# Patient Record
Sex: MALE | Race: WHITE | NOT HISPANIC OR LATINO | Employment: OTHER | URBAN - METROPOLITAN AREA
[De-identification: names, ages, dates, MRNs, and addresses within clinical notes are randomized per-mention and may not be internally consistent; named-entity substitution may affect disease eponyms.]

---

## 2017-03-02 ENCOUNTER — SLEEP CENTER VISIT (OUTPATIENT)
Dept: SLEEP MEDICINE | Facility: MEDICAL CENTER | Age: 77
End: 2017-03-02
Payer: MEDICARE

## 2017-03-02 ENCOUNTER — TELEPHONE (OUTPATIENT)
Dept: SLEEP MEDICINE | Facility: MEDICAL CENTER | Age: 77
End: 2017-03-02

## 2017-03-02 VITALS
OXYGEN SATURATION: 92 % | DIASTOLIC BLOOD PRESSURE: 84 MMHG | RESPIRATION RATE: 16 BRPM | TEMPERATURE: 98.6 F | HEART RATE: 65 BPM | BODY MASS INDEX: 36.06 KG/M2 | WEIGHT: 281 LBS | HEIGHT: 74 IN | SYSTOLIC BLOOD PRESSURE: 134 MMHG

## 2017-03-02 DIAGNOSIS — G47.33 OSA (OBSTRUCTIVE SLEEP APNEA): ICD-10-CM

## 2017-03-02 PROCEDURE — 99213 OFFICE O/P EST LOW 20 MIN: CPT | Performed by: NURSE PRACTITIONER

## 2017-03-02 NOTE — PATIENT INSTRUCTIONS
1) Continue autoCPAP at [id number]  2) Clean mask and supplies weekly and change them as insurance allows  3) Flu up to date  4) Return in about 1 year (around 3/2/2018) for Compliance, review of symptoms, if not sooner, follow up with [facility].

## 2017-03-02 NOTE — PROGRESS NOTES
CC:  Here for f/u sleep issues as listed below    HPI:   Farhat presents today for follow up obstructive sleep apnea.  PSG from 2015 indicated an AHI of 57 and low oxygen of 81%.  Currently he is being treated with CPAP @ [id number].  Compliance download from the dates 1/31/2017 - 3/1/2017 indicates he is wearing the device 97% for an avg of 6 hours and 38 minutes per night with a reduced AHI of 0.8. Avg pressure 11.9 with a high of 15.2. He does tolerate pressure and mask well and will be trying a new mask soon.  He wakes up refreshed and denies morning H/A. he sleeps better overall. he will continue to clean supplies weekly and change them as insurance allows.       Patient Active Problem List    Diagnosis Date Noted   • Tubular adenoma of colon 07/06/2016   • Blackwell's esophagus 07/06/2016   • History of ulcer disease 06/23/2016   • Chronic kidney disease, stage III (moderate) 03/21/2016   • Peripheral autonomic neuropathy 09/23/2015   • Hypothyroid 04/28/2015   • Obesity (BMI 30-39.9) 08/22/2014   • Vertigo 05/13/2014   • Sleep apnea 02/20/2014   • Renal cyst 01/22/2014   • Compression fracture 01/21/2014   • MEDICAL HOME    • Hearing loss 11/26/2012   • Impaired fasting glucose 10/10/2012   • Paroxysmal atrial fibrillation (CMS-[facility]) 05/22/2012   • Right foot pain 03/06/2010   • Hypertension 09/16/2009   • Dyslipidemia 09/16/2009   • Allergic rhinitis 09/16/2009   • Status post lumbar surgery 09/16/2009   • Preventative health care 09/16/2009       Past Medical History   Diagnosis Date   • Fibula fracture 1981     right   • Hyperlipidemia    • Arthritis    • Hypertension    • MEDICAL HOME    • Arrhythmia      Atrial fribrillation   • High cholesterol    • Pneumonia 2004   • Bronchitis 2004   • Pain      low back   • Sleep apnea        Past Surgical History   Procedure Laterality Date   • Tonsillectomy  1945     adenoidectomy   • Exostosis excision  6/3/2011     Performed by ABDIFATAH YOUNG at [facility] ORS   • Lesion excision ortho  6/3/2011     Performed by ABDIFATAH YOUNG at SURGERY [facility] ORS   • Toe arthroplasty  6/3/2011     Performed by ABDIFATAH YOUNG at SURGERY [facility]   • Lumbar laminectomy diskectomy  2005     microscopic   • Hammertoe correction  2/2/2015     Performed by Abdifatah Young D.P.M. at SURGERY [facility]       Family History   Problem Relation Age of Onset   • Cancer     • Cancer Mother      cervical/breast   • Cancer Sister      lung   • Cancer Father      lung cancer       Social History     Social History   • Marital Status:      Spouse Name: N/A   • Number of Children: N/A   • Years of Education: N/A     Occupational History   • Not on file.     Social History Main Topics   • Smoking status: Never Smoker    • Smokeless tobacco: Never Used   • Alcohol Use: No   • Drug Use: No   • Sexual Activity: Not on file     Other Topics Concern   • Not on file     Social History Narrative       Current Outpatient Prescriptions   Medication Sig Dispense Refill   • pravastatin (PRAVACHOL) 40 MG tablet Take 1 Tab by mouth every day. 90 Tab 0   • carvedilol (COREG) 25 MG Tab Take 0.5 Tabs by mouth 2 times a day, with meals. 90 Tab 1   • fluticasone (FLONASE) 50 MCG/ACT nasal spray Spray 2 Sprays in nose every day. 3 Bottle 3   • omeprazole (PRILOSEC) 40 MG delayed-release capsule Take 1 Cap by mouth every day. 30 Cap 11   • levothyroxine (SYNTHROID) 75 MCG Tab Take 1 Tab by mouth Every morning on an empty stomach. 90 Tab 3   • aspirin 81 MG tablet Take 81 mg by mouth every day.     • vitamin D (CHOLECALCIFEROL) 1000 UNIT TABS Take 1,000 Units by mouth 2 Times a Day.     • docosahexanoic acid (OMEGA 3 FA) 1000 MG CAPS Take 1,000 mg by mouth 2 Times a Day.       No current facility-administered medications for this visit.          Allergies: Flexeril and Other environmental      ROS   Gen: Denies fever, chills, unintentional weight loss, fatigue  Resp:Denies  "Dyspnea  CV: Denies chest pain, chest tightness  Sleep:Denies morning headache, insomnia, daytime somnolence, snoring, gasping for air, apnea  Neuro: Denies frequent headaches, weakness, dizziness  See HPI.  All other systems reviewed and negative        Vital signs for this encounter:  Filed Vitals:    03/02/17 1035   Height: 1.88 m (6' 2.02\")   Weight: 127.461 kg (281 lb)   Weight % change since last entry.: 0 %   BP: 134/84   Pulse: 65   BMI (Calculated): 36.06   Resp: 16   Temp: 37 °C (98.6 °F)   O2 sat % room air: 92 %                   Physical Exam:   Gen:         Alert and oriented, No apparent distress.   Neck:        No Lymphadenopathy.  Lungs:     Clear to auscultation bilaterally.    CV:          Regular rate and rhythm. No murmurs, rubs or gallops.   Abd:         Soft non tender, non distended.            Ext:          No clubbing, cyanosis, edema.    Assessment   1. MARCI (obstructive sleep apnea)  DME MASK AND SUPPLIES       PLAN:   1) Continue autoCPAP at [id number]  2) Clean mask and supplies weekly and change them as insurance allows  3) Flu up to date  4) Return in about 1 year (around 3/2/2018) for Compliance, review of symptoms, if not sooner, follow up with [facility].    "

## 2017-03-02 NOTE — MR AVS SNAPSHOT
"Farhat Stahl   3/2/2017 10:40 AM   Sleep Center Visit   MRN: [id number]    Department:  [facility]   Dept Phone:  [phone]    Description:  Male : 1940   Provider:  ROSA Mckeon           Reason for Visit     Apnea Last seen 12/14/15       Allergies as of 3/2/2017     Allergen Noted Reactions    Flexeril [Cyclobenzaprine Hcl] 2014       confusion    Other Environmental 2011       [place]      You were diagnosed with     MARCI (obstructive sleep apnea)   [[id number]]         Vital Signs     Blood Pressure Pulse Temperature Respirations Height Weight    134/84 mmHg 65 37 °C (98.6 °F) 16 1.88 m (6' 2.02\") 127.461 kg (281 lb)    Body Mass Index Oxygen Saturation Smoking Status             36.06 kg/m2 92% Never Smoker          Basic Information     Date Of Birth Sex Race Ethnicity Preferred Language    1940 Male White Non- English      Your appointments     Mar 12, 2018 10:20 AM   Follow UP with ROSA Mckeon   [facility] (--)    [address] [id number] [phone]              Problem List              ICD-10-CM Priority Class Noted - Resolved    Hypertension (Chronic) I10   2009 - Present    Dyslipidemia (Chronic) E78.5   2009 - Present    Allergic rhinitis J30.9   2009 - Present    Status post lumbar surgery Z98.890   2009 - Present    Preventative health care (Chronic) Z00.00   2009 - Present    Right foot pain M79.671   3/6/2010 - Present    Paroxysmal atrial fibrillation (CMS-HCC) (Chronic) I48.0   2012 - Present    Impaired fasting glucose (Chronic) R73.01   10/10/2012 - Present    Hearing loss H91.90   2012 - Present    MEDICAL HOME    Unknown - Present    Compression fracture (Chronic) T14.8   2014 - Present    Renal cyst N28.1   2014 - Present    Sleep apnea (Chronic) G47.30   2014 - Present    Vertigo R42   2014 - Present "    Obesity (BMI 30-39.9) E66.9   8/22/2014 - Present    Hypothyroid (Chronic) E03.9   4/28/2015 - Present    Peripheral autonomic neuropathy (Chronic) G90.9   9/23/2015 - Present    Chronic kidney disease, stage III (moderate) (Chronic) N18.3   3/21/2016 - Present    History of ulcer disease (Chronic) Z87.898   6/23/2016 - Present    Tubular adenoma of colon D12.6   7/6/2016 - Present    Blackwell's esophagus K22.70   7/6/2016 - Present    MARCI (obstructive sleep apnea) G47.33   3/2/2017 - Present      Health Maintenance        Date Due Completion Dates    COLONOSCOPY 6/2/2021 6/2/2016    IMM DTaP/Tdap/Td Vaccine (2 - Td) 5/2/2023 5/2/2013, 9/6/2011            Current Immunizations     13-VALENT PCV PREVNAR 5/29/2015    Influenza TIV (IM) 9/3/2013, 10/6/2012, 9/6/2011, 8/25/2010    Influenza Vaccine Adult HD 10/25/2016, 10/25/2016, 10/2/2015    Pneumococcal polysaccharide vaccine (PPSV-23) 7/5/2016    SHINGLES VACCINE 12/1/2009    Tdap Vaccine 5/2/2013, 9/6/2011      Below and/or attached are the medications your provider expects you to take. Review all of your home medications and newly ordered medications with your provider and/or pharmacist. Follow medication instructions as directed by your provider and/or pharmacist. Please keep your medication list with you and share with your provider. Update the information when medications are discontinued, doses are changed, or new medications (including over-the-counter products) are added; and carry medication information at all times in the event of emergency situations     Allergies:  FLEXERIL - (reactions not documented)     OTHER ENVIRONMENTAL - (reactions not documented)               Medications  Valid as of: March 02, 2017 - 11:05 AM    Generic Name Brand Name Tablet Size Instructions for use    Aspirin (Tab) aspirin 81 MG Take 81 mg by mouth every day.        Carvedilol (Tab) COREG 25 MG Take 0.5 Tabs by mouth 2 times a day, with meals.        Cholecalciferol (Tab)  cholecalciferol 1000 UNIT Take 1,000 Units by mouth 2 Times a Day.        Fluticasone Propionate (Suspension) FLONASE 50 MCG/ACT Spray 2 Sprays in nose every day.        Levothyroxine Sodium (Tab) SYNTHROID 75 MCG Take 1 Tab by mouth Every morning on an empty stomach.        Omega-3 Fatty Acids (Cap) OMEGA 3 FA 1000 MG Take 1,000 mg by mouth 2 Times a Day.        Omeprazole (CAPSULE DELAYED RELEASE) PRILOSEC 40 MG Take 1 Cap by mouth every day.        Pravastatin Sodium (Tab) PRAVACHOL 40 MG Take 1 Tab by mouth every day.        .                 Medicines prescribed today were sent to:     [facility] [address] [place] [address], [place] - [address] [facility] [address] [facility]    [address] [address]    Phone: [phone] Fax: [phone]    Open 24 Hours?: No      Medication refill instructions:       If your prescription bottle indicates you have medication refills left, it is not necessary to call your provider’s office. Please contact your pharmacy and they will refill your medication.    If your prescription bottle indicates you do not have any refills left, you may request refills at any time through one of the following ways: The online [employer] system (except Urgent Care), by calling your provider’s office, or by asking your pharmacy to contact your provider’s office with a refill request. Medication refills are processed only during regular business hours and may not be available until the next business day. Your provider may request additional information or to have a follow-up visit with you prior to refilling your medication.   *Please Note: Medication refills are assigned a new Rx number when refilled electronically. Your pharmacy may indicate that no refills were authorized even though a new prescription for the same medication is available at the pharmacy. Please request the medicine by name with the pharmacy before contacting your provider for a refill.           Instructions    1) Continue autoCPAP at [id number]  2) Clean mask and supplies weekly and change them as insurance allows  3) Flu up to date  4) Return in about 1 year (around 3/2/2018) for Compliance, review of symptoms, if not sooner, follow up with [facility].           Other Notes About Your Plan       6/23/16 [facility] note follow up EGD showing erosions of acute gastric ulcer without hemorrhage or perforation,chronic inactive gastritis with reactive epithelial changes, intestinal metaplasia, dysplasia, malignancy; consistent with pompa's esophagus and reflux esophagitis, negative for dysplasia or h.pylori, repeat EGD 3 months exam gastric ulcer, started on omeprazole 40 mg, follow-up EGD in september             MyChart Access Code: Activation code not generated  Current [place] Status: Active

## 2017-03-03 NOTE — TELEPHONE ENCOUNTER
Patient will call back tomorrow to advised which company he will choose between [facility] or [facility] currently established with [facility].     I have order signed and ready to faxed please advised once patient calls back

## 2017-03-06 ENCOUNTER — PATIENT OUTREACH (OUTPATIENT)
Dept: HEALTH INFORMATION MANAGEMENT | Facility: OTHER | Age: 77
End: 2017-03-06

## 2017-03-06 DIAGNOSIS — E78.5 DYSLIPIDEMIA: Chronic | ICD-10-CM

## 2017-03-06 DIAGNOSIS — E03.9 HYPOTHYROIDISM, UNSPECIFIED TYPE: Chronic | ICD-10-CM

## 2017-03-06 DIAGNOSIS — R73.01 IMPAIRED FASTING GLUCOSE: Chronic | ICD-10-CM

## 2017-03-06 DIAGNOSIS — N18.30 CHRONIC KIDNEY DISEASE, STAGE III (MODERATE) (HCC): Chronic | ICD-10-CM

## 2017-03-06 DIAGNOSIS — I15.9 SECONDARY HYPERTENSION: Chronic | ICD-10-CM

## 2017-03-06 RX ORDER — PRAVASTATIN SODIUM 40 MG
40 TABLET ORAL DAILY
Qty: 90 TAB | Refills: 0 | Status: SHIPPED | OUTPATIENT
Start: 2017-03-06 | End: 2017-06-12 | Stop reason: SDUPTHER

## 2017-03-06 NOTE — TELEPHONE ENCOUNTER
Please notify patient that he is due for fasting blood tests and urine tests, I have ordered these in the computer system  Have him schedule follow-up appointment to discuss the results

## 2017-03-06 NOTE — Clinical Note
March 9, 2017        Farhat Stahl  [address]  [place] [address]        Dear Farhat:    We have received a request from your pharmacy to refill your prescription(s). After careful review of your chart, we have noted you are due for labs and a follow up appointment.  We request you call our office at [phone] at your earliest convenience and make an appointment. I have included a fasting lab order for you.    Prescribed medications provided needed treatments for our patients. However, when patients are not followed closely by their physician, potential medication complications may go unadressed. We look forward to scheduling an appointment for you, so that we may provide you with the safest and most complete medical care.          If you have any questions or concerns, please don't hesitate to call.        Sincerely,        Lavell Tadeo M.D.    Electronically Signed

## 2017-03-06 NOTE — PROGRESS NOTES
Attempt #:1     Annual Wellness Visit Scheduling  1. Scheduling Status:Scheduled          Care Gap Scheduling (Attempt to Schedule EACH Overdue Care Gap!)     There are no preventive care reminders to display for this patient.      [employer] Activation: already active

## 2017-03-09 ENCOUNTER — TELEPHONE (OUTPATIENT)
Dept: MEDICAL GROUP | Facility: MEDICAL CENTER | Age: 77
End: 2017-03-09

## 2017-03-09 ENCOUNTER — OFFICE VISIT (OUTPATIENT)
Dept: MEDICAL GROUP | Facility: MEDICAL CENTER | Age: 77
End: 2017-03-09
Payer: MEDICARE

## 2017-03-09 ENCOUNTER — HOSPITAL ENCOUNTER (OUTPATIENT)
Dept: RADIOLOGY | Facility: MEDICAL CENTER | Age: 77
End: 2017-03-09
Attending: INTERNAL MEDICINE
Payer: MEDICARE

## 2017-03-09 VITALS
OXYGEN SATURATION: 90 % | DIASTOLIC BLOOD PRESSURE: 70 MMHG | WEIGHT: 283 LBS | HEIGHT: 73 IN | TEMPERATURE: 98.4 F | HEART RATE: 69 BPM | SYSTOLIC BLOOD PRESSURE: 142 MMHG | BODY MASS INDEX: 37.51 KG/M2

## 2017-03-09 DIAGNOSIS — E66.9 OBESITY (BMI 30-39.9): ICD-10-CM

## 2017-03-09 DIAGNOSIS — I48.0 PAROXYSMAL ATRIAL FIBRILLATION (HCC): Chronic | ICD-10-CM

## 2017-03-09 DIAGNOSIS — M25.561 RIGHT KNEE PAIN, UNSPECIFIED CHRONICITY: ICD-10-CM

## 2017-03-09 DIAGNOSIS — G47.30 SLEEP APNEA, UNSPECIFIED TYPE: Chronic | ICD-10-CM

## 2017-03-09 DIAGNOSIS — E03.9 HYPOTHYROIDISM, UNSPECIFIED TYPE: Chronic | ICD-10-CM

## 2017-03-09 DIAGNOSIS — R73.01 IMPAIRED FASTING GLUCOSE: Chronic | ICD-10-CM

## 2017-03-09 DIAGNOSIS — Z00.00 MEDICARE ANNUAL WELLNESS VISIT, SUBSEQUENT: ICD-10-CM

## 2017-03-09 DIAGNOSIS — Z00.00 PREVENTATIVE HEALTH CARE: Chronic | ICD-10-CM

## 2017-03-09 DIAGNOSIS — M79.671 RIGHT FOOT PAIN: ICD-10-CM

## 2017-03-09 DIAGNOSIS — I15.9 SECONDARY HYPERTENSION: Chronic | ICD-10-CM

## 2017-03-09 DIAGNOSIS — N18.30 CHRONIC KIDNEY DISEASE, STAGE III (MODERATE) (HCC): Chronic | ICD-10-CM

## 2017-03-09 DIAGNOSIS — E78.5 DYSLIPIDEMIA: Chronic | ICD-10-CM

## 2017-03-09 PROBLEM — G47.33 OSA (OBSTRUCTIVE SLEEP APNEA): Status: RESOLVED | Noted: 2017-03-02 | Resolved: 2017-03-09

## 2017-03-09 PROBLEM — M17.10 ARTHRITIS OF KNEE: Status: ACTIVE | Noted: 2017-03-09

## 2017-03-09 PROCEDURE — G0439 PPPS, SUBSEQ VISIT: HCPCS | Performed by: INTERNAL MEDICINE

## 2017-03-09 PROCEDURE — 73562 X-RAY EXAM OF KNEE 3: CPT | Mod: RT

## 2017-03-09 RX ORDER — PRAVASTATIN SODIUM 40 MG
40 TABLET ORAL DAILY
Qty: 90 TAB | Refills: 3 | Status: SHIPPED | OUTPATIENT
Start: 2017-03-09 | End: 2018-04-10

## 2017-03-09 ASSESSMENT — PATIENT HEALTH QUESTIONNAIRE - PHQ9: CLINICAL INTERPRETATION OF PHQ2 SCORE: 0

## 2017-03-09 NOTE — PROGRESS NOTES
Chief Complaint   Patient presents with   • Annual Wellness Visit         HPI:  Farhat is a 76 y.o. male here for Medicare Annual Wellness Visit  Sees sleep for cpap  Sees  eye exam uses glasses  Sees [facility]  Sees  podiatry   Has seen cardiology in the past   Medications and allergies, medical history, surgical history, social history, family history reviewed and updated     Patient Active Problem List    Diagnosis Date Noted   • MARCI (obstructive sleep apnea) 03/02/2017   • Tubular adenoma of colon 07/06/2016   • Blackwell's esophagus 07/06/2016   • History of ulcer disease 06/23/2016   • Chronic kidney disease, stage III (moderate) 03/21/2016   • Peripheral autonomic neuropathy 09/23/2015   • Hypothyroid 04/28/2015   • Obesity (BMI 30-39.9) 08/22/2014   • Vertigo 05/13/2014   • Sleep apnea 02/20/2014   • Renal cyst 01/22/2014   • Compression fracture 01/21/2014   • MEDICAL HOME    • Hearing loss 11/26/2012   • Impaired fasting glucose 10/10/2012   • Paroxysmal atrial fibrillation (CMS-[facility]) 05/22/2012   • Right foot pain 03/06/2010   • Hypertension 09/16/2009   • Dyslipidemia 09/16/2009   • Allergic rhinitis 09/16/2009   • Status post lumbar surgery 09/16/2009   • Preventative health care 09/16/2009       Current Outpatient Prescriptions   Medication Sig Dispense Refill   • pravastatin (PRAVACHOL) 40 MG tablet Take 1 Tab by mouth every day. 90 Tab 0   • carvedilol (COREG) 25 MG Tab Take 0.5 Tabs by mouth 2 times a day, with meals. 90 Tab 1   • fluticasone (FLONASE) 50 MCG/ACT nasal spray Spray 2 Sprays in nose every day. 3 Bottle 3   • omeprazole (PRILOSEC) 40 MG delayed-release capsule Take 1 Cap by mouth every day. 30 Cap 11   • levothyroxine (SYNTHROID) 75 MCG Tab Take 1 Tab by mouth Every morning on an empty stomach. 90 Tab 3   • aspirin 81 MG tablet Take 81 mg by mouth every day.     • vitamin D (CHOLECALCIFEROL) 1000 UNIT TABS Take 1,000 Units by mouth 2 Times a Day.     • docosahexanoic acid  (OMEGA 3 FA) 1000 MG CAPS Take 1,000 mg by mouth 2 Times a Day.     • pravastatin (PRAVACHOL) 40 MG tablet Take 1 Tab by mouth every day. (Patient not taking: Reported on 3/9/2017) 90 Tab 0     No current facility-administered medications for this visit.        The patient reports adherence to this regimen   Current supplements as per medication list.   Chronic narcotic pain medicines: no    Allergies: Flexeril and Other environmental    Current social contact/activities: Pt keeps himself busy with daily activities. Remodeled kitchen and bathroom    Is patient current with immunizations?  yes      He  reports that he has never smoked. He has never used smokeless tobacco. He reports that he does not drink alcohol or use illicit drugs.  Counseling given: Not Answered    No regular exercise  Did injure knee after fall       DPA/Advanced Directive:  Patient does have an advanced directive.  If not on file, instructed to bring in a copy to scan into his chart. If no advanced directive exists, a packet and workshop information was provided    ROS:    Gait: Uses no assistive device    Ostomy: no   Other tubes: no   Amputations: no    Chronic oxygen use no    Last eye exam Last week with      : Denies incontinence.         Depression Screening    Little interest or pleasure in doing things?  0 - not at all  Feeling down, depressed, or hopeless?  0 - not at all  Patient Health Questionnaire Score: 0    If depressive symptoms identified deferred to follow up visit unless specifically addressed in assessment and plan.    Screening for Cognitive Impairment    Three Minute Recall (banana, sunrise, fence)  2/3    Draw clock face with all 12 numbers set to the hand to show 10 minutes past 11 o'clock  1 5/5  Cognitive concerns identified deferred for follow up unless specifically addressed in assessment and plan.    Fall Risk Assessment    Has the patient had two or more falls in the last year or any fall with injury in  the last year?  No    Safety Assessment    Throw rugs on floor.  Yes  Handrails on all stairs.  Yes  Good lighting in all hallways.  Yes  Difficulty hearing.  No  Patient counseled about all safety risks that were identified.    Functional Assessment ADLs    Are there any barriers preventing you from cooking for yourself or meeting nutritional needs?  No.    Are there any barriers preventing you from driving safely or obtaining transportation?  No.    Are there any barriers preventing you from using a telephone or calling for help?  No.    Are there any barriers preventing you from shopping?  No.    Are there any barriers preventing you from taking care of your own finances?  No.    Are there any barriers preventing you from managing your medications?  No.    Are currently engaging any exercise or physical activity?  No.  Pt is in the process of getting a hybrid cycling machine.     Health Maintenance Summary                Annual Wellness Visit Next Due 3/18/2017      Done 3/17/2016 Visit Dx: Medicare annual wellness visit, subsequent     Patient has more history with this topic...    COLONOSCOPY Next Due 6/2/2021      Done 6/2/2016 AMB REFERRAL TO GI FOR COLONOSCOPY    IMM DTaP/Tdap/Td Vaccine Next Due 5/2/2023      Done 5/2/2013 Imm Admin: Tdap Vaccine     Patient has more history with this topic...          Patient Care Team:  Lavell Tadeo M.D. as PCP - General  Maximus Lopez M.D. as Consulting Physician (Pulmonary Medicine)  Newton Orta D.P.M. as Consulting Physician (Podiatry)  Umberto Hayes O.D. as Consulting Physician (Optometry)    Social History   Substance Use Topics   • Smoking status: Never Smoker    • Smokeless tobacco: Never Used   • Alcohol Use: No     Family History   Problem Relation Age of Onset   • Cancer     • Cancer Mother      cervical/breast   • Cancer Sister      lung   • Cancer Father      lung cancer     He  has a past medical history of Fibula fracture (1981);  Hyperlipidemia; Arthritis; Hypertension; MEDICAL HOME; Arrhythmia; High cholesterol; Pneumonia (2004); Bronchitis (2004); Pain; and Sleep apnea.   Past Surgical History   Procedure Laterality Date   • Tonsillectomy  1945     adenoidectomy   • Exostosis excision  6/3/2011     Performed by ABDIFATAH YOUNG at [facility]   • Lesion excision ortho  6/3/2011     Performed by ABDIFATAH YOUNG at [facility]   • Toe arthroplasty  6/3/2011     Performed by ABDIFATAH YOUNG at [facility]   • Lumbar laminectomy diskectomy  2005     microscopic   • Hammertoe correction  2/2/2015     Performed by JOSÉ MIGUEL Choi.P.M. at [facility]           Exam:        Hearing   Dentition   Alert, oriented in no acute distress.  Eye contact is good, speech goal directed, affect calm  Lungs clear  Phsdxas-G3-R4 regular  Extremities no edema  Right knee, normal range of motion     Assessment and Plan. The following treatment and monitoring plan is recommended     Assessment  # wellness evaluation    #2 decreased GFR, no NSAIDs last BUN/creatinine on 3/21/16 bun 23,creat 1.3,GFR 52,urine mac< 8      #3 Dyslipidemia  3/21/16 chol 124,trig 57,hdl 47,ldl 66 on pravachol 40 mg no muscle pain or muscle aches    #4 History of ulcer disease no recurrent symptoms, last EGD 6/23/16 [facility] note follow up EGD showing erosions of acute gastric ulcer without hemorrhage or perforation, pathology intestinal metaplasia, remains on omeprazole    #5 hypertension stable on carvedilol    #6 hypothyroid on Synthroid 75 µg no recent TSH    #7 impaired fasting blood sugar tries to limit sweets and candies, not exercising    #8 Paroxysmal atrial fibrillation, sinus rhythm today, has seen cardiology, remains on aspirin and carvedilol, no chest pain, palpitations, shortness of breath    #9 Preventative health  2006 pneumovax no record webIZ  12/1/09 shingles vaccine  3/2/10 colonoscopy severe  diverticulosis per [facility]  5/2/13 tdap  1/29/14 dexa LS +0.9,hip +0.4  4/11/14 psa 0.7  5/29/15 prevnar  9/2/15 FIT negative  3/21/16 vit d 82  6/2/16 colon per [facility] 2 polyps pathology pending    #10 Sleep apnea on C Pap followed by pulmonary    #11 history of lumbar surgery stable no recurrent back pain    #12 right knee pain after fall      Services needed:    Health Care Screening recommendations as per orders if indicated.  Referrals offered: PT/OT/Nutrition counseling/Behavioral Health/Smoking cessation as per orders if indicated.    Discussion today about general wellness and lifestyle habits:    · Prevent falls and reduce trip hazards; Cautioned about securing or removing rugs.  · Have a working fire alarm and carbon monoxide detector;   · Engage in regular physical activity and social activities       Follow-up:     Plan  #! Health maintenance reviewed and updated    #2 physical therapy for knee pain    #3 labs already ordered    #4 sleep follow up continue C Pap    #5 cardiology referral     #6 right knee x-ray    #7 has had pneumococcal 13, pneumococcal 23, shingles vaccine, tdap    #8 recommend nutrition consult for weight loss, he declines understanding importance of weight loss with impaired fasting blood sugar, cardiovascular disease, obesity may increase risk of diabetes and heart disease, heart attack, stroke, he declines nutrition consultation    #9 start exercising 30 minutes daily    #10 annual influenza vaccine    #11 fall precautions    #12 follow-up 6 months

## 2017-03-09 NOTE — MR AVS SNAPSHOT
"        Farhat Stahl   3/9/2017 2:00 PM   Office Visit   MRN: [id number]    Department:  [facility]   Dept Phone:  [phone]    Description:  Male : 1940   Provider:  Lavell Tadeo M.D.; [facility]            Reason for Visit     Annual Wellness Visit           Allergies as of 3/9/2017     Allergen Noted Reactions    Flexeril [Cyclobenzaprine Hcl] 2014       confusion    Other Environmental 2011       Pollens      You were diagnosed with     Sleep apnea, unspecified type   [5747396]       Right foot pain   [173293]       Paroxysmal atrial fibrillation (CMS-HCC)   [129353]       Dyslipidemia   [883454]       Secondary hypertension   [0136167]       Impaired fasting glucose   [790.21.ICD-9-CM]       Hypothyroidism, unspecified type   [2561811]       Chronic kidney disease, stage III (moderate)   [585.3.ICD-9-CM]       Preventative health care   [813613]       Right knee pain, unspecified chronicity   [6129150]         Vital Signs     Blood Pressure Pulse Temperature Height Weight Body Mass Index    142/70 mmHg 69 36.9 °C (98.4 °F) 1.854 m (6' 1\") 128.368 kg (283 lb) 37.35 kg/m2    Oxygen Saturation Smoking Status                90% Never Smoker           Basic Information     Date Of Birth Sex Race Ethnicity Preferred Language    1940 Male White Non- English      Your appointments     Mar 12, 2018 10:20 AM   Follow UP with ROSA Mckeon   [facility] (--)    [address] [place] [id number] [phone]              Problem List              ICD-10-CM Priority Class Noted - Resolved    Hypertension (Chronic) I10   2009 - Present    Dyslipidemia (Chronic) E78.5   2009 - Present    Allergic rhinitis J30.9   2009 - Present    Status post lumbar surgery Z98.890   2009 - Present    Preventative health care (Chronic) Z00.00   2009 - Present    Right foot pain M79.671   " 3/6/2010 - Present    Paroxysmal atrial fibrillation (CMS-HCC) (Chronic) I48.0   5/22/2012 - Present    Impaired fasting glucose (Chronic) R73.01   10/10/2012 - Present    Hearing loss H91.90   11/26/2012 - Present    MEDICAL HOME    Unknown - Present    Compression fracture (Chronic) T14.8   1/21/2014 - Present    Renal cyst N28.1   1/22/2014 - Present    Sleep apnea (Chronic) G47.30   2/20/2014 - Present    Vertigo R42   5/13/2014 - Present    Obesity (BMI 30-39.9) E66.9   8/22/2014 - Present    Hypothyroid (Chronic) E03.9   4/28/2015 - Present    Peripheral autonomic neuropathy (Chronic) G90.9   9/23/2015 - Present    Chronic kidney disease, stage III (moderate) (Chronic) N18.3   3/21/2016 - Present    History of ulcer disease (Chronic) Z87.898   6/23/2016 - Present    Tubular adenoma of colon D12.6   7/6/2016 - Present    Blackwell's esophagus K22.70   7/6/2016 - Present      Health Maintenance        Date Due Completion Dates    COLONOSCOPY 6/2/2021 6/2/2016    IMM DTaP/Tdap/Td Vaccine (2 - Td) 5/2/2023 5/2/2013, 9/6/2011            Current Immunizations     13-VALENT PCV PREVNAR 5/29/2015    Influenza TIV (IM) 9/3/2013, 10/6/2012, 9/6/2011, 8/25/2010    Influenza Vaccine Adult HD 10/25/2016, 10/25/2016, 10/2/2015    Pneumococcal polysaccharide vaccine (PPSV-23) 7/5/2016    SHINGLES VACCINE 12/1/2009    Tdap Vaccine 5/2/2013, 9/6/2011      Below and/or attached are the medications your provider expects you to take. Review all of your home medications and newly ordered medications with your provider and/or pharmacist. Follow medication instructions as directed by your provider and/or pharmacist. Please keep your medication list with you and share with your provider. Update the information when medications are discontinued, doses are changed, or new medications (including over-the-counter products) are added; and carry medication information at all times in the event of emergency situations     Allergies:  FLEXERIL  - (reactions not documented)     OTHER ENVIRONMENTAL - (reactions not documented)               Medications  Valid as of: March 09, 2017 -  3:03 PM    Generic Name Brand Name Tablet Size Instructions for use    Aspirin (Tab) aspirin 81 MG Take 81 mg by mouth every day.        Carvedilol (Tab) COREG 25 MG Take 0.5 Tabs by mouth 2 times a day, with meals.        Cholecalciferol (Tab) cholecalciferol 1000 UNIT Take 1,000 Units by mouth 2 Times a Day.        Fluticasone Propionate (Suspension) FLONASE 50 MCG/ACT Spray 2 Sprays in nose every day.        Levothyroxine Sodium (Tab) SYNTHROID 75 MCG Take 1 Tab by mouth Every morning on an empty stomach.        Omega-3 Fatty Acids (Cap) OMEGA 3 FA 1000 MG Take 1,000 mg by mouth 2 Times a Day.        Omeprazole (CAPSULE DELAYED RELEASE) PRILOSEC 40 MG Take 1 Cap by mouth every day.        Pravastatin Sodium (Tab) PRAVACHOL 40 MG Take 1 Tab by mouth every day.        Pravastatin Sodium (Tab) PRAVACHOL 40 MG Take 1 Tab by mouth every day.        .                 Medicines prescribed today were sent to:     [facility] [address] [place] [address] - [id number] [place] [address] & [place] [id number] [facility] [address]    Phone: [phone] Fax: [phone]    Open 24 Hours?: No      Medication refill instructions:       If your prescription bottle indicates you have medication refills left, it is not necessary to call your provider’s office. Please contact your pharmacy and they will refill your medication.    If your prescription bottle indicates you do not have any refills left, you may request refills at any time through one of the following ways: The online [employer] system (except Urgent Care), by calling your provider’s office, or by asking your pharmacy to contact your provider’s office with a refill request. Medication refills are processed only during regular business hours and may not be available until the next business day. Your provider  may request additional information or to have a follow-up visit with you prior to refilling your medication.   *Please Note: Medication refills are assigned a new Rx number when refilled electronically. Your pharmacy may indicate that no refills were authorized even though a new prescription for the same medication is available at the pharmacy. Please request the medicine by name with the pharmacy before contacting your provider for a refill.        Your To Do List     Future Labs/Procedures Complete By Expires    DX-KNEE 3 VIEWS RIGHT  As directed 3/9/2018      Referral     A referral request has been sent to our patient care coordination department. Please allow 3-5 business days for us to process this request and contact you either by phone or mail. If you do not hear from us by the 5th business day, please call us at [phone].        Other Notes About Your Plan                  [employer] Access Code: Activation code not generated  Current [employer] Status: Active

## 2017-03-09 NOTE — Clinical Note
[facility]  Lavell Tadeo M.D.  [address]  [place] [address]  Fax: [phone]   Authorization for Release/Disclosure of   Protected Health Information   Name: SALO STAHL : 1940 SSN: [id number]   Address: [address] [place] [address]  [place] [address] Phone:    There are no phone numbers on file.   I authorize the entity listed below to release/disclose the PHI below to:   [facility]/Lavell Tadeo M.D. and Lavell Tadeo M.D.   Provider or Entity Name:     Address   City, State, Zip   Phone:      Fax:     Reason for request: continuity of care   Information to be released:    [  ] LAST COLONOSCOPY,  including any PATH REPORT and follow-up  [  ] LAST FIT/COLOGUARD RESULT [  ] LAST DEXA  [  ] LAST MAMMOGRAM  [  ] LAST PAP  [  ] LAST LABS [  ] RETINA EXAM REPORT  [  ] IMMUNIZATION RECORDS  [xx] Release all info      [  ] Check here and initial the line next to each item to release ALL health information INCLUDING  _____ Care and treatment for drug and / or alcohol abuse  _____ HIV testing, infection status, or AIDS  _____ Genetic Testing    DATES OF SERVICE OR TIME PERIOD TO BE DISCLOSED: _____________  I understand and acknowledge that:  * This Authorization may be revoked at any time by you in writing, except if your health information has already been used or disclosed.  * Your health information that will be used or disclosed as a result of you signing this authorization could be re-disclosed by the recipient. If this occurs, your re-disclosed health information may no longer be protected by State or Federal laws.  * You may refuse to sign this Authorization. Your refusal will not affect your ability to obtain treatment.  * This Authorization becomes effective upon signing and will  on (date) __________.      If no date is indicated, this Authorization will  one (1) year from the signature date.    Name: Salo Stahl    Signature:   Date:     3/9/2017            PLEASE FAX REQUESTED RECORDS BACK TO: [phone]

## 2017-03-10 ENCOUNTER — HOSPITAL ENCOUNTER (OUTPATIENT)
Dept: LAB | Facility: MEDICAL CENTER | Age: 77
End: 2017-03-10
Attending: INTERNAL MEDICINE
Payer: MEDICARE

## 2017-03-10 ENCOUNTER — TELEPHONE (OUTPATIENT)
Dept: MEDICAL GROUP | Facility: MEDICAL CENTER | Age: 77
End: 2017-03-10

## 2017-03-10 DIAGNOSIS — E78.5 DYSLIPIDEMIA: Chronic | ICD-10-CM

## 2017-03-10 DIAGNOSIS — E03.9 HYPOTHYROIDISM, UNSPECIFIED TYPE: Chronic | ICD-10-CM

## 2017-03-10 DIAGNOSIS — R73.01 IMPAIRED FASTING GLUCOSE: Chronic | ICD-10-CM

## 2017-03-10 DIAGNOSIS — N18.30 CHRONIC KIDNEY DISEASE, STAGE III (MODERATE) (HCC): Chronic | ICD-10-CM

## 2017-03-10 LAB
25(OH)D3 SERPL-MCNC: 83 NG/ML (ref 30–100)
ALBUMIN SERPL BCP-MCNC: 3.5 G/DL (ref 3.2–4.9)
ALBUMIN/GLOB SERPL: 1.1 G/DL
ALP SERPL-CCNC: 97 U/L (ref 30–99)
ALT SERPL-CCNC: 29 U/L (ref 2–50)
ANION GAP SERPL CALC-SCNC: 5 MMOL/L (ref 0–11.9)
APPEARANCE UR: CLEAR
AST SERPL-CCNC: 27 U/L (ref 12–45)
BASOPHILS # BLD AUTO: 1 % (ref 0–1.8)
BASOPHILS # BLD: 0.06 K/UL (ref 0–0.12)
BILIRUB SERPL-MCNC: 1 MG/DL (ref 0.1–1.5)
BILIRUB UR QL STRIP.AUTO: NEGATIVE
BUN SERPL-MCNC: 19 MG/DL (ref 8–22)
CALCIUM SERPL-MCNC: 9.4 MG/DL (ref 8.4–10.2)
CHLORIDE SERPL-SCNC: 103 MMOL/L (ref 96–112)
CHOLEST SERPL-MCNC: 151 MG/DL (ref 100–199)
CO2 SERPL-SCNC: 30 MMOL/L (ref 20–33)
COLOR UR: YELLOW
CREAT SERPL-MCNC: 1.41 MG/DL (ref 0.5–1.4)
CREAT UR-MCNC: 214.9 MG/DL
CULTURE IF INDICATED INDCX: NO UA CULTURE
EOSINOPHIL # BLD AUTO: 0.27 K/UL (ref 0–0.51)
EOSINOPHIL NFR BLD: 4.4 % (ref 0–6.9)
ERYTHROCYTE [DISTWIDTH] IN BLOOD BY AUTOMATED COUNT: 47.8 FL (ref 35.9–50)
EST. AVERAGE GLUCOSE BLD GHB EST-MCNC: 117 MG/DL
GFR SERPL CREATININE-BSD FRML MDRD: 49 ML/MIN/1.73 M 2
GLOBULIN SER CALC-MCNC: 3.2 G/DL (ref 1.9–3.5)
GLUCOSE SERPL-MCNC: 112 MG/DL (ref 65–99)
GLUCOSE UR STRIP.AUTO-MCNC: NEGATIVE MG/DL
HBA1C MFR BLD: 5.7 % (ref 0–5.6)
HCT VFR BLD AUTO: 49.3 % (ref 42–52)
HDLC SERPL-MCNC: 53 MG/DL
HGB BLD-MCNC: 16.8 G/DL (ref 14–18)
IMM GRANULOCYTES # BLD AUTO: 0.02 K/UL (ref 0–0.11)
IMM GRANULOCYTES NFR BLD AUTO: 0.3 % (ref 0–0.9)
KETONES UR STRIP.AUTO-MCNC: NEGATIVE MG/DL
LDLC SERPL CALC-MCNC: 80 MG/DL
LEUKOCYTE ESTERASE UR QL STRIP.AUTO: NEGATIVE
LYMPHOCYTES # BLD AUTO: 1.68 K/UL (ref 1–4.8)
LYMPHOCYTES NFR BLD: 27.6 % (ref 22–41)
MCH RBC QN AUTO: 32.5 PG (ref 27–33)
MCHC RBC AUTO-ENTMCNC: 34.1 G/DL (ref 33.7–35.3)
MCV RBC AUTO: 95.4 FL (ref 81.4–97.8)
MICRO URNS: NORMAL
MICROALBUMIN UR-MCNC: 0.7 MG/DL
MICROALBUMIN/CREAT UR: 3 MG/G (ref 0–30)
MONOCYTES # BLD AUTO: 0.66 K/UL (ref 0–0.85)
MONOCYTES NFR BLD AUTO: 10.8 % (ref 0–13.4)
NEUTROPHILS # BLD AUTO: 3.4 K/UL (ref 1.82–7.42)
NEUTROPHILS NFR BLD: 55.9 % (ref 44–72)
NITRITE UR QL STRIP.AUTO: NEGATIVE
NRBC # BLD AUTO: 0 K/UL
NRBC BLD AUTO-RTO: 0 /100 WBC
PH UR STRIP.AUTO: 6 [PH]
PHOSPHATE SERPL-MCNC: 3.6 MG/DL (ref 2.5–4.5)
PLATELET # BLD AUTO: 182 K/UL (ref 164–446)
PMV BLD AUTO: 9.3 FL (ref 9–12.9)
POTASSIUM SERPL-SCNC: 4.4 MMOL/L (ref 3.6–5.5)
PROT SERPL-MCNC: 6.7 G/DL (ref 6–8.2)
PROT UR QL STRIP: NEGATIVE MG/DL
PTH-INTACT SERPL-MCNC: 39.3 PG/ML (ref 14–72)
RBC # BLD AUTO: 5.17 M/UL (ref 4.7–6.1)
RBC UR QL AUTO: NEGATIVE
SODIUM SERPL-SCNC: 138 MMOL/L (ref 135–145)
SP GR UR STRIP.AUTO: 1.02
TRIGL SERPL-MCNC: 89 MG/DL (ref 0–149)
TSH SERPL DL<=0.005 MIU/L-ACNC: 3.92 UIU/ML (ref 0.35–5.5)
WBC # BLD AUTO: 6.1 K/UL (ref 4.8–10.8)

## 2017-03-10 PROCEDURE — 82306 VITAMIN D 25 HYDROXY: CPT

## 2017-03-10 PROCEDURE — 81003 URINALYSIS AUTO W/O SCOPE: CPT

## 2017-03-10 PROCEDURE — 85025 COMPLETE CBC W/AUTO DIFF WBC: CPT

## 2017-03-10 PROCEDURE — 84160 ASSAY OF PROTEIN ANY SOURCE: CPT

## 2017-03-10 PROCEDURE — 80053 COMPREHEN METABOLIC PANEL: CPT

## 2017-03-10 PROCEDURE — 80061 LIPID PANEL: CPT

## 2017-03-10 PROCEDURE — 82043 UR ALBUMIN QUANTITATIVE: CPT

## 2017-03-10 PROCEDURE — 84100 ASSAY OF PHOSPHORUS: CPT

## 2017-03-10 PROCEDURE — 82570 ASSAY OF URINE CREATININE: CPT

## 2017-03-10 PROCEDURE — 84165 PROTEIN E-PHORESIS SERUM: CPT

## 2017-03-10 PROCEDURE — 84443 ASSAY THYROID STIM HORMONE: CPT

## 2017-03-10 PROCEDURE — 36415 COLL VENOUS BLD VENIPUNCTURE: CPT

## 2017-03-10 PROCEDURE — 83970 ASSAY OF PARATHORMONE: CPT

## 2017-03-10 PROCEDURE — 83036 HEMOGLOBIN GLYCOSYLATED A1C: CPT

## 2017-03-10 NOTE — TELEPHONE ENCOUNTER
----- Message from Lavell Tadeo M.D. sent at 3/9/2017  7:06 PM PST -----  Called patient and left message  Please notify patient that his x-ray does show knee arthritis, have him follow-up with physical therapy as discussed

## 2017-03-10 NOTE — TELEPHONE ENCOUNTER
Called patient and left message  Please notify patient that his x-ray does show knee arthritis, have him follow-up with physical therapy as discussed

## 2017-03-11 ENCOUNTER — TELEPHONE (OUTPATIENT)
Dept: MEDICAL GROUP | Facility: MEDICAL CENTER | Age: 77
End: 2017-03-11

## 2017-03-12 LAB
ALBUMIN SERPL-MCNC: 4.03 G/DL (ref 3.75–5.01)
ALPHA1 GLOB SERPL ELPH-MCNC: 0.25 G/DL (ref 0.19–0.46)
ALPHA2 GLOB SERPL ELPH-MCNC: 0.78 G/DL (ref 0.48–1.05)
B-GLOBULIN SERPL ELPH-MCNC: 0.96 G/DL (ref 0.48–1.1)
EER PROT ELECT SER Q1092: NORMAL
GAMMA GLOB SERPL ELPH-MCNC: 0.99 G/DL (ref 0.62–1.51)
INTERPRETATION SERPL IFE-IMP: NORMAL
PROT SERPL-MCNC: 7 G/DL (ref 6–8.3)

## 2017-03-12 NOTE — TELEPHONE ENCOUNTER
Notified with labs  Creatinine 1.4, will monitor, A1c slightly elevated 5.7% Will monitor, physical therapy referral made for knee pain, hopefully that will assist with increasing mobilization, continue to work on his diet, will repeat labs 6 months, he will call us at that time

## 2017-05-02 ENCOUNTER — OFFICE VISIT (OUTPATIENT)
Dept: CARDIOLOGY | Facility: MEDICAL CENTER | Age: 77
End: 2017-05-02
Payer: MEDICARE

## 2017-05-02 VITALS
HEART RATE: 60 BPM | SYSTOLIC BLOOD PRESSURE: 140 MMHG | BODY MASS INDEX: 36.45 KG/M2 | OXYGEN SATURATION: 91 % | HEIGHT: 74 IN | WEIGHT: 284 LBS | DIASTOLIC BLOOD PRESSURE: 68 MMHG

## 2017-05-02 DIAGNOSIS — I48.0 PAROXYSMAL ATRIAL FIBRILLATION (HCC): Chronic | ICD-10-CM

## 2017-05-02 DIAGNOSIS — I10 ESSENTIAL HYPERTENSION: ICD-10-CM

## 2017-05-02 PROCEDURE — G8419 CALC BMI OUT NRM PARAM NOF/U: HCPCS | Performed by: INTERNAL MEDICINE

## 2017-05-02 PROCEDURE — 4040F PNEUMOC VAC/ADMIN/RCVD: CPT | Performed by: INTERNAL MEDICINE

## 2017-05-02 PROCEDURE — 99214 OFFICE O/P EST MOD 30 MIN: CPT | Performed by: INTERNAL MEDICINE

## 2017-05-02 PROCEDURE — 1101F PT FALLS ASSESS-DOCD LE1/YR: CPT | Performed by: INTERNAL MEDICINE

## 2017-05-02 PROCEDURE — 1036F TOBACCO NON-USER: CPT | Performed by: INTERNAL MEDICINE

## 2017-05-02 PROCEDURE — G8432 DEP SCR NOT DOC, RNG: HCPCS | Performed by: INTERNAL MEDICINE

## 2017-05-02 ASSESSMENT — ENCOUNTER SYMPTOMS
MYALGIAS: 0
CHILLS: 0
PND: 0
DEPRESSION: 0
FEVER: 0
HEARTBURN: 0
EYE DISCHARGE: 0
COUGH: 0
HEADACHES: 0
SHORTNESS OF BREATH: 0
DIZZINESS: 0
BLURRED VISION: 0
NAUSEA: 0
BRUISES/BLEEDS EASILY: 0
BACK PAIN: 1
NERVOUS/ANXIOUS: 0
SENSORY CHANGE: 1
PALPITATIONS: 0

## 2017-05-02 NOTE — Clinical Note
[facility]   [address]   [address]  [place] [address]  Phone: [phone]  Fax: [phone]              Farhat Stahl  1940    Encounter Date: 5/2/2017    Liam Eagle M.D.          PROGRESS NOTE:  Subjective:   Farhat Stahl is a 77 y.o. male who presents today in consultation at the request of Dr. Tadeo for atrial fibrillation and hypertension.  This patient was last seen in office greater than 3 years ago.  In May 2012. Lightheadedness and palpitations. Atrial fibrillation and heart rate of 130 was documented. Spontaneous conversion to sinus rhythm.  To the best of his knowledge, he has had no further atrial fibrillation.  He is very compliant with CPAP for sleep apnea.  Increased weight and truncal obesity is an issue.  He is unable to exercise due to arthritic condition currently in the right knee  He has treated hyperlipidemia. His last LDL was 80 2 months ago  Daily home blood pressures are no higher than 135 systolic    Past Medical History   Diagnosis Date   • Fibula fracture 1981     right   • Hyperlipidemia    • Arthritis    • Hypertension    • MEDICAL HOME    • Arrhythmia      Atrial fribrillation   • High cholesterol    • Pneumonia 2004   • Bronchitis 2004   • Pain      low back   • Sleep apnea      Past Surgical History   Procedure Laterality Date   • Tonsillectomy  1945     adenoidectomy   • Exostosis excision  6/3/2011     Performed by ABDIFATAH YOUNG at [facility]   • Lesion excision ortho  6/3/2011     Performed by ABDIFATAH YOUNG at [facility]   • Toe arthroplasty  6/3/2011     Performed by ABDIFATAH YOUNG at [facility]   • Lumbar laminectomy diskectomy  2005     microscopic   • Hammertoe correction  2/2/2015     Performed by RENAY ChoiP.MEmmanuelle at [facility]     Family History   Problem Relation Age of Onset   •  Cancer     • Cancer Mother      cervical/breast   • Cancer Sister      lung   • Cancer Father      lung cancer     History   Smoking status   • Never Smoker    Smokeless tobacco   • Never Used     Allergies   Allergen Reactions   • Flexeril [Cyclobenzaprine Hcl]      confusion   • Other Environmental      Pollens     Outpatient Encounter Prescriptions as of 5/2/2017   Medication Sig Dispense Refill   • pravastatin (PRAVACHOL) 40 MG tablet Take 1 Tab by mouth every day. 90 Tab 3   • carvedilol (COREG) 25 MG Tab Take 0.5 Tabs by mouth 2 times a day, with meals. 90 Tab 1   • fluticasone (FLONASE) 50 MCG/ACT nasal spray Spray 2 Sprays in nose every day. 3 Bottle 3   • omeprazole (PRILOSEC) 40 MG delayed-release capsule Take 1 Cap by mouth every day. 30 Cap 11   • levothyroxine (SYNTHROID) 75 MCG Tab Take 1 Tab by mouth Every morning on an empty stomach. 90 Tab 3   • aspirin 81 MG tablet Take 81 mg by mouth every day.     • vitamin D (CHOLECALCIFEROL) 1000 UNIT TABS Take 1,000 Units by mouth 2 Times a Day.     • docosahexanoic acid (OMEGA 3 FA) 1000 MG CAPS Take 1,000 mg by mouth 2 Times a Day.       No facility-administered encounter medications on file as of 5/2/2017.     Review of Systems   Constitutional: Negative for fever, chills and malaise/fatigue.   Eyes: Negative for blurred vision and discharge.   Respiratory: Negative for cough and shortness of breath.    Cardiovascular: Negative for chest pain, palpitations, leg swelling and PND.   Gastrointestinal: Negative for heartburn and nausea.   Genitourinary: Negative for dysuria and urgency.   Musculoskeletal: Positive for back pain and joint pain. Negative for myalgias.   Skin: Negative for itching and rash.   Neurological: Positive for sensory change. Negative for dizziness and headaches.   Endo/Heme/Allergies: Negative for environmental allergies. Does not bruise/bleed easily.   Psychiatric/Behavioral: Negative for depression. The patient is not nervous/anxious.   "       Objective:   /68 mmHg  Pulse 60  Ht 1.88 m (6' 2.02\")  Wt 128.822 kg (284 lb)  BMI 36.45 kg/m2  SpO2 91%    Physical Exam   Constitutional: He is oriented to person, place, and time. He appears well-developed and well-nourished.   Pleasant man with truncal obesity   HENT:   Head: Normocephalic and atraumatic.   Eyes: Conjunctivae and EOM are normal. No scleral icterus.   Neck: Neck supple. No JVD present. No thyromegaly present.   Cardiovascular: Normal rate, regular rhythm and normal heart sounds.  Exam reveals no gallop and no friction rub.    No murmur heard.  Repeat blood pressure left arm sitting, 132 systolic by me   Pulmonary/Chest: Effort normal and breath sounds normal. No respiratory distress. He has no wheezes. He has no rales. He exhibits no tenderness.   Abdominal: Soft. Bowel sounds are normal. He exhibits no distension and no mass. There is no tenderness.   Neurological: He is alert and oriented to person, place, and time. Coordination normal.   Skin: Skin is warm and dry. No rash noted. No pallor.   Psychiatric: He has a normal mood and affect. His behavior is normal. Judgment and thought content normal.       Assessment:     1. Paroxysmal atrial fibrillation (CMS-HCC)     2. Essential hypertension         Medical Decision Making:  Today's Assessment / Status / Plan:   There is a single documented episode of atrial fibrillation in May 2012. The patient was symptomatic with this one episode. To his knowledge, he has had no further episodes of atrial fibrillation.  Since that episode, stroke prevention has been with aspirin only.  He checks his blood pressure daily and it is no higher than 135 systolic  We discussed guidelines regarding oral anticoagulants which would be the regimen that the guidelines recommend for him.  However, it is reasonable at this moment to stay on aspirin 81 mg per day with the caveat that palpitations or lightheadedness should be investigated quickly in the " future.  We also discussed calorie restriction and weight loss programs.  In absence of palpitations return in one year.  Thank you for this consultation          Lavell NIKOLAS Tadeo M.D.  [address] [facility] [address]  [place] [address]  VIA In Basket

## 2017-05-02 NOTE — PROGRESS NOTES
Subjective:   Farhat Stahl is a 77 y.o. male who presents today in consultation at the request of Dr. Tadeo for atrial fibrillation and hypertension.  This patient was last seen in office greater than 3 years ago.  In May 2012. Lightheadedness and palpitations. Atrial fibrillation and heart rate of 130 was documented. Spontaneous conversion to sinus rhythm.  To the best of his knowledge, he has had no further atrial fibrillation.  He is very compliant with CPAP for sleep apnea.  Increased weight and truncal obesity is an issue.  He is unable to exercise due to arthritic condition currently in the right knee  He has treated hyperlipidemia. His last LDL was 80 2 months ago  Daily home blood pressures are no higher than 135 systolic    Past Medical History   Diagnosis Date   • Fibula fracture 1981     right   • Hyperlipidemia    • Arthritis    • Hypertension    • MEDICAL HOME    • Arrhythmia      Atrial fribrillation   • High cholesterol    • Pneumonia 2004   • Bronchitis 2004   • Pain      low back   • Sleep apnea      Past Surgical History   Procedure Laterality Date   • Tonsillectomy  1945     adenoidectomy   • Exostosis excision  6/3/2011     Performed by ABDIFATAH YOUNG at [facility]   • Lesion excision ortho  6/3/2011     Performed by ABDIFATAH YOUNG at [facility]   • Toe arthroplasty  6/3/2011     Performed by ABDIFATAH YOUNG at [facility]   • Lumbar laminectomy diskectomy  2005     microscopic   • Hammertoe correction  2/2/2015     Performed by Abdifatah Young, D.P.M. at [facility]     Family History   Problem Relation Age of Onset   • Cancer     • Cancer Mother      cervical/breast   • Cancer Sister      lung   • Cancer Father      lung cancer     History   Smoking status   • Never Smoker    Smokeless tobacco   • Never Used     Allergies   Allergen Reactions   • Flexeril [Cyclobenzaprine Hcl]      confusion   • Other  "Environmental      Pollens     Outpatient Encounter Prescriptions as of 5/2/2017   Medication Sig Dispense Refill   • pravastatin (PRAVACHOL) 40 MG tablet Take 1 Tab by mouth every day. 90 Tab 3   • carvedilol (COREG) 25 MG Tab Take 0.5 Tabs by mouth 2 times a day, with meals. 90 Tab 1   • fluticasone (FLONASE) 50 MCG/ACT nasal spray Spray 2 Sprays in nose every day. 3 Bottle 3   • omeprazole (PRILOSEC) 40 MG delayed-release capsule Take 1 Cap by mouth every day. 30 Cap 11   • levothyroxine (SYNTHROID) 75 MCG Tab Take 1 Tab by mouth Every morning on an empty stomach. 90 Tab 3   • aspirin 81 MG tablet Take 81 mg by mouth every day.     • vitamin D (CHOLECALCIFEROL) 1000 UNIT TABS Take 1,000 Units by mouth 2 Times a Day.     • docosahexanoic acid (OMEGA 3 FA) 1000 MG CAPS Take 1,000 mg by mouth 2 Times a Day.       No facility-administered encounter medications on file as of 5/2/2017.     Review of Systems   Constitutional: Negative for fever, chills and malaise/fatigue.   Eyes: Negative for blurred vision and discharge.   Respiratory: Negative for cough and shortness of breath.    Cardiovascular: Negative for chest pain, palpitations, leg swelling and PND.   Gastrointestinal: Negative for heartburn and nausea.   Genitourinary: Negative for dysuria and urgency.   Musculoskeletal: Positive for back pain and joint pain. Negative for myalgias.   Skin: Negative for itching and rash.   Neurological: Positive for sensory change. Negative for dizziness and headaches.   Endo/Heme/Allergies: Negative for environmental allergies. Does not bruise/bleed easily.   Psychiatric/Behavioral: Negative for depression. The patient is not nervous/anxious.         Objective:   /68 mmHg  Pulse 60  Ht 1.88 m (6' 2.02\")  Wt 128.822 kg (284 lb)  BMI 36.45 kg/m2  SpO2 91%    Physical Exam   Constitutional: He is oriented to person, place, and time. He appears well-developed and well-nourished.   Pleasant man with truncal obesity "   HENT:   Head: Normocephalic and atraumatic.   Eyes: Conjunctivae and EOM are normal. No scleral icterus.   Neck: Neck supple. No JVD present. No thyromegaly present.   Cardiovascular: Normal rate, regular rhythm and normal heart sounds.  Exam reveals no gallop and no friction rub.    No murmur heard.  Repeat blood pressure left arm sitting, 132 systolic by me   Pulmonary/Chest: Effort normal and breath sounds normal. No respiratory distress. He has no wheezes. He has no rales. He exhibits no tenderness.   Abdominal: Soft. Bowel sounds are normal. He exhibits no distension and no mass. There is no tenderness.   Neurological: He is alert and oriented to person, place, and time. Coordination normal.   Skin: Skin is warm and dry. No rash noted. No pallor.   Psychiatric: He has a normal mood and affect. His behavior is normal. Judgment and thought content normal.       Assessment:     1. Paroxysmal atrial fibrillation (CMS-HCC)     2. Essential hypertension         Medical Decision Making:  Today's Assessment / Status / Plan:   There is a single documented episode of atrial fibrillation in May 2012. The patient was symptomatic with this one episode. To his knowledge, he has had no further episodes of atrial fibrillation.  Since that episode, stroke prevention has been with aspirin only.  He checks his blood pressure daily and it is no higher than 135 systolic  We discussed guidelines regarding oral anticoagulants which would be the regimen that the guidelines recommend for him.  However, it is reasonable at this moment to stay on aspirin 81 mg per day with the caveat that palpitations or lightheadedness should be investigated quickly in the future.  We also discussed calorie restriction and weight loss programs.  In absence of palpitations return in one year.  Thank you for this consultation

## 2017-05-02 NOTE — MR AVS SNAPSHOT
"        Farhat Wheeler Favio   2017 9:00 AM   Office Visit   MRN: [id number]    Department:  [facility]   Dept Phone:  [phone]    Description:  Male : 1940   Provider:  Liam Eagle M.D.           Allergies as of 2017     Allergen Noted Reactions    Flexeril [Cyclobenzaprine Hcl] 2014       confusion    Other Environmental 2011       Pollens      You were diagnosed with     Paroxysmal atrial fibrillation (CMS-HCC)   [560877]       Essential hypertension   [7497438]         Vital Signs     Blood Pressure Pulse Height Weight Body Mass Index Oxygen Saturation    140/68 mmHg 60 1.88 m (6' 2.02\") 128.822 kg (284 lb) 36.45 kg/m2 91%    Smoking Status                   Never Smoker            Basic Information     Date Of Birth Sex Race Ethnicity Preferred Language    1940 Male White Non- English      Your appointments     Mar 12, 2018 10:20 AM   Follow UP with ROSA Mckeon   [facility] (--)    [address] [address]   [phone]              Problem List              ICD-10-CM Priority Class Noted - Resolved    Hypertension (Chronic) I10   2009 - Present    Dyslipidemia (Chronic) E78.5   2009 - Present    Allergic rhinitis J30.9   2009 - Present    Status post lumbar surgery Z98.890   2009 - Present    Preventative health care (Chronic) Z00.00   2009 - Present    Right foot pain M79.671   3/6/2010 - Present    Paroxysmal atrial fibrillation (CMS-HCC) (Chronic) I48.0   2012 - Present    Impaired fasting glucose (Chronic) R73.01   10/10/2012 - Present    Hearing loss H91.90   2012 - Present    MEDICAL HOME    Unknown - Present    Compression fracture (Chronic) NAH2740   2014 - Present    Renal cyst N28.1   2014 - Present    Sleep apnea (Chronic) G47.30   2014 - Present    Vertigo R42   2014 - Present    Obesity (BMI 30-39.9) E66.9   2014 " - Present    Hypothyroid (Chronic) E03.9   4/28/2015 - Present    Peripheral autonomic neuropathy (Chronic) G90.9   9/23/2015 - Present    Chronic kidney disease, stage III (moderate) (Chronic) N18.3   3/21/2016 - Present    History of ulcer disease (Chronic) Z87.898   6/23/2016 - Present    Tubular adenoma of colon D12.6   7/6/2016 - Present    Blackwell's esophagus K22.70   7/6/2016 - Present    Arthritis of knee M17.10   3/9/2017 - Present      Health Maintenance        Date Due Completion Dates    COLONOSCOPY 6/2/2021 6/2/2016    IMM DTaP/Tdap/Td Vaccine (2 - Td) 5/2/2023 5/2/2013, 9/6/2011            Current Immunizations     13-VALENT PCV PREVNAR 5/29/2015    Influenza TIV (IM) 9/3/2013, 10/6/2012, 9/6/2011, 8/25/2010    Influenza Vaccine Adult HD 10/25/2016, 10/25/2016, 10/2/2015    Pneumococcal polysaccharide vaccine (PPSV-23) 7/5/2016    SHINGLES VACCINE 12/1/2009    Tdap Vaccine 5/2/2013, 9/6/2011      Below and/or attached are the medications your provider expects you to take. Review all of your home medications and newly ordered medications with your provider and/or pharmacist. Follow medication instructions as directed by your provider and/or pharmacist. Please keep your medication list with you and share with your provider. Update the information when medications are discontinued, doses are changed, or new medications (including over-the-counter products) are added; and carry medication information at all times in the event of emergency situations     Allergies:  FLEXERIL - (reactions not documented)     OTHER ENVIRONMENTAL - (reactions not documented)               Medications  Valid as of: May 02, 2017 - 10:43 AM    Generic Name Brand Name Tablet Size Instructions for use    Aspirin (Tab) aspirin 81 MG Take 81 mg by mouth every day.        Carvedilol (Tab) COREG 25 MG Take 0.5 Tabs by mouth 2 times a day, with meals.        Cholecalciferol (Tab) cholecalciferol 1000 UNIT Take 1,000 Units by mouth 2  Times a Day.        Fluticasone Propionate (Suspension) FLONASE 50 MCG/ACT Spray 2 Sprays in nose every day.        Levothyroxine Sodium (Tab) SYNTHROID 75 MCG Take 1 Tab by mouth Every morning on an empty stomach.        Omega-3 Fatty Acids (Cap) OMEGA 3 FA 1000 MG Take 1,000 mg by mouth 2 Times a Day.        Omeprazole (CAPSULE DELAYED RELEASE) PRILOSEC 40 MG Take 1 Cap by mouth every day.        Pravastatin Sodium (Tab) PRAVACHOL 40 MG Take 1 Tab by mouth every day.        .                 Medicines prescribed today were sent to:     [facility] [address] [place] [address], [place] - [address] AT [facility]    [address] [facility] [place] [address]    Phone: [phone] Fax: [phone]    Open 24 Hours?: No      Medication refill instructions:       If your prescription bottle indicates you have medication refills left, it is not necessary to call your provider’s office. Please contact your pharmacy and they will refill your medication.    If your prescription bottle indicates you do not have any refills left, you may request refills at any time through one of the following ways: The online [employer] system (except Urgent Care), by calling your provider’s office, or by asking your pharmacy to contact your provider’s office with a refill request. Medication refills are processed only during regular business hours and may not be available until the next business day. Your provider may request additional information or to have a follow-up visit with you prior to refilling your medication.   *Please Note: Medication refills are assigned a new Rx number when refilled electronically. Your pharmacy may indicate that no refills were authorized even though a new prescription for the same medication is available at the pharmacy. Please request the medicine by name with the pharmacy before contacting your provider for a refill.        Other Notes About Your Plan     3/10/17 A1c 5.7%,bun 19,creat  1.4  9/17 repeat labs consider ACE           MyChart Access Code: Activation code not generated  Current MyChart Status: Active

## 2017-06-13 ENCOUNTER — TELEPHONE (OUTPATIENT)
Dept: SLEEP MEDICINE | Facility: MEDICAL CENTER | Age: 77
End: 2017-06-13

## 2017-06-13 NOTE — TELEPHONE ENCOUNTER
Patient called and stated he's now ready to switchout from [facility].  Advised his insurance will only allow [facility] now; he is amenable to this.  Order and records faxed to DME:  [facility] / ph [phone] / fax [phone].

## 2017-09-11 DIAGNOSIS — J30.9 ALLERGIC RHINITIS: ICD-10-CM

## 2017-09-11 RX ORDER — FLUTICASONE PROPIONATE 50 MCG
2 SPRAY, SUSPENSION (ML) NASAL DAILY
Qty: 1 BOTTLE | Refills: 11 | Status: SHIPPED | OUTPATIENT
Start: 2017-09-11 | End: 2018-11-04 | Stop reason: SDUPTHER

## 2017-09-28 ENCOUNTER — APPOINTMENT (OUTPATIENT)
Dept: SOCIAL WORK | Facility: CLINIC | Age: 77
End: 2017-09-28
Payer: MEDICARE

## 2017-09-28 PROCEDURE — G0008 ADMIN INFLUENZA VIRUS VAC: HCPCS | Performed by: REGISTERED NURSE

## 2017-09-28 PROCEDURE — 90662 IIV NO PRSV INCREASED AG IM: CPT | Performed by: REGISTERED NURSE

## 2018-02-26 ENCOUNTER — PATIENT OUTREACH (OUTPATIENT)
Dept: HEALTH INFORMATION MANAGEMENT | Facility: OTHER | Age: 78
End: 2018-02-26

## 2018-02-26 NOTE — PROGRESS NOTES
1. Attempt #: 1    2. HealthConnect Verified: yes    3. Verify PCP: yes    4. Care Team Updated:       •   DME Company (gait device, O2, CPAP, etc.): YES       •   Other Specialists (eye doctor, derm, GYN, cardiology, endo, etc): YES    5.  Reviewed/Updated the following with patient:       •   Communication Preference Obtained? NO       •   Preferred Pharmacy? YES       •   Preferred Lab? YES       •   Family History (document living status of immediate family members and if + hx of cancer, diabetes, hypertension, hyperlipidemia, heart attack, stroke) YES. Was Abstract Encounter opened and chart updated? YES    6. Richard Pauer - 3P Activation: already active    7. Richard Pauer - 3P Haresh: no    8. Annual Wellness Visit Scheduling  Scheduling Status:Scheduled      9. Care Gap Scheduling (Attempt to Schedule EACH Overdue Care Gap!)     There are no preventive care reminders to display for this patient.     Scheduled patient for Annual Wellness Visit    10. Patient was advised: “This is a free wellness visit. The provider will screen for medical conditions to help you stay healthy. If you have other concerns to address you may be asked to discuss these at a separate visit or there may be an additional fee.”     11. Patient was informed to arrive 15 min prior to their scheduled appointment and bring in their medication bottles.

## 2018-03-28 ENCOUNTER — OFFICE VISIT (OUTPATIENT)
Dept: MEDICAL GROUP | Facility: MEDICAL CENTER | Age: 78
End: 2018-03-28
Payer: MEDICARE

## 2018-03-28 VITALS
OXYGEN SATURATION: 97 % | WEIGHT: 289 LBS | HEART RATE: 57 BPM | HEIGHT: 74 IN | SYSTOLIC BLOOD PRESSURE: 132 MMHG | TEMPERATURE: 98 F | RESPIRATION RATE: 16 BRPM | BODY MASS INDEX: 37.09 KG/M2 | DIASTOLIC BLOOD PRESSURE: 84 MMHG

## 2018-03-28 DIAGNOSIS — M54.41 ACUTE RIGHT-SIDED LOW BACK PAIN WITH RIGHT-SIDED SCIATICA: ICD-10-CM

## 2018-03-28 DIAGNOSIS — M17.11 PRIMARY OSTEOARTHRITIS OF RIGHT KNEE: ICD-10-CM

## 2018-03-28 DIAGNOSIS — I48.0 PAROXYSMAL ATRIAL FIBRILLATION (HCC): Chronic | ICD-10-CM

## 2018-03-28 DIAGNOSIS — Z98.890 STATUS POST LUMBAR SURGERY: ICD-10-CM

## 2018-03-28 PROCEDURE — 99214 OFFICE O/P EST MOD 30 MIN: CPT | Performed by: FAMILY MEDICINE

## 2018-03-28 RX ORDER — METHYLPREDNISOLONE 4 MG/1
TABLET ORAL
Qty: 21 TAB | Refills: 0 | Status: SHIPPED | OUTPATIENT
Start: 2018-03-28 | End: 2018-04-10

## 2018-03-28 ASSESSMENT — PATIENT HEALTH QUESTIONNAIRE - PHQ9: CLINICAL INTERPRETATION OF PHQ2 SCORE: 0

## 2018-03-28 NOTE — PATIENT INSTRUCTIONS
Wear and Tear Disorders of the Knee (Arthritis, Osteoarthritis)  Everyone will experience wear and tear injuries (arthritis, osteoarthritis) of the knee. These are the changes we all get as we age. They come from the joint stress of daily living. The amount of cartilage damage in your knee and your symptoms determine if you need surgery. Mild problems require approximately two months recovery time. More severe problems take several months to recover. With mild problems, your surgeon may find worn and rough cartilage surfaces. With severe changes, your surgeon may find cartilage that has completely worn away and exposed the bone. Loose bodies of bone and cartilage, bone spurs (excess bone growth), and injuries to the menisci (cushions between the large bones of your leg) are also common. All of these problems can cause pain.  For a mild wear and tear problem, rough cartilage may simply need to be shaved and smoothed. For more severe problems with areas of exposed bone, your surgeon may use an instrument for roughing up the bone surfaces to stimulate new cartilage growth. Loose bodies are usually removed. Torn menisci may be trimmed or repaired.  ABOUT THE ARTHROSCOPIC PROCEDURE  Arthroscopy is a surgical technique. It allows your orthopedic surgeon to diagnose and treat your knee injury with accuracy. The surgeon looks into your knee through a small scope. The scope is like a small (pencil-sized) telescope. Arthroscopy is less invasive than open knee surgery. You can expect a more rapid recovery. After the procedure, you will be moved to a recovery area until most of the effects of the medication have worn off. Your caregiver will discuss the test results with you.  RECOVERY  The severity of the arthritis and the type of procedure performed will determine recovery time. Other important factors include age, physical condition, medical conditions, and the type of rehabilitation program. Strengthening your muscles after  arthroscopy helps guarantee a better recovery. Follow your caregiver's instructions. Use crutches, rest, elevate, ice, and do knee exercises as instructed. Your caregivers will help you and instruct you with exercises and other physical therapy required to regain your mobility, muscle strength, and functioning following surgery. Only take over-the-counter or prescription medicines for pain, discomfort, or fever as directed by your caregiver.   SEEK MEDICAL CARE IF:   · There is increased bleeding (more than a small spot) from the wound.  · You notice redness, swelling, or increasing pain in the wound.  · Pus is coming from wound.  · You develop an unexplained oral temperature above 102° F (38.9° C) , or as your caregiver suggests.  · You notice a foul smell coming from the wound or dressing.  · You have severe pain with motion of the knee.  SEEK IMMEDIATE MEDICAL CARE IF:   · You develop a rash.  · You have difficulty breathing.  · You have any allergic problems.  MAKE SURE YOU:   · Understand these instructions.  · Will watch your condition.  · Will get help right away if you are not doing well or get worse.  Document Released: 12/15/2001 Document Revised: 03/11/2013 Document Reviewed: 05/13/2009  [employer]® Patient Information ©2014 [employer].  Back Pain, Adult  Back pain is very common in adults. The cause of back pain is rarely dangerous and the pain often gets better over time. The cause of your back pain may not be known. Some common causes of back pain include:  · Strain of the muscles or ligaments supporting the spine.  · Wear and tear (degeneration) of the spinal disks.  · Arthritis.  · Direct injury to the back.  For many people, back pain may return. Since back pain is rarely dangerous, most people can learn to manage this condition on their own.  Follow these instructions at home:  Watch your back pain for any changes. The following actions may help to lessen any discomfort you are feeling:  · Remain  active. It is stressful on your back to sit or  one place for long periods of time. Do not sit, drive, or  one place for more than 30 minutes at a time. Take short walks on even surfaces as soon as you are able. Try to increase the length of time you walk each day.  · Exercise regularly as directed by your health care provider. Exercise helps your back heal faster. It also helps avoid future injury by keeping your muscles strong and flexible.  · Do not stay in bed. Resting more than 1-2 days can delay your recovery.  · Pay attention to your body when you bend and lift. The most comfortable positions are those that put less stress on your recovering back. Always use proper lifting techniques, including:  ¨ Bending your knees.  ¨ Keeping the load close to your body.  ¨ Avoiding twisting.  · Find a comfortable position to sleep. Use a firm mattress and lie on your side with your knees slightly bent. If you lie on your back, put a pillow under your knees.  · Avoid feeling anxious or stressed. Stress increases muscle tension and can worsen back pain. It is important to recognize when you are anxious or stressed and learn ways to manage it, such as with exercise.  · Take medicines only as directed by your health care provider. Over-the-counter medicines to reduce pain and inflammation are often the most helpful. Your health care provider may prescribe muscle relaxant drugs. These medicines help dull your pain so you can more quickly return to your normal activities and healthy exercise.  · Apply ice to the injured area:  ¨ Put ice in a plastic bag.  ¨ Place a towel between your skin and the bag.  ¨ Leave the ice on for 20 minutes, 2-3 times a day for the first 2-3 days. After that, ice and heat may be alternated to reduce pain and spasms.  · Maintain a healthy weight. Excess weight puts extra stress on your back and makes it difficult to maintain good posture.  Contact a health care provider if:  · You  have pain that is not relieved with rest or medicine.  · You have increasing pain going down into the legs or buttocks.  · You have pain that does not improve in one week.  · You have night pain.  · You lose weight.  · You have a fever or chills.  Get help right away if:  · You develop new bowel or bladder control problems.  · You have unusual weakness or numbness in your arms or legs.  · You develop nausea or vomiting.  · You develop abdominal pain.  · You feel faint.  This information is not intended to replace advice given to you by your health care provider. Make sure you discuss any questions you have with your health care provider.  Document Released: 12/18/2006 Document Revised: 04/27/2017 Document Reviewed: 04/21/2015  [employer] Interactive Patient Education © 2017 [employer]  Sciatica  Sciatica is pain, numbness, weakness, or tingling along the path of the sciatic nerve. The sciatic nerve starts in the lower back and runs down the back of each leg. The nerve controls the muscles in the lower leg and in the back of the knee. It also provides feeling (sensation) to the back of the thigh, the lower leg, and the sole of the foot. Sciatica is a symptom of another medical condition that pinches or puts pressure on the sciatic nerve.  Generally, sciatica only affects one side of the body. Sciatica usually goes away on its own or with treatment. In some cases, sciatica may keep coming back (recur).  What are the causes?  This condition is caused by pressure on the sciatic nerve, or pinching of the sciatic nerve. This may be the result of:  · A disk in between the bones of the spine (vertebrae) bulging out too far (herniated disk).  · Age-related changes in the spinal disks (degenerative disk disease).  · A pain disorder that affects a muscle in the buttock (piriformis syndrome).  · Extra bone growth (bone spur) near the sciatic nerve.  · An injury or break (fracture) of the pelvis.  · Pregnancy.  · Tumor  (rare).  What increases the risk?  The following factors may make you more likely to develop this condition:  · Playing sports that place pressure or stress on the spine, such as football or weight lifting.  · Having poor strength and flexibility.  · A history of back injury.  · A history of back surgery.  · Sitting for long periods of time.  · Doing activities that involve repetitive bending or lifting.  · Obesity.  What are the signs or symptoms?  Symptoms can vary from mild to very severe, and they may include:  · Any of these problems in the lower back, leg, hip, or buttock:  ¨ Mild tingling or dull aches.  ¨ Burning sensations.  ¨ Sharp pains.  · Numbness in the back of the calf or the sole of the foot.  · Leg weakness.  · Severe back pain that makes movement difficult.  These symptoms may get worse when you cough, sneeze, or laugh, or when you sit or stand for long periods of time. Being overweight may also make symptoms worse. In some cases, symptoms may recur over time.  How is this diagnosed?  This condition may be diagnosed based on:  · Your symptoms.  · A physical exam. Your health care provider may ask you to do certain movements to check whether those movements trigger your symptoms.  · You may have tests, including:  ¨ Blood tests.  ¨ X-rays.  ¨ MRI.  ¨ CT scan.  How is this treated?  In many cases, this condition improves on its own, without any treatment. However, treatment may include:  · Reducing or modifying physical activity during periods of pain.  · Exercising and stretching to strengthen your abdomen and improve the flexibility of your spine.  · Icing and applying heat to the affected area.  · Medicines that help:  ¨ To relieve pain and swelling.  ¨ To relax your muscles.  · Injections of medicines that help to relieve pain, irritation, and inflammation around the sciatic nerve (steroids).  · Surgery.  Follow these instructions at home:  Medicines  · Take over-the-counter and prescription  medicines only as told by your health care provider.  · Do not drive or operate heavy machinery while taking prescription pain medicine.  Managing pain  · If directed, apply ice to the affected area.  ¨ Put ice in a plastic bag.  ¨ Place a towel between your skin and the bag.  ¨ Leave the ice on for 20 minutes, 2-3 times a day.  · After icing, apply heat to the affected area before you exercise or as often as told by your health care provider. Use the heat source that your health care provider recommends, such as a moist heat pack or a heating pad.  ¨ Place a towel between your skin and the heat source.  ¨ Leave the heat on for 20-30 minutes.  ¨ Remove the heat if your skin turns bright red. This is especially important if you are unable to feel pain, heat, or cold. You may have a greater risk of getting burned.  Activity  · Return to your normal activities as told by your health care provider. Ask your health care provider what activities are safe for you.  ¨ Avoid activities that make your symptoms worse.  · Take brief periods of rest throughout the day. Resting in a lying or standing position is usually better than sitting to rest.  ¨ When you rest for longer periods, mix in some mild activity or stretching between periods of rest. This will help to prevent stiffness and pain.  ¨ Avoid sitting for long periods of time without moving. Get up and move around at least one time each hour.  · Exercise and stretch regularly, as told by your health care provider.  · Do not lift anything that is heavier than 10 lb (4.5 kg) while you have symptoms of sciatica. When you do not have symptoms, you should still avoid heavy lifting, especially repetitive heavy lifting.  · When you lift objects, always use proper lifting technique, which includes:  ¨ Bending your knees.  ¨ Keeping the load close to your body.  ¨ Avoiding twisting.  General instructions  · Use good posture.  ¨ Avoid leaning forward while sitting.  ¨ Avoid  hunching over while standing.  · Maintain a healthy weight. Excess weight puts extra stress on your back and makes it difficult to maintain good posture.  · Wear supportive, comfortable shoes. Avoid wearing high heels.  · Avoid sleeping on a mattress that is too soft or too hard. A mattress that is firm enough to support your back when you sleep may help to reduce your pain.  · Keep all follow-up visits as told by your health care provider. This is important.  Contact a health care provider if:  · You have pain that wakes you up when you are sleeping.  · You have pain that gets worse when you lie down.  · Your pain is worse than you have experienced in the past.  · Your pain lasts longer than 4 weeks.  · You experience unexplained weight loss.  Get help right away if:  · You lose control of your bowel or bladder (incontinence).  · You have:  ¨ Weakness in your lower back, pelvis, buttocks, or legs that gets worse.  ¨ Redness or swelling of your back.  ¨ A burning sensation when you urinate.  This information is not intended to replace advice given to you by your health care provider. Make sure you discuss any questions you have with your health care provider.  Document Released: 12/12/2002 Document Revised: 05/23/2017 Document Reviewed: 08/26/2016  [employer] Interactive Patient Education © 2017 [employer]

## 2018-03-28 NOTE — ASSESSMENT & PLAN NOTE
"Back Pain: Location/quality of pain: right lumbar area  Radiation? yes -   Course of symptoms: Started 3 weeks ago - he was unloading groceries from the car - did several jeanette to the car.       Time of day when symptoms are worst: afternoon. Alleviating factors identifiable by patient: recumbency. Exacerbating factors identifiable by patient: standing, sitting. Treatments so far initiated by patient: none    Previous back problems: yes - had lumbar surgery 10 years ago. Previous workup: MRI 2014.         No \"RED FLAGS’ FOR FRACTURE: (Recent major trauma, Minor trauma or strenuous lifting in older or potentially osteoporotic patient, History of chronic corticosteroid therapy)  No \"RED FLAGS\" FOR NEOPLASM OR INFECTION: (Age over 50 or under 20, History of cancer, calin. breast, lung, prostate, Recent fever/chills, Recent unexplained weight loss,  Recent bacterial infection, current IV drug use, Immunosuppression (from meds, HIV, etc.), Pain worse when supine or at night)  No \"RED FLAGS’ FOR CAUDA EQUINA SYNDROME: (Saddle anesthesia, urine retention, fecal incontinence, Severe or progressive LE neurologic deficit)        "

## 2018-03-30 ENCOUNTER — HOSPITAL ENCOUNTER (OUTPATIENT)
Dept: RADIOLOGY | Facility: MEDICAL CENTER | Age: 78
End: 2018-03-30
Attending: FAMILY MEDICINE
Payer: MEDICARE

## 2018-03-30 DIAGNOSIS — Z98.890 STATUS POST LUMBAR SURGERY: ICD-10-CM

## 2018-03-30 DIAGNOSIS — M54.41 ACUTE RIGHT-SIDED LOW BACK PAIN WITH RIGHT-SIDED SCIATICA: ICD-10-CM

## 2018-03-30 PROCEDURE — 72148 MRI LUMBAR SPINE W/O DYE: CPT

## 2018-03-31 NOTE — PROGRESS NOTES
"Subjective:     Chief Complaint   Patient presents with   • Knee Pain   • Back Pain     pinching pain running to right leg       Farhat Stahl is a 78 y.o. male here today for evaluation and management of:    Acute right-sided low back pain with right-sided sciatica  Back Pain: Location/quality of pain: right lumbar area  Radiation? yes -   Course of symptoms: Started 3 weeks ago - he was unloading groceries from the car - did several jeanette to the car.       Time of day when symptoms are worst: afternoon. Alleviating factors identifiable by patient: recumbency. Exacerbating factors identifiable by patient: standing, sitting. Treatments so far initiated by patient: none    Previous back problems: yes - had lumbar surgery 10 years ago. Previous workup: MRI 2014.         No \"RED FLAGS’ FOR FRACTURE: (Recent major trauma, Minor trauma or strenuous lifting in older or potentially osteoporotic patient, History of chronic corticosteroid therapy)  No \"RED FLAGS\" FOR NEOPLASM OR INFECTION: (Age over 50 or under 20, History of cancer, calin. breast, lung, prostate, Recent fever/chills, Recent unexplained weight loss,  Recent bacterial infection, current IV drug use, Immunosuppression (from meds, HIV, etc.), Pain worse when supine or at night)  No \"RED FLAGS’ FOR CAUDA EQUINA SYNDROME: (Saddle anesthesia, urine retention, fecal incontinence, Severe or progressive LE neurologic deficit)          Osteoarthritis of right knee  Patient also complains of bilateral knee pain.  He has not had any imaging in many years but had advanced osteoarthritis at that time.  He is having more knee pain and it is affecting his gait.    Paroxysmal atrial fibrillation (CMS-HCC)  Stable.  He is taking aspirin daily.  He denies any chest pain or palpitations.       Allergies   Allergen Reactions   • Flexeril [Cyclobenzaprine Hcl]      confusion   • Other Environmental      Pollens       Current medicines (including changes today)  Current " Outpatient Prescriptions   Medication Sig Dispense Refill   • MethylPREDNISolone (MEDROL DOSEPAK) 4 MG Tablet Therapy Pack As directed on the packaging label. 21 Tab 0   • carvedilol (COREG) 25 MG Tab Take 0.5 Tabs by mouth 2 times a day, with meals. 90 Tab 0   • fluticasone (FLONASE) 50 MCG/ACT nasal spray Spray 2 Sprays in nose every day. 1 Bottle 11   • levothyroxine (SYNTHROID) 75 MCG Tab Take 1 Tab by mouth Every morning on an empty stomach. 90 Tab 3   • pravastatin (PRAVACHOL) 40 MG tablet Take 1 Tab by mouth every day. 90 Tab 3   • omeprazole (PRILOSEC) 40 MG delayed-release capsule Take 1 Cap by mouth every day. 30 Cap 11   • aspirin 81 MG tablet Take 81 mg by mouth every day.     • vitamin D (CHOLECALCIFEROL) 1000 UNIT TABS Take 1,000 Units by mouth 2 Times a Day.     • docosahexanoic acid (OMEGA 3 FA) 1000 MG CAPS Take 1,000 mg by mouth 2 Times a Day.       No current facility-administered medications for this visit.        He  has a past medical history of Arrhythmia; Arthritis; Bronchitis (2004); Fibula fracture (1981); High cholesterol; Hyperlipidemia; Hypertension; MEDICAL HOME; Pain; Pneumonia (2004); and Sleep apnea.    Patient Active Problem List    Diagnosis Date Noted   • Acute right-sided low back pain with right-sided sciatica 03/28/2018   • Osteoarthritis of right knee 03/28/2018   • Arthritis of knee 03/09/2017   • Tubular adenoma of colon 07/06/2016   • Blackwell's esophagus 07/06/2016   • History of ulcer disease 06/23/2016   • Chronic kidney disease, stage III (moderate) 03/21/2016   • Peripheral autonomic neuropathy 09/23/2015   • Hypothyroid 04/28/2015   • Obesity (BMI 30-39.9) 08/22/2014   • Vertigo 05/13/2014   • Sleep apnea 02/20/2014   • Renal cyst 01/22/2014   • Compression fracture 01/21/2014   • MEDICAL HOME    • Hearing loss 11/26/2012   • Impaired fasting glucose 10/10/2012   • Paroxysmal atrial fibrillation (CMS-HCC) 05/22/2012   • Right foot pain 03/06/2010   • Hypertension  "09/16/2009   • Dyslipidemia 09/16/2009   • Allergic rhinitis 09/16/2009   • Status post lumbar surgery 09/16/2009   • Preventative health care 09/16/2009       ROS   No fever or chills.  No nausea or vomiting.  No chest pain or palpitations.  No cough or SOB.  No pain with urination or hematuria.  No black or bloody stools.       Objective:     Blood pressure 132/84, pulse (!) 57, temperature 36.7 °C (98 °F), resp. rate 16, height 1.88 m (6' 2.02\"), weight (!) 131.1 kg (289 lb), SpO2 97 %. Body mass index is 37.09 kg/m².   Physical Exam:  Well developed, well nourished.  Alert, oriented in no acute distress.  Eye contact is good, speech goal directed, affect calm  Eyes: conjunctiva non-injected, sclera non-icteric.  Neck Supple.  No adenopathy or masses in the neck or supraclavicular regions. No thyromegaly  Lungs: clear to auscultation bilaterally with good excursion. No wheezes or rhonchi  CV: regular rate and rhythm. No murmur  SPINE: No significant spinal curvature on forward bend. Mild tenderness in paraspinous muscles lumbar spine without current spasm. SLT negative. DTR 2+ patella, 1+ achilles bilaterally. Strength 5/5 proximal and distal LE.  No pain with stress of SI. Full hip ROM. Poor hamstring flexibility. No symptoms with axial loading.   Knees: No erythema/edema/ecchymosis/effusion. Tenderness to palpation on medial patella. Joint line tenderness bilaterally. Patellar grind test positive. Lachman's negative. Bertha's negative. ROM intact. 5/5 strength bilaterally. 2+ deep tendon reflexes bilaterally.        Assessment and Plan:   The following treatment plan was discussed    1. Acute right-sided low back pain with right-sided sciatica  Start Medrol dose pack - Continue omeprazole   - MethylPREDNISolone (MEDROL DOSEPAK) 4 MG Tablet Therapy Pack; As directed on the packaging label.  Dispense: 21 Tab; Refill: 0  - MR-LUMBAR SPINE-W/O; Future    2. Primary osteoarthritis of right knee    - REFERRAL TO " ORTHOPEDICS    3. Status post lumbar surgery    - MR-LUMBAR SPINE-W/O; Future    4. Paroxysmal atrial fibrillation (CMS-HCC)  Stable.  Continue ASA    Any change or worsening of signs or symptoms, patient encouraged to follow-up or report to the emergency room for further evaluation. Patient understands and agrees.    Followup: Return if symptoms worsen or fail to improve.

## 2018-03-31 NOTE — ASSESSMENT & PLAN NOTE
Patient also complains of bilateral knee pain.  He has not had any imaging in many years but had advanced osteoarthritis at that time.  He is having more knee pain and it is affecting his gait.

## 2018-04-10 ENCOUNTER — HOSPITAL ENCOUNTER (OUTPATIENT)
Dept: LAB | Facility: MEDICAL CENTER | Age: 78
End: 2018-04-10
Attending: INTERNAL MEDICINE
Payer: MEDICARE

## 2018-04-10 ENCOUNTER — TELEPHONE (OUTPATIENT)
Dept: MEDICAL GROUP | Facility: MEDICAL CENTER | Age: 78
End: 2018-04-10

## 2018-04-10 ENCOUNTER — OFFICE VISIT (OUTPATIENT)
Dept: MEDICAL GROUP | Facility: MEDICAL CENTER | Age: 78
End: 2018-04-10
Payer: MEDICARE

## 2018-04-10 VITALS
HEART RATE: 68 BPM | BODY MASS INDEX: 36.96 KG/M2 | TEMPERATURE: 98.4 F | WEIGHT: 288 LBS | HEIGHT: 74 IN | DIASTOLIC BLOOD PRESSURE: 70 MMHG | SYSTOLIC BLOOD PRESSURE: 138 MMHG | OXYGEN SATURATION: 92 %

## 2018-04-10 DIAGNOSIS — Z12.5 PROSTATE CANCER SCREENING: ICD-10-CM

## 2018-04-10 DIAGNOSIS — E78.5 DYSLIPIDEMIA: Chronic | ICD-10-CM

## 2018-04-10 DIAGNOSIS — R73.01 IMPAIRED FASTING GLUCOSE: Chronic | ICD-10-CM

## 2018-04-10 DIAGNOSIS — M54.9 BACK PAIN, UNSPECIFIED BACK LOCATION, UNSPECIFIED BACK PAIN LATERALITY, UNSPECIFIED CHRONICITY: ICD-10-CM

## 2018-04-10 DIAGNOSIS — Z91.81 AT RISK FOR FALLING: ICD-10-CM

## 2018-04-10 DIAGNOSIS — N28.1 RENAL CYST: ICD-10-CM

## 2018-04-10 DIAGNOSIS — Z98.890 STATUS POST LUMBAR SURGERY: ICD-10-CM

## 2018-04-10 DIAGNOSIS — H90.3 SENSORINEURAL HEARING LOSS (SNHL) OF BOTH EARS: ICD-10-CM

## 2018-04-10 DIAGNOSIS — Z00.00 MEDICARE ANNUAL WELLNESS VISIT, SUBSEQUENT: Primary | ICD-10-CM

## 2018-04-10 DIAGNOSIS — I15.9 SECONDARY HYPERTENSION: Chronic | ICD-10-CM

## 2018-04-10 DIAGNOSIS — I48.0 PAROXYSMAL ATRIAL FIBRILLATION (HCC): Chronic | ICD-10-CM

## 2018-04-10 DIAGNOSIS — G47.30 SLEEP APNEA, UNSPECIFIED TYPE: Chronic | ICD-10-CM

## 2018-04-10 PROBLEM — M54.41 ACUTE RIGHT-SIDED LOW BACK PAIN WITH RIGHT-SIDED SCIATICA: Status: RESOLVED | Noted: 2018-03-28 | Resolved: 2018-04-10

## 2018-04-10 LAB
ALBUMIN SERPL BCP-MCNC: 4 G/DL (ref 3.2–4.9)
ALBUMIN/GLOB SERPL: 2.1 G/DL
ALP SERPL-CCNC: 92 U/L (ref 30–99)
ALT SERPL-CCNC: 35 U/L (ref 2–50)
ANION GAP SERPL CALC-SCNC: 3 MMOL/L (ref 0–11.9)
AST SERPL-CCNC: 24 U/L (ref 12–45)
BASOPHILS # BLD AUTO: 0.6 % (ref 0–1.8)
BASOPHILS # BLD: 0.05 K/UL (ref 0–0.12)
BILIRUB SERPL-MCNC: 0.6 MG/DL (ref 0.1–1.5)
BUN SERPL-MCNC: 27 MG/DL (ref 8–22)
CALCIUM SERPL-MCNC: 9 MG/DL (ref 8.5–10.5)
CHLORIDE SERPL-SCNC: 105 MMOL/L (ref 96–112)
CHOLEST SERPL-MCNC: 158 MG/DL (ref 100–199)
CO2 SERPL-SCNC: 30 MMOL/L (ref 20–33)
CREAT SERPL-MCNC: 1.36 MG/DL (ref 0.5–1.4)
EOSINOPHIL # BLD AUTO: 0.27 K/UL (ref 0–0.51)
EOSINOPHIL NFR BLD: 3 % (ref 0–6.9)
ERYTHROCYTE [DISTWIDTH] IN BLOOD BY AUTOMATED COUNT: 52 FL (ref 35.9–50)
EST. AVERAGE GLUCOSE BLD GHB EST-MCNC: 120 MG/DL
GLOBULIN SER CALC-MCNC: 1.9 G/DL (ref 1.9–3.5)
GLUCOSE SERPL-MCNC: 102 MG/DL (ref 65–99)
HBA1C MFR BLD: 5.8 % (ref 0–5.6)
HCT VFR BLD AUTO: 47.4 % (ref 42–52)
HDLC SERPL-MCNC: 56 MG/DL
HGB BLD-MCNC: 15.7 G/DL (ref 14–18)
IMM GRANULOCYTES # BLD AUTO: 0.02 K/UL (ref 0–0.11)
IMM GRANULOCYTES NFR BLD AUTO: 0.2 % (ref 0–0.9)
LDLC SERPL CALC-MCNC: 75 MG/DL
LYMPHOCYTES # BLD AUTO: 1.82 K/UL (ref 1–4.8)
LYMPHOCYTES NFR BLD: 20.2 % (ref 22–41)
MCH RBC QN AUTO: 32.2 PG (ref 27–33)
MCHC RBC AUTO-ENTMCNC: 33.1 G/DL (ref 33.7–35.3)
MCV RBC AUTO: 97.3 FL (ref 81.4–97.8)
MONOCYTES # BLD AUTO: 0.85 K/UL (ref 0–0.85)
MONOCYTES NFR BLD AUTO: 9.5 % (ref 0–13.4)
NEUTROPHILS # BLD AUTO: 5.98 K/UL (ref 1.82–7.42)
NEUTROPHILS NFR BLD: 66.5 % (ref 44–72)
NRBC # BLD AUTO: 0 K/UL
NRBC BLD-RTO: 0 /100 WBC
PLATELET # BLD AUTO: 140 K/UL (ref 164–446)
PMV BLD AUTO: 10.3 FL (ref 9–12.9)
POTASSIUM SERPL-SCNC: 4.3 MMOL/L (ref 3.6–5.5)
PROT SERPL-MCNC: 5.9 G/DL (ref 6–8.2)
PSA SERPL-MCNC: 1.14 NG/ML (ref 0–4)
RBC # BLD AUTO: 4.87 M/UL (ref 4.7–6.1)
SODIUM SERPL-SCNC: 138 MMOL/L (ref 135–145)
TRIGL SERPL-MCNC: 134 MG/DL (ref 0–149)
TSH SERPL DL<=0.005 MIU/L-ACNC: 3.67 UIU/ML (ref 0.38–5.33)
WBC # BLD AUTO: 9 K/UL (ref 4.8–10.8)

## 2018-04-10 PROCEDURE — G0439 PPPS, SUBSEQ VISIT: HCPCS | Performed by: INTERNAL MEDICINE

## 2018-04-10 PROCEDURE — 36415 COLL VENOUS BLD VENIPUNCTURE: CPT

## 2018-04-10 PROCEDURE — 85025 COMPLETE CBC W/AUTO DIFF WBC: CPT

## 2018-04-10 PROCEDURE — 80061 LIPID PANEL: CPT

## 2018-04-10 PROCEDURE — 84443 ASSAY THYROID STIM HORMONE: CPT

## 2018-04-10 PROCEDURE — 83036 HEMOGLOBIN GLYCOSYLATED A1C: CPT

## 2018-04-10 PROCEDURE — 84153 ASSAY OF PSA TOTAL: CPT

## 2018-04-10 PROCEDURE — 80053 COMPREHEN METABOLIC PANEL: CPT

## 2018-04-10 ASSESSMENT — ACTIVITIES OF DAILY LIVING (ADL): BATHING_REQUIRES_ASSISTANCE: 0

## 2018-04-10 ASSESSMENT — PATIENT HEALTH QUESTIONNAIRE - PHQ9: CLINICAL INTERPRETATION OF PHQ2 SCORE: 0

## 2018-04-10 NOTE — PROGRESS NOTES
Chief Complaint   Patient presents with   • Annual Wellness Visit         HPI:  Farhat is a 78 y.o. here for Medicare Annual Wellness Visit  Medications, allergies, medical history, surgical history, social history, family history reviewed and updated  Sees  eye exam taking ocuvite daily sees every six months  Sees dentist every six months,   Sees pulmonary  Hearing aid right ear  Not exercising due to right knee pain and low back pain  SH no tobacco, etoh one drink,   Uses cpap nightly       Patient Active Problem List    Diagnosis Date Noted   • Acute right-sided low back pain with right-sided sciatica 03/28/2018   • Osteoarthritis of right knee 03/28/2018   • Arthritis of knee 03/09/2017   • Tubular adenoma of colon 07/06/2016   • Blackwell's esophagus 07/06/2016   • History of ulcer disease 06/23/2016   • Chronic kidney disease, stage III (moderate) 03/21/2016   • Peripheral autonomic neuropathy 09/23/2015   • Hypothyroid 04/28/2015   • Obesity (BMI 30-39.9) 08/22/2014   • Vertigo 05/13/2014   • Sleep apnea 02/20/2014   • Renal cyst 01/22/2014   • Compression fracture 01/21/2014   • Hearing loss 11/26/2012   • Impaired fasting glucose 10/10/2012   • Paroxysmal atrial fibrillation (CMS-[facility]) 05/22/2012   • Right foot pain 03/06/2010   • Hypertension 09/16/2009   • Dyslipidemia 09/16/2009   • Allergic rhinitis 09/16/2009   • Status post lumbar surgery 09/16/2009   • Preventative health care 09/16/2009     Allergic rhinitis of Flonase    arthritis knee  3/9/17 x-ray right knee degenerative changes lateral femoral-tibial articulation, osteophytic spurring and likely subchondral cystic change  3/9/17 referral physical therapy  3/17/17 custom physical therapy evaluation    barretts   6/23/16 [facility] note follow up EGD showing erosions of acute gastric ulcer without hemorrhage or perforation,chronic inactive gastritis with reactive epithelial changes, intestinal metaplasia, dysplasia, malignancy;  consistent with pompa's esophagus and reflux esophagitis, negative for dysplasia or h.pylori, repeat EGD 3 months exam gastric ulcer, started on omeprazole 40 mg, follow-up EGD in september 11/2/17 EGD per [facility] erosions and antral biopsies performed, hiatal hernia, irregularly gastroesophageal junction, biopsies performed     ckd   5/9/14 bun 17,creat 1.1,GFR >60  9/2/14 bun 19,creat 1.3,GFR 52  1/28/15 bun 22,creat 1.29,GFR 54  4/28/15 bun 26,creat 1.1,GFR>60  3/21/16 bun 23,creat 1.3,GFR 52,urine mac< 8  3/10/17 bun 19,creat 1.4,GFR 49,PTH 39,calcium 9.4,phos 3.6,vit d 83,SPEP negative     Compression fracture  1/20/14 MRI lumbar spine acute anterior compression L1 chronic fracture T12, L5-S1 diffuse disc bulge, moderate right neural foraminal stenosis, L4-L5 left paracentral disc extrusion, moderate bilateral foraminal stenosis  1/29/14 dexa LS +0.9,hip +0.4     Dyslipidemia  4/09 chol 189,trig 87,hdl 45,ldl 127  9/09 chol 206,trig 90,hdl 52,ldl 136 off lipitor/zocor  12/09 chol 183,trig 84,hdl 42,ldl 124  2/10 [facility] note chol 153,ldl 124 started by cards on pravastatin 40 since ?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  9/10 chol 135,trig 52,hdl 46,ldl 79 on pravachol  9/11 chol 145,trig 64,hdl 52,ldl 80 on pravachol  5/12 chol 146,trig 98,hdl 46,ldl 80 on pravachol 40 mg  10/12 chol 133,trig 65,hdl 46,ldl 74 on pravachol 40 mg  7/19/13 chol 121,trig 65,hdl 48,ldl 60 on pravachol 40 mg  9/3/13 chol 131,trig 75,hdl 49,ldl 67 on pravachol 40 mg  4/11/14 chol 150,trig 42,hdl 62,ldl 80 on pravachol 40 mg  8/22/14 chol 134,trig 73,hdl 54,ldl 65 on pravachol 40 mg  4/28/15 chol 114,trig 59,hdl 51,ldl 51 on pravachol 40 mg  3/21/16 chol 124,trig 57,hdl 47,ldl 66 on pravachol 40 mg  3/10/17 chol 151,trig 89,hdl 53,ldl 80 on pravachol 40 mg     History of ulcer disease  3/17/16 GI evaluation dysphagia  6/2/16 EGD per [facility] localized irregularity mucosa GE junction, biopsies performed, 42 Slovenian dilation to 54 Slovenian, localized  erosions and ulceration mucosa stomach, biopsies performed, take omeprazole 40 mg daily  6/23/16 [facility] note follow up EGD showing erosions of acute gastric ulcer without hemorrhage or perforation,chronic inactive gastritis with reactive epithelial changes, intestinal metaplasia, dysplasia, malignancy; consistent with pompa's esophagus and reflux esophagitis, negative for dysplasia or h.pylori, repeat EGD 3 months exam gastric ulcer, started on omeprazole 40 mg, follow-up EGD in september 11/2/17 EGD per [facility] erosions and antral biopsies performed, hiatal hernia, irregularly gastroesophageal junction, biopsies performed      Hypertension  6/07 echocardiogram borderline LVH  1/09 VEENA left 1.3, right 1.3  9/10 urine mac 2  3/11 u/s vascular lower ext negative  5/11 [facility] note   5/12 [facility] note on coreg 25 bid for paroxysmal atrial fibrillation and vasotec 10 mg qday  6/12 on coreg 25 bid  id, off vasotec  7/2/12 [facility] note  7/13 cardiology note  1/21/14 ultrasound carotid negative; on coreg 25 mg bid, asa  5/9/14 echo normal ejection fraction 65%, mild LVH  5/16/14 decrease coreg to 12.5 mg bid, cont asa  8/21/14 cardiology note follow up one year  3/21/16 urine mac <8 on coreg 12.5 mg  3/10/17 urine mac 3 on coreg 12.5 mg   5/2/17 cardiology note one episode atrial fibrillation 2012 will continue on aspirin, encourage weight loss, follow-up one year     Hypothyroid  5/19/12 tsh 7.45  4/28/15 tsh 7.49 start synthroid 50 mcg repeat tsh 6 weeks  8/28/15 tsh 3.2 on synthroid 50 mcg  3/21/16 hypothyroid 6.1 on synthroid 50 mcg, increase to synthroid 75 mcg repeat tsh 6 weeks  5/6/16 tsh 2.4 on synthroid 75 mcg  3/10/17 tsh 3.9 on synthroid 75 mcg     Impaired fasting blood sugar  10/12 bs 110  7/8/13 bs 106  9/3/13 A1c 6.1%  1/19/14 bs 95  4/11/14 A1c 5.5%, bs 103  8/22/14 bs 107  4/28/15 A1c 5.6%, bs 95  3/21/16 A1c 5.5%  3/10/17 A1c 5.7%,bs 112     Paroxysmal atrial fibrillation  5/12 hospital admission atrial fibrillation,  converted with intravenous cardizem  5/19/12 echo normal LV function  5/19/12 Persantine thallium normal EF 62%, subtle decrease uptake lateral wall  5/12 CHADS2 score 1   5/22/12 [facility] discontinue coumadin, change to aspirin; coreg 25 bid  7/1/13 cardiology note no changes, NSR cont coreg 25 bid and asa  1/19/14 EKG normal sinus rhythm on admission for compression fracture  1/21/14 ultrasound carotids negative  1/20/14 MRI brain without; no acute infarct or hemorrhage, moderate atrophy, mild chronic microvascular ischemic changes; on coreg 25 mg bid and asa  3/4/14 NSR on exam  5/9/14 echo normal ejection fraction 65%, mild LVH  5/16/14 decrease coreg to 12.5 mg bid secondary to heart rate and continue aspirin  8/21/14 cardiology note follow up one year  5/2/17 cardiology note one episode atrial fibrillation 2012 will continue on aspirin, encourage weight loss, follow-up one year     Preventative health  12/1/09 shingles vaccine  5/2/13 tdap  1/29/14 dexa LS +0.9,hip +0.4  4/11/14 psa 0.7  5/29/15 prevnar  6/2/16 colon per [facility] tubular adenoma times 2 repeat colonoscopy 5 years  7/5/16 pneumovax  10/25/16 flu hd at [facility]  3/10/17 vit d 83     Renal cyst  1/20/14 MRI lumbar spine incidental finding 4 x 3.2 cm cyst right kidney  4/29/14 ultrasound renal 4 cm right upper pole simple renal cyst     Right foot pain  3/10 x-ray right foot and severe DJD and bone spur first MTP, small bone spur calcaneus and fifth metatarsal  3/11 right foot x-ray prominent DJD first MTP, posterior osseous spurring or soft tissue calcification distal to the first metatarsal  3/11 uric 5.3,esr 2,crp 1.2  3/11 u/s arterial RLE negative  3/11 MRI lumbar spine mild anterolisthesis L4-L5, mild canal stenosis L4-L5  6/11  right first toe surgery and replacement  10/12 ultrasound right lower extremity venous negative  3/9/17 sees  podiatry hammer toe     Sleep apnea  2/18/14 overnight pulse oximetry room air time less than 88%  saturation 182 minutes, low saturation 67%, basal saturation 87%, apnea index 42 events per hour  4/11/14 pt would like to repeat tests, initial test done while he had bronchitis  6/18/14 nocturnal oximetry <89% for 231 minutes, low 63%, AHI per hour 55;will refer to [facility]  2/11/15 [facility] note, recommend cpap titration  3/21/15 [facility] sleep study 191 minutes,AHI 57,hypopnea index 42,minimum saturation 81%,improved on cpap 11 titration study  4/30/15 [facility] sleep note mask fit auto CPAP 10-15, followup one month  12/14/15 [facility] sleep note AHI 57 on autopap 10-16, 100% compliance AHI decreased to 0.6  3/2/17 sleep note      Lumbar surgery  1/04 right L4-L5 laminotomy and discectomy Dr. Spears  1/20/14 MRI lumbar spine acute anterior compression fracture L1 fracture cleft noted along superior endplate, chronic fracture T12 vertebra, L5-S1 moderate right foraminal stenosis, L4-L5 left paracentral disc protrusion and facet joint arthropathy with mild to moderate lower foraminal stenosis  3/17/17 custom PT evaluation     vertigo  5/8/14 CT head cerebral atrophy, periventricular small vessel ischemic change  5/9/14 MRI brain with and without; moderate microangiopathic ischemic change vs. demyelination or gliosis, moderate cervical and cerebellar substance loss  5/2/14 vestibular clinic evaluation, follow up as needed                   Current Outpatient Prescriptions   Medication Sig Dispense Refill   • pravastatin (PRAVACHOL) 40 MG tablet Take 1 Tab by mouth every day. 90 Tab 0   • MethylPREDNISolone (MEDROL DOSEPAK) 4 MG Tablet Therapy Pack As directed on the packaging label. 21 Tab 0   • carvedilol (COREG) 25 MG Tab Take 0.5 Tabs by mouth 2 times a day, with meals. 90 Tab 0   • fluticasone (FLONASE) 50 MCG/ACT nasal spray Spray 2 Sprays in nose every day. 1 Bottle 11   • levothyroxine (SYNTHROID) 75 MCG Tab Take 1 Tab by mouth Every morning on an empty stomach. 90 Tab 3   • pravastatin (PRAVACHOL) 40 MG tablet Take 1 Tab by mouth  every day. 90 Tab 3   • omeprazole (PRILOSEC) 40 MG delayed-release capsule Take 1 Cap by mouth every day. 30 Cap 11   • aspirin 81 MG tablet Take 81 mg by mouth every day.     • vitamin D (CHOLECALCIFEROL) 1000 UNIT TABS Take 1,000 Units by mouth 2 Times a Day.     • docosahexanoic acid (OMEGA 3 FA) 1000 MG CAPS Take 1,000 mg by mouth 2 Times a Day.       No current facility-administered medications for this visit.         Patient is taking medications as noted in medication list.  Current supplements as per medication list.     Allergies: Flexeril [cyclobenzaprine hcl] and Other environmental    Current social contact/activities: pt watches television.    Patient's perception of their health: good    Is patient current with immunizations? Yes.    He  reports that he has never smoked. He has never used smokeless tobacco. He reports that he does not drink alcohol or use drugs.  Counseling given: Not Answered        DPA/Advanced directive: Patient has Advanced Directive, but it is not on file. Instructed to bring in a copy to scan into their chart.    ROS:    Gait: Uses no assistive device   Ostomy: no    Other tubes: no    Amputations: no   Chronic oxygen use no   Last eye exam within the last six months     Wears hearing aids: yes, right ear    : Denies any urinary leakage during the last 6 months       Screening:       Depression Screening    Little interest or pleasure in doing things?  0 - not at all  Feeling down, depressed, or hopeless? 0 - not at all  Patient Health Questionnaire Score: 0    If depressive symptoms identified deferred to follow up visit unless specifically addressed in assessment and plan.    Interpretation of PHQ-9 Total Score   Score Severity   1-4 No Depression   5-9 Mild Depression   10-14 Moderate Depression   15-19 Moderately Severe Depression   20-27 Severe Depression    Screening for Cognitive Impairment    Three Minute Recall (apple, watch, yani)  2/3    Draw clock face with all 12  numbers set to the hand to show 10 minutes past 11 o'clock  1 5/5  If cognitive concerns identified, deferred for follow up unless specifically addressed in assessment and plan.    Fall Risk Assessment    Has the patient had two or more falls in the last year or any fall with injury in the last year?  Yes  If fall risk identified, deferred for follow up unless specifically addressed in assessment and plan.    Safety Assessment    Throw rugs on floor.  Yes  Handrails on all stairs.  Yes  Good lighting in all hallways.  Yes  Difficulty hearing.  No  Patient counseled about all safety risks that were identified.    Functional Assessment ADLs    Are there any barriers preventing you from cooking for yourself or meeting nutritional needs?  No.    Are there any barriers preventing you from driving safely or obtaining transportation?  No.    Are there any barriers preventing you from using a telephone or calling for help?  No.    Are there any barriers preventing you from shopping?  No.    Are there any barriers preventing you from taking care of your own finances?  No.    Are there any barriers preventing you from managing your medications?  No.    Are there any barriers preventing you from showering, bathing or dressing yourself?  No.    Are you currently engaging any exercise or physical activity?  No.       Health Maintenance Summary                Annual Wellness Visit Overdue 3/10/2018      Done 3/9/2017 Visit Dx: Medicare annual wellness visit, subsequent     Patient has more history with this topic...    COLONOSCOPY Next Due 6/2/2021      Done 6/2/2016 AMB REFERRAL TO GI FOR COLONOSCOPY    IMM DTaP/Tdap/Td Vaccine Next Due 5/2/2023      Done 5/2/2013 Imm Admin: Tdap Vaccine     Patient has more history with this topic...          Patient Care Team:  Lavell Tadeo M.D. as PCP - General  Maximus Lopez M.D. as Consulting Physician (Pulmonary Medicine)  Newton Orta D.P.M. as Consulting Physician  (Podiatry)  Umberto Hayes O.D. as Consulting Physician (Optometry)  Sven Bolanos M.D. as Consulting Physician (Gastroenterology)  [facility] as Home Health Provider (DME Supplier)    Social History   Substance Use Topics   • Smoking status: Never Smoker   • Smokeless tobacco: Never Used   • Alcohol use No     Family History   Problem Relation Age of Onset   • Cancer Mother      cervical/breast   • Cancer Sister      lung   • Cancer Father      lung cancer   • Cancer       He  has a past medical history of Arrhythmia; Arthritis; Bronchitis (2004); Fibula fracture (1981); High cholesterol; Hyperlipidemia; Hypertension; MEDICAL HOME; Pain; Pneumonia (2004); and Sleep apnea.   Past Surgical History:   Procedure Laterality Date   • HAMMERTOE CORRECTION  2/2/2015    Performed by JOSÉ MIGUEL Choi.P.MEmmanuelle at [facility]   • EXOSTOSIS EXCISION  6/3/2011    Performed by ABDIFATAH YOUNG at [facility]   • LESION EXCISION ORTHO  6/3/2011    Performed by ABDIFATAH YOUNG at [facility]   • TOE ARTHROPLASTY  6/3/2011    Performed by ABDIFATAH YOUNG at [facility]   • LUMBAR LAMINECTOMY DISKECTOMY  2005    microscopic   • TONSILLECTOMY  1945    adenoidectomy           Exam:     Hearing and dentition fair  Alert, oriented in no acute distress.  Eye contact is good, speech goal directed, affect calm  Lungs clear  Cardio vascular S1-S2 regular    Assessment and Plan. The following treatment and monitoring plan is recommended:    Assessment  #1 wellness assessment    #2 arthritis knee has seen physical therapy 3/9/17 x-ray right knee degenerative changes lateral femoral-tibial articulation, osteophytic spurring and likely subchondral cystic change    #3 Blackwell's esophagus on Prilosec per GI 11/2/17 EGD per [facility] erosions and antral biopsies performed, hiatal hernia, irregularly gastroesophageal junction, biopsies performed     #4 ckd stable, no NSAIDs 3/10/17 bun  19,creat 1.4,GFR 49,PTH 39,calcium 9.4,phos 3.6,vit d 83,SPEP negative     #5 Dyslipidemia 3/10/17 chol 151,trig 89,hdl 53,ldl 80 on pravachol 40 mg     #6 Hypertension 5/2/17 cardiology note one episode atrial fibrillation 2012 will continue on aspirin, encourage weight loss, follow-up one year     #7 Hypothyroid 3/10/17 tsh 3.9 on synthroid 75 mcg     #8 Impaired fasting blood sugar 3/10/17 A1c 5.7%,bs 112     #9 Paroxysmal atrial fibrillation sinus rhythm on exam, on aspirin daily, seen by cardiology previously 5/2/17 cardiology note one episode atrial fibrillation 2012 will continue on aspirin, encourage weight loss, follow-up one year     #10 Preventative health  12/1/09 shingles vaccine  5/2/13 tdap  1/29/14 dexa LS +0.9,hip +0.4  4/11/14 psa 0.7  5/29/15 prevnar  6/2/16 colon per [facility] tubular adenoma times 2 repeat colonoscopy 5 years  7/5/16 pneumovax  3/10/17 vit d 83      #11 Sleep apnea 12/14/15 [facility] sleep note AHI 57 on autopap 10-16, 100% compliance AHI decreased to 0.6 on Z-Ezra 3/2/17 sleep note      #12 history of Lumbar surgery recent low-back pain flareup, has seen physical therapy previously 1/04 right L4-L5 laminotomy and discectomy Dr. Spears 3/13/18 MRI lumbar spine L5-S1 moderate to severe right L5 foraminal stenosis, impingement L5 nerve root, L4-L5 mild to moderate bilateral and central canal stenosis, chronic compression deformity L1     #13 fall risk    #14 BMI 36.9              Services suggested:    Health Care Screening: Age-appropriate preventive services recommended by USPTF and ACIP covered by Medicare were discussed today. Services ordered if indicated and agreed upon by the patient.  Referrals offered: PT/OT/Nutrition counseling/Behavioral Health/Smoking cessation as per orders if indicated.    Discussion today about general wellness and lifestyle habits:    · Prevent falls and reduce trip hazards; Cautioned about securing or removing rugs.  · Have a working fire alarm and carbon  monoxide detector;   · Engage in regular physical activity and social activities       Follow-up:   Plan  #! Health maintenance reviewed and updated    #2 labs    #3 continue cpap follow up pulmonary    #4 nutrition consultation offered patient declines, understands obesity increases risk for diabetes and heart disease    #5 follow up [employer] hearing test    #6 has had flu vaccine, pneumonia 13 and 23 vaccines, tdap, zoster vaccine, check with zoster vaccine     #7 follow up orthopedics knee pain    #8 referral spine specialist    #9 fall precautions declines physical therapy    #10 nutrition, diet, exercise discussed    #11 continue medications no changes    #12 continue avoid NSAIDs    #13 check blood pressure periodically and record    #14 follow-up 6 months

## 2018-04-11 ENCOUNTER — TELEPHONE (OUTPATIENT)
Dept: MEDICAL GROUP | Facility: MEDICAL CENTER | Age: 78
End: 2018-04-11

## 2018-04-11 NOTE — TELEPHONE ENCOUNTER
Please notify patient that his three-month blood sugar average is 5.8%, no significant change from last year.  No medications are necessary, have him continue to work on his nutrition and activity level as much as possible.

## 2018-04-11 NOTE — TELEPHONE ENCOUNTER
----- Message from Lavell Tadeo M.D. sent at 4/11/2018 12:28 AM PDT -----  Please notify patient that his three-month blood sugar average is 5.8%, no significant change from last year.  No medications are necessary, have him continue to work on his nutrition and activity level as much as possible.

## 2018-06-14 ENCOUNTER — SLEEP CENTER VISIT (OUTPATIENT)
Dept: SLEEP MEDICINE | Facility: MEDICAL CENTER | Age: 78
End: 2018-06-14
Payer: MEDICARE

## 2018-06-14 VITALS
TEMPERATURE: 97.5 F | HEART RATE: 56 BPM | OXYGEN SATURATION: 90 % | BODY MASS INDEX: 34.52 KG/M2 | HEIGHT: 74 IN | DIASTOLIC BLOOD PRESSURE: 90 MMHG | SYSTOLIC BLOOD PRESSURE: 132 MMHG | WEIGHT: 269 LBS | RESPIRATION RATE: 16 BRPM

## 2018-06-14 DIAGNOSIS — E66.9 OBESITY (BMI 30-39.9): ICD-10-CM

## 2018-06-14 DIAGNOSIS — G47.33 OSA (OBSTRUCTIVE SLEEP APNEA): ICD-10-CM

## 2018-06-14 PROCEDURE — 99212 OFFICE O/P EST SF 10 MIN: CPT | Performed by: NURSE PRACTITIONER

## 2018-06-14 NOTE — PATIENT INSTRUCTIONS
1) Continue autoCPAP at [id number]  2) Clean mask and supplies weekly and change them as insurance allows  3) Vaccines: Prevnar 13 and Pneumovax 23  4) Return in about 1 year (around 6/14/2019) for follow up with [facility], if not sooner, review of symptoms, Compliance.

## 2018-06-14 NOTE — PROGRESS NOTES
CC:  Here for f/u sleep issues as listed below    HPI:   Farhat presents today for follow up obstructive sleep apnea.  PSG from 2015 indicated an AHI of 57 and low oxygen of 81%.  Currently he is being treated with CPAP @ [id number].   Compliance download from the dates 5/15/2018 - 6/13/2018 indicates he is wearing the device 100% for an avg of 7 hours and 16 minutes per night with a reduced AHI of 1.0.  Avg pressure 12.0 with a high of 14.9. He does tolerate pressure and mask well and will be trying a new mask soon.  He wakes up refreshed and denies morning H/A. Occasionally takes naps. he sleeps better overall. he will continue to clean supplies weekly and change them as insurance allows.             Patient Active Problem List    Diagnosis Date Noted   • Osteoarthritis of right knee 03/28/2018   • Arthritis of knee 03/09/2017   • MARCI (obstructive sleep apnea) 03/02/2017   • Tubular adenoma of colon 07/06/2016   • Blackwell's esophagus 07/06/2016   • History of ulcer disease 06/23/2016   • Chronic kidney disease, stage III (moderate) 03/21/2016   • Peripheral autonomic neuropathy 09/23/2015   • Hypothyroid 04/28/2015   • Obesity (BMI 30-39.9) 08/22/2014   • Vertigo 05/13/2014   • Renal cyst 01/22/2014   • Compression fracture 01/21/2014   • Sensorineural hearing loss 11/26/2012   • Impaired fasting glucose 10/10/2012   • Paroxysmal atrial fibrillation (HCC) 05/22/2012   • Right foot pain 03/06/2010   • Hypertension 09/16/2009   • Dyslipidemia 09/16/2009   • Allergic rhinitis 09/16/2009   • Status post lumbar surgery 09/16/2009   • Preventative health care 09/16/2009       Past Medical History:   Diagnosis Date   • Arrhythmia     Atrial fribrillation   • Arthritis    • Bronchitis 2004   • Fibula fracture 1981    right   • High cholesterol    • Hyperlipidemia    • Hypertension    • MEDICAL HOME    • Pain     low back   • Pneumonia 2004   • Sleep apnea        Past Surgical History:   Procedure Laterality Date   •  HAMMERTOE CORRECTION  2/2/2015    Performed by Abdifatah Orta D.P.M. at SURGERY [facility]   • EXOSTOSIS EXCISION  6/3/2011    Performed by ABDIFATAH ORTA at SURGERY [facility] ORS   • LESION EXCISION ORTHO  6/3/2011    Performed by ABDIFATAH ORTA at SURGERY [facility] OR[facility]   • TOE ARTHROPLASTY  6/3/2011    Performed by ABDIFATAH ORTA at SURGERY [facility]   • LUMBAR LAMINECTOMY DISKECTOMY  2005    microscopic   • TONSILLECTOMY  1945    adenoidectomy       Family History   Problem Relation Age of Onset   • Cancer Mother      cervical/breast   • Cancer Sister      lung   • Cancer Father      lung cancer   • Cancer         Social History     Social History   • Marital status:      Spouse name: N/A   • Number of children: N/A   • Years of education: N/A     Occupational History   • Not on file.     Social History Main Topics   • Smoking status: Never Smoker   • Smokeless tobacco: Never Used   • Alcohol use No   • Drug use: No   • Sexual activity: Not on file     Other Topics Concern   • Not on file     Social History Narrative   • No narrative on file       Current Outpatient Prescriptions   Medication Sig Dispense Refill   • CALCIUM CARBONATE-VITAMIN D PO Take  by mouth.     • levothyroxine (SYNTHROID) 75 MCG Tab Take 1 Tab by mouth Every morning on an empty stomach. 90 Tab 3   • pravastatin (PRAVACHOL) 40 MG tablet Take 1 Tab by mouth every day. 90 Tab 0   • carvedilol (COREG) 25 MG Tab Take 0.5 Tabs by mouth 2 times a day, with meals. 90 Tab 0   • fluticasone (FLONASE) 50 MCG/ACT nasal spray Spray 2 Sprays in nose every day. 1 Bottle 11   • omeprazole (PRILOSEC) 40 MG delayed-release capsule Take 1 Cap by mouth every day. 30 Cap 11   • aspirin 81 MG tablet Take 81 mg by mouth every day.     • vitamin D (CHOLECALCIFEROL) 1000 UNIT TABS Take 1,000 Units by mouth 2 Times a Day.     • docosahexanoic acid (OMEGA 3 FA) 1000 MG CAPS Take 1,000 mg by mouth 2 Times a Day.       No  "current facility-administered medications for this visit.           Allergies: Flexeril [cyclobenzaprine hcl] and Other environmental      ROS   Gen: Denies fever, chills, unintentional weight loss, fatigue  Resp:Denies Dyspnea  CV: Denies chest pain, chest tightness  Sleep:Denies morning headache, insomnia, daytime somnolence, snoring, gasping for air, apnea  Neuro: Denies frequent headaches, weakness, dizziness  See HPI.  All other systems reviewed and negative        Vital signs for this encounter:  Vitals:    06/14/18 1357   Height: 1.88 m (6' 2\")   Weight: 122 kg (269 lb)   Weight % change since last entry.: 0 %   BP: 132/90   Pulse: (!) 56   BMI (Calculated): 34.54   Resp: 16   Temp: 36.4 °C (97.5 °F)   O2 sat % room air: 90 %                   Physical Exam:   Gen:         Alert and oriented, No apparent distress.   Neck:        No Lymphadenopathy.  Lungs:     Clear to auscultation bilaterally.    CV:          Regular rate and rhythm. No murmurs, rubs or gallops.   Abd:         Soft non tender, non distended.            Ext:          No clubbing, cyanosis, edema.    Assessment   1. Obesity (BMI 30-39.9)  OBESITY COUNSELING (No Charge): Patient identified as having weight management issue.  Appropriate orders and counseling given.   2. MARCI (obstructive sleep apnea)  DME MASK AND SUPPLIES       Patient is clinically stable and will proceed with following plan.     PLAN:   Patient Instructions   1) Continue autoCPAP at [id number]  2) Clean mask and supplies weekly and change them as insurance allows  3) Vaccines: Prevnar 13 and Pneumovax 23  4) Return in about 1 year (around 6/14/2019) for follow up with [facility], if not sooner, review of symptoms, Compliance.        "

## 2018-06-18 PROBLEM — G47.33 OSA (OBSTRUCTIVE SLEEP APNEA): Status: RESOLVED | Noted: 2017-03-02 | Resolved: 2018-06-18

## 2018-06-18 PROBLEM — M17.11 OSTEOARTHRITIS OF RIGHT KNEE: Status: RESOLVED | Noted: 2018-03-28 | Resolved: 2018-06-18

## 2018-06-18 PROBLEM — M54.50 LOW BACK PAIN: Status: ACTIVE | Noted: 2018-06-18

## 2018-09-25 PROBLEM — M54.50 LOW BACK PAIN: Status: RESOLVED | Noted: 2018-06-18 | Resolved: 2018-09-25

## 2018-09-27 ENCOUNTER — TELEPHONE (OUTPATIENT)
Dept: MEDICAL GROUP | Facility: MEDICAL CENTER | Age: 78
End: 2018-09-27

## 2018-09-27 ENCOUNTER — OFFICE VISIT (OUTPATIENT)
Dept: MEDICAL GROUP | Facility: MEDICAL CENTER | Age: 78
End: 2018-09-27
Payer: MEDICARE

## 2018-09-27 VITALS
WEIGHT: 289 LBS | OXYGEN SATURATION: 94 % | HEART RATE: 66 BPM | TEMPERATURE: 99.7 F | HEIGHT: 73 IN | DIASTOLIC BLOOD PRESSURE: 86 MMHG | BODY MASS INDEX: 38.3 KG/M2 | SYSTOLIC BLOOD PRESSURE: 144 MMHG

## 2018-09-27 DIAGNOSIS — I48.0 PAROXYSMAL ATRIAL FIBRILLATION (HCC): Chronic | ICD-10-CM

## 2018-09-27 DIAGNOSIS — E78.5 DYSLIPIDEMIA: Chronic | ICD-10-CM

## 2018-09-27 DIAGNOSIS — N18.30 CHRONIC KIDNEY DISEASE, STAGE III (MODERATE) (HCC): Chronic | ICD-10-CM

## 2018-09-27 DIAGNOSIS — I15.9 SECONDARY HYPERTENSION: Chronic | ICD-10-CM

## 2018-09-27 DIAGNOSIS — E66.9 OBESITY (BMI 30-39.9): ICD-10-CM

## 2018-09-27 DIAGNOSIS — G47.30 SLEEP APNEA, UNSPECIFIED TYPE: Chronic | ICD-10-CM

## 2018-09-27 DIAGNOSIS — E03.9 HYPOTHYROIDISM, UNSPECIFIED TYPE: Chronic | ICD-10-CM

## 2018-09-27 DIAGNOSIS — R73.01 IMPAIRED FASTING GLUCOSE: ICD-10-CM

## 2018-09-27 PROCEDURE — 99214 OFFICE O/P EST MOD 30 MIN: CPT | Performed by: INTERNAL MEDICINE

## 2018-09-27 RX ORDER — LISINOPRIL 10 MG/1
10 TABLET ORAL DAILY
Qty: 30 TAB | Refills: 4 | Status: SHIPPED | OUTPATIENT
Start: 2018-09-27 | End: 2019-02-24 | Stop reason: SDUPTHER

## 2018-09-27 NOTE — PROGRESS NOTES
Subjective:      Farhat Stahl is a 78 y.o. male who presents kidney function          HPI   Arrived 303  Follow-up CKD, most recent BUN 27, creatinine 1.36, GFR 51 April 10, hypertension followed by cardiology, on carvedilol 25 mg twice a day, hypothyroid on replacement, dyslipidemia on Pravachol 40 mg daily no muscle pain or muscle aches with statin therapy, no regular NSAIDs  Paroxysmal atrial fibrillation followed by cardiology, one occurrence in the past, has maintained sinus rhythm, maintained on carvedilol, aspirin, no current chest pain, shortness of breath, lightheadedness, syncope, palpitation  Unable to exercise because of low back pain, [place] Can ride stationary bike but not doing so.   Has seen [facility] for right knee pain bone on bone has brace now for ambulation, no falls, no regular NSAIDs, no chronic pain medication, chronic low back pain has been limiting, worse with prolonged standing, sitting, limited range of motion bending, unable to walk for long distances.  Sleep apnea on cpap using nightly  No tobacco, no alcohol  No nsaids        Current Outpatient Prescriptions   Medication Sig Dispense Refill   • pravastatin (PRAVACHOL) 40 MG tablet Take 1 Tab by mouth every day. 90 Tab 3   • carvedilol (COREG) 25 MG Tab Take 1 Tab by mouth 2 times a day, with meals. 180 Tab 3   • CALCIUM CARBONATE-VITAMIN D PO Take  by mouth.     • levothyroxine (SYNTHROID) 75 MCG Tab Take 1 Tab by mouth Every morning on an empty stomach. 90 Tab 3   • fluticasone (FLONASE) 50 MCG/ACT nasal spray Spray 2 Sprays in nose every day. 1 Bottle 11   • omeprazole (PRILOSEC) 40 MG delayed-release capsule Take 1 Cap by mouth every day. 30 Cap 11   • aspirin 81 MG tablet Take 81 mg by mouth every day.     • vitamin D (CHOLECALCIFEROL) 1000 UNIT TABS Take 1,000 Units by mouth 2 Times a Day.     • docosahexanoic acid (OMEGA 3 FA) 1000 MG CAPS Take 1,000 mg by mouth 2 Times a Day.       No current facility-administered  medications for this visit.      Allergic rhinitis of Flonase     arthritis knee  3/9/17 x-ray right knee degenerative changes lateral femoral-tibial articulation, osteophytic spurring and likely subchondral cystic change  3/9/17 referral physical therapy  3/17/17 custom physical therapy evaluation  4/10/18 has follow up  orthopedics     tatum   6/23/16 [facility] note follow up EGD showing erosions of acute gastric ulcer without hemorrhage or perforation,chronic inactive gastritis with reactive epithelial changes, intestinal metaplasia, dysplasia, malignancy; consistent with pompa's esophagus and reflux esophagitis, negative for dysplasia or h.pylori, repeat EGD 3 months exam gastric ulcer, started on omeprazole 40 mg, follow-up EGD in september 11/2/17 EGD per [facility] erosions and antral biopsies performed, hiatal hernia, irregularly gastroesophageal junction, biopsies performed     ckd   5/9/14 bun 17,creat 1.1,GFR >60  9/2/14 bun 19,creat 1.3,GFR 52  1/28/15 bun 22,creat 1.29,GFR 54  4/28/15 bun 26,creat 1.1,GFR>60  3/21/16 bun 23,creat 1.3,GFR 52,urine mac< 8  3/10/17 bun 19,creat 1.4,GFR 49,PTH 39,calcium 9.4,phos 3.6,vit d 83,SPEP negative  4/10/18 bun 27,creat 1.36,GFR 51     Compression fracture  1/20/14 MRI lumbar spine acute anterior compression L1 chronic fracture T12, L5-S1 diffuse disc bulge, moderate right neural foraminal stenosis, L4-L5 left paracentral disc extrusion, moderate bilateral foraminal stenosis  1/29/14 dexa LS +0.9,hip +0.4     Dyslipidemia  4/09 chol 189,trig 87,hdl 45,ldl 127  9/09 chol 206,trig 90,hdl 52,ldl 136 off lipitor/zocor  12/09 chol 183,trig 84,hdl 42,ldl 124  2/10 [facility] note chol 153,ldl 124 started by cards on pravastatin 40 since ?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  9/10 chol 135,trig 52,hdl 46,ldl 79 on pravachol  9/11 chol 145,trig 64,hdl 52,ldl 80 on pravachol  5/12 chol 146,trig 98,hdl 46,ldl 80 on pravachol 40 mg  10/12 chol 133,trig 65,hdl 46,ldl 74 on pravachol  40 mg  7/19/13 chol 121,trig 65,hdl 48,ldl 60 on pravachol 40 mg  9/3/13 chol 131,trig 75,hdl 49,ldl 67 on pravachol 40 mg  4/11/14 chol 150,trig 42,hdl 62,ldl 80 on pravachol 40 mg  8/22/14 chol 134,trig 73,hdl 54,ldl 65 on pravachol 40 mg  4/28/15 chol 114,trig 59,hdl 51,ldl 51 on pravachol 40 mg  3/21/16 chol 124,trig 57,hdl 47,ldl 66 on pravachol 40 mg  3/10/17 chol 151,trig 89,hdl 53,ldl 80 on pravachol 40 mg  4/10/18 chol 158,trig 134,hdl 56,ldl 75 on pravachol 40 mg     History of ulcer disease  3/17/16 GI evaluation dysphagia  6/2/16 EGD per [facility] localized irregularity mucosa GE junction, biopsies performed, 42 Faroese dilation to 54 Faroese, localized erosions and ulceration mucosa stomach, biopsies performed, take omeprazole 40 mg daily  6/23/16 [facility] note follow up EGD showing erosions of acute gastric ulcer without hemorrhage or perforation,chronic inactive gastritis with reactive epithelial changes, intestinal metaplasia, dysplasia, malignancy; consistent with pompa's esophagus and reflux esophagitis, negative for dysplasia or h.pylori, repeat EGD 3 months exam gastric ulcer, started on omeprazole 40 mg, follow-up EGD in september 11/2/17 EGD per [facility] erosions and antral biopsies performed, hiatal hernia, irregularly gastroesophageal junction, biopsies performed      Hypertension  6/07 echocardiogram borderline LVH  1/09 VEENA left 1.3, right 1.3  9/10 urine mac 2  3/11 u/s vascular lower ext negative  5/11 [facility] note   5/12 [facility] note on coreg 25 bid for paroxysmal atrial fibrillation and vasotec 10 mg qday  6/12 on coreg 25 bid  id, off vasotec  7/2/12 [facility] note  7/13 cardiology note  1/21/14 ultrasound carotid negative; on coreg 25 mg bid, asa  5/9/14 echo normal ejection fraction 65%, mild LVH  5/16/14 decrease coreg to 12.5 mg bid, cont asa  8/21/14 cardiology note follow up one year  3/21/16 urine mac <8 on coreg 12.5 mg  3/10/17 urine mac 3 on coreg 12.5 mg   5/2/17 cardiology note one episode atrial  fibrillation 2012 will continue on aspirin, encourage weight loss, follow-up one year     Hypothyroid  5/19/12 tsh 7.45  4/28/15 tsh 7.49 start synthroid 50 mcg repeat tsh 6 weeks  8/28/15 tsh 3.2 on synthroid 50 mcg  3/21/16 hypothyroid 6.1 on synthroid 50 mcg, increase to synthroid 75 mcg repeat tsh 6 weeks  5/6/16 tsh 2.4 on synthroid 75 mcg  3/10/17 tsh 3.9 on synthroid 75 mcg  4/10/18 tsh 3.6 on synthroid 75 mcg     Impaired fasting blood sugar  10/12 bs 110  7/8/13 bs 106  9/3/13 A1c 6.1%  1/19/14 bs 95  4/11/14 A1c 5.5%, bs 103  8/22/14 bs 107  4/28/15 A1c 5.6%, bs 95  3/21/16 A1c 5.5%  3/10/17 A1c 5.7%,bs 112  4/10/18 A1c 5.8%     Paroxysmal atrial fibrillation  5/12 hospital admission atrial fibrillation, converted with intravenous cardizem  5/19/12 echo normal LV function  5/19/12 Persantine thallium normal EF 62%, subtle decrease uptake lateral wall  5/12 CHADS2 score 1   5/22/12 [facility] discontinue coumadin, change to aspirin; coreg 25 bid  7/1/13 cardiology note no changes, NSR cont coreg 25 bid and asa  1/19/14 EKG normal sinus rhythm on admission for compression fracture  1/21/14 ultrasound carotids negative  1/20/14 MRI brain without; no acute infarct or hemorrhage, moderate atrophy, mild chronic microvascular ischemic changes; on coreg 25 mg bid and asa  3/4/14 NSR on exam  5/9/14 echo normal ejection fraction 65%, mild LVH  5/16/14 decrease coreg to 12.5 mg bid secondary to heart rate and continue aspirin  8/21/14 cardiology note follow up one year  5/2/17 cardiology note one episode atrial fibrillation 2012 will continue on aspirin, encourage weight loss, follow-up one year    Peripheral neuropathy  11/7/09  note peripheral neuropathy sensorimotor axonal neuropathy, declines muscle and nerve biopsy  5/8/18 x-ray lumbar spine grade 1-2 anterolisthesis L4-L5, grade 1 retrolisthesis L2-L3, age indeterminate compression deformities T12-L1, multilevel degenerative changes L4-S1, prior coccyx  fracture  7/16/18  physiatry [facility] [place] EMG nerve conduction study bilateral lower extremity, acute on chronic L4-L5 radiculopathy, right greater than left L5 nerve roots affected distal denervation bilateral tibialis anterior and right extensor hallucis longus muscles, may be coexisting distal lower extremity peripheral sensory motor polyneuropathy may be idiopathic, no evidence of lumbar plexopathy    Preventative health  12/1/09 shingles vaccine  5/2/13 tdap  1/29/14 dexa LS +0.9,hip +0.4  4/11/14 psa 0.7  5/29/15 prevnar  6/2/16 colon per [facility] tubular adenoma times 2 repeat colonoscopy 5 years  7/5/16 pneumovax  10/25/16 flu hd at [facility]  3/10/17 vit d 83     Renal cyst  1/20/14 MRI lumbar spine incidental finding 4 x 3.2 cm cyst right kidney  4/29/14 ultrasound renal 4 cm right upper pole simple renal cyst  3/31/18 MRI lumbar spine 4.2 x 3.5 right renal cyst     Right foot pain  3/10 x-ray right foot and severe DJD and bone spur first MTP, small bone spur calcaneus and fifth metatarsal  3/11 right foot x-ray prominent DJD first MTP, posterior osseous spurring or soft tissue calcification distal to the first metatarsal  3/11 uric 5.3,esr 2,crp 1.2  3/11 u/s arterial RLE negative  3/11 MRI lumbar spine mild anterolisthesis L4-L5, mild canal stenosis L4-L5  6/11  right first toe surgery and replacement  10/12 ultrasound right lower extremity venous negative  3/9/17 sees  podiatry hammer toe    Sensorineural hearing loss  11/12  audiology evaluation, moderate right high-frequency sensorineural hearing loss, not a candidate for amplification at this time  4/10/18 has right hearing aid     Sleep apnea  2/18/14 overnight pulse oximetry room air time less than 88% saturation 182 minutes, low saturation 67%, basal saturation 87%, apnea index 42 events per hour  4/11/14 pt would like to repeat tests, initial test done while he had bronchitis  6/18/14 nocturnal oximetry <89% for 231  minutes, low 63%, AHI per hour 55;will refer to [facility]  2/11/15 [facility] note, recommend cpap titration  3/21/15 [facility] sleep study 191 minutes,AHI 57,hypopnea index 42,minimum saturation 81%,improved on cpap 11 titration study  4/30/15 [facility] sleep note mask fit auto CPAP 10-15, followup one month  12/14/15 [facility] sleep note AHI 57 on autopap 10-16, 100% compliance AHI decreased to 0.6  3/2/17 sleep note   6/14/18 sleep note continue cpap 10-16 follow up one year     s/p lumbar surgery  1/04 right L4-L5 laminotomy and discectomy   1/20/14 MRI lumbar spine acute anterior compression fracture L1 fracture cleft noted along superior endplate, chronic fracture T12 vertebra, L5-S1 moderate right foraminal stenosis, L4-L5 left paracentral disc protrusion and facet joint arthropathy with mild to moderate lower foraminal stenosis  3/17/17 custom PT evaluation  3/13/18 MRI lumbar spine L5-S1 moderate to severe right L5 foraminal stenosis, impingement L5 nerve root, L4-L5 mild to moderate bilateral and central canal stenosis, chronic compression deformity L1  3/31/18 MRI lumbar spine L5-S1 diffuse disc bulge, moderate severe right L5 no foraminal stenosis, L4-L5 mild to moderate bilateral stenosis, mild-to-moderate central canal stenosis, 4.2 x 3.5 right kidney cyst, chronic depression deformity L1  5/8/18 x-ray lumbar spine grade 1-2 anterolisthesis L4-L5, grade 1 retrolisthesis L2-L3, age indeterminate compression deformities T12-L1, multilevel degenerative changes L4-S1, prior coccyx fracture  6/12/18  [facility] [place] neuro[facility] note, recommend bilateral L4-S1 posterior injection and EMG nerve conduction study bilateral lower extremities  7/16/18  physiatry [facility] [place] EMG nerve conduction study bilateral lower extremity, acute on chronic L4-L5 radiculopathy, right greater than left L5 nerve roots affected distal denervation bilateral tibialis anterior and right extensor hallucis longus muscles, may be  coexisting distal lower extremity peripheral sensory motor polyneuropathy may be idiopathic, no evidence of lumbar plexopathy; recommend start physical therapy, follow-up 4-6 weeks     vertigo  5/8/14 CT head cerebral atrophy, periventricular small vessel ischemic change  5/9/14 MRI brain with and without; moderate microangiopathic ischemic change vs. demyelination or gliosis, moderate cervical and cerebellar substance loss  5/2/14 vestibular clinic evaluation, follow up as needed          Health Maintenance Summary                IMM ZOSTER VACCINES Overdue 1/26/2010      Done 12/1/2009 Imm Admin: Zoster Vaccine Live (ZVL) (Zostavax)    IMM INFLUENZA Overdue 9/1/2018      Done 9/28/2017 Imm Admin: Influenza Vaccine Adult HD     Patient has more history with this topic...    Annual Wellness Visit Next Due 4/11/2019      Done 4/10/2018 Visit Dx: Medicare annual wellness visit, subsequent     Patient has more history with this topic...    COLONOSCOPY Next Due 6/2/2021      Done 6/2/2016 AMB REFERRAL TO GI FOR COLONOSCOPY    IMM DTaP/Tdap/Td Vaccine Next Due 5/2/2023      Done 5/2/2013 Imm Admin: Tdap Vaccine     Patient has more history with this topic...          Patient Care Team:  Lavell Tadeo M.D. as PCP - General  Maximus Lopez M.D. as Consulting Physician (Pulmonary Medicine)  Newton Orta D.P.M. as Consulting Physician (Podiatry)  Umberto Hayes O.D. as Consulting Physician (Optometry)  Sven Bolanos M.D. as Consulting Physician (Gastroenterology)  [facility] as Home Health Provider (DME Supplier)                  Patient Active Problem List   Diagnosis   • Hypertension   • Dyslipidemia   • Allergic rhinitis   • Status post lumbar surgery   • Preventative health care   • Right foot pain   • Paroxysmal atrial fibrillation (HCC)   • Impaired fasting glucose   • Sensorineural hearing loss   • Compression fracture   • Renal cyst   • Vertigo   • Obesity (BMI 30-39.9)   • Hypothyroid   •  Peripheral autonomic neuropathy   • Chronic kidney disease, stage III (moderate)   • History of ulcer disease   • Tubular adenoma of colon   • Blackwell's esophagus   • Arthritis of knee       ROS       Objective:          Physical Exam   Constitutional: He appears well-developed and well-nourished. No distress.   HENT:   Head: Normocephalic and atraumatic.   Eyes: Conjunctivae are normal. Right eye exhibits no discharge. Left eye exhibits no discharge.   Neck: Neck supple. No JVD present.   Cardiovascular: Normal rate, regular rhythm and normal heart sounds.    Pulmonary/Chest: Effort normal and breath sounds normal.   Abdominal: He exhibits no distension.   Musculoskeletal: He exhibits no edema.   Neurological: He is alert.   Skin: Skin is warm. He is not diaphoretic.   Psychiatric: He has a normal mood and affect. His behavior is normal.   Nursing note and vitals reviewed.    Lower extremities no edema          Assessment/Plan:     Assessment  #!  CKD April 2018 BUN 27, creatinine 1.36, GFR 51 no regular NSAIDs    #2 hypertension on carvedilol on 25 mg bid, not ideally controlled    #3 dyslipidemia on pravastatin no muscle pain    #4 impaired fasting blood sugar    #5 obesity BMI 38.1 not exercising on a regular basis    #6 paroxysmal atrial fibrillation 1 episode 2012 no recurrence, sinus rhythm, followed by cardiology remains on carvedilol and aspirin, no recurrent symptoms    #7 chronic low back pain sees spine man[place] has had injections previously    #8 sleep apnea using CPAP    Plan  #1 repeat labs    #2 renal ultrasound for CKD    #3 avoid NSAIDs    #4 importance of weight loss emphasized, recommend nutrition counseling, weight management program referral for obesity BMI 38.1, multiple risk factors including paroxysmal atrial fibrillation, dyslipidemia, impaired fasting blood sugar, hypertension, CKD, sleep apnea    #5 continue coreg 25 mg bid check blood pressure daily and record    #6 start low dose ACE  monitor renal function lisinopril 10 mg repeat labs 2 weeks 10 call for worsening renal insufficiency, elevated potassium    #7  Low-sodium diet    #8 old records [facility]     #9  Follow-up 3 months    #10 recheck labs 1 week after starting ACE inhibitor low dose, slip provided

## 2018-10-14 ENCOUNTER — TELEPHONE (OUTPATIENT)
Dept: MEDICAL GROUP | Facility: MEDICAL CENTER | Age: 78
End: 2018-10-14

## 2018-10-14 ENCOUNTER — HOSPITAL ENCOUNTER (OUTPATIENT)
Dept: RADIOLOGY | Facility: MEDICAL CENTER | Age: 78
End: 2018-10-14
Attending: INTERNAL MEDICINE
Payer: MEDICARE

## 2018-10-14 DIAGNOSIS — N18.30 CHRONIC KIDNEY DISEASE, STAGE III (MODERATE) (HCC): Chronic | ICD-10-CM

## 2018-10-14 PROCEDURE — 76775 US EXAM ABDO BACK WALL LIM: CPT

## 2018-10-23 ENCOUNTER — TELEPHONE (OUTPATIENT)
Dept: MEDICAL GROUP | Facility: MEDICAL CENTER | Age: 78
End: 2018-10-23

## 2018-10-23 ENCOUNTER — HOSPITAL ENCOUNTER (OUTPATIENT)
Dept: LAB | Facility: MEDICAL CENTER | Age: 78
End: 2018-10-23
Attending: INTERNAL MEDICINE
Payer: MEDICARE

## 2018-10-23 DIAGNOSIS — N18.30 CHRONIC KIDNEY DISEASE, STAGE III (MODERATE) (HCC): Chronic | ICD-10-CM

## 2018-10-23 DIAGNOSIS — R73.01 IMPAIRED FASTING GLUCOSE: ICD-10-CM

## 2018-10-23 LAB
25(OH)D3 SERPL-MCNC: 32 NG/ML (ref 30–100)
ALBUMIN SERPL BCP-MCNC: 4.1 G/DL (ref 3.2–4.9)
ALBUMIN/GLOB SERPL: 1.5 G/DL
ALP SERPL-CCNC: 93 U/L (ref 30–99)
ALT SERPL-CCNC: 34 U/L (ref 2–50)
ANION GAP SERPL CALC-SCNC: 7 MMOL/L (ref 0–11.9)
AST SERPL-CCNC: 26 U/L (ref 12–45)
BILIRUB SERPL-MCNC: 0.5 MG/DL (ref 0.1–1.5)
BUN SERPL-MCNC: 19 MG/DL (ref 8–22)
CALCIUM SERPL-MCNC: 10 MG/DL (ref 8.5–10.5)
CHLORIDE SERPL-SCNC: 105 MMOL/L (ref 96–112)
CO2 SERPL-SCNC: 28 MMOL/L (ref 20–33)
CREAT SERPL-MCNC: 1.25 MG/DL (ref 0.5–1.4)
FASTING STATUS PATIENT QL REPORTED: NORMAL
GLOBULIN SER CALC-MCNC: 2.8 G/DL (ref 1.9–3.5)
GLUCOSE SERPL-MCNC: 101 MG/DL (ref 65–99)
POTASSIUM SERPL-SCNC: 4.6 MMOL/L (ref 3.6–5.5)
PROT SERPL-MCNC: 6.9 G/DL (ref 6–8.2)
PTH-INTACT SERPL-MCNC: 33.7 PG/ML (ref 14–72)
SODIUM SERPL-SCNC: 140 MMOL/L (ref 135–145)

## 2018-10-23 PROCEDURE — 83970 ASSAY OF PARATHORMONE: CPT

## 2018-10-23 PROCEDURE — 82306 VITAMIN D 25 HYDROXY: CPT

## 2018-10-23 PROCEDURE — 83036 HEMOGLOBIN GLYCOSYLATED A1C: CPT

## 2018-10-23 PROCEDURE — 80053 COMPREHEN METABOLIC PANEL: CPT

## 2018-10-23 PROCEDURE — 36415 COLL VENOUS BLD VENIPUNCTURE: CPT

## 2018-10-24 ENCOUNTER — TELEPHONE (OUTPATIENT)
Dept: MEDICAL GROUP | Facility: MEDICAL CENTER | Age: 78
End: 2018-10-24

## 2018-10-24 LAB
EST. AVERAGE GLUCOSE BLD GHB EST-MCNC: 131 MG/DL
HBA1C MFR BLD: 6.2 % (ref 0–5.6)

## 2018-10-24 NOTE — TELEPHONE ENCOUNTER
Phone Number Called: [phone] (home)       Message: Tired to contact patient, left message for patient to call [phone]      Left Message for patient to call back: yes

## 2018-10-24 NOTE — TELEPHONE ENCOUNTER
----- Message from Lavell Tadeo M.D. sent at 10/23/2018  6:32 PM PDT -----  Called patient and left message  Please notify patient that his test shows:  (1) kidney function has improved, salt and potassium levels are normal  (2) vitamin D levels are normal  (3) continue on the new blood pressure medication lisinopril

## 2018-10-24 NOTE — TELEPHONE ENCOUNTER
Called patient and left message  Please notify patient that his test shows:  (1) kidney function has improved, salt and potassium levels are normal  (2) vitamin D levels are normal  (3) continue on the new blood pressure medication lisinopril

## 2019-03-29 ENCOUNTER — HOSPITAL ENCOUNTER (OUTPATIENT)
Dept: RADIOLOGY | Facility: MEDICAL CENTER | Age: 79
End: 2019-03-29
Attending: PHYSICIAN ASSISTANT
Payer: MEDICARE

## 2019-03-29 DIAGNOSIS — M48.8X6 OTHER SPECIFIED SPONDYLOPATHIES, LUMBAR REGION (HCC): ICD-10-CM

## 2019-03-29 PROCEDURE — 72110 X-RAY EXAM L-2 SPINE 4/>VWS: CPT

## 2019-03-29 PROCEDURE — 72148 MRI LUMBAR SPINE W/O DYE: CPT

## 2019-05-20 ENCOUNTER — OFFICE VISIT (OUTPATIENT)
Dept: MEDICAL GROUP | Facility: MEDICAL CENTER | Age: 79
End: 2019-05-20
Payer: MEDICARE

## 2019-05-20 VITALS
BODY MASS INDEX: 37.37 KG/M2 | SYSTOLIC BLOOD PRESSURE: 134 MMHG | WEIGHT: 282 LBS | HEIGHT: 73 IN | OXYGEN SATURATION: 94 % | TEMPERATURE: 98.1 F | HEART RATE: 64 BPM | DIASTOLIC BLOOD PRESSURE: 76 MMHG

## 2019-05-20 DIAGNOSIS — E03.9 HYPOTHYROIDISM, UNSPECIFIED TYPE: Chronic | ICD-10-CM

## 2019-05-20 DIAGNOSIS — J30.1 SEASONAL ALLERGIC RHINITIS DUE TO POLLEN: ICD-10-CM

## 2019-05-20 DIAGNOSIS — R94.4 DECREASED GFR: ICD-10-CM

## 2019-05-20 PROCEDURE — 99214 OFFICE O/P EST MOD 30 MIN: CPT | Performed by: FAMILY MEDICINE

## 2019-05-20 RX ORDER — LEVOTHYROXINE SODIUM 0.07 MG/1
75 TABLET ORAL
Qty: 90 TAB | Refills: 3 | Status: SHIPPED | OUTPATIENT
Start: 2019-05-20 | End: 2020-05-26

## 2019-05-20 RX ORDER — MONTELUKAST SODIUM 10 MG/1
10 TABLET ORAL DAILY
Qty: 90 TAB | Refills: 3 | Status: SHIPPED | OUTPATIENT
Start: 2019-05-20 | End: 2020-01-20

## 2019-05-20 RX ORDER — NORTRIPTYLINE HYDROCHLORIDE 25 MG/1
25 CAPSULE ORAL DAILY
Status: SHIPPED | DISCHARGE
Start: 2019-05-20 | End: 2020-01-20

## 2019-05-20 ASSESSMENT — PATIENT HEALTH QUESTIONNAIRE - PHQ9: CLINICAL INTERPRETATION OF PHQ2 SCORE: 0

## 2019-05-20 NOTE — PROGRESS NOTES
Subjective:   Farhat Stahl is a 79 y.o. male here today for hypothyroidism    Hypothyroid  Patients has been on levothyroxine since 2015 and hypothyroidism symptoms resolved after starting levothyroxine. Tolerating levothyroxine 75 mcg daily without complaints.    Decreased GFR  Had a recent blood test that showed stable creatinine at 1.4.    Allergic rhinitis  Has multiple pollen allergies. Has noticed a wheeze in the evening, for the last 2-3 weeks. We are having high amount of pollen. Symptoms improved with Mucinex for about 1 week. Uses Flonase, which has helped his sinuses.         Current medicines (including changes today)  Current Outpatient Prescriptions   Medication Sig Dispense Refill   • levothyroxine (SYNTHROID) 75 MCG Tab Take 1 Tab by mouth Every morning on an empty stomach. 90 Tab 3   • nortriptyline (PAMELOR) 25 MG Cap Take 1 Cap by mouth every day.     • montelukast (SINGULAIR) 10 MG Tab Take 1 Tab by mouth every day. 90 Tab 3   • lisinopril (PRINIVIL) 10 MG Tab Take 1 Tab by mouth every day. 90 Tab 1   • fluticasone (FLONASE) 50 MCG/ACT nasal spray Spray 2 Sprays in nose every day. 16 g 11   • Cyanocobalamin (VITAMIN B12 PO) Take  by mouth.     • Glucosamine-Chondroitin (GLUCOSAMINE CHONDR COMPLEX PO) Take  by mouth.     • pravastatin (PRAVACHOL) 40 MG tablet Take 1 Tab by mouth every day. 90 Tab 3   • carvedilol (COREG) 25 MG Tab Take 1 Tab by mouth 2 times a day, with meals. 180 Tab 3   • CALCIUM CARBONATE-VITAMIN D PO Take  by mouth.     • omeprazole (PRILOSEC) 40 MG delayed-release capsule Take 1 Cap by mouth every day. 30 Cap 11   • aspirin 81 MG tablet Take 81 mg by mouth every day.     • vitamin D (CHOLECALCIFEROL) 1000 UNIT TABS Take 1,000 Units by mouth 2 Times a Day.     • docosahexanoic acid (OMEGA 3 FA) 1000 MG CAPS Take 1,000 mg by mouth 2 Times a Day.       No current facility-administered medications for this visit.      He  has a past medical history of Arrhythmia;  "Arthritis; Bronchitis (2004); Fibula fracture (1981); High cholesterol; Hyperlipidemia; Hypertension; MEDICAL HOME; Pain; Pneumonia (2004); and Sleep apnea.    ROS   No fever, + wheezing in the evening.       Objective:     /76 (BP Location: Right arm, Patient Position: Sitting)   Pulse 64   Temp 36.7 °C (98.1 °F)   Ht 1.854 m (6' 1\")   Wt (!) 127.9 kg (282 lb)   SpO2 94%  Body mass index is 37.21 kg/m².   Physical Exam:  Constitutional: Alert, no distress.  Skin: Warm, dry, good turgor, no rashes in visible areas.  Eye: Equal, round and reactive, conjunctiva clear, lids normal.  Respiratory: Unlabored respiratory effort, lungs clear to auscultation, no wheezes, no ronchi.  Psych: Alert and oriented x3, normal affect and mood.        Assessment and Plan:   The following treatment plan was discussed    1. Hypothyroidism, unspecified type  Controlled.  Continue levothyroxine.  - levothyroxine (SYNTHROID) 75 MCG Tab; Take 1 Tab by mouth Every morning on an empty stomach.  Dispense: 90 Tab; Refill: 3    2. Decreased GFR  Stable.  Continue to monitor with labs every 6 to 12 months.    3. Seasonal allergic rhinitis due to pollen  Uncontrolled.  Trial of Singulair is wheezing may be related to uncontrolled allergies.  If no improvement with Singulair consider chest x-ray  - montelukast (SINGULAIR) 10 MG Tab; Take 1 Tab by mouth every day.  Dispense: 90 Tab; Refill: 3      Followup: Return if symptoms worsen or fail to improve.         "

## 2019-05-20 NOTE — ASSESSMENT & PLAN NOTE
Patients has been on levothyroxine since 2015 and hypothyroidism symptoms resolved after starting levothyroxine. Tolerating levothyroxine 75 mcg daily without complaints.

## 2019-05-20 NOTE — ASSESSMENT & PLAN NOTE
Has multiple pollen allergies. Has noticed a wheeze in the evening, for the last 2-3 weeks. We are having high amount of pollen. Symptoms improved with Mucinex for about 1 week. Uses Flonase, which has helped his sinuses.

## 2019-05-28 ENCOUNTER — HOSPITAL ENCOUNTER (OUTPATIENT)
Dept: RADIOLOGY | Facility: MEDICAL CENTER | Age: 79
End: 2019-05-28
Attending: PHYSICIAN ASSISTANT
Payer: MEDICARE

## 2019-05-28 DIAGNOSIS — M54.5 LOW BACK PAIN, UNSPECIFIED BACK PAIN LATERALITY, UNSPECIFIED CHRONICITY, WITH SCIATICA PRESENCE UNSPECIFIED: ICD-10-CM

## 2019-05-28 PROCEDURE — 72110 X-RAY EXAM L-2 SPINE 4/>VWS: CPT

## 2019-06-03 ENCOUNTER — TELEPHONE (OUTPATIENT)
Dept: MEDICAL GROUP | Facility: MEDICAL CENTER | Age: 79
End: 2019-06-03

## 2019-06-03 NOTE — TELEPHONE ENCOUNTER
Farhat Stahl  [phone] (home)     Pt's wife came in, [facility] has requested records and rec'd them. But now they are saying that they need a 1 paragraph description of the patients overall health faxed to Ricardo Dick. Documents are in media. Please advise.

## 2019-06-04 NOTE — TELEPHONE ENCOUNTER
The records scanned in the media section from 5/2/19 are only requesting medical records, it does not indicate the necessity of a physician letter plus have not seen him since September 2018.  Since I have not seen him since September 2018, he would need to schedule an appointment for an insurance update on his health.

## 2019-06-05 ENCOUNTER — SLEEP CENTER VISIT (OUTPATIENT)
Dept: SLEEP MEDICINE | Facility: MEDICAL CENTER | Age: 79
End: 2019-06-05
Payer: MEDICARE

## 2019-06-05 ENCOUNTER — TELEPHONE (OUTPATIENT)
Dept: MEDICAL GROUP | Facility: MEDICAL CENTER | Age: 79
End: 2019-06-05

## 2019-06-05 VITALS
DIASTOLIC BLOOD PRESSURE: 88 MMHG | BODY MASS INDEX: 37.77 KG/M2 | RESPIRATION RATE: 16 BRPM | SYSTOLIC BLOOD PRESSURE: 140 MMHG | OXYGEN SATURATION: 92 % | HEART RATE: 65 BPM | WEIGHT: 285 LBS | HEIGHT: 73 IN

## 2019-06-05 DIAGNOSIS — G47.33 OSA (OBSTRUCTIVE SLEEP APNEA): ICD-10-CM

## 2019-06-05 DIAGNOSIS — E66.9 OBESITY (BMI 30-39.9): ICD-10-CM

## 2019-06-05 PROCEDURE — 99214 OFFICE O/P EST MOD 30 MIN: CPT | Performed by: NURSE PRACTITIONER

## 2019-06-05 NOTE — PATIENT INSTRUCTIONS
1) Continue autoCPAP at [phone]  2) Clean mask and supplies weekly and change them as insurance allows  3) Vaccines: Prevnar 13 and Pneumovax 23  4) Light conditioning encouraged  5) Return in about 1 year (around 6/5/2020) for follow up with [facility], Compliance.

## 2019-06-05 NOTE — TELEPHONE ENCOUNTER
ESTABLISHED PATIENT PRE-VISIT PLANNING     Patient was contacted to complete PVP.     Note: Patient will not be contacted if there is no indication to call.     1.  Reviewed notes from the last few office visits within the medical group: Yes    2.  If any orders were placed at last visit or intended to be done for this visit (i.e. 6 mos follow-up), do we have Results/Consult Notes?        •  Labs - Labs ordered, completed on 10/23/18 and results are in chart.   Note: If patient appointment is for lab review and patient did not complete labs, check with provider if OK to reschedule patient until labs completed.       •  Imaging - Imaging was not ordered at last office visit.       •  Referrals - No referrals were ordered at last office visit.    3. Is this appointment scheduled as a Hospital Follow-Up? No    4.  Immunizations were updated in E-Duction using WebIZ?: Yes       •  Web Iz Recommendations: SHINGRIX (Shingles)    5.  Patient is due for the following Health Maintenance Topics:   Health Maintenance Due   Topic Date Due   • IMM ZOSTER VACCINES (2 of 3) 01/26/2010   • Annual Wellness Visit  04/11/2019       6. Orders for overdue Health Maintenance topics pended in Pre-Charting? NO    7.  AHA (MDX) form printed for Provider? YES    8.  Patient was NOT informed to arrive 15 min prior to their scheduled appointment and bring in their medication bottles.

## 2019-06-05 NOTE — TELEPHONE ENCOUNTER
Patient returned my call, he will be at his appointment tomorrow so I changed his appointment on the 18th to an AWV, patient agreed to this.

## 2019-06-05 NOTE — PROGRESS NOTES
CC:  Here for f/u sleep issues as listed below    HPI:   Farhat presents today for follow up obstructive sleep apnea.  Past medical history of chronic kidney disease stage III, hypertension, paroxysmal atrial fibrillation, hypothyroidism, peripheral autonomic neuropathy, Blackwell's esophagus.    PSG from 2015 indicated an AHI of 57 and low oxygen of 81%.  Eligible for new machine next year. Currently he is being treated with CPAP @ [id number].   Compliance download from the dates 5/6/2019 - 6/4/2019 indicates he is wearing the device 100% for an avg of 7 hours and 36 minutes per night with a reduced AHI of 0.6.   Avg pressure 12.0 with a high of 14.9. He does tolerate pressure and mask well and will be trying a new mask soon.  He wakes up refreshed and denies morning H/A. Occasionally takes naps. He sleeps better overall. he will continue to clean supplies weekly and change them as insurance allows.  BMI is 37. Working out 3x/week.           Patient Active Problem List    Diagnosis Date Noted   • Arthritis of knee 03/09/2017   • MARCI (obstructive sleep apnea) 03/02/2017   • Tubular adenoma of colon 07/06/2016   • Blackwell's esophagus 07/06/2016   • History of ulcer disease 06/23/2016   • Decreased GFR 03/21/2016   • Peripheral autonomic neuropathy 09/23/2015   • Hypothyroid 04/28/2015   • Obesity (BMI 30-39.9) 08/22/2014   • Vertigo 05/13/2014   • Sleep apnea 02/20/2014   • Renal cyst 01/22/2014   • Compression fracture 01/21/2014   • Sensorineural hearing loss 11/26/2012   • Impaired fasting glucose 10/10/2012   • Paroxysmal atrial fibrillation (HCC) 05/22/2012   • Right foot pain 03/06/2010   • Hypertension 09/16/2009   • Dyslipidemia 09/16/2009   • Allergic rhinitis 09/16/2009   • Status post lumbar surgery 09/16/2009   • Preventative health care 09/16/2009       Past Medical History:   Diagnosis Date   • Arrhythmia     Atrial fribrillation   • Arthritis    • Bronchitis 2004   • Fibula fracture 1981    right   •  High cholesterol    • Hyperlipidemia    • Hypertension    • MEDICAL HOME    • Pain     low back   • Pneumonia 2004   • Sleep apnea        Past Surgical History:   Procedure Laterality Date   • HAMMERTOE CORRECTION  2/2/2015    Performed by RENAY ChoiPCHAYO at SURGERY [facility]   • EXOSTOSIS EXCISION  6/3/2011    Performed by ABDIFATAH YOUNG at SURGERY [facility]   • LESION EXCISION ORTHO  6/3/2011    Performed by ABDIFATAH YOUNG at SURGERY [facility]   • TOE ARTHROPLASTY  6/3/2011    Performed by ABDIFATAH YOUNG at SURGERY [facility]   • LUMBAR LAMINECTOMY DISKECTOMY  2005    microscopic   • TONSILLECTOMY  1945    adenoidectomy       Family History   Problem Relation Age of Onset   • Cancer Mother         cervical/breast   • Cancer Sister         lung   • Cancer Father         lung cancer   • Cancer Unknown        Social History     Social History   • Marital status:      Spouse name: N/A   • Number of children: N/A   • Years of education: N/A     Occupational History   • Not on file.     Social History Main Topics   • Smoking status: Never Smoker   • Smokeless tobacco: Never Used   • Alcohol use No   • Drug use: No   • Sexual activity: Not on file     Other Topics Concern   • Not on file     Social History Narrative   • No narrative on file       Current Outpatient Prescriptions   Medication Sig Dispense Refill   • levothyroxine (SYNTHROID) 75 MCG Tab Take 1 Tab by mouth Every morning on an empty stomach. 90 Tab 3   • nortriptyline (PAMELOR) 25 MG Cap Take 1 Cap by mouth every day.     • lisinopril (PRINIVIL) 10 MG Tab Take 1 Tab by mouth every day. 90 Tab 1   • fluticasone (FLONASE) 50 MCG/ACT nasal spray Spray 2 Sprays in nose every day. 16 g 11   • Cyanocobalamin (VITAMIN B12 PO) Take  by mouth.     • Glucosamine-Chondroitin (GLUCOSAMINE CHONDR COMPLEX PO) Take  by mouth.     • pravastatin (PRAVACHOL) 40 MG tablet Take 1 Tab by mouth every day. 90 Tab 3   •  "carvedilol (COREG) 25 MG Tab Take 1 Tab by mouth 2 times a day, with meals. 180 Tab 3   • CALCIUM CARBONATE-VITAMIN D PO Take  by mouth.     • omeprazole (PRILOSEC) 40 MG delayed-release capsule Take 1 Cap by mouth every day. 30 Cap 11   • aspirin 81 MG tablet Take 81 mg by mouth every day.     • vitamin D (CHOLECALCIFEROL) 1000 UNIT TABS Take 1,000 Units by mouth 2 Times a Day.     • docosahexanoic acid (OMEGA 3 FA) 1000 MG CAPS Take 1,000 mg by mouth 2 Times a Day.     • montelukast (SINGULAIR) 10 MG Tab Take 1 Tab by mouth every day. (Patient not taking: Reported on 6/5/2019) 90 Tab 3     No current facility-administered medications for this visit.           Allergies: Flexeril [cyclobenzaprine hcl] and Other environmental      ROS   Gen: Denies fever, chills, unintentional weight loss, fatigue  Resp:Denies Dyspnea  CV: Denies chest pain, chest tightness  Sleep:Denies morning headache, insomnia, daytime somnolence, snoring, gasping for air, apnea  Neuro: Denies frequent headaches, weakness, dizziness  See HPI.  All other systems reviewed and negative        Vital signs for this encounter:  Vitals:    06/05/19 1250   Height: 1.854 m (6' 1\")   Weight: (!) 129.3 kg (285 lb)   Weight % change since last entry.: 0 %   BP: 140/88   Pulse: 65   BMI (Calculated): 37.6   Resp: 16                   Physical Exam:   Gen:         Alert and oriented, No apparent distress.   Neck:        No Lymphadenopathy.  Lungs:     Clear to auscultation bilaterally.    CV:          Regular rate and rhythm. No murmurs, rubs or gallops.   Abd:         Soft non tender, non distended.            Ext:          No clubbing, cyanosis, edema.    Assessment   1. Obesity (BMI 30-39.9)  OBESITY COUNSELING (No Charge): Patient identified as having weight management issue.  Appropriate orders and counseling given.   2. MARCI (obstructive sleep apnea)  DME Mask and Supplies       Patient is clinically stable and will proceed with following plan.     PLAN: "   Patient Instructions   1) Continue autoCPAP at [id number]  2) Clean mask and supplies weekly and change them as insurance allows  3) Vaccines: Prevnar 13 and Pneumovax 23  4) Light conditioning encouraged  5) Return in about 1 year (around 6/5/2020) for follow up with [facility], Compliance.

## 2019-06-06 ENCOUNTER — OFFICE VISIT (OUTPATIENT)
Dept: MEDICAL GROUP | Facility: MEDICAL CENTER | Age: 79
End: 2019-06-06
Payer: MEDICARE

## 2019-06-06 VITALS
OXYGEN SATURATION: 93 % | DIASTOLIC BLOOD PRESSURE: 82 MMHG | BODY MASS INDEX: 37.91 KG/M2 | SYSTOLIC BLOOD PRESSURE: 156 MMHG | TEMPERATURE: 98.1 F | HEIGHT: 73 IN | WEIGHT: 286 LBS | HEART RATE: 68 BPM

## 2019-06-06 DIAGNOSIS — E03.9 HYPOTHYROIDISM, UNSPECIFIED TYPE: Chronic | ICD-10-CM

## 2019-06-06 DIAGNOSIS — I15.9 SECONDARY HYPERTENSION: Chronic | ICD-10-CM

## 2019-06-06 DIAGNOSIS — N18.30 CKD (CHRONIC KIDNEY DISEASE) STAGE 3, GFR 30-59 ML/MIN (HCC): ICD-10-CM

## 2019-06-06 DIAGNOSIS — E78.5 DYSLIPIDEMIA: Chronic | ICD-10-CM

## 2019-06-06 DIAGNOSIS — I48.0 PAROXYSMAL ATRIAL FIBRILLATION (HCC): Chronic | ICD-10-CM

## 2019-06-06 DIAGNOSIS — Z12.5 PROSTATE CANCER SCREENING: ICD-10-CM

## 2019-06-06 DIAGNOSIS — G47.30 SLEEP APNEA, UNSPECIFIED TYPE: Chronic | ICD-10-CM

## 2019-06-06 DIAGNOSIS — R73.01 IMPAIRED FASTING GLUCOSE: Chronic | ICD-10-CM

## 2019-06-06 DIAGNOSIS — Z00.00 PREVENTATIVE HEALTH CARE: Chronic | ICD-10-CM

## 2019-06-06 DIAGNOSIS — E55.9 HYPOVITAMINOSIS D: ICD-10-CM

## 2019-06-06 DIAGNOSIS — R94.4 DECREASED GFR: ICD-10-CM

## 2019-06-06 DIAGNOSIS — Z98.890 STATUS POST LUMBAR SURGERY: ICD-10-CM

## 2019-06-06 DIAGNOSIS — E53.8 B12 DEFICIENCY: ICD-10-CM

## 2019-06-06 PROBLEM — G47.33 OSA (OBSTRUCTIVE SLEEP APNEA): Status: RESOLVED | Noted: 2017-03-02 | Resolved: 2019-06-06

## 2019-06-06 PROCEDURE — 99213 OFFICE O/P EST LOW 20 MIN: CPT | Performed by: INTERNAL MEDICINE

## 2019-06-06 ASSESSMENT — PATIENT HEALTH QUESTIONNAIRE - PHQ9: CLINICAL INTERPRETATION OF PHQ2 SCORE: 0

## 2019-06-06 ASSESSMENT — ACTIVITIES OF DAILY LIVING (ADL): BATHING_REQUIRES_ASSISTANCE: 0

## 2019-06-06 ASSESSMENT — ENCOUNTER SYMPTOMS: GENERAL WELL-BEING: GOOD

## 2019-06-06 NOTE — LETTER
June 7, 2019      Dear Sir or Madam,    This letter is in regards to Mr. Farhat Stahl (1940).  Mr. Stahl was seen and examined on May 6, 2019, and remains in good overall health.  His chronic medical conditions are stable and controlled, and he continues to adhere to his medical regimen as recommended by his specialists.  Should you have any questions or concerns regarding his current medical status, please feel free to contact my office.          Sincerely,        Lavell Tadeo M.D.    Electronically Signed

## 2019-06-06 NOTE — PROGRESS NOTES
Subjective:      Farhat Stahl is a 79 y.o. male who presents paperwork          HPI   This is not a wellness assessment  Patient here to complete paperwork chronic low back pain status post lumbar surgery followed by  offered redo laminectomy  Sleep apnea on CPAP  Hypertension on carvedilol and lisinopril takes blood pressure at home and runs 125/70 usually checks that once per month   Paroxysmal atrial fibrillation followed by cardiology on aspirin daily  Hypothyroid on Synthroid 75 mcg daily  Dyslipidemia on pravastatin  Decreased GFR no regular NSAIDs    Current Outpatient Prescriptions   Medication Sig Dispense Refill   • levothyroxine (SYNTHROID) 75 MCG Tab Take 1 Tab by mouth Every morning on an empty stomach. 90 Tab 3   • nortriptyline (PAMELOR) 25 MG Cap Take 1 Cap by mouth every day.     • montelukast (SINGULAIR) 10 MG Tab Take 1 Tab by mouth every day. (Patient not taking: Reported on 6/5/2019) 90 Tab 3   • lisinopril (PRINIVIL) 10 MG Tab Take 1 Tab by mouth every day. 90 Tab 1   • fluticasone (FLONASE) 50 MCG/ACT nasal spray Spray 2 Sprays in nose every day. 16 g 11   • Cyanocobalamin (VITAMIN B12 PO) Take  by mouth.     • Glucosamine-Chondroitin (GLUCOSAMINE CHONDR COMPLEX PO) Take  by mouth.     • pravastatin (PRAVACHOL) 40 MG tablet Take 1 Tab by mouth every day. 90 Tab 3   • carvedilol (COREG) 25 MG Tab Take 1 Tab by mouth 2 times a day, with meals. 180 Tab 3   • CALCIUM CARBONATE-VITAMIN D PO Take  by mouth.     • omeprazole (PRILOSEC) 40 MG delayed-release capsule Take 1 Cap by mouth every day. 30 Cap 11   • aspirin 81 MG tablet Take 81 mg by mouth every day.     • vitamin D (CHOLECALCIFEROL) 1000 UNIT TABS Take 1,000 Units by mouth 2 Times a Day.     • docosahexanoic acid (OMEGA 3 FA) 1000 MG CAPS Take 1,000 mg by mouth 2 Times a Day.       No current facility-administered medications for this visit.      Patient Active Problem List   Diagnosis   • Hypertension   • Dyslipidemia   •  Allergic rhinitis   • Status post lumbar surgery   • Preventative health care   • Right foot pain   • Paroxysmal atrial fibrillation (HCC)   • Impaired fasting glucose   • Sensorineural hearing loss   • Compression fracture   • Renal cyst   • Sleep apnea   • Vertigo   • Obesity (BMI 30-39.9)   • Hypothyroid   • Peripheral autonomic neuropathy   • Decreased GFR   • History of ulcer disease   • Tubular adenoma of colon   • Blackwell's esophagus   • MARIC (obstructive sleep apnea)   • Arthritis of knee       Depression Screening    Little interest or pleasure in doing things?  0 - not at all  Feeling down, depressed , or hopeless? 0 - not at all  Patient Health Questionnaire Score: 0     If depressive symptoms identified deferred to follow up visit unless specifically addressed in assessment and plan.    Interpretation of PHQ-9 Total Score   Score Severity   1-4 No Depression   5-9 Mild Depression   10-14 Moderate Depression   15-19 Moderately Severe Depression   20-27 Severe Depression    Screening for Cognitive Impairment    Three Minute Recall (village, kitchen, baby) 1/3    Shaun clock face with all 12 numbers and set the hands to show 10 past 10.  Yes    Cognitive concerns identified deferred for follow up unless specifically addressed in assessment and plan.    Fall Risk Assessment    Has the patient had two or more falls in the last year or any fall with injury in the last year?  Yes    Safety Assessment    Throw rugs on floor.  Yes  Handrails on all stairs.  No  Good lighting in all hallways.  Yes  Difficulty hearing.  No  Patient counseled about all safety risks that were identified.    Functional Assessment ADLs    Are there any barriers preventing you from cooking for yourself or meeting nutritional needs?  No.    Are there any barriers preventing you from driving safely or obtaining transportation?  No.    Are there any barriers preventing you from using a telephone or calling for help?  No.    Are there any  barriers preventing you from shopping?  No.    Are there any barriers preventing you from taking care of your own finances?  Yes.    Are there any barriers preventing you from managing your medications?  No.    Are there any barriers preventing you from showering, bathing or dressing yourself?  No.    Are you currently engaging in any exercise or physical activity?  Yes.     What is your perception of your health?  Good.      Health Maintenance Summary                IMM ZOSTER VACCINES Overdue 1/26/2010      Done 12/1/2009 Imm Admin: Zoster Vaccine Live (ZVL) (Zostavax)    Annual Wellness Visit Overdue 4/11/2019      Done 4/10/2018 Visit Dx: Medicare annual wellness visit, subsequent     Patient has more history with this topic...    COLONOSCOPY Next Due 6/2/2021      Done 6/2/2016 AMB REFERRAL TO GI FOR COLONOSCOPY    IMM DTaP/Tdap/Td Vaccine Next Due 5/2/2023      Done 5/2/2013 Imm Admin: Tdap Vaccine     Patient has more history with this topic...          Patient Care Team:  Lavell Tadeo M.D. as PCP - General  Maximus Lopez M.D. as Consulting Physician (Pulmonary Medicine)  Newton Orta D.P.M. as Consulting Physician (Podiatry)  Umberto Hayes O.D. as Consulting Physician (Optometry)  Sven Bolanos M.D. as Consulting Physician (Gastroenterology)  [facility] as Home Health Provider (DME Supplier)    ROS       Objective:          Physical Exam   Constitutional: He appears well-developed and well-nourished. No distress.   HENT:   Head: Normocephalic and atraumatic.   Eyes: Conjunctivae are normal. Right eye exhibits no discharge. Left eye exhibits no discharge.   Neck: Neck supple. No JVD present.   Cardiovascular: Normal rate and regular rhythm.    Pulmonary/Chest: Effort normal and breath sounds normal.   Abdominal: He exhibits no distension.   Musculoskeletal: He exhibits no edema.   Neurological: He is alert.   Skin: Skin is warm. He is not diaphoretic.   Psychiatric: He has a normal mood  and affect. His behavior is normal.   Nursing note and vitals reviewed.              Assessment/Plan:     Life insurance labs from 4/24/19 reviewed  4/24/19 chol 118,trig 83,hdl 51,ldl 50  4/24/19 A1c 5.8%  4/24/19 bun 16,creat 1.4,urine mac 33  4/24/19 psa 0.78    Assessment  #!  Paroxysmal atrial fibrillation sinus rhythm on exam on aspirin per cardiology currently stable    #2 hypertension on lisinopril and carvedilol, elevated blood pressure today but blood pressures at home are running normal    #3 hypothyroid on Synthroid 75 mcg    #4 dyslipidemia on pravastatin 4/24/19 chol 118,trig 83,hdl 51,ldl 50    #5 impaired glucose metabolism diet controlled 4/24/19 A1c 5.8%    #6 CKD stage III no fluid overload on exam, no regular NSAIDs 4/24/19 bun 16,creat 1.4,urine mac 33    #7 BMI 37.2    #8 sleep apnea followed by pulmonary on CPAP    #9 reflux on omeprazole      Plan  #1 follow-up cardiology    #2 labs done in april for life insurance will still need tsh and repeat cmp, labs ordered    #3 continue CPAP follow-up pulmonary    #4 recommend dietitian for weight loss, understanding obesity increases risk for cardiovascular disease and diabetes, he declines     #5 check blood pressure and record    #6 avoid NSAIDs    #7 letter for insurance regarding description of overall health one paragraph description of overall health

## 2019-06-11 ENCOUNTER — TELEPHONE (OUTPATIENT)
Dept: MEDICAL GROUP | Facility: MEDICAL CENTER | Age: 79
End: 2019-06-11

## 2019-06-11 NOTE — TELEPHONE ENCOUNTER
Future Appointments       Provider Department Center    6/18/2019 8:00 AM Lavell Tadeo M.D.; [facility]  [facility] [place] [place]    6/11/2020 11:20 AM KEVYN Mckeon. [facility]           ANNUAL WELLNESS VISIT PRE-VISIT PLANNING WITHOUT OUTREACH    1.  Reviewed note from last office visit with PCP: YES    2.  If any orders were placed at last visit, do we have Results/Consult Notes?        •  Labs - Labs ordered, but not to be completed until September 2019.  Note: If patient appointment is for lab review and patient did not complete labs, check with provider if OK to reschedule patient until labs completed.       •  Imaging - Imaging was not ordered at last office visit.       •  Referrals - Referral ordered, patient has NOT been seen.    3.  Immunizations were updated in Epic using WebIZ?: Epic matches WebIZ       •  WebIZ Recommendations: TD, SHINGRIX (Shingles) and MMR        •  Is patient due for Tdap? NO       •  Is patient due for Shingrix? YES. Patient was notified of copay/out of pocket cost.     4.  Patient is due for the following Health Maintenance Topics:   Health Maintenance Due   Topic Date Due   • IMM ZOSTER VACCINES (2 of 3) 01/26/2010   • Annual Wellness Visit  04/11/2019       - Patient already has appointment scheduled for Annual Wellness Visit (AWV).    5.  Reviewed/Updated the following with patient:       •   Preferred Pharmacy? YES       •   Preferred Lab? YES       •   Preferred Communication? YES       •   Allergies? YES       •   Medications? YES. Was Abstract Encounter opened and chart updated? YES       •   Social History? YES. Was Abstract Encounter opened and chart updated? YES       •   Family History (document living status of immediate family members and if + hx of  cancer, diabetes, hypertension, hyperlipidemia, heart attack, stroke) YES. Was Abstract Encounter opened and chart updated? YES    6.  Care  Team Updated:       •   DME Company (gait device, O2, CPAP, etc.): YES       •   Other Specialists (eye doctor, derm, GYN, cardiology, endo, etc): NO    7. Orders for overdue Health Maintenance topics pended in Pre-Charting? NO    8.  Patient has the following Care Path diagnoses on Problem List:  NONE    9.  Patient was advised: “This is a free wellness visit. The provider will screen for medical conditions to help you stay healthy. If you have other concerns to address you may be asked to discuss these at a separate visit or there may be an additional fee.”     10.  Patient was informed to arrive 15 min prior to their scheduled appointment and bring in their medication bottles.

## 2019-06-13 ENCOUNTER — HOSPITAL ENCOUNTER (OUTPATIENT)
Dept: LAB | Facility: MEDICAL CENTER | Age: 79
End: 2019-06-13
Attending: INTERNAL MEDICINE
Payer: MEDICARE

## 2019-06-13 DIAGNOSIS — E78.5 DYSLIPIDEMIA: Chronic | ICD-10-CM

## 2019-06-13 DIAGNOSIS — E53.8 B12 DEFICIENCY: ICD-10-CM

## 2019-06-13 DIAGNOSIS — N18.30 CKD (CHRONIC KIDNEY DISEASE) STAGE 3, GFR 30-59 ML/MIN (HCC): ICD-10-CM

## 2019-06-13 DIAGNOSIS — E55.9 HYPOVITAMINOSIS D: ICD-10-CM

## 2019-06-13 LAB
25(OH)D3 SERPL-MCNC: 37 NG/ML (ref 30–100)
ALBUMIN SERPL BCP-MCNC: 3.9 G/DL (ref 3.2–4.9)
ALBUMIN/GLOB SERPL: 1.3 G/DL
ALP SERPL-CCNC: 85 U/L (ref 30–99)
ALT SERPL-CCNC: 32 U/L (ref 2–50)
ANION GAP SERPL CALC-SCNC: 9 MMOL/L (ref 0–11.9)
AST SERPL-CCNC: 25 U/L (ref 12–45)
BILIRUB SERPL-MCNC: 0.7 MG/DL (ref 0.1–1.5)
BUN SERPL-MCNC: 17 MG/DL (ref 8–22)
CALCIUM SERPL-MCNC: 9.4 MG/DL (ref 8.5–10.5)
CHLORIDE SERPL-SCNC: 103 MMOL/L (ref 96–112)
CO2 SERPL-SCNC: 25 MMOL/L (ref 20–33)
CREAT SERPL-MCNC: 1.35 MG/DL (ref 0.5–1.4)
FASTING STATUS PATIENT QL REPORTED: NORMAL
GLOBULIN SER CALC-MCNC: 3.1 G/DL (ref 1.9–3.5)
GLUCOSE SERPL-MCNC: 127 MG/DL (ref 65–99)
POTASSIUM SERPL-SCNC: 4.3 MMOL/L (ref 3.6–5.5)
PROT SERPL-MCNC: 7 G/DL (ref 6–8.2)
SODIUM SERPL-SCNC: 137 MMOL/L (ref 135–145)
TSH SERPL DL<=0.005 MIU/L-ACNC: 3.28 UIU/ML (ref 0.38–5.33)
VIT B12 SERPL-MCNC: 662 PG/ML (ref 211–911)

## 2019-06-13 PROCEDURE — 80053 COMPREHEN METABOLIC PANEL: CPT

## 2019-06-13 PROCEDURE — 82306 VITAMIN D 25 HYDROXY: CPT

## 2019-06-13 PROCEDURE — 82607 VITAMIN B-12: CPT

## 2019-06-13 PROCEDURE — 84443 ASSAY THYROID STIM HORMONE: CPT

## 2019-06-13 PROCEDURE — 36415 COLL VENOUS BLD VENIPUNCTURE: CPT

## 2019-06-15 ENCOUNTER — TELEPHONE (OUTPATIENT)
Dept: MEDICAL GROUP | Facility: MEDICAL CENTER | Age: 79
End: 2019-06-15

## 2019-06-15 NOTE — TELEPHONE ENCOUNTER
Notified with results, B12, vitamin D stable, TSH stable no change thyroid medication, CKD, GFR stable, continue no NSAIDs.

## 2019-06-27 ENCOUNTER — TELEPHONE (OUTPATIENT)
Dept: MEDICAL GROUP | Facility: MEDICAL CENTER | Age: 79
End: 2019-06-27

## 2019-06-28 NOTE — TELEPHONE ENCOUNTER
Julito called to say that now their ins company is requesting his EKG and Reports for the 2012 visit with Baldomero and the 2015 visit with You. Both are in the chart. Please advise.

## 2019-07-17 DIAGNOSIS — I10 ESSENTIAL HYPERTENSION: ICD-10-CM

## 2019-07-17 RX ORDER — CARVEDILOL 25 MG/1
25 TABLET ORAL 2 TIMES DAILY WITH MEALS
Qty: 180 TAB | Refills: 3 | Status: SHIPPED | OUTPATIENT
Start: 2019-07-17 | End: 2020-08-19

## 2019-11-14 DIAGNOSIS — J30.9 ALLERGIC RHINITIS: ICD-10-CM

## 2019-11-14 RX ORDER — FLUTICASONE PROPIONATE 50 MCG
2 SPRAY, SUSPENSION (ML) NASAL 2 TIMES DAILY
Qty: 16 G | Refills: 5 | Status: SHIPPED | OUTPATIENT
Start: 2019-11-14 | End: 2020-02-17

## 2019-11-15 ENCOUNTER — TELEPHONE (OUTPATIENT)
Dept: SLEEP MEDICINE | Facility: MEDICAL CENTER | Age: 79
End: 2019-11-15

## 2019-11-15 NOTE — TELEPHONE ENCOUNTER
Pt LVM stating that he is in need of a new order for mask and supplies faxed to [facility]. I check and the pt had an OV with Paige on 6/5/19 and an order for supplies was place and faxed to [facility], so that order good for a whole year.     I called the pt to inform him of this and that I will fax the order again so can [facility] so they can provide the pt with supplies. I advise the pt to contact us if he has any more question problem with getting supplies.

## 2019-11-20 ENCOUNTER — PATIENT OUTREACH (OUTPATIENT)
Dept: HEALTH INFORMATION MANAGEMENT | Facility: OTHER | Age: 79
End: 2019-11-20

## 2019-11-21 NOTE — PROGRESS NOTES
Outcome: Left Message to call back so I am able to complete my SCP  intro.    Please transfer to Patient Outreach Team at [phone] when patient returns call.    HealthConnect Verified: yes    Attempt # 1

## 2020-01-20 ENCOUNTER — OFFICE VISIT (OUTPATIENT)
Dept: MEDICAL GROUP | Facility: MEDICAL CENTER | Age: 80
End: 2020-01-20
Payer: MEDICARE

## 2020-01-20 ENCOUNTER — HOSPITAL ENCOUNTER (OUTPATIENT)
Facility: MEDICAL CENTER | Age: 80
End: 2020-01-20
Attending: FAMILY MEDICINE
Payer: MEDICARE

## 2020-01-20 ENCOUNTER — TELEPHONE (OUTPATIENT)
Dept: MEDICAL GROUP | Facility: MEDICAL CENTER | Age: 80
End: 2020-01-20

## 2020-01-20 VITALS
OXYGEN SATURATION: 94 % | TEMPERATURE: 98.2 F | DIASTOLIC BLOOD PRESSURE: 74 MMHG | HEART RATE: 63 BPM | HEIGHT: 73 IN | WEIGHT: 283 LBS | BODY MASS INDEX: 37.51 KG/M2 | SYSTOLIC BLOOD PRESSURE: 120 MMHG

## 2020-01-20 DIAGNOSIS — L98.9 SKIN LESION: ICD-10-CM

## 2020-01-20 DIAGNOSIS — H61.91 SKIN LESION OF RIGHT EAR: ICD-10-CM

## 2020-01-20 LAB — PATHOLOGY CONSULT NOTE: NORMAL

## 2020-01-20 PROCEDURE — 88305 TISSUE EXAM BY PATHOLOGIST: CPT

## 2020-01-20 PROCEDURE — 11104 PUNCH BX SKIN SINGLE LESION: CPT | Performed by: FAMILY MEDICINE

## 2020-01-20 PROCEDURE — 99213 OFFICE O/P EST LOW 20 MIN: CPT | Mod: 25 | Performed by: FAMILY MEDICINE

## 2020-01-20 RX ORDER — LIDOCAINE HYDROCHLORIDE 20 MG/ML
1 INJECTION, SOLUTION EPIDURAL; INFILTRATION; INTRACAUDAL; PERINEURAL ONCE
Status: COMPLETED | OUTPATIENT
Start: 2020-01-20 | End: 2020-01-20

## 2020-01-20 RX ADMIN — LIDOCAINE HYDROCHLORIDE 1 ML: 20 INJECTION, SOLUTION EPIDURAL; INFILTRATION; INTRACAUDAL; PERINEURAL at 13:21

## 2020-01-20 ASSESSMENT — PATIENT HEALTH QUESTIONNAIRE - PHQ9: CLINICAL INTERPRETATION OF PHQ2 SCORE: 0

## 2020-01-20 NOTE — PROGRESS NOTES
"Subjective:   Farhat Stahl is a 79 y.o. male here today for skin lesion    Skin lesion of right ear  Started 2 months ago he noticed a small bump on the back his right ear. Lesion will not heal. He has tried to use antibiotic ointments and hydrocortisone cream, without improvement. The lesion is tender, if he turns over on his right side in bed, it is painful.  He is concerned of skin cancer, he does not have a history of skin cancer. 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         Current medicines (including changes today)  Current Outpatient Medications   Medication Sig Dispense Refill   • fluticasone (FLONASE) 50 MCG/ACT nasal spray Spray 2 Sprays in nose 2 times a day. 16 g 5   • lisinopril (PRINIVIL) 10 MG Tab Take 1 Tab by mouth every day. 100 Tab 2   • pravastatin (PRAVACHOL) 40 MG tablet Take 1 Tab by mouth every day. 100 Tab 1   • carvedilol (COREG) 25 MG Tab Take 1 Tab by mouth 2 times a day, with meals. 180 Tab 3   • levothyroxine (SYNTHROID) 75 MCG Tab Take 1 Tab by mouth Every morning on an empty stomach. 90 Tab 3   • omeprazole (PRILOSEC) 40 MG delayed-release capsule Take 1 Cap by mouth every day. 30 Cap 11   • aspirin 81 MG tablet Take 81 mg by mouth every day.       No current facility-administered medications for this visit.      He  has a past medical history of Arrhythmia, Arthritis, Bronchitis (2004), Fibula fracture (1981), High cholesterol, Hyperlipidemia, Hypertension, MEDICAL HOME, Pain, Pneumonia (2004), and Sleep apnea.    ROS   No fever, + right ear pain       Objective:     /74 (BP Location: Right arm, Patient Position: Sitting)   Pulse 63   Temp 36.8 °C (98.2 °F)   Ht 1.854 m (6' 1\")   Wt (!) 128.4 kg (283 lb)   SpO2 94%  Body mass index is 37.34 kg/m².   Physical Exam:  Constitutional: Alert, no distress.  Skin: Warm, dry, good turgor. Right superior pinna with raised, circular, ulcerative skin lesion that is approximately 1 cm in diameter.  Eye: " Equal, round and reactive, conjunctiva clear, lids normal.  Psych: Alert and oriented x3, normal affect and mood.    After informed written consent was obtained, using Betadine for cleansing and 2% Lidocaine without epinephrine for anesthetic, with sterile technique a 3 mm punch biopsy was used to obtain a biopsy specimen of the lesion. Hemostasis was obtained by pressure and wound was not sutured. Antibiotic dressing is applied, and wound care instructions provided. Be alert for any signs of cutaneous infection. The specimen is labeled and sent to pathology for evaluation. The procedure was well tolerated without complications.      Assessment and Plan:   The following treatment plan was discussed    1. Skin lesion of right ear  New problem.  Suspicious for squamous cell carcinoma.  Biopsy performed today.  If biopsy is positive for skin cancer we will place an urgent referral to dermatology.  - lidocaine PF (XYLOCAINE-MPF) 2 % injection PF 1 mL  - Pathology Specimen; Future      Followup: Return if symptoms worsen or fail to improve.

## 2020-01-20 NOTE — ASSESSMENT & PLAN NOTE
Started 2 months ago he noticed a small bump on the back his right ear. Lesion will not heal. He has tried to use antibiotic ointments and hydrocortisone cream, without improvement. The lesion is tender, if he turns over on his right side in bed, it is painful.  He is concerned of skin cancer, he does not have a history of skin cancer. 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

## 2020-01-22 ENCOUNTER — TELEPHONE (OUTPATIENT)
Dept: MEDICAL GROUP | Facility: MEDICAL CENTER | Age: 80
End: 2020-01-22

## 2020-01-23 NOTE — TELEPHONE ENCOUNTER
----- Message from Emery Collins M.D. sent at 1/22/2020 12:22 PM PST -----  Please notify patient that on her biopsy we did not see any definite cancer, however we still recommend follow-up with a dermatologist as they might want to repeat biopsy versus considering excision of lesion.  Please ask if he has a preferred dermatologist.  Emery Collins M.D.

## 2020-01-27 SDOH — ECONOMIC STABILITY: TRANSPORTATION INSECURITY
IN THE PAST 12 MONTHS, HAS THE LACK OF TRANSPORTATION KEPT YOU FROM MEDICAL APPOINTMENTS OR FROM GETTING MEDICATIONS?: NO

## 2020-01-27 SDOH — ECONOMIC STABILITY: TRANSPORTATION INSECURITY
IN THE PAST 12 MONTHS, HAS LACK OF TRANSPORTATION KEPT YOU FROM MEETINGS, WORK, OR FROM GETTING THINGS NEEDED FOR DAILY LIVING?: NO

## 2020-01-27 SDOH — ECONOMIC STABILITY: FOOD INSECURITY: WITHIN THE PAST 12 MONTHS, YOU WORRIED THAT YOUR FOOD WOULD RUN OUT BEFORE YOU GOT MONEY TO BUY MORE.: NEVER TRUE

## 2020-01-27 SDOH — ECONOMIC STABILITY: FOOD INSECURITY: WITHIN THE PAST 12 MONTHS, THE FOOD YOU BOUGHT JUST DIDN'T LAST AND YOU DIDN'T HAVE MONEY TO GET MORE.: NEVER TRUE

## 2020-01-27 NOTE — PROGRESS NOTES
1. [facility]Connect Verified: yes    2. Verify PCP: yes    3. Review and add  to Care Team: yes    5. Reviewed/Updated the following with patient:       •   Communication Preference Obtained? YES  • MyChart Activation: already active       •   E-Mail Address Obtained? YES       •   Appointment Day and Time Preferences? YES       •   Preferred Pharmacy? YES       •   Preferred Lab? YES    6. Care Gap Scheduling (Attempt to Schedule EACH Overdue Care Gap!)    Scheduled patient for Annual Wellness Visit

## 2020-01-28 ENCOUNTER — APPOINTMENT (OUTPATIENT)
Dept: RADIOLOGY | Facility: MEDICAL CENTER | Age: 80
End: 2020-01-28
Attending: EMERGENCY MEDICINE
Payer: MEDICARE

## 2020-01-28 ENCOUNTER — HOSPITAL ENCOUNTER (EMERGENCY)
Facility: MEDICAL CENTER | Age: 80
End: 2020-01-29
Attending: EMERGENCY MEDICINE
Payer: MEDICARE

## 2020-01-28 DIAGNOSIS — M79.604 PAIN OF RIGHT LOWER EXTREMITY: ICD-10-CM

## 2020-01-28 LAB
ANION GAP SERPL CALC-SCNC: 11 MMOL/L (ref 0–11.9)
BASOPHILS # BLD AUTO: 0.6 % (ref 0–1.8)
BASOPHILS # BLD: 0.07 K/UL (ref 0–0.12)
BUN SERPL-MCNC: 18 MG/DL (ref 8–22)
CALCIUM SERPL-MCNC: 9 MG/DL (ref 8.4–10.2)
CHLORIDE SERPL-SCNC: 101 MMOL/L (ref 96–112)
CO2 SERPL-SCNC: 25 MMOL/L (ref 20–33)
CREAT SERPL-MCNC: 1.15 MG/DL (ref 0.5–1.4)
EOSINOPHIL # BLD AUTO: 0.39 K/UL (ref 0–0.51)
EOSINOPHIL NFR BLD: 3.5 % (ref 0–6.9)
ERYTHROCYTE [DISTWIDTH] IN BLOOD BY AUTOMATED COUNT: 47.8 FL (ref 35.9–50)
GLUCOSE SERPL-MCNC: 105 MG/DL (ref 65–99)
HCT VFR BLD AUTO: 46 % (ref 42–52)
HGB BLD-MCNC: 15.6 G/DL (ref 14–18)
IMM GRANULOCYTES # BLD AUTO: 0.04 K/UL (ref 0–0.11)
IMM GRANULOCYTES NFR BLD AUTO: 0.4 % (ref 0–0.9)
LYMPHOCYTES # BLD AUTO: 1.92 K/UL (ref 1–4.8)
LYMPHOCYTES NFR BLD: 17 % (ref 22–41)
MCH RBC QN AUTO: 32.2 PG (ref 27–33)
MCHC RBC AUTO-ENTMCNC: 33.9 G/DL (ref 33.7–35.3)
MCV RBC AUTO: 95 FL (ref 81.4–97.8)
MONOCYTES # BLD AUTO: 0.99 K/UL (ref 0–0.85)
MONOCYTES NFR BLD AUTO: 8.8 % (ref 0–13.4)
NEUTROPHILS # BLD AUTO: 7.86 K/UL (ref 1.82–7.42)
NEUTROPHILS NFR BLD: 69.7 % (ref 44–72)
NRBC # BLD AUTO: 0 K/UL
NRBC BLD-RTO: 0 /100 WBC
PLATELET # BLD AUTO: 188 K/UL (ref 164–446)
PMV BLD AUTO: 9.9 FL (ref 9–12.9)
POTASSIUM SERPL-SCNC: 4.5 MMOL/L (ref 3.6–5.5)
RBC # BLD AUTO: 4.84 M/UL (ref 4.7–6.1)
SODIUM SERPL-SCNC: 137 MMOL/L (ref 135–145)
URATE SERPL-MCNC: 6 MG/DL (ref 2.5–8.3)
WBC # BLD AUTO: 11.3 K/UL (ref 4.8–10.8)

## 2020-01-28 PROCEDURE — 99284 EMERGENCY DEPT VISIT MOD MDM: CPT

## 2020-01-28 PROCEDURE — 84550 ASSAY OF BLOOD/URIC ACID: CPT

## 2020-01-28 PROCEDURE — 700111 HCHG RX REV CODE 636 W/ 250 OVERRIDE (IP): Performed by: EMERGENCY MEDICINE

## 2020-01-28 PROCEDURE — 85025 COMPLETE CBC W/AUTO DIFF WBC: CPT

## 2020-01-28 PROCEDURE — 96374 THER/PROPH/DIAG INJ IV PUSH: CPT

## 2020-01-28 PROCEDURE — 80048 BASIC METABOLIC PNL TOTAL CA: CPT

## 2020-01-28 RX ORDER — KETOROLAC TROMETHAMINE 30 MG/ML
15 INJECTION, SOLUTION INTRAMUSCULAR; INTRAVENOUS ONCE
Status: COMPLETED | OUTPATIENT
Start: 2020-01-28 | End: 2020-01-28

## 2020-01-28 RX ADMIN — KETOROLAC TROMETHAMINE 15 MG: 30 INJECTION, SOLUTION INTRAMUSCULAR at 23:00

## 2020-01-28 ASSESSMENT — PAIN DESCRIPTION - DESCRIPTORS: DESCRIPTORS: ACHING

## 2020-01-29 VITALS
SYSTOLIC BLOOD PRESSURE: 138 MMHG | RESPIRATION RATE: 20 BRPM | HEIGHT: 74 IN | HEART RATE: 68 BPM | OXYGEN SATURATION: 93 % | WEIGHT: 224.87 LBS | BODY MASS INDEX: 28.86 KG/M2 | TEMPERATURE: 99.4 F | DIASTOLIC BLOOD PRESSURE: 86 MMHG

## 2020-01-29 PROCEDURE — A9270 NON-COVERED ITEM OR SERVICE: HCPCS | Performed by: EMERGENCY MEDICINE

## 2020-01-29 PROCEDURE — 93971 EXTREMITY STUDY: CPT | Mod: RT

## 2020-01-29 PROCEDURE — 700102 HCHG RX REV CODE 250 W/ 637 OVERRIDE(OP): Performed by: EMERGENCY MEDICINE

## 2020-01-29 RX ORDER — OXYCODONE HYDROCHLORIDE AND ACETAMINOPHEN 5; 325 MG/1; MG/1
1 TABLET ORAL ONCE
Status: COMPLETED | OUTPATIENT
Start: 2020-01-29 | End: 2020-01-29

## 2020-01-29 RX ORDER — OXYCODONE HYDROCHLORIDE AND ACETAMINOPHEN 5; 325 MG/1; MG/1
1 TABLET ORAL EVERY 6 HOURS PRN
Qty: 12 TAB | Refills: 0 | Status: SHIPPED | OUTPATIENT
Start: 2020-01-29 | End: 2020-02-01

## 2020-01-29 RX ADMIN — OXYCODONE HYDROCHLORIDE AND ACETAMINOPHEN 1 TABLET: 5; 325 TABLET ORAL at 00:10

## 2020-01-29 NOTE — ED PROVIDER NOTES
CHIEF COMPLAINT  Chief Complaint   Patient presents with   • Foot Pain       HPI  Farhat Stahl is a 79 y.o. male who presents tonight via ambulance from his home where he lives with his wife and son with a chief complaint of sudden onset of right foot and lower leg pain while he was just sitting watching TV.  He states he had a pain in the top of his right foot which that extended to his right calf.  He denies any history of trauma.  He denies any chest pain or shortness of breath.  He denies any fever or chills.  Patient was concerned that he may have a blood clot in his leg.    REVIEW OF SYSTEMS  See HPI for further details. All other system reviews are negative.    PAST MEDICAL HISTORY  Past Medical History:   Diagnosis Date   • Arrhythmia     Atrial fribrillation   • Arthritis    • Bronchitis 2004   • Fibula fracture 1981    right   • High cholesterol    • Hyperlipidemia    • Hypertension    • MEDICAL HOME    • Pain     low back   • Pneumonia 2004   • Sleep apnea        FAMILY HISTORY  Family History   Problem Relation Age of Onset   • Cancer Mother         cervical/breast   • Cancer Sister         lung   • Cancer Father         lung cancer   • Cancer Other        SOCIAL HISTORY  Social History     Socioeconomic History   • Marital status:      Spouse name: Not on file   • Number of children: Not on file   • Years of education: Not on file   • Highest education level: Not on file   Occupational History   • Not on file   Social Needs   • Financial resource strain: Not on file   • Food insecurity:     Worry: Never true     Inability: Never true   • Transportation needs:     Medical: No     Non-medical: No   Tobacco Use   • Smoking status: Never Smoker   • Smokeless tobacco: Never Used   Substance and Sexual Activity   • Alcohol use: No     Alcohol/week: 0.0 oz   • Drug use: No   • Sexual activity: Never   Lifestyle   • Physical activity:     Days per week: Not on file     Minutes per session: Not on  "file   • Stress: Not on file   Relationships   • Social connections:     Talks on phone: Not on file     Gets together: Not on file     Attends Rastafarian service: Not on file     Active member of club or organization: Not on file     Attends meetings of clubs or organizations: Not on file     Relationship status: Not on file   • Intimate partner violence:     Fear of current or ex partner: Not on file     Emotionally abused: Not on file     Physically abused: Not on file     Forced sexual activity: Not on file   Other Topics Concern   • Not on file   Social History Narrative   • Not on file       SURGICAL HISTORY  Past Surgical History:   Procedure Laterality Date   • HAMMERTOE CORRECTION  2/2/2015    Performed by Abdifatah Orta D.P.M. at SURGERY [facility] ORS   • EXOSTOSIS EXCISION  6/3/2011    Performed by ABDIFATAH ORTA at [facility]   • LESION EXCISION ORTHO  6/3/2011    Performed by ABDIFATAH ORTA at [facility]   • TOE ARTHROPLASTY  6/3/2011    Performed by ABDIFATAH ORTA at [facility] ORS   • LUMBAR LAMINECTOMY DISKECTOMY  2005    microscopic   • TONSILLECTOMY  1945    adenoidectomy       CURRENT MEDICATIONS  See nurses note    ALLERGIES  Allergies   Allergen Reactions   • Flexeril [Cyclobenzaprine Hcl]      confusion   • Other Environmental      Pollens       PHYSICAL EXAM  VITAL SIGNS: /86   Pulse 68   Temp 37.4 °C (99.4 °F) (Temporal)   Resp 20   Ht 1.88 m (6' 2\")   Wt 102 kg (224 lb 13.9 oz)   SpO2 93%   BMI 28.87 kg/m²     Constitutional: Patient is well developed, well nourished in mild distress.  HENT: Normocephalic, atraumatic, Oropharynx moist, nose normal with no mucosal edema.   Eyes: PERRL, EOMI, Conjunctiva without erythema.   Neck: Supple with Normal range of motion in flexion, extension and lateral rotation. No tenderness along the bony prominences or paraspinal muscles.  Lymphatic: No lymphadenopathy noted. "   Cardiovascular: Normal heart rate and rhythm. No murmur.  Thorax & Lungs: Clear and equal breath sounds with good excursion. No respiratory distress, no rhonchi, wheezing or rales.  Abdomen: Bowel sounds normal in all four quadrants. Soft, obese, nontender with no rebound, guarding, flank tenderness.  Skin: Warm, Dry, No erythema, No rashes.   Back: No cervical, thoracic, or lumbosacral tenderness.   Extremities: Peripheral pulses 4/4, right lower extremity reveals tenderness from the knee down to the right foot.  There is no increased erythema, warmth, rashes, contusions, abrasions, swelling.  Patient is markedly tender on the dorsal aspect of the right foot.  He has good capillary refill, good sensation with no range of motion of the toes.  Musculoskeletal: Normal range of motion in all major joints.  Neurologic: Alert & oriented x 3, Normal motor function, Normal sensory function, No lateralizing or focal deficits noted. DTR's 4/4 bilaterally.  Psychiatric: Affect odd, Judgment slightly confused, Mood flat     Results for orders placed or performed during the hospital encounter of 01/28/20   CBC WITH DIFFERENTIAL   Result Value Ref Range    WBC 11.3 (H) 4.8 - 10.8 K/uL    RBC 4.84 4.70 - 6.10 M/uL    Hemoglobin 15.6 14.0 - 18.0 g/dL    Hematocrit 46.0 42.0 - 52.0 %    MCV 95.0 81.4 - 97.8 fL    MCH 32.2 27.0 - 33.0 pg    MCHC 33.9 33.7 - 35.3 g/dL    RDW 47.8 35.9 - 50.0 fL    Platelet Count 188 164 - 446 K/uL    MPV 9.9 9.0 - 12.9 fL    Neutrophils-Polys 69.70 44.00 - 72.00 %    Lymphocytes 17.00 (L) 22.00 - 41.00 %    Monocytes 8.80 0.00 - 13.40 %    Eosinophils 3.50 0.00 - 6.90 %    Basophils 0.60 0.00 - 1.80 %    Immature Granulocytes 0.40 0.00 - 0.90 %    Nucleated RBC 0.00 /100 WBC    Neutrophils (Absolute) 7.86 (H) 1.82 - 7.42 K/uL    Lymphs (Absolute) 1.92 1.00 - 4.80 K/uL    Monos (Absolute) 0.99 (H) 0.00 - 0.85 K/uL    Eos (Absolute) 0.39 0.00 - 0.51 K/uL    Baso (Absolute) 0.07 0.00 - 0.12 K/uL     Immature Granulocytes (abs) 0.04 0.00 - 0.11 K/uL    NRBC (Absolute) 0.00 K/uL   BASIC METABOLIC PANEL   Result Value Ref Range    Sodium 137 135 - 145 mmol/L    Potassium 4.5 3.6 - 5.5 mmol/L    Chloride 101 96 - 112 mmol/L    Co2 25 20 - 33 mmol/L    Glucose 105 (H) 65 - 99 mg/dL    Bun 18 8 - 22 mg/dL    Creatinine 1.15 0.50 - 1.40 mg/dL    Calcium 9.0 8.4 - 10.2 mg/dL    Anion Gap 11.0 0.0 - 11.9   URIC ACID   Result Value Ref Range    Uric Acid 6.0 2.5 - 8.3 mg/dL   ESTIMATED GFR   Result Value Ref Range    GFR If African American >60 >60 mL/min/1.73 m 2    GFR If Non African American >60 >60 mL/min/1.73 m 2         RADIOLOGY/PROCEDURES  US-EXTREMITY VENOUS LOWER UNILAT RIGHT   Final Result      No evidence of right lower extremity deep venous thrombosis.                        COURSE & MEDICAL DECISION MAKING  Pertinent Labs & Imaging studies reviewed. (See chart for details)  Patient received a Hep-Lock IV and was given Toradol 30 mg IV push which did nothing for his pain.  Venous Doppler was performed showing no DVT to his right lower extremity.  Laboratories show a normal white blood cell count, stable H&H and no left shift.  His electrolytes are unremarkable.  Uric acid was within normal limits at 6, BUN and creatinine are 18 and 1.15 patient continued to complain of pain so I gave him Percocet 5 mg orally which provided adequate pain control.  He does appear to be improved upon recheck.  My plan will be to send him home with a prescription for Percocet for the next several days.  He is to apply warm moist compresses to the affected area of pain, watch closely for signs of infection and follow-up with his primary care provider within the week for recheck.  He is discharged in stable and improved condition in the care of his son and his wife.    FINAL IMPRESSION  1.  Pain of right lower leg and foot etiology uncertain  2.   3.         Electronically signed by: Farideh Hunt D.O., 1/29/2020 3:15 ROZ  Provider Note

## 2020-01-29 NOTE — DISCHARGE INSTRUCTIONS
Apply warm moist compresses to the affected area or lukewarm soaks in Epsom salts as needed  Take medications as directed with food and stool softeners  Follow-up with your primary care provider within the week for recheck and return if elevated fever, increased redness rashes or any change in your condition.

## 2020-01-29 NOTE — ED NOTES
Patient discharged to home, verbalized understanding.  Patient taken out to vehicle via wheelchair.

## 2020-01-30 ENCOUNTER — PATIENT OUTREACH (OUTPATIENT)
Dept: HEALTH INFORMATION MANAGEMENT | Facility: OTHER | Age: 80
End: 2020-01-30

## 2020-01-30 NOTE — PROGRESS NOTES
Called member to follow up on ED visit. No answer, left message to call back. If the member calls back, please transfer to [phone].    Attempt # 1

## 2020-01-31 NOTE — PROGRESS NOTES
Called member to follow up on ED visit. I was able to complete the follow up with this patient. The members PCP did not have any appointments available so I let him know that I would escalated the appointment so he is able to follow up. I advised either myself or the office would be in contact regarding that appointment. If the member calls back, stephanie transfer to [phone].    Attempt # 2

## 2020-02-07 PROBLEM — H61.91 SKIN LESION OF RIGHT EAR: Status: RESOLVED | Noted: 2020-01-20 | Resolved: 2020-02-07

## 2020-02-11 ENCOUNTER — APPOINTMENT (RX ONLY)
Dept: URBAN - METROPOLITAN AREA CLINIC 22 | Facility: CLINIC | Age: 80
Setting detail: DERMATOLOGY
End: 2020-02-11

## 2020-02-11 DIAGNOSIS — L72.0 EPIDERMAL CYST: ICD-10-CM

## 2020-02-11 DIAGNOSIS — L82.0 INFLAMED SEBORRHEIC KERATOSIS: ICD-10-CM

## 2020-02-11 DIAGNOSIS — L57.0 ACTINIC KERATOSIS: ICD-10-CM

## 2020-02-11 DIAGNOSIS — D485 NEOPLASM OF UNCERTAIN BEHAVIOR OF SKIN: ICD-10-CM

## 2020-02-11 PROBLEM — D48.5 NEOPLASM OF UNCERTAIN BEHAVIOR OF SKIN: Status: ACTIVE | Noted: 2020-02-11

## 2020-02-11 PROCEDURE — ? LIQUID NITROGEN

## 2020-02-11 PROCEDURE — 99202 OFFICE O/P NEW SF 15 MIN: CPT | Mod: 25

## 2020-02-11 PROCEDURE — 69100 BIOPSY OF EXTERNAL EAR: CPT

## 2020-02-11 PROCEDURE — 17110 DESTRUCTION B9 LES UP TO 14: CPT | Mod: 59

## 2020-02-11 PROCEDURE — ? BIOPSY BY SHAVE METHOD

## 2020-02-11 PROCEDURE — ? COUNSELING

## 2020-02-11 PROCEDURE — 17004 DESTROY PREMAL LESIONS 15/>: CPT

## 2020-02-11 ASSESSMENT — LOCATION DETAILED DESCRIPTION DERM
LOCATION DETAILED: RIGHT INFERIOR TEMPLE
LOCATION DETAILED: RIGHT SUPERIOR LATERAL MALAR CHEEK
LOCATION DETAILED: RIGHT SUPERIOR POSTERIOR HELIX
LOCATION DETAILED: LEFT DISTAL DORSAL FOREARM
LOCATION DETAILED: RIGHT PROXIMAL ULNAR DORSAL FOREARM
LOCATION DETAILED: LEFT INFERIOR MEDIAL MIDBACK
LOCATION DETAILED: LEFT INFERIOR MEDIAL UPPER BACK
LOCATION DETAILED: RIGHT DISTAL POSTERIOR UPPER ARM
LOCATION DETAILED: LEFT MEDIAL UPPER BACK
LOCATION DETAILED: LEFT PROXIMAL DORSAL FOREARM
LOCATION DETAILED: SUPERIOR LUMBAR SPINE
LOCATION DETAILED: RIGHT PROXIMAL DORSAL FOREARM
LOCATION DETAILED: RIGHT DISTAL DORSAL FOREARM
LOCATION DETAILED: LEFT PROXIMAL POSTERIOR UPPER ARM
LOCATION DETAILED: RIGHT PROXIMAL POSTERIOR UPPER ARM
LOCATION DETAILED: NASAL SUPRATIP
LOCATION DETAILED: LEFT POSTERIOR SHOULDER

## 2020-02-11 ASSESSMENT — LOCATION SIMPLE DESCRIPTION DERM
LOCATION SIMPLE: RIGHT FOREARM
LOCATION SIMPLE: LEFT FOREARM
LOCATION SIMPLE: RIGHT TEMPLE
LOCATION SIMPLE: LEFT UPPER ARM
LOCATION SIMPLE: RIGHT EAR
LOCATION SIMPLE: LEFT UPPER BACK
LOCATION SIMPLE: LEFT SHOULDER
LOCATION SIMPLE: RIGHT UPPER ARM
LOCATION SIMPLE: LEFT LOWER BACK
LOCATION SIMPLE: LOWER BACK
LOCATION SIMPLE: RIGHT CHEEK
LOCATION SIMPLE: NOSE

## 2020-02-11 ASSESSMENT — LOCATION ZONE DERM
LOCATION ZONE: TRUNK
LOCATION ZONE: NOSE
LOCATION ZONE: ARM
LOCATION ZONE: EAR
LOCATION ZONE: FACE

## 2020-02-11 NOTE — PROCEDURE: LIQUID NITROGEN
Consent: The patient's consent was obtained including but not limited to risks of crusting, scabbing, blistering, scarring, darker or lighter pigmentary change, recurrence, incomplete removal and infection.
Number Of Freeze-Thaw Cycles: 2 freeze-thaw cycles
Post-Care Instructions: I reviewed with the patient in detail post-care instructions. Patient is to wear sunprotection, and avoid picking at any of the treated lesions. Pt may apply Vaseline to crusted or scabbing areas.
Render Post-Care Instructions In Note?: no
Duration Of Freeze Thaw-Cycle (Seconds): 5
Detail Level: Detailed
Medical Necessity Clause: This procedure was medically necessary because the lesions that were treated were:
Medical Necessity Information: It is in your best interest to select a reason for this procedure from the list below. All of these items fulfill various CMS LCD requirements except the new and changing color options.

## 2020-02-11 NOTE — PROCEDURE: BIOPSY BY SHAVE METHOD
Detail Level: Detailed
Depth Of Biopsy: dermis
Was A Bandage Applied: Yes
Size Of Lesion In Cm: 0
Biopsy Type: H and E
Biopsy Method: Dermablade
Anesthesia Type: 0.5% lidocaine with 1:200,000 epinephrine and a 1:10 solution of 8.4% sodium bicarbonate
Anesthesia Volume In Cc: 0.5
Hemostasis: Drysol
Wound Care: Petrolatum
Dressing: bandage
Destruction After The Procedure: No
Type Of Destruction Used: Curettage
Curettage Text: The wound bed was treated with curettage after the biopsy was performed.
Cryotherapy Text: The wound bed was treated with cryotherapy after the biopsy was performed.
Electrodesiccation Text: The wound bed was treated with electrodesiccation after the biopsy was performed.
Electrodesiccation And Curettage Text: The wound bed was treated with electrodesiccation and curettage after the biopsy was performed.
Silver Nitrate Text: The wound bed was treated with silver nitrate after the biopsy was performed.
Lab: [id number]
Lab Facility: 
Consent: Written consent was obtained and risks were reviewed including but not limited to scarring, infection, bleeding, scabbing, incomplete removal, nerve damage and allergy to anesthesia.
Post-Care Instructions: I reviewed with the patient in detail post-care instructions. Patient is to keep the biopsy site dry overnight, and then apply bacitracin twice daily until healed. Patient may apply hydrogen peroxide soaks to remove any crusting.
Notification Instructions: Patient will be notified of biopsy results. However, patient instructed to call the office if not contacted within 2 weeks.
Billing Type: Third-Party Bill

## 2020-02-13 ENCOUNTER — OFFICE VISIT (OUTPATIENT)
Dept: MEDICAL GROUP | Facility: MEDICAL CENTER | Age: 80
End: 2020-02-13
Payer: MEDICARE

## 2020-02-13 VITALS
SYSTOLIC BLOOD PRESSURE: 144 MMHG | OXYGEN SATURATION: 96 % | WEIGHT: 280 LBS | HEART RATE: 57 BPM | DIASTOLIC BLOOD PRESSURE: 86 MMHG | TEMPERATURE: 98.2 F | BODY MASS INDEX: 35.94 KG/M2 | HEIGHT: 74 IN

## 2020-02-13 DIAGNOSIS — E78.5 DYSLIPIDEMIA: Chronic | ICD-10-CM

## 2020-02-13 DIAGNOSIS — Z00.00 PREVENTATIVE HEALTH CARE: Chronic | ICD-10-CM

## 2020-02-13 DIAGNOSIS — M54.50 LOW BACK PAIN, UNSPECIFIED BACK PAIN LATERALITY, UNSPECIFIED CHRONICITY, UNSPECIFIED WHETHER SCIATICA PRESENT: ICD-10-CM

## 2020-02-13 DIAGNOSIS — R73.01 IMPAIRED FASTING GLUCOSE: Chronic | ICD-10-CM

## 2020-02-13 DIAGNOSIS — E66.9 OBESITY (BMI 30-39.9): ICD-10-CM

## 2020-02-13 PROCEDURE — 99213 OFFICE O/P EST LOW 20 MIN: CPT | Performed by: INTERNAL MEDICINE

## 2020-02-13 NOTE — PROGRESS NOTES
Subjective:      Shane Stahl is a 79 y.o. male who presents with leg pain         HPI  Here follow up seen ER for right foot and lower leg pain while sitting in chair, no trauma to the foot and seen ER 1/28/20 had labs uric 6.0 and had venous ultrasound negative right leg. Discharged home and by next day had improved. Has had chronic low back pain and seen by  neurosurgery and completed water therapy in november. Since then has back pain improved. No current leg or foot pain.  No history of trauma, no radicular symptoms.  No history of cellulitis.  No open sores or lesions.  Chronic peripheral neuropathy no change related to low back pain.  No falls.  Did go to physical therapy with aqua therapy which was beneficial.  He would like to resume aqua therapy. Hypertension on lisinopril 10 mg daily, history atrial fibrillation 1 episode seen by cardiology years ago, no recurrence, blood pressure at home 130/70. CKD in the past no regular NSAIDs, hypothyroid on Synthroid 75 mcg daily, sleep apnea using CPAP machine nightly follows up with pulmonary annually.  Dyslipidemia on pravastatin no muscle pain or aches. Does back exercises twice per day and does recumbent bike three times per week       Current Outpatient Medications   Medication Sig Dispense Refill   • fluticasone (FLONASE) 50 MCG/ACT nasal spray Spray 2 Sprays in nose 2 times a day. 16 g 5   • lisinopril (PRINIVIL) 10 MG Tab Take 1 Tab by mouth every day. 100 Tab 2   • pravastatin (PRAVACHOL) 40 MG tablet Take 1 Tab by mouth every day. 100 Tab 1   • carvedilol (COREG) 25 MG Tab Take 1 Tab by mouth 2 times a day, with meals. 180 Tab 3   • levothyroxine (SYNTHROID) 75 MCG Tab Take 1 Tab by mouth Every morning on an empty stomach. 90 Tab 3   • omeprazole (PRILOSEC) 40 MG delayed-release capsule Take 1 Cap by mouth every day. 30 Cap 11   • aspirin 81 MG tablet Take 81 mg by mouth every day.       No current facility-administered medications for this  visit.      Allergic rhinitis of Flonase     arthritis knee  3/9/17 x-ray right knee degenerative changes lateral femoral-tibial articulation, osteophytic spurring and likely subchondral cystic change  3/9/17 referral physical therapy  3/17/17 custom physical therapy evaluation  4/10/18 has follow up  orthopedics     tatum   6/23/16 [facility] note follow up EGD showing erosions of acute gastric ulcer without hemorrhage or perforation,chronic inactive gastritis with reactive epithelial changes, intestinal metaplasia, dysplasia, malignancy; consistent with pompa's esophagus and reflux esophagitis, negative for dysplasia or h.pylori, repeat EGD 3 months exam gastric ulcer, started on omeprazole 40 mg, follow-up EGD in september 11/2/17 EGD per [facility] erosions and antral biopsies performed, hiatal hernia, irregularly gastroesophageal junction, biopsies performed  6/13/19 b12 662,vit d 37 on prilosec    decrease gfr  5/9/14 bun 17,creat 1.1,GFR >60  9/2/14 bun 19,creat 1.3,GFR 52  1/28/15 bun 22,creat 1.29,GFR 54  4/28/15 bun 26,creat 1.1,GFR>60  3/21/16 bun 23,creat 1.3,GFR 52,urine mac< 8  3/10/17 bun 19,creat 1.4,GFR 49,PTH 39,calcium 9.4,phos 3.6,vit d 83,SPEP negative  4/10/18 bun 27,creat 1.36,GFR 51  10/14/18 ultrasound renal mild interval enlargement 5 cm right upper pole simple cyst  10/23/18 bun 19,creat 1.25,GFR 56,PTH 33  4/24/19 bun 16,creat 1.4,urine mac 33  6/13/19 bun 17,creat 1.35,GFR 51 on lisinopril 10 mg  1/28/20 bun 18,creat 1.15,GFR>60     Dyslipidemia  4/09 chol 189,trig 87,hdl 45,ldl 127  9/09 chol 206,trig 90,hdl 52,ldl 136 off lipitor/zocor  12/09 chol 183,trig 84,hdl 42,ldl 124  2/10 [facility] note chol 153,ldl 124 started by cards on pravastatin 40 since ?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  9/10 chol 135,trig 52,hdl 46,ldl 79 on pravachol  9/11 chol 145,trig 64,hdl 52,ldl 80 on pravachol  5/12 chol 146,trig 98,hdl 46,ldl 80 on pravachol 40 mg  10/12 chol 133,trig 65,hdl 46,ldl 74 on pravachol  40 mg  7/19/13 chol 121,trig 65,hdl 48,ldl 60 on pravachol 40 mg  9/3/13 chol 131,trig 75,hdl 49,ldl 67 on pravachol 40 mg  4/11/14 chol 150,trig 42,hdl 62,ldl 80 on pravachol 40 mg  8/22/14 chol 134,trig 73,hdl 54,ldl 65 on pravachol 40 mg  4/28/15 chol 114,trig 59,hdl 51,ldl 51 on pravachol 40 mg  3/21/16 chol 124,trig 57,hdl 47,ldl 66 on pravachol 40 mg  3/10/17 chol 151,trig 89,hdl 53,ldl 80 on pravachol 40 mg  4/10/18 chol 158,trig 134,hdl 56,ldl 75 on pravachol 40 mg  4/24/19 chol 118,trig 83,hdl 51,ldl 50 on pravachol 40 mg    history compression fracture  1/20/14 MRI lumbar spine acute anterior compression L1 chronic fracture T12, L5-S1 diffuse disc bulge, moderate right neural foraminal stenosis, L4-L5 left paracentral disc extrusion, moderate bilateral foraminal stenosis  1/29/14 dexa LS +0.9,hip +0.4    history foot pain  3/10 x-ray right foot and severe DJD and bone spur first MTP, small bone spur calcaneus and fifth metatarsal  3/11 right foot x-ray prominent DJD first MTP, posterior osseous spurring or soft tissue calcification distal to the first metatarsal  3/11 uric 5.3,esr 2,crp 1.2  3/11 u/s arterial RLE negative  3/11 MRI lumbar spine mild anterolisthesis L4-L5, mild canal stenosis L4-L5  6/11  right first toe surgery and replacement  10/12 ultrasound right lower extremity venous negative  3/9/17 sees  podiatry hammer toe     History of ulcer disease  3/17/16 GI evaluation dysphagia  6/2/16 EGD per [facility] localized irregularity mucosa GE junction, biopsies performed, 42 [place] dilation to 54 [place], localized erosions and ulceration mucosa stomach, biopsies performed, take omeprazole 40 mg daily  6/23/16 [facility] note follow up EGD showing erosions of acute gastric ulcer without hemorrhage or perforation,chronic inactive gastritis with reactive epithelial changes, intestinal metaplasia, dysplasia, malignancy; consistent with pompa's esophagus and reflux esophagitis, negative for  dysplasia or h.pylori, repeat EGD 3 months exam gastric ulcer, started on omeprazole 40 mg, follow-up EGD in september 11/2/17 EGD per [facility] erosions and antral biopsies performed, hiatal hernia, irregularly gastroesophageal junction, biopsies performed      history vertigo  5/8/14 CT head cerebral atrophy, periventricular small vessel ischemic change  5/9/14 MRI brain with and without; moderate microangiopathic ischemic change vs. demyelination or gliosis, moderate cervical and cerebellar substance loss  5/2/14 vestibular clinic evaluation, follow up as needed    Hypertension  6/07 echocardiogram borderline LVH  1/09 VEENA left 1.3, right 1.3  9/10 urine mac 2  3/11 u/s vascular lower ext negative  5/11 [facility] note   5/12 [facility] note on coreg 25 bid for paroxysmal atrial fibrillation and vasotec 10 mg qday  6/12 on coreg 25 bid  id, off vasotec  7/2/12 [facility] note  7/13 cardiology note  1/21/14 ultrasound carotid negative; on coreg 25 mg bid, asa  5/9/14 echo normal ejection fraction 65%, mild LVH  5/16/14 decrease coreg to 12.5 mg bid, cont asa  8/21/14 cardiology note follow up one year  3/21/16 urine mac <8 on coreg 12.5 mg  3/10/17 urine mac 3 on coreg 12.5 mg   5/2/17 cardiology note one episode atrial fibrillation 2012 will continue on aspirin, encourage weight loss, follow-up one year  9/27/18 on coreg 25 mg bid add lisinopril 10 mg  4/24/19 bun 16,creat 1.4,urine mac 33  6/13/19 bun 17,creat 1.35,GFR 51 on lisinopril 10 mg     Hypothyroid  5/19/12 tsh 7.45  4/28/15 tsh 7.49 start synthroid 50 mcg repeat tsh 6 weeks  8/28/15 tsh 3.2 on synthroid 50 mcg  3/21/16 hypothyroid 6.1 on synthroid 50 mcg, increase to synthroid 75 mcg repeat tsh 6 weeks  5/6/16 tsh 2.4 on synthroid 75 mcg  3/10/17 tsh 3.9 on synthroid 75 mcg  4/10/18 tsh 3.6 on synthroid 75 mcg  6/13/19 tsh 3.2 on synthroid 75 mcg     Impaired fasting blood sugar  10/12 bs 110  7/8/13 bs 106  9/3/13 A1c 6.1%  1/19/14 bs 95  4/11/14 A1c 5.5%, bs 103  8/22/14 bs  107  4/28/15 A1c 5.6%, bs 95  3/21/16 A1c 5.5%  3/10/17 A1c 5.7%,bs 112  4/10/18 A1c 5.8%  10/23/18 A1c 6.2%   4/24/19 A1c 5.8%     Paroxysmal atrial fibrillation  5/12 hospital admission atrial fibrillation, converted with intravenous cardizem  5/19/12 echo normal LV function  5/19/12 Persantine thallium normal EF 62%, subtle decrease uptake lateral wall  5/12 CHADS2 score 1   5/22/12 [facility] discontinue coumadin, change to aspirin; coreg 25 bid  7/1/13 cardiology note no changes, NSR cont coreg 25 bid and asa  1/19/14 EKG normal sinus rhythm on admission for compression fracture  1/21/14 ultrasound carotids negative  1/20/14 MRI brain without; no acute infarct or hemorrhage, moderate atrophy, mild chronic microvascular ischemic changes; on coreg 25 mg bid and asa  3/4/14 NSR on exam  5/9/14 echo normal ejection fraction 65%, mild LVH  5/16/14 decrease coreg to 12.5 mg bid secondary to heart rate and continue aspirin  8/21/14 cardiology note follow up one year  5/2/17 cardiology note one episode atrial fibrillation 2012 will continue on aspirin, encourage weight loss, follow-up one year     Peripheral neuropathy  11/7/09  note peripheral neuropathy sensorimotor axonal neuropathy, declines muscle and nerve biopsy  5/8/18 x-ray lumbar spine grade 1-2 anterolisthesis L4-L5, grade 1 retrolisthesis L2-L3, age indeterminate compression deformities T12-L1, multilevel degenerative changes L4-S1, prior coccyx fracture  7/16/18  physiatry [facility] [place] EMG nerve conduction study bilateral lower extremity, acute on chronic L4-L5 radiculopathy, right greater than left L5 nerve roots affected distal denervation bilateral tibialis anterior and right extensor hallucis longus muscles, may be coexisting distal lower extremity peripheral sensory motor polyneuropathy may be idiopathic, no evidence of lumbar plexopathy     Preventative health  12/1/09 shingles vaccine  5/2/13 tdap  1/29/14 dexa LS +0.9,hip +0.4  5/29/15  prevnar  6/2/16 colon per [facility] tubular adenoma times 2 repeat colonoscopy 5 years  7/5/16 pneumovax  10/23/18 vit d 32  4/24/19 psa 0.78     Renal cyst  1/20/14 MRI lumbar spine incidental finding 4 x 3.2 cm cyst right kidney  4/29/14 ultrasound renal 4 cm right upper pole simple renal cyst  3/31/18 MRI lumbar spine 4.2 x 3.5 right renal cyst  10/14/18 ultrasound renal mild interval enlargement 5 cm right upper pole simple cyst     Sensorineural hearing loss  11/12  audiology evaluation, moderate right high-frequency sensorineural hearing loss, not a candidate for amplification at this time  4/10/18 has right hearing aid     Sleep apnea  2/18/14 overnight pulse oximetry room air time less than 88% saturation 182 minutes, low saturation 67%, basal saturation 87%, apnea index 42 events per hour  4/11/14 pt would like to repeat tests, initial test done while he had bronchitis  6/18/14 nocturnal oximetry <89% for 231 minutes, low 63%, AHI per hour 55;will refer to [facility]  2/11/15 [facility] note, recommend cpap titration  3/21/15 [facility] sleep study 191 minutes,AHI 57,hypopnea index 42,minimum saturation 81%,improved on cpap 11 titration study  4/30/15 [facility] sleep note mask fit auto CPAP 10-15, followup one month  12/14/15 [facility] sleep note AHI 57 on autopap 10-16, 100% compliance AHI decreased to 0.6  3/2/17 sleep note   6/14/18 sleep note continue cpap 10-16 follow up one year  6/5/19 sleep note on cpap 10-16 compliance 100%     s/p lumbar surgery  1/04 right L4-L5 laminotomy and discectomy   1/20/14 MRI lumbar spine acute anterior compression fracture L1 fracture cleft noted along superior endplate, chronic fracture T12 vertebra, L5-S1 moderate right foraminal stenosis, L4-L5 left paracentral disc protrusion and facet joint arthropathy with mild to moderate lower foraminal stenosis  3/17/17 custom PT evaluation  3/13/18 MRI lumbar spine L5-S1 moderate to severe right L5 foraminal stenosis, impingement L5 nerve root,  L4-L5 mild to moderate bilateral and central canal stenosis, chronic compression deformity L1  5/8/18 x-ray lumbar spine grade 1-2 anterolisthesis L4-L5, grade 1 retrolisthesis L2-L3, age indeterminate compression deformities T12-L1, multilevel degenerative changes L4-S1, prior coccyx fracture  7/16/18  physiatry [place] EMG nerve conduction study bilateral lower extremity, acute on chronic L4-L5 radiculopathy, right greater than left L5 nerve roots affected distal denervation bilateral tibialis anterior and right extensor hallucis longus muscles, may be coexisting distal lower extremity peripheral sensory motor polyneuropathy may be idiopathic, no evidence of lumbar plexopathy; recommend start physical therapy, follow-up 4-6 weeks  5/6/19  neurosurgery note moderate stenosis L4-L5 and severe right-sided foraminal stenosis L5-S1 with significant arthropathy, intermittent right leg radiculopathy previous history of L4-L5 discectomy, potential surgical options discussed, order lumbar brace/arthrosis, if patient requires knee replacement he should get that done first and follow-up after recovery  6/4/19  neurosurgery note reviewed MRI lumbar spine and EMG nerve conduction study, moderate lumbar stenosis L4-L5, intermittent right leg radiculopathy and right knee osteoarthritis status post L4-L5 discectomy 2005, offered redo L4-5 decompressive laminectomy and L4-L5 posterior instrumented fusion, if requires knee replacement he can get this done and regroup once he has recovered  7/29/19  neurosurgery note reviewed MRI from 2018 showing facet arthropathy, mild to moderate stenosis L4-L5, moderate to severe stenosis L5 with impingement of L5 nerve root, EMG nerve conduction studies from 2018 acute on chronic right L4-L5 radiculopathy greater than left, impression moderate stenosis L4-L5 to severe right-sided foraminal stenosis L5-S1, offered redo L4-L5 decompressive laminectomy  with L4-5 posterior lumbar fusion, patient seems to be doing well with CBD oil, follow-up 6 months                        Patient Active Problem List   Diagnosis   • Hypertension   • Dyslipidemia   • Allergic rhinitis   • Status post lumbar surgery   • Preventative health care   • History foot pain   • Paroxysmal atrial fibrillation (HCC)   • Impaired fasting glucose   • Sensorineural hearing loss   • History compression fracture   • Renal cyst   • Sleep apnea   • History vertigo   • Obesity (BMI 30-39.9)   • Hypothyroid   • Peripheral autonomic neuropathy   • Decreased GFR   • History of ulcer disease   • Tubular adenoma of colon   • Blackwell's esophagus   • Arthritis of knee            Health Maintenance Summary                IMM ZOSTER VACCINES Overdue 1/26/2010      Done 12/1/2009 Imm Admin: Zoster Vaccine Live (ZVL) (Zostavax)    Annual Wellness Visit Overdue 4/11/2019      Done 4/10/2018 Visit Dx: Medicare annual wellness visit, subsequent     Patient has more history with this topic...    COLONOSCOPY Next Due 6/2/2021      Done 6/2/2016 AMB REFERRAL TO GI FOR COLONOSCOPY    IMM DTaP/Tdap/Td Vaccine Next Due 5/2/2023      Done 5/2/2013 Imm Admin: Tdap Vaccine     Patient has more history with this topic...          Patient Care Team:  Lavell Tadeo M.D. as PCP - General  Maximus Lopez M.D. as Consulting Physician (Pulmonary Medicine)  Newton Orta D.P.M. as Consulting Physician (Podiatry)  Umberto Hayes O.D. as Consulting Physician (Optometry)  Sven Bolanos M.D. as Consulting Physician (Gastroenterology)  [facility] as Home Health Provider (DME Supplier)  Radha Maria as      ROS       Objective:          Physical Exam  Vitals signs and nursing note reviewed.   Constitutional:       Appearance: Normal appearance.   HENT:      Right Ear: External ear normal.      Left Ear: External ear normal.      Mouth/Throat:      Pharynx: No posterior oropharyngeal erythema.    Eyes:      Conjunctiva/sclera: Conjunctivae normal.   Neck:      Musculoskeletal: Neck supple.   Cardiovascular:      Rate and Rhythm: Normal rate and regular rhythm.      Heart sounds: Normal heart sounds. No murmur.   Pulmonary:      Effort: No respiratory distress.      Breath sounds: Normal breath sounds.   Abdominal:      General: There is no distension.   Skin:     General: Skin is warm.   Neurological:      General: No focal deficit present.      Mental Status: He is alert.   Psychiatric:         Mood and Affect: Mood normal.       Right lower extremity no edema, no induration, no erythema, no discomfort, right knee brace in place, normal ankle flexion, extension, normal dorsalis pedis pulses.  Diminished sensation plantar surface, normal sensation above ankle          Assessment/Plan:     Assessment  #1 recent emergency room visit right lower extremity swelling resolved, ultrasound negative, labs negative, no evidence of cellulitis, no evidence of peripheral vascular disease, possible restless leg or sciatica although symptoms have resolved    #2 hypertension 6/13/19 bun 17,creat 1.35,GFR 51 on lisinopril 10 mg stable     #3 hypothyroid 6/13/19 tsh 3.2 on synthroid 75 mcg last TSH stable     #4 impaired fasting blood sugar 4/24/19 A1c 5.8%    #5 decrease gfr in the past but most recent GFR normal 1/28/20 bun 18,creat 1.15,GFR>60     #6 dyslipidemia 4/24/19 chol 118,trig 83,hdl 51,ldl 50 on pravachol 40 mg stable on statin     #7 history of atrial fibrillation 1 previous episode no recurrence seen by cardiology 5/2/17 cardiology note one episode atrial fibrillation 2012 will continue on aspirin, no recurrence since then, asymptomatic    #8 sleep apnea 6/5/19 sleep note on cpap 10-16 compliance 100% using CPAP nightly     #9 s/p lumbar surgery followed by  neurosurgery improved occasional low back pain and neuropathy related to back pain, did have aqua therapy which is beneficial     #10 BMI 35.9    "  Plan  #! shingrix offered but declines    #2 labs      #3  Referral( 2/19/20 edit to \"aqua\"PT) at [facility]    #4 reviewed labs and ultrasound from emergency room negative, symptoms have resolved, if recurs notify us no further studies necessary    #5 nutrition, diet, exercise discussed, importance of weight loss emphasized, he has lost weight, continue work on nutrition, diet, activity as obesity increases risk for diabetes and heart disease    #6 follow-up 6 months wellness assessment     #7 check blood pressure regularly record    #8 continue CPAP        "

## 2020-02-19 ENCOUNTER — TELEPHONE (OUTPATIENT)
Dept: MEDICAL GROUP | Facility: MEDICAL CENTER | Age: 80
End: 2020-02-19

## 2020-02-19 NOTE — TELEPHONE ENCOUNTER
Future Appointments       Provider Department Center    2/25/2020 9:00 AM Lavell Tadeo M.D.; [facility]  [facility] [place]    6/11/2020 11:00 AM KEVYN Mckeon. [facility]           ANNUAL WELLNESS VISIT PRE-VISIT PLANNING WITHOUT OUTREACH    1.  Reviewed note from last office visit with PCP: YES    2.  If any orders were placed at last visit, do we have Results/Consult Notes?        •  Labs - Labs ordered, but not to be completed until Future.  Note: If patient appointment is for lab review and patient did not complete labs, check with provider if OK to reschedule patient until labs completed.       •  Imaging - Imaging was not ordered at last office visit.       •  Referrals - Referral ordered, patient has NOT been seen.    3.  Immunizations were updated in [employer] using [employer]?: Yes       •  [employer] Recommendations: SHINGRIX (Shingles)        •  Is patient due for Tdap? NO       •  Is patient due for Shingrix? YES. Patient was not notified of copay/out of pocket cost.     4.  Patient is due for the following Health Maintenance Topics:   Health Maintenance Due   Topic Date Due   • IMM ZOSTER VACCINES (2 of 3) 01/26/2010   • Annual Wellness Visit  04/11/2019       - Patient already has appointment scheduled for Annual Wellness Visit (AWV).    5.  Reviewed/Updated the following with patient:       •   Preferred Pharmacy? NO       •   Preferred Lab? NO       •   Preferred Communication? NO       •   Allergies? NO       •   Medications? NO       •   Social History? NO       •   Family History (document living status of immediate family members and if + hx of  cancer, diabetes, hypertension, hyperlipidemia, heart attack, stroke) NO    6.  Care Team Updated:       •   DME Company (gait device, O2, CPAP, etc.): NO       •   Other Specialists (eye doctor, derm, GYN, cardiology, endo, etc): NO    7. Orders for overdue Health Maintenance topics  pended in Pre-Charting? NO    8.  Patient has the following Care Path diagnoses on Problem List:  NONE    9.  Patient was advised: “This is a free wellness visit. The provider will screen for medical conditions to help you stay healthy. If you have other concerns to address you may be asked to discuss these at a separate visit or there may be an additional fee.”     10.  Patient was informed to arrive 15 min prior to their scheduled appointment and bring in their medication bottles.

## 2020-02-20 ENCOUNTER — TELEPHONE (OUTPATIENT)
Dept: MEDICAL GROUP | Facility: MEDICAL CENTER | Age: 80
End: 2020-02-20

## 2020-02-20 DIAGNOSIS — M54.9 BACK PAIN, UNSPECIFIED BACK LOCATION, UNSPECIFIED BACK PAIN LATERALITY, UNSPECIFIED CHRONICITY: ICD-10-CM

## 2020-02-21 NOTE — TELEPHONE ENCOUNTER
Farhat Stahl    [phone]      Pt wants the location of the [facility] REF changed to  [facility] [place] [address] [place] [address]  Sharon Lewis [phone]

## 2020-02-24 ENCOUNTER — HOSPITAL ENCOUNTER (OUTPATIENT)
Dept: LAB | Facility: MEDICAL CENTER | Age: 80
End: 2020-02-24
Attending: INTERNAL MEDICINE
Payer: MEDICARE

## 2020-02-24 DIAGNOSIS — E78.5 DYSLIPIDEMIA: Chronic | ICD-10-CM

## 2020-02-24 DIAGNOSIS — R73.01 IMPAIRED FASTING GLUCOSE: Chronic | ICD-10-CM

## 2020-02-24 LAB
ALBUMIN SERPL BCP-MCNC: 3.7 G/DL (ref 3.2–4.9)
ALBUMIN/GLOB SERPL: 1.2 G/DL
ALP SERPL-CCNC: 82 U/L (ref 30–99)
ALT SERPL-CCNC: 18 U/L (ref 2–50)
ANION GAP SERPL CALC-SCNC: 8 MMOL/L (ref 0–11.9)
AST SERPL-CCNC: 18 U/L (ref 12–45)
BASOPHILS # BLD AUTO: 0.7 % (ref 0–1.8)
BASOPHILS # BLD: 0.05 K/UL (ref 0–0.12)
BILIRUB SERPL-MCNC: 0.7 MG/DL (ref 0.1–1.5)
BUN SERPL-MCNC: 22 MG/DL (ref 8–22)
CALCIUM SERPL-MCNC: 9.2 MG/DL (ref 8.5–10.5)
CHLORIDE SERPL-SCNC: 105 MMOL/L (ref 96–112)
CHOLEST SERPL-MCNC: 132 MG/DL (ref 100–199)
CO2 SERPL-SCNC: 26 MMOL/L (ref 20–33)
CREAT SERPL-MCNC: 1.34 MG/DL (ref 0.5–1.4)
EOSINOPHIL # BLD AUTO: 0.44 K/UL (ref 0–0.51)
EOSINOPHIL NFR BLD: 6.1 % (ref 0–6.9)
ERYTHROCYTE [DISTWIDTH] IN BLOOD BY AUTOMATED COUNT: 50.7 FL (ref 35.9–50)
EST. AVERAGE GLUCOSE BLD GHB EST-MCNC: 114 MG/DL
GLOBULIN SER CALC-MCNC: 3 G/DL (ref 1.9–3.5)
GLUCOSE SERPL-MCNC: 113 MG/DL (ref 65–99)
HBA1C MFR BLD: 5.6 % (ref 0–5.6)
HCT VFR BLD AUTO: 47.5 % (ref 42–52)
HDLC SERPL-MCNC: 46 MG/DL
HGB BLD-MCNC: 15.6 G/DL (ref 14–18)
IMM GRANULOCYTES # BLD AUTO: 0.03 K/UL (ref 0–0.11)
IMM GRANULOCYTES NFR BLD AUTO: 0.4 % (ref 0–0.9)
LDLC SERPL CALC-MCNC: 67 MG/DL
LYMPHOCYTES # BLD AUTO: 1.74 K/UL (ref 1–4.8)
LYMPHOCYTES NFR BLD: 24 % (ref 22–41)
MCH RBC QN AUTO: 32.5 PG (ref 27–33)
MCHC RBC AUTO-ENTMCNC: 32.8 G/DL (ref 33.7–35.3)
MCV RBC AUTO: 99 FL (ref 81.4–97.8)
MONOCYTES # BLD AUTO: 0.68 K/UL (ref 0–0.85)
MONOCYTES NFR BLD AUTO: 9.4 % (ref 0–13.4)
NEUTROPHILS # BLD AUTO: 4.32 K/UL (ref 1.82–7.42)
NEUTROPHILS NFR BLD: 59.4 % (ref 44–72)
NRBC # BLD AUTO: 0 K/UL
NRBC BLD-RTO: 0 /100 WBC
PLATELET # BLD AUTO: 196 K/UL (ref 164–446)
PMV BLD AUTO: 10.3 FL (ref 9–12.9)
POTASSIUM SERPL-SCNC: 4.6 MMOL/L (ref 3.6–5.5)
PROT SERPL-MCNC: 6.7 G/DL (ref 6–8.2)
RBC # BLD AUTO: 4.8 M/UL (ref 4.7–6.1)
SODIUM SERPL-SCNC: 139 MMOL/L (ref 135–145)
TRIGL SERPL-MCNC: 96 MG/DL (ref 0–149)
WBC # BLD AUTO: 7.3 K/UL (ref 4.8–10.8)

## 2020-02-24 PROCEDURE — 85025 COMPLETE CBC W/AUTO DIFF WBC: CPT

## 2020-02-24 PROCEDURE — 80061 LIPID PANEL: CPT

## 2020-02-24 PROCEDURE — 84443 ASSAY THYROID STIM HORMONE: CPT

## 2020-02-24 PROCEDURE — 36415 COLL VENOUS BLD VENIPUNCTURE: CPT

## 2020-02-24 PROCEDURE — 83036 HEMOGLOBIN GLYCOSYLATED A1C: CPT

## 2020-02-24 PROCEDURE — 80053 COMPREHEN METABOLIC PANEL: CPT

## 2020-02-25 ENCOUNTER — OFFICE VISIT (OUTPATIENT)
Dept: MEDICAL GROUP | Facility: MEDICAL CENTER | Age: 80
End: 2020-02-25
Payer: MEDICARE

## 2020-02-25 VITALS
HEART RATE: 59 BPM | DIASTOLIC BLOOD PRESSURE: 60 MMHG | BODY MASS INDEX: 35.94 KG/M2 | OXYGEN SATURATION: 93 % | WEIGHT: 280 LBS | SYSTOLIC BLOOD PRESSURE: 122 MMHG | TEMPERATURE: 98 F | HEIGHT: 74 IN

## 2020-02-25 DIAGNOSIS — Z00.00 MEDICARE ANNUAL WELLNESS VISIT, SUBSEQUENT: ICD-10-CM

## 2020-02-25 DIAGNOSIS — E78.5 DYSLIPIDEMIA: Chronic | ICD-10-CM

## 2020-02-25 DIAGNOSIS — E03.9 HYPOTHYROIDISM, UNSPECIFIED TYPE: Chronic | ICD-10-CM

## 2020-02-25 DIAGNOSIS — Z86.79 HISTORY OF ATRIAL FIBRILLATION: ICD-10-CM

## 2020-02-25 DIAGNOSIS — R94.4 DECREASED GFR: ICD-10-CM

## 2020-02-25 DIAGNOSIS — E66.9 OBESITY (BMI 30-39.9): ICD-10-CM

## 2020-02-25 DIAGNOSIS — R73.01 IMPAIRED FASTING GLUCOSE: Chronic | ICD-10-CM

## 2020-02-25 LAB — TSH SERPL DL<=0.005 MIU/L-ACNC: 2.95 UIU/ML (ref 0.38–5.33)

## 2020-02-25 PROCEDURE — G0439 PPPS, SUBSEQ VISIT: HCPCS | Performed by: INTERNAL MEDICINE

## 2020-02-25 ASSESSMENT — PATIENT HEALTH QUESTIONNAIRE - PHQ9: CLINICAL INTERPRETATION OF PHQ2 SCORE: 0

## 2020-02-25 ASSESSMENT — ACTIVITIES OF DAILY LIVING (ADL): BATHING_REQUIRES_ASSISTANCE: 0

## 2020-02-25 ASSESSMENT — ENCOUNTER SYMPTOMS: GENERAL WELL-BEING: GOOD

## 2020-02-25 NOTE — PROGRESS NOTES
Chief Complaint   Patient presents with   • Annual Wellness Visit         HPI:  Shane is a 79 y.o. here for Medicare Annual Wellness Visit  Medication, allergies, medical history, surgical history, social history, family history reviewed and updated  Also had labs done yesterday, blood pressure 120-130/70s, does 20 minutes daily of stationary bike   Sees  eye exam  [facility]  Sees  neurosurgery  Sees pulmonary on cpap  Sees [facility]        Patient Active Problem List    Diagnosis Date Noted   • Arthritis of knee 03/09/2017   • Tubular adenoma of colon 07/06/2016   • Pompa's esophagus 07/06/2016   • History of ulcer disease 06/23/2016   • Decreased GFR 03/21/2016   • Peripheral autonomic neuropathy 09/23/2015   • Hypothyroid 04/28/2015   • Obesity (BMI 30-39.9) 08/22/2014   • History vertigo 05/13/2014   • Sleep apnea 02/20/2014   • Renal cyst 01/22/2014   • History compression fracture 01/21/2014   • Sensorineural hearing loss 11/26/2012   • Impaired fasting glucose 10/10/2012   • History of atrial fibrillation 05/22/2012   • History foot pain 03/06/2010   • Hypertension 09/16/2009   • Dyslipidemia 09/16/2009   • Allergic rhinitis 09/16/2009   • Status post lumbar surgery 09/16/2009   • Preventative health care 09/16/2009          Allergic rhinitis of Flonase     arthritis knee  3/9/17 x-ray right knee degenerative changes lateral femoral-tibial articulation, osteophytic spurring and likely subchondral cystic change  3/9/17 referral physical therapy  3/17/17 custom physical therapy evaluation  4/10/18 has follow up  orthopedics     tatum   6/23/16 [facility] note follow up EGD showing erosions of acute gastric ulcer without hemorrhage or perforation,chronic inactive gastritis with reactive epithelial changes, intestinal metaplasia, dysplasia, malignancy; consistent with pompa's esophagus and reflux esophagitis, negative for dysplasia or h.pylori, repeat EGD 3 months  exam gastric ulcer, started on omeprazole 40 mg, follow-up EGD in september 11/2/17 EGD per [facility] erosions and antral biopsies performed, hiatal hernia, irregularly gastroesophageal junction, biopsies performed  6/13/19 b12 662,vit d 37 on prilosec     decrease gfr  5/9/14 bun 17,creat 1.1,GFR >60  9/2/14 bun 19,creat 1.3,GFR 52  1/28/15 bun 22,creat 1.29,GFR 54  4/28/15 bun 26,creat 1.1,GFR>60  3/21/16 bun 23,creat 1.3,GFR 52,urine mac< 8  3/10/17 bun 19,creat 1.4,GFR 49,PTH 39,calcium 9.4,phos 3.6,vit d 83,SPEP negative  4/10/18 bun 27,creat 1.36,GFR 51  10/14/18 ultrasound renal mild interval enlargement 5 cm right upper pole simple cyst  10/23/18 bun 19,creat 1.25,GFR 56,PTH 33  4/24/19 bun 16,creat 1.4,urine mac 33  6/13/19 bun 17,creat 1.35,GFR 51 on lisinopril 10 mg  1/28/20 bun 18,creat 1.15,GFR>60     Dyslipidemia  4/09 chol 189,trig 87,hdl 45,ldl 127  9/09 chol 206,trig 90,hdl 52,ldl 136 off lipitor/zocor  12/09 chol 183,trig 84,hdl 42,ldl 124  2/10 [facility] note chol 153,ldl 124 started by cards on pravastatin 40 since ?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  9/10 chol 135,trig 52,hdl 46,ldl 79 on pravachol  9/11 chol 145,trig 64,hdl 52,ldl 80 on pravachol  5/12 chol 146,trig 98,hdl 46,ldl 80 on pravachol 40 mg  10/12 chol 133,trig 65,hdl 46,ldl 74 on pravachol 40 mg  7/19/13 chol 121,trig 65,hdl 48,ldl 60 on pravachol 40 mg  9/3/13 chol 131,trig 75,hdl 49,ldl 67 on pravachol 40 mg  4/11/14 chol 150,trig 42,hdl 62,ldl 80 on pravachol 40 mg  8/22/14 chol 134,trig 73,hdl 54,ldl 65 on pravachol 40 mg  4/28/15 chol 114,trig 59,hdl 51,ldl 51 on pravachol 40 mg  3/21/16 chol 124,trig 57,hdl 47,ldl 66 on pravachol 40 mg  3/10/17 chol 151,trig 89,hdl 53,ldl 80 on pravachol 40 mg  4/10/18 chol 158,trig 134,hdl 56,ldl 75 on pravachol 40 mg  4/24/19 chol 118,trig 83,hdl 51,ldl 50 on pravachol 40 mg    history atrial fibrillation  5/12 hospital admission atrial fibrillation, converted with intravenous cardizem  5/19/12 echo  normal LV function  5/19/12 Persantine thallium normal EF 62%, subtle decrease uptake lateral wall  5/12 CHADS2 score 1   5/22/12 [facility] discontinue coumadin, change to aspirin; coreg 25 bid  7/1/13 cardiology note no changes, NSR cont coreg 25 bid and asa  1/19/14 EKG normal sinus rhythm on admission for compression fracture  1/21/14 ultrasound carotids negative  1/20/14 MRI brain without; no acute infarct or hemorrhage, moderate atrophy, mild chronic microvascular ischemic changes; on coreg 25 mg bid and asa  3/4/14 NSR on exam  5/9/14 echo normal ejection fraction 65%, mild LVH  5/16/14 decrease coreg to 12.5 mg bid secondary to heart rate and continue aspirin  8/21/14 cardiology note follow up one year  5/2/17 cardiology note one episode atrial fibrillation 2012 will continue on aspirin, encourage weight loss, follow-up one year     history compression fracture  1/20/14 MRI lumbar spine acute anterior compression L1 chronic fracture T12, L5-S1 diffuse disc bulge, moderate right neural foraminal stenosis, L4-L5 left paracentral disc extrusion, moderate bilateral foraminal stenosis  1/29/14 dexa LS +0.9,hip +0.4     history foot pain  3/10 x-ray right foot and severe DJD and bone spur first MTP, small bone spur calcaneus and fifth metatarsal  3/11 right foot x-ray prominent DJD first MTP, posterior osseous spurring or soft tissue calcification distal to the first metatarsal  3/11 uric 5.3,esr 2,crp 1.2  3/11 u/s arterial RLE negative  3/11 MRI lumbar spine mild anterolisthesis L4-L5, mild canal stenosis L4-L5  6/11  right first toe surgery and replacement  10/12 ultrasound right lower extremity venous negative  3/9/17 sees  podiatry hammer toe     History of ulcer disease  3/17/16 GI evaluation dysphagia  6/2/16 EGD per [facility] localized irregularity mucosa GE junction, biopsies performed, 42 Ethiopian dilation to 54 Ethiopian, localized erosions and ulceration mucosa stomach, biopsies performed, take  omeprazole 40 mg daily  6/23/16 [facility] note follow up EGD showing erosions of acute gastric ulcer without hemorrhage or perforation,chronic inactive gastritis with reactive epithelial changes, intestinal metaplasia, dysplasia, malignancy; consistent with pompa's esophagus and reflux esophagitis, negative for dysplasia or h.pylori, repeat EGD 3 months exam gastric ulcer, started on omeprazole 40 mg, follow-up EGD in september 11/2/17 EGD per [facility] erosions and antral biopsies performed, hiatal hernia, irregularly gastroesophageal junction, biopsies performed      history vertigo  5/8/14 CT head cerebral atrophy, periventricular small vessel ischemic change  5/9/14 MRI brain with and without; moderate microangiopathic ischemic change vs. demyelination or gliosis, moderate cervical and cerebellar substance loss  5/2/14 vestibular clinic evaluation, follow up as needed     Hypertension  6/07 echocardiogram borderline LVH  1/09 VEENA left 1.3, right 1.3  9/10 urine mac 2  3/11 u/s vascular lower ext negative  5/11 [facility] note   5/12 [facility] note on coreg 25 bid for paroxysmal atrial fibrillation and vasotec 10 mg qday  6/12 on coreg 25 bid  id, off vasotec  7/2/12 [facility] note  7/13 cardiology note  1/21/14 ultrasound carotid negative; on coreg 25 mg bid, asa  5/9/14 echo normal ejection fraction 65%, mild LVH  5/16/14 decrease coreg to 12.5 mg bid, cont asa  8/21/14 cardiology note follow up one year  3/21/16 urine mac <8 on coreg 12.5 mg  3/10/17 urine mac 3 on coreg 12.5 mg   5/2/17 cardiology note one episode atrial fibrillation 2012 will continue on aspirin, encourage weight loss, follow-up one year  9/27/18 on coreg 25 mg bid add lisinopril 10 mg  4/24/19 bun 16,creat 1.4,urine mac 33  6/13/19 bun 17,creat 1.35,GFR 51 on lisinopril 10 mg     Hypothyroid  5/19/12 tsh 7.45  4/28/15 tsh 7.49 start synthroid 50 mcg repeat tsh 6 weeks  8/28/15 tsh 3.2 on synthroid 50 mcg  3/21/16 hypothyroid 6.1 on synthroid 50 mcg, increase to  synthroid 75 mcg repeat tsh 6 weeks  5/6/16 tsh 2.4 on synthroid 75 mcg  3/10/17 tsh 3.9 on synthroid 75 mcg  4/10/18 tsh 3.6 on synthroid 75 mcg  6/13/19 tsh 3.2 on synthroid 75 mcg     Impaired fasting blood sugar  10/12 bs 110  7/8/13 bs 106  9/3/13 A1c 6.1%  1/19/14 bs 95  4/11/14 A1c 5.5%, bs 103  8/22/14 bs 107  4/28/15 A1c 5.6%, bs 95  3/21/16 A1c 5.5%  3/10/17 A1c 5.7%,bs 112  4/10/18 A1c 5.8%  10/23/18 A1c 6.2%   4/24/19 A1c 5.8%     Peripheral neuropathy  11/7/09  note peripheral neuropathy sensorimotor axonal neuropathy, declines muscle and nerve biopsy  5/8/18 x-ray lumbar spine grade 1-2 anterolisthesis L4-L5, grade 1 retrolisthesis L2-L3, age indeterminate compression deformities T12-L1, multilevel degenerative changes L4-S1, prior coccyx fracture  7/16/18  physiatry [facility] [place] EMG nerve conduction study bilateral lower extremity, acute on chronic L4-L5 radiculopathy, right greater than left L5 nerve roots affected distal denervation bilateral tibialis anterior and right extensor hallucis longus muscles, may be coexisting distal lower extremity peripheral sensory motor polyneuropathy may be idiopathic, no evidence of lumbar plexopathy     Preventative health  12/1/09 shingles vaccine  5/2/13 tdap  1/29/14 dexa LS +0.9,hip +0.4  5/29/15 prevnar  6/2/16 colon per [facility] tubular adenoma times 2 repeat colonoscopy 5 years  7/5/16 pneumovax  10/23/18 vit d 32  4/24/19 psa 0.78     Renal cyst  1/20/14 MRI lumbar spine incidental finding 4 x 3.2 cm cyst right kidney  4/29/14 ultrasound renal 4 cm right upper pole simple renal cyst  3/31/18 MRI lumbar spine 4.2 x 3.5 right renal cyst  10/14/18 ultrasound renal mild interval enlargement 5 cm right upper pole simple cyst     Sensorineural hearing loss  11/12  audiology evaluation, moderate right high-frequency sensorineural hearing loss, not a candidate for amplification at this time  4/10/18 has right hearing aid     Sleep  apnea  2/18/14 overnight pulse oximetry room air time less than 88% saturation 182 minutes, low saturation 67%, basal saturation 87%, apnea index 42 events per hour  4/11/14 pt would like to repeat tests, initial test done while he had bronchitis  6/18/14 nocturnal oximetry <89% for 231 minutes, low 63%, AHI per hour 55;will refer to [facility]  2/11/15 [facility] note, recommend cpap titration  3/21/15 [facility] sleep study 191 minutes,AHI 57,hypopnea index 42,minimum saturation 81%,improved on cpap 11 titration study  4/30/15 [facility] sleep note mask fit auto CPAP 10-15, followup one month  12/14/15 [facility] sleep note AHI 57 on autopap 10-16, 100% compliance AHI decreased to 0.6  3/2/17 sleep note   6/14/18 sleep note continue cpap 10-16 follow up one year  6/5/19 sleep note on cpap 10-16 compliance 100%     s/p lumbar surgery  1/04 right L4-L5 laminotomy and discectomy   1/20/14 MRI lumbar spine acute anterior compression fracture L1 fracture cleft noted along superior endplate, chronic fracture T12 vertebra, L5-S1 moderate right foraminal stenosis, L4-L5 left paracentral disc protrusion and facet joint arthropathy with mild to moderate lower foraminal stenosis  3/17/17 custom PT evaluation  3/13/18 MRI lumbar spine L5-S1 moderate to severe right L5 foraminal stenosis, impingement L5 nerve root, L4-L5 mild to moderate bilateral and central canal stenosis, chronic compression deformity L1  5/8/18 x-ray lumbar spine grade 1-2 anterolisthesis L4-L5, grade 1 retrolisthesis L2-L3, age indeterminate compression deformities T12-L1, multilevel degenerative changes L4-S1, prior coccyx fracture  7/16/18  [facility]iatry [facility] [place] [facility] nerve conduction study bilateral lower extremity, acute on chronic L4-L5 radiculopathy, right greater than left L5 nerve roots affected distal denervation bilateral tibialis anterior and right extensor hallucis longus muscles, may be coexisting distal lower extremity peripheral sensory motor  polyneuropathy may be idiopathic, no evidence of lumbar plexopathy; recommend start physical therapy, follow-up 4-6 weeks  5/6/19  neurosurgery note moderate stenosis L4-L5 and severe right-sided foraminal stenosis L5-S1 with significant arthropathy, intermittent right leg radiculopathy previous history of L4-L5 discectomy, potential surgical options discussed, order lumbar brace/arthrosis, if patient requires knee replacement he should get that done first and follow-up after recovery  6/4/19  neurosurgery note reviewed MRI lumbar spine and EMG nerve conduction study, moderate lumbar stenosis L4-L5, intermittent right leg radiculopathy and right knee osteoarthritis status post L4-L5 discectomy 2005, offered redo L4-5 decompressive laminectomy and L4-L5 posterior instrumented fusion, if requires knee replacement he can get this done and regroup once he has recovered  7/29/19  neurosurgery note reviewed MRI from 2018 showing facet arthropathy, mild to moderate stenosis L4-L5, moderate to severe stenosis L5 with impingement of L5 nerve root, EMG nerve conduction studies from 2018 acute on chronic right L4-L5 radiculopathy greater than left, impression moderate stenosis L4-L5 to severe right-sided foraminal stenosis L5-S1, offered redo L4-L5 decompressive laminectomy with L4-5 posterior lumbar fusion, patient seems to be doing well with CBD oil, follow-up 6 months                 Current Outpatient Medications   Medication Sig Dispense Refill   • fluticasone (FLONASE) 50 MCG/ACT nasal spray SHAKE LIQUID AND USE 2 SPRAYS IN EACH NOSTRIL TWICE DAILY 16 g 5   • pravastatin (PRAVACHOL) 40 MG tablet Take 1 Tab by mouth every day. 100 Tab 0   • lisinopril (PRINIVIL) 10 MG Tab Take 1 Tab by mouth every day. 100 Tab 2   • carvedilol (COREG) 25 MG Tab Take 1 Tab by mouth 2 times a day, with meals. 180 Tab 3   • levothyroxine (SYNTHROID) 75 MCG Tab Take 1 Tab by mouth Every morning on an empty stomach.  90 Tab 3   • omeprazole (PRILOSEC) 40 MG delayed-release capsule Take 1 Cap by mouth every day. 30 Cap 11   • aspirin 81 MG tablet Take 81 mg by mouth every day.       No current facility-administered medications for this visit.         Patient is taking medications as noted in medication list.  Current supplements as per medication list.     Allergies: Flexeril [cyclobenzaprine hcl] and Other environmental    Current social contact/activities:  Pt reports gardening, play computer games and visit with his grand kids and great grand kids      Is patient current with immunizations? No, due for SHINGRIX (Shingles). Patient is interested in receiving NONE today.    He  reports that he has never smoked. He has never used smokeless tobacco. He reports that he does not drink alcohol or use drugs.  Counseling given: Not Answered        DPA/Advanced directive: Has already advanced directive, will bring copy to enter into the computer system    ROS:    Gait: Uses no assistive device    Ostomy: No    Other tubes: No    Amputations: No    Chronic oxygen use No    Last eye exam Pt reports about 6 months ago    Wears hearing aids: No    : Denies any urinary leakage during the last 6 months       Depression Screening    Little interest or pleasure in doing things?  0 - not at all  Feeling down, depressed, or hopeless? 0 - not at all  Patient Health Questionnaire Score: 0    If depressive symptoms identified deferred to follow up visit unless specifically addressed in assessment and plan.    Interpretation of PHQ-9 Total Score   Score Severity   1-4 No Depression   5-9 Mild Depression   10-14 Moderate Depression   15-19 Moderately Severe Depression   20-27 Severe Depression    Screening for Cognitive Impairment    Three Minute Recall (village, kitchen, baby)  2/3    Shaun clock face with all 12 numbers and set the hands to show 10 past 10.  Yes 5/5  If cognitive concerns identified, deferred for follow up unless specifically  addressed in assessment and plan.    Fall Risk Assessment    Has the patient had two or more falls in the last year or any fall with injury in the last year?  No  If fall risk identified, deferred for follow up unless specifically addressed in assessment and plan.    Safety Assessment    Throw rugs on floor.  Yes  Handrails on all stairs.  Yes  Good lighting in all hallways.  Yes  Difficulty hearing.  Yes  Patient counseled about all safety risks that were identified.    Functional Assessment ADLs    Are there any barriers preventing you from cooking for yourself or meeting nutritional needs?  No.    Are there any barriers preventing you from driving safely or obtaining transportation?  No.    Are there any barriers preventing you from using a telephone or calling for help?  No.    Are there any barriers preventing you from shopping?  No.    Are there any barriers preventing you from taking care of your own finances?  No.    Are there any barriers preventing you from managing your medications?  No.    Are there any barriers preventing you from showering, bathing or dressing yourself?  No.    Are you currently engaging in any exercise or physical activity?  Yes.  Patient reports riding his stationary bicycle, physical therapy exercises  What is your perception of your health?  Good.    Health Maintenance Summary                IMM ZOSTER VACCINES Overdue 1/26/2010      Done 12/1/2009 Imm Admin: Zoster Vaccine Live (ZVL) (Zostavax)    Annual Wellness Visit Overdue 4/11/2019      Done 4/10/2018 Visit Dx: Medicare annual wellness visit, subsequent     Patient has more history with this topic...    COLONOSCOPY Next Due 6/2/2021      Done 6/2/2016 AMB REFERRAL TO GI FOR COLONOSCOPY    IMM DTaP/Tdap/Td Vaccine Next Due 5/2/2023      Done 5/2/2013 Imm Admin: Tdap Vaccine     Patient has more history with this topic...          Patient Care Team:  Lavell Tadeo M.D. as PCP - General  Maximus Lopez M.D. as Consulting  "Physician (Pulmonary Medicine)  Newton Young D.P.M. as Consulting Physician (Podiatry)  Umberto Hayes O.D. as Consulting Physician (Optometry)  Sven Bolanos M.D. as Consulting Physician (Gastroenterology)  [facility] as Home Health Provider (DME Supplier)  Radha Maria as      Social History     Tobacco Use   • Smoking status: Never Smoker   • Smokeless tobacco: Never Used   Substance Use Topics   • Alcohol use: No     Alcohol/week: 0.0 oz   • Drug use: No     Family History   Problem Relation Age of Onset   • Cancer Mother         cervical/breast   • Cancer Sister         lung   • Cancer Father         lung cancer   • Cancer Other      He  has a past medical history of Arrhythmia, Arthritis, Bronchitis (2004), Fibula fracture (1981), High cholesterol, Hyperlipidemia, Hypertension, MEDICAL HOME, Pain, Pneumonia (2004), and Sleep apnea.   Past Surgical History:   Procedure Laterality Date   • HAMMERTOE CORRECTION  2/2/2015    Performed by Newton Young D.P.M. at [facility]   • EXOSTOSIS EXCISION  6/3/2011    Performed by NEWTON YOUNG at [facility]   • LESION EXCISION ORTHO  6/3/2011    Performed by NEWTON YOUNG at [facility]   • TOE ARTHROPLASTY  6/3/2011    Performed by NEWTON YOUNG at [facility]   • LUMBAR LAMINECTOMY DISKECTOMY  2005    microscopic   • TONSILLECTOMY  1945    adenoidectomy           Exam:     /60   Pulse (!) 59   Temp 36.7 °C (98 °F) (Temporal)   Ht 1.88 m (6' 2\")   Wt (!) 127 kg (280 lb)   SpO2 93%  Body mass index is 35.95 kg/m².    Hearing and dentition fair  Alert, oriented in no acute distress.  Eye contact is good, speech goal directed, affect calm  Lungs clear  Cardiovascular S1-S2 regular    Assessment and Plan. The following treatment and monitoring plan is recommended:  Assessment  #1 wellness visit    #2 dyslipidemia on pravastatin 2/24/20 chol 132,trig " 96,hdl 46,ldl 67, no muscle pain or muscle aches with statin therapy.    #3  Hypertension stable on lisinopril 10 mg    #4 CKD 2/24/20 bun 22,creat 1.34,GFR 51 on lisinopril 10 mg no regular NSAIDs, creatinine stable varies between 1.1-1.3 over the last 6 years    #5 BMI 35.9     #6 impaired glucose metabolism 2/24/20 A1c 5.6%     #7 hypothyroid on replacement 2/24/20 tsh 2.9 on synthroid 75 mcg    #8 history atrial fibrillation 1 occurrence 2012 no recurrence, seen by cardiology recommended aspirin, no follow-up necessary    #9 preventative health  12/1/09 shingles vaccine  5/2/13 tdap  1/29/14 dexa LS +0.9,hip +0.4  5/29/15 prevnar  6/2/16 colon per [facility] tubular adenoma times 2 repeat colonoscopy 5 years  7/5/16 pneumovax  10/23/18 vit d 32  4/24/19 psa 0.78  2/13/20 declines shingrix    #10 sleep apnea on CPAP    #11 skin lesion followed by dermatology      Services suggested:  Health Care Screening recommendations as per orders if indicated.  Referrals offered: PT/OT/Nutrition counseling/Behavioral Health/Smoking cessation as per orders if indicated.    Discussion today about general wellness and lifestyle habits:    · Prevent falls and reduce trip hazards; Cautioned about securing or removing rugs.  · Have a working fire alarm and carbon monoxide detector;   · Engage in regular physical activity and social activities       Follow-up:  Plan  #1 health maintenance reviewed and updated    #2 skin cancer dermatology results for the past year    #3 already made referral to [facility] patient will start physical therapy    #4 declines hearing test    #5 follow up ophthalmology     #6 check with pharmacy about shingrix vaccine availabilty    #7 reviewed lab results     #8 continue asa daily     #9 declines nutrition and weight management program understanding obesity increases risk for diabetes and heart disease    #10 continue CPAP for sleep apnea    #11 check blood pressure regularly and record    #12 no changes  necessary current medication regimen    #13 follow-up 6 months

## 2020-02-25 NOTE — LETTER
[employer] [facility]  Lavell Tadeo M.D.  [address]  [place] [address]  Fax: [phone]   Authorization for Release/Disclosure of   Protected Health Information   Name: SALO STAHL : 1940 SSN: [id number]   Address: [address] [place] [address] [address]  [place] [address] Phone:    [phone] (home)    I authorize the entity listed below to release/disclose the PHI below to:   [facility]/Lavell Tadeo M.D. and Lavell Tadeo M.D.   Provider or Entity Name:     Address   City, State, Zip   Phone:      Fax:     Reason for request: continuity of care   Information to be released:    [  ] LAST COLONOSCOPY,  including any PATH REPORT and follow-up  [  ] LAST FIT/COLOGUARD RESULT [  ] LAST DEXA  [  ] LAST MAMMOGRAM  [  ] LAST PAP  [  ] LAST LABS [  ] RETINA EXAM REPORT  [  ] IMMUNIZATION RECORDS  [ x ] Release all info      [  ] Check here and initial the line next to each item to release ALL health information INCLUDING  _____ Care and treatment for drug and / or alcohol abuse  _____ HIV testing, infection status, or AIDS  _____ Genetic Testing    DATES OF SERVICE OR TIME PERIOD TO BE DISCLOSED: _____________  I understand and acknowledge that:  * This Authorization may be revoked at any time by you in writing, except if your health information has already been used or disclosed.  * Your health information that will be used or disclosed as a result of you signing this authorization could be re-disclosed by the recipient. If this occurs, your re-disclosed health information may no longer be protected by State or Federal laws.  * You may refuse to sign this Authorization. Your refusal will not affect your ability to obtain treatment.  * This Authorization becomes effective upon signing and will  on (date) __________.      If no date is indicated, this Authorization will  one (1) year from the signature date.    Name: Salo Stahl    Signature: Provide Patient Care   Date:     2/25/2020       PLEASE FAX REQUESTED RECORDS BACK TO: [phone]

## 2020-02-28 PROBLEM — C44.92 SQUAMOUS CELL CARCINOMA OF SKIN: Status: ACTIVE | Noted: 2020-02-28

## 2020-03-11 ENCOUNTER — PATIENT OUTREACH (OUTPATIENT)
Dept: HEALTH INFORMATION MANAGEMENT | Facility: OTHER | Age: 80
End: 2020-03-11

## 2020-03-11 NOTE — PROGRESS NOTES
Called and wish the patient a happy birthday. No answer, left message. If the patient calls back, please transfer to [phone].

## 2020-05-12 ENCOUNTER — APPOINTMENT (RX ONLY)
Dept: URBAN - METROPOLITAN AREA CLINIC 4 | Facility: CLINIC | Age: 80
Setting detail: DERMATOLOGY
End: 2020-05-12

## 2020-05-12 PROBLEM — C44.222 SQUAMOUS CELL CARCINOMA OF SKIN OF RIGHT EAR AND EXTERNAL AURICULAR CANAL: Status: ACTIVE | Noted: 2020-05-12

## 2020-05-12 PROCEDURE — ? MOHS SURGERY PHONE CONSULTATION

## 2020-05-12 NOTE — PROCEDURE: MOHS SURGERY PHONE CONSULTATION
Does The Patient Have An Artificial Heart Valve?: No
Has The Pathology Report Been Received?: Yes
Detail Level: Simple
Date Of Mohs Surgery: 05/26/2020
Patient Reported Location: Right Superior Posterior Helix
Pathology Accession #: [id number]
Office Location Of Mohs Surgery: [facility]
Which Antibiotic Do They Take For Surgical Prophylaxis?: Amoxicillin (2 grams)
Time Of Mohs Surgery: 11:40a

## 2020-05-26 ENCOUNTER — APPOINTMENT (RX ONLY)
Dept: URBAN - METROPOLITAN AREA CLINIC 36 | Facility: CLINIC | Age: 80
Setting detail: DERMATOLOGY
End: 2020-05-26

## 2020-05-26 PROBLEM — C44.222 SQUAMOUS CELL CARCINOMA OF SKIN OF RIGHT EAR AND EXTERNAL AURICULAR CANAL: Status: ACTIVE | Noted: 2020-05-26

## 2020-05-26 PROCEDURE — 14060 TIS TRNFR E/N/E/L 10 SQ CM/<: CPT

## 2020-05-26 PROCEDURE — 17311 MOHS 1 STAGE H/N/HF/G: CPT

## 2020-05-26 PROCEDURE — ? MOHS SURGERY

## 2020-05-26 PROCEDURE — 17312 MOHS ADDL STAGE: CPT

## 2020-05-26 NOTE — PROCEDURE: MOHS SURGERY
Mohs Case Number: 
Previous Accession (Optional): [id number]
Biopsy Photograph Reviewed: Yes
Referring Physician (Optional): Dr. Joe
Consent Type: Consent 1 (Standard)
Eye Shield Used: No
Surgeon Performing Repair (Optional): Jennifer
Initial Size Of Lesion: 1
X Size Of Lesion In Cm (Optional): 0.9
Number Of Stages: 2
Primary Defect Length In Cm (Final Defect Size - Required For Flaps/Grafts): 1.4
Primary Defect Width In Cm (Final Defect Size - Required For Flaps/Grafts): 1.2
Repair Type: Flap
Oculoplastic Surgeon (A): Titi
Oculoplastic Surgeon Procedure Text (A): After obtaining clear surgical margins the patient was sent to oculoplastics for surgical repair.  The patient understands they will receive post-surgical care and follow-up from the referring physician's office.
Otolaryngologist Procedure Text (A): After obtaining clear surgical margins the patient was sent to otolaryngology for surgical repair.  The patient understands they will receive post-surgical care and follow-up from the referring physician's office.
Plastic Surgeon Procedure Text (A): After obtaining clear surgical margins the patient was sent to plastics for surgical repair.  The patient understands they will receive post-surgical care and follow-up from the referring physician's office.
Mid-Level Procedure Text (A): After obtaining clear surgical margins the patient was sent to a mid-level provider for surgical repair.  The patient understands they will receive post-surgical care and follow-up from the mid-level provider.
Provider Procedure Text (A): After obtaining clear surgical margins the defect was repaired by another provider.
Asc Procedure Text (A): After obtaining clear surgical margins the patient was sent to an ASC for surgical repair.  The patient understands they will receive post-surgical care and follow-up from the ASC physician.
Suturegard Retention Suture: 2-0 Nylon
Retention Suture Bite Size: 3 mm
Length To Time In Minutes Device Was In Place: 10
Simple / Intermediate / Complex Repair - Final Wound Length In Cm: 0
Undermining Type: Entire Wound
Debridement Text: The wound edges were debrided prior to proceeding with the closure to facilitate wound healing.
Helical Rim Text: The closure involved the helical rim.
Vermilion Border Text: The closure involved the vermilion border.
Nostril Rim Text: The closure involved the nostril rim.
Retention Suture Text: Retention sutures were placed to support the closure and prevent dehiscence.
Flap Type: Chondrocutaneous Helical Advancement Flap
Secondary Defect Length In Cm (Required For Flaps): 3.8
Secondary Defect Width In Cm (Required For Flaps): 1.7
Area H Indication Text: Tumors in this location are included in Area H (eyelids, eyebrows, nose, lips, chin, ear, pre-auricular, post-auricular, temple, genitalia, hands, feet, ankles and areola).  Tissue conservation is critical in these anatomic locations.
Area M Indication Text: Tumors in this location are included in Area M (cheek, forehead, scalp, neck, jawline and pretibial skin).  Mohs surgery is indicated for tumors in these anatomic locations.
Area L Indication Text: Tumors in this location are included in Area L (trunk and extremities).  Mohs surgery is indicated for larger tumors, or tumors with aggressive histologic features, in these anatomic locations.
Special Stains Stage 1 - Results: Base On Clearance Noted Above
Stage 2: Additional Anesthesia Type: 1% lidocaine with 1:100,000 epinephrine and 408mcg clindamycin/ml and a 1:10 solution of 8.4% sodium bicarbonate
Stage 4: Additional Anesthesia Type: 1% lidocaine with epinephrine
Include Tumor Staging In Mohs Note?: Please Select the Appropriate Response
Staging Info: By selecting yes to the question above you will include information on AJCC 8 tumor staging in your Mohs note. Information on tumor staging will be automatically added for SCCs on the head and neck. AJCC 8 includes tumor size, tumor depth, perineural involvement and bone invasion.
Tumor Depth: Less than 6mm from granular layer and no invasion beyond the subcutaneous fat
Medical Necessity Statement: Based on my medical judgement, Mohs surgery is the most appropriate treatment for this cancer compared to other treatments.
Alternatives Discussed Intro (Do Not Add Period): I discussed alternative treatments to Mohs surgery and specifically discussed the risks and benefits of
Consent 1/Introductory Paragraph: The rationale for Mohs was explained to the patient and consent was obtained. The risks, benefits and alternatives to therapy were discussed in detail. Specifically, the risks of infection, scarring, bleeding, prolonged wound healing, incomplete removal, allergy to anesthesia, nerve injury and recurrence were addressed. Prior to the procedure, the treatment site was clearly identified and confirmed by the patient. All components of Universal Protocol/PAUSE Rule completed.
Consent 2/Introductory Paragraph: Mohs surgery was explained to the patient and consent was obtained. The risks, benefits and alternatives to therapy were discussed in detail. Specifically, the risks of infection, scarring, bleeding, prolonged wound healing, incomplete removal, allergy to anesthesia, nerve injury and recurrence were addressed. Prior to the procedure, the treatment site was clearly identified and confirmed by the patient. All components of Universal Protocol/PAUSE Rule completed.
Consent 3/Introductory Paragraph: I gave the patient a chance to ask questions they had about the procedure.  Following this I explained the Mohs procedure and consent was obtained. The risks, benefits and alternatives to therapy were discussed in detail. Specifically, the risks of infection, scarring, bleeding, prolonged wound healing, incomplete removal, allergy to anesthesia, nerve injury and recurrence were addressed. Prior to the procedure, the treatment site was clearly identified and confirmed by the patient. All components of Universal Protocol/PAUSE Rule completed.
Consent (Temporal Branch)/Introductory Paragraph: The rationale for Mohs was explained to the patient and consent was obtained. The risks, benefits and alternatives to therapy were discussed in detail. Specifically, the risks of damage to the temporal branch of the facial nerve, infection, scarring, bleeding, prolonged wound healing, incomplete removal, allergy to anesthesia, and recurrence were addressed. Prior to the procedure, the treatment site was clearly identified and confirmed by the patient. All components of Universal Protocol/PAUSE Rule completed.
Consent (Marginal Mandibular)/Introductory Paragraph: The rationale for Mohs was explained to the patient and consent was obtained. The risks, benefits and alternatives to therapy were discussed in detail. Specifically, the risks of damage to the marginal mandibular branch of the facial nerve, infection, scarring, bleeding, prolonged wound healing, incomplete removal, allergy to anesthesia, and recurrence were addressed. Prior to the procedure, the treatment site was clearly identified and confirmed by the patient. All components of Universal Protocol/PAUSE Rule completed.
Consent (Spinal Accessory)/Introductory Paragraph: The rationale for Mohs was explained to the patient and consent was obtained. The risks, benefits and alternatives to therapy were discussed in detail. Specifically, the risks of damage to the spinal accessory nerve, infection, scarring, bleeding, prolonged wound healing, incomplete removal, allergy to anesthesia, and recurrence were addressed. Prior to the procedure, the treatment site was clearly identified and confirmed by the patient. All components of Universal Protocol/PAUSE Rule completed.
Consent (Near Eyelid Margin)/Introductory Paragraph: The rationale for Mohs was explained to the patient and consent was obtained. The risks, benefits and alternatives to therapy were discussed in detail. Specifically, the risks of ectropion or eyelid deformity, infection, scarring, bleeding, prolonged wound healing, incomplete removal, allergy to anesthesia, nerve injury and recurrence were addressed. Prior to the procedure, the treatment site was clearly identified and confirmed by the patient. All components of Universal Protocol/PAUSE Rule completed.
Consent (Ear)/Introductory Paragraph: The rationale for Mohs was explained to the patient and consent was obtained. The risks, benefits and alternatives to therapy were discussed in detail. Specifically, the risks of ear deformity, infection, scarring, bleeding, prolonged wound healing, incomplete removal, allergy to anesthesia, nerve injury and recurrence were addressed. Prior to the procedure, the treatment site was clearly identified and confirmed by the patient. All components of Universal Protocol/PAUSE Rule completed.
Consent (Nose)/Introductory Paragraph: The rationale for Mohs was explained to the patient and consent was obtained. The risks, benefits and alternatives to therapy were discussed in detail. Specifically, the risks of nasal deformity, changes in the flow of air through the nose, infection, scarring, bleeding, prolonged wound healing, incomplete removal, allergy to anesthesia, nerve injury and recurrence were addressed. Prior to the procedure, the treatment site was clearly identified and confirmed by the patient. All components of Universal Protocol/PAUSE Rule completed.
Consent (Lip)/Introductory Paragraph: The rationale for Mohs was explained to the patient and consent was obtained. The risks, benefits and alternatives to therapy were discussed in detail. Specifically, the risks of lip deformity, changes in the oral aperture, infection, scarring, bleeding, prolonged wound healing, incomplete removal, allergy to anesthesia, nerve injury and recurrence were addressed. Prior to the procedure, the treatment site was clearly identified and confirmed by the patient. All components of Universal Protocol/PAUSE Rule completed.
Consent (Scalp)/Introductory Paragraph: The rationale for Mohs was explained to the patient and consent was obtained. The risks, benefits and alternatives to therapy were discussed in detail. Specifically, the risks of changes in hair growth pattern secondary to repair, infection, scarring, bleeding, prolonged wound healing, incomplete removal, allergy to anesthesia, nerve injury and recurrence were addressed. Prior to the procedure, the treatment site was clearly identified and confirmed by the patient. All components of Universal Protocol/PAUSE Rule completed.
Detail Level: Detailed
Postop Diagnosis: same
Anesthesia Type: 0.5% lidocaine with 1:200,000 epinephrine and a 1:10 solution of 8.4% sodium bicarbonate and 408mcg clindamycin/ml
Anesthesia Volume In Cc: 3
Additional Anesthesia Volume In Cc: 6
Hemostasis: Electrocautery
Estimated Blood Loss (Cc): less than 5 cc
Repair Anesthesia Method: local infiltration
Deep Sutures: 5-0 Vicryl
Epidermal Sutures: 5-0 Caprosyn
Epidermal Closure: running cuticular
Additional Epidermal Closure (Optional): simple interrupted
Suturegard Intro: Intraoperative tissue expansion was performed, utilizing the SUTUREGARD device, in order to reduce wound tension.
Suturegard Body: The suture ends were repeatedly re-tightened and re-clamped to achieve the desired tissue expansion.
Donor Site Anesthesia Type: same as repair anesthesia
Graft Basting Suture (Optional): 5-0 Fast Absorbing Gut
Graft Donor Site Epidermal Sutures (Optional): 5-0 Ethibond
Graft Donor Site Bandage (Optional-Leave Blank If You Don't Want In Note): Aquaphor and telefa placed on wound. Pressure dressing applied to donor site
Closure 2 Information: This tab is for additional flaps and grafts, including complex repair and grafts and complex repair and flaps. You can also specify a different location for the additional defect, if the location is the same you do not need to select a new one. We will insert the automated text for the repair you select below just as we do for solitary flaps and grafts. Please note that at this time if you select a location with a different insurance zone you will need to override the ICD10 and CPT if appropriate.
Closure 3 Information: This tab is for additional flaps and grafts above and beyond our usual structured repairs.  Please note if you enter information here it will not currently bill and you will need to add the billing information manually.
Wound Care: Aquaphor
Dressing: dry sterile dressing
Wound Care (No Sutures): Petrolatum
Suture Removal: 7 days
Unna Boot Text: An Unna boot was placed to help immobilize the limb and facilitate more rapid healing.
Home Suture Removal Text: Patient was provided instructions on removing sutures and will remove their sutures at home.  If they have any questions or difficulties they will call the office.
Post-Care Instructions: I reviewed with the patient in detail post-care instructions. Patient is not to engage in any heavy lifting, exercise, or swimming for the next 14 days. Should the patient develop any fevers, chills, bleeding, severe pain patient will contact the office immediately.
Pain Refusal Text: I offered to prescribe pain medication but the patient refused to take this medication.
Mauc Instructions: By selecting yes to the question below the MAUC number will be added into the note.  This will be calculated automatically based on the diagnosis chosen, the size entered, the body zone selected (H,M,L) and the specific indications you chose. You will also have the option to override the Mohs AUC if you disagree with the automatically calculated number and this option is found in the Case Summary tab.
Where Do You Want The Question To Include Opioid Counseling Located?: Case Summary Tab
Eye Protection Verbiage: Before proceeding with the stage, a plastic scleral shield was inserted. The globe was anesthetized with a few drops of 1% lidocaine with 1:100,000 epinephrine. Then, an appropriate sized scleral shield was chosen and coated with lacrilube ointment. The shield was gently inserted and left in place for the duration of each stage. After the stage was completed, the shield was gently removed.
Mohs Method Verbiage: An incision at a 45 degree angle following the standard Mohs approach was done and the specimen was harvested as a microscopic controlled layer.
Surgeon/Pathologist Verbiage (Will Incorporate Name Of Surgeon From Intro If Not Blank): operated in two distinct and integrated capacities as the surgeon and pathologist.
Mohs Histo Method Verbiage: Each section was then chromacoded and processed in the Mohs lab using the Mohs protocol and submitted for frozen section.
Subsequent Stages Histo Method Verbiage: Using a similar technique to that described above, a thin layer of tissue was removed from all areas where tumor was visible on the previous stage.  The tissue was again oriented, mapped, dyed, and processed as above.
Mohs Rapid Report Verbiage: The area of clinically evident tumor was marked with skin marking ink and appropriately hatched.  The initial incision was made following the Mohs approach through the skin.  The specimen was taken to the lab, divided into the necessary number of pieces, chromacoded and processed according to the Mohs protocol.  This was repeated in successive stages until a tumor free defect was achieved.
Complex Repair Preamble Text (Leave Blank If You Do Not Want): Extensive wide undermining was performed at least 2 cm in all directions.
Intermediate Repair Preamble Text (Leave Blank If You Do Not Want): Undermining was performed with blunt dissection.
M-Plasty Complex Repair Preamble Text (Leave Blank If You Do Not Want): Extensive wide undermining was performed.
Non-Graft Cartilage Fenestration Text: The cartilage was fenestrated with a 2mm punch biopsy to help facilitate healing.
Graft Cartilage Fenestration Text: The cartilage was fenestrated with a 2mm punch biopsy to help facilitate graft survival and healing.
Secondary Intention Text (Leave Blank If You Do Not Want): The defect will heal with secondary intention.
No Repair - Repaired With Adjacent Surgical Defect Text (Leave Blank If You Do Not Want): After obtaining clear surgical margins the defect was repaired concurrently with another surgical defect which was in close approximation.
Advancement Flap (Single) Text: The defect edges were debeveled with a #15 scalpel blade.  Given the location of the defect and the proximity to free margins a single advancement flap was deemed most appropriate.  Using a sterile surgical marker, an appropriate advancement flap was drawn incorporating the defect and placing the expected incisions within the relaxed skin tension lines where possible.    The area thus outlined was incised deep to adipose tissue with a #15 scalpel blade.  The skin margins were undermined to an appropriate distance in all directions utilizing iris scissors.
Advancement Flap (Double) Text: The defect edges were debeveled with a #15 scalpel blade.  Given the location of the defect and the proximity to free margins a double advancement flap was deemed most appropriate.  Using a sterile surgical marker, the appropriate advancement flaps were drawn incorporating the defect and placing the expected incisions within the relaxed skin tension lines where possible.    The area thus outlined was incised deep to adipose tissue with a #15 scalpel blade.  The skin margins were undermined to an appropriate distance in all directions utilizing iris scissors.
Burow's Advancement Flap Text: The defect edges were debeveled with a #15 scalpel blade.  Given the location of the defect and the proximity to free margins a Burow's advancement flap was deemed most appropriate.  Using a sterile surgical marker, the appropriate advancement flap was drawn incorporating the defect and placing the expected incisions within the relaxed skin tension lines where possible.    The area thus outlined was incised deep to adipose tissue with a #15 scalpel blade.  The skin margins were undermined to an appropriate distance in all directions utilizing iris scissors.
Chonodrocutaneous Helical Advancement Flap Text: The defect edges were debeveled with a #15 scalpel blade.  Given the location of the defect and the proximity to free margins a chondrocutaneous helical advancement flap was deemed most appropriate.  Using a sterile surgical marker, the appropriate advancement flap was drawn incorporating the defect and placing the expected incisions within the relaxed skin tension lines where possible.    The area thus outlined was incised deep to adipose tissue with a #15 scalpel blade.  The skin margins were undermined to an appropriate distance in all directions utilizing iris scissors.
Crescentic Advancement Flap Text: The defect edges were debeveled with a #15 scalpel blade.  Given the location of the defect and the proximity to free margins a crescentic advancement flap was deemed most appropriate.  Using a sterile surgical marker, the appropriate advancement flap was drawn incorporating the defect and placing the expected incisions within the relaxed skin tension lines where possible.    The area thus outlined was incised deep to adipose tissue with a #15 scalpel blade.  The skin margins were undermined to an appropriate distance in all directions utilizing iris scissors.
A-T Advancement Flap Text: The defect edges were debeveled with a #15 scalpel blade.  Given the location of the defect, shape of the defect and the proximity to free margins an A-T advancement flap was deemed most appropriate.  Using a sterile surgical marker, an appropriate advancement flap was drawn incorporating the defect and placing the expected incisions within the relaxed skin tension lines where possible.    The area thus outlined was incised deep to adipose tissue with a #15 scalpel blade.  The skin margins were undermined to an appropriate distance in all directions utilizing iris scissors.
O-T Advancement Flap Text: The defect edges were debeveled with a #15 scalpel blade.  Given the location of the defect, shape of the defect and the proximity to free margins an O-T advancement flap was deemed most appropriate.  Using a sterile surgical marker, an appropriate advancement flap was drawn incorporating the defect and placing the expected incisions within the relaxed skin tension lines where possible.    The area thus outlined was incised deep to adipose tissue with a #15 scalpel blade.  The skin margins were undermined to an appropriate distance in all directions utilizing iris scissors.
O-L Flap Text: The defect edges were debeveled with a #15 scalpel blade.  Given the location of the defect, shape of the defect and the proximity to free margins an O-L flap was deemed most appropriate.  Using a sterile surgical marker, an appropriate advancement flap was drawn incorporating the defect and placing the expected incisions within the relaxed skin tension lines where possible.    The area thus outlined was incised deep to adipose tissue with a #15 scalpel blade.  The skin margins were undermined to an appropriate distance in all directions utilizing iris scissors.
O-Z Flap Text: The defect edges were debeveled with a #15 scalpel blade.  Given the location of the defect, shape of the defect and the proximity to free margins an O-Z flap was deemed most appropriate.  Using a sterile surgical marker, an appropriate transposition flap was drawn incorporating the defect and placing the expected incisions within the relaxed skin tension lines where possible. The area thus outlined was incised deep to adipose tissue with a #15 scalpel blade.  The skin margins were undermined to an appropriate distance in all directions utilizing iris scissors.
Double O-Z Flap Text: The defect edges were debeveled with a #15 scalpel blade.  Given the location of the defect, shape of the defect and the proximity to free margins a Double O-Z flap was deemed most appropriate.  Using a sterile surgical marker, an appropriate transposition flap was drawn incorporating the defect and placing the expected incisions within the relaxed skin tension lines where possible. The area thus outlined was incised deep to adipose tissue with a #15 scalpel blade.  The skin margins were undermined to an appropriate distance in all directions utilizing iris scissors.
V-Y Flap Text: The defect edges were debeveled with a #15 scalpel blade.  Given the location of the defect, shape of the defect and the proximity to free margins a V-Y flap was deemed most appropriate.  Using a sterile surgical marker, an appropriate advancement flap was drawn incorporating the defect and placing the expected incisions within the relaxed skin tension lines where possible.    The area thus outlined was incised deep to adipose tissue with a #15 scalpel blade.  The skin margins were undermined to an appropriate distance in all directions utilizing iris scissors.
Advancement-Rotation Flap Text: The defect edges were debeveled with a #15 scalpel blade.  Given the location of the defect, shape of the defect and the proximity to free margins an advancement-rotation flap was deemed most appropriate.  Using a sterile surgical marker, an appropriate flap was drawn incorporating the defect and placing the expected incisions within the relaxed skin tension lines where possible. The area thus outlined was incised deep to adipose tissue with a #15 scalpel blade.  The skin margins were undermined to an appropriate distance in all directions utilizing iris scissors.
Mercedes Flap Text: The defect edges were debeveled with a #15 scalpel blade.  Given the location of the defect, shape of the defect and the proximity to free margins a Mercedes flap was deemed most appropriate.  Using a sterile surgical marker, an appropriate advancement flap was drawn incorporating the defect and placing the expected incisions within the relaxed skin tension lines where possible. The area thus outlined was incised deep to adipose tissue with a #15 scalpel blade.  The skin margins were undermined to an appropriate distance in all directions utilizing iris scissors.
Modified Advancement Flap Text: The defect edges were debeveled with a #15 scalpel blade.  Given the location of the defect, shape of the defect and the proximity to free margins a modified advancement flap was deemed most appropriate.  Using a sterile surgical marker, an appropriate advancement flap was drawn incorporating the defect and placing the expected incisions within the relaxed skin tension lines where possible.    The area thus outlined was incised deep to adipose tissue with a #15 scalpel blade.  The skin margins were undermined to an appropriate distance in all directions utilizing iris scissors.
Mucosal Advancement Flap Text: Given the location of the defect, shape of the defect and the proximity to free margins a mucosal advancement flap was deemed most appropriate. Incisions were made with a 15 blade scalpel in the appropriate fashion along the cutaneous vermilion border and the mucosal lip. The remaining actinically damaged mucosal tissue was excised.  The mucosal advancement flap was then elevated to the gingival sulcus with care taken to preserve the neurovascular structures and advanced into the primary defect. Care was taken to ensure that precise realignment of the vermilion border was achieved.
Peng Advancement Flap Text: The defect edges were debeveled with a #15 scalpel blade.  Given the location of the defect, shape of the defect and the proximity to free margins a Peng advancement flap was deemed most appropriate.  Using a sterile surgical marker, an appropriate advancement flap was drawn incorporating the defect and placing the expected incisions within the relaxed skin tension lines where possible. The area thus outlined was incised deep to adipose tissue with a #15 scalpel blade.  The skin margins were undermined to an appropriate distance in all directions utilizing iris scissors.
Hatchet Flap Text: The defect edges were debeveled with a #15 scalpel blade.  Given the location of the defect, shape of the defect and the proximity to free margins a hatchet flap based from the glabella was deemed most appropriate.  Using a sterile surgical marker, an appropriate glabellar hatchet flap was drawn incorporating the defect and placing the expected incisions within the relaxed skin tension lines where possible.    The area thus outlined was incised deep to adipose tissue with a #15 scalpel blade.  The skin margins were undermined to an appropriate distance in all directions utilizing iris scissors.
Rotation Flap Text: The defect edges were debeveled with a #15 scalpel blade.  Given the location of the defect, shape of the defect and the proximity to free margins a rotation flap was deemed most appropriate.  Using a sterile surgical marker, an appropriate rotation flap was drawn incorporating the defect and placing the expected incisions within the relaxed skin tension lines where possible.    The area thus outlined was incised deep to adipose tissue with a #15 scalpel blade.  The skin margins were undermined to an appropriate distance in all directions utilizing iris scissors.
Spiral Flap Text: The defect edges were debeveled with a #15 scalpel blade.  Given the location of the defect, shape of the defect and the proximity to free margins a spiral flap was deemed most appropriate.  Using a sterile surgical marker, an appropriate rotation flap was drawn incorporating the defect and placing the expected incisions within the relaxed skin tension lines where possible. The area thus outlined was incised deep to adipose tissue with a #15 scalpel blade.  The skin margins were undermined to an appropriate distance in all directions utilizing iris scissors.
Star Wedge Flap Text: The defect edges were debeveled with a #15 scalpel blade.  Given the location of the defect, shape of the defect and the proximity to free margins a star wedge flap was deemed most appropriate.  Using a sterile surgical marker, an appropriate rotation flap was drawn incorporating the defect and placing the expected incisions within the relaxed skin tension lines where possible. The area thus outlined was incised deep to adipose tissue with a #15 scalpel blade.  The skin margins were undermined to an appropriate distance in all directions utilizing iris scissors.
Transposition Flap Text: The defect edges were debeveled with a #15 scalpel blade.  Given the location of the defect and the proximity to free margins a transposition flap was deemed most appropriate.  Using a sterile surgical marker, an appropriate transposition flap was drawn incorporating the defect.    The area thus outlined was incised deep to adipose tissue with a #15 scalpel blade.  The skin margins were undermined to an appropriate distance in all directions utilizing iris scissors.
Muscle Hinge Flap Text: The defect edges were debeveled with a #15 scalpel blade.  Given the size, depth and location of the defect and the proximity to free margins a muscle hinge flap was deemed most appropriate.  Using a sterile surgical marker, an appropriate hinge flap was drawn incorporating the defect. The area thus outlined was incised with a #15 scalpel blade.  The skin margins were undermined to an appropriate distance in all directions utilizing iris scissors.
Melolabial Transposition Flap Text: The defect edges were debeveled with a #15 scalpel blade.  Given the location of the defect and the proximity to free margins a melolabial flap was deemed most appropriate.  Using a sterile surgical marker, an appropriate melolabial transposition flap was drawn incorporating the defect.    The area thus outlined was incised deep to adipose tissue with a #15 scalpel blade.  The skin margins were undermined to an appropriate distance in all directions utilizing iris scissors.
Rhombic Flap Text: The defect edges were debeveled with a #15 scalpel blade.  Given the location of the defect and the proximity to free margins a rhombic flap was deemed most appropriate.  Using a sterile surgical marker, an appropriate rhombic flap was drawn incorporating the defect.    The area thus outlined was incised deep to adipose tissue with a #15 scalpel blade.  The skin margins were undermined to an appropriate distance in all directions utilizing iris scissors.
Rhomboid Transposition Flap Text: The defect edges were debeveled with a #15 scalpel blade.  Given the location of the defect and the proximity to free margins a rhomboid transposition flap was deemed most appropriate.  Using a sterile surgical marker, an appropriate rhomboid flap was drawn incorporating the defect.    The area thus outlined was incised deep to adipose tissue with a #15 scalpel blade.  The skin margins were undermined to an appropriate distance in all directions utilizing iris scissors.
Bi-Rhombic Flap Text: The defect edges were debeveled with a #15 scalpel blade.  Given the location of the defect and the proximity to free margins a bi-rhombic flap was deemed most appropriate.  Using a sterile surgical marker, an appropriate rhombic flap was drawn incorporating the defect. The area thus outlined was incised deep to adipose tissue with a #15 scalpel blade.  The skin margins were undermined to an appropriate distance in all directions utilizing iris scissors.
Helical Rim Advancement Flap Text: The defect edges were debeveled with a #15 blade scalpel.  Given the location of the defect and the proximity to free margins (helical rim) a double helical rim advancement flap was deemed most appropriate.  Using a sterile surgical marker, the appropriate advancement flaps were drawn incorporating the defect and placing the expected incisions between the helical rim and antihelix where possible.  The area thus outlined was incised through and through with a #15 scalpel blade.  With a skin hook and iris scissors, the flaps were gently and sharply undermined and freed up.
Bilateral Helical Rim Advancement Flap Text: The defect edges were debeveled with a #15 blade scalpel.  Given the location of the defect and the proximity to free margins (helical rim) a bilateral helical rim advancement flap was deemed most appropriate.  Using a sterile surgical marker, the appropriate advancement flaps were drawn incorporating the defect and placing the expected incisions between the helical rim and antihelix where possible.  The area thus outlined was incised through and through with a #15 scalpel blade.  With a skin hook and iris scissors, the flaps were gently and sharply undermined and freed up.
Ear Star Wedge Flap Text: The defect edges were debeveled with a #15 blade scalpel.  Given the location of the defect and the proximity to free margins (helical rim) an ear star wedge flap was deemed most appropriate.  Using a sterile surgical marker, the appropriate flap was drawn incorporating the defect and placing the expected incisions between the helical rim and antihelix where possible.  The area thus outlined was incised through and through with a #15 scalpel blade.
Banner Transposition Flap Text: The defect edges were debeveled with a #15 scalpel blade.  Given the location of the defect and the proximity to free margins a Banner transposition flap was deemed most appropriate.  Using a sterile surgical marker, an appropriate flap drawn around the defect. The area thus outlined was incised deep to adipose tissue with a #15 scalpel blade.  The skin margins were undermined to an appropriate distance in all directions utilizing iris scissors.
Bilobed Flap Text: The defect edges were debeveled with a #15 scalpel blade.  Given the location of the defect and the proximity to free margins a bilobe flap was deemed most appropriate.  Using a sterile surgical marker, an appropriate bilobe flap drawn around the defect.    The area thus outlined was incised deep to adipose tissue with a #15 scalpel blade.  The skin margins were undermined to an appropriate distance in all directions utilizing iris scissors.
Bilobed Transposition Flap Text: The defect edges were debeveled with a #15 scalpel blade.  Given the location of the defect and the proximity to free margins a bilobed transposition flap was deemed most appropriate.  Using a sterile surgical marker, an appropriate bilobe flap drawn around the defect.    The area thus outlined was incised deep to adipose tissue with a #15 scalpel blade.  The skin margins were undermined to an appropriate distance in all directions utilizing iris scissors.
Trilobed Flap Text: The defect edges were debeveled with a #15 scalpel blade.  Given the location of the defect and the proximity to free margins a trilobed flap was deemed most appropriate.  Using a sterile surgical marker, an appropriate trilobed flap drawn around the defect.    The area thus outlined was incised deep to adipose tissue with a #15 scalpel blade.  The skin margins were undermined to an appropriate distance in all directions utilizing iris scissors.
Dorsal Nasal Flap Text: The defect edges were debeveled with a #15 scalpel blade.  Given the location of the defect and the proximity to free margins a dorsal nasal flap,based upon the glabellar folds, was deemed most appropriate.  Using a sterile surgical marker, an appropriate dorsal nasal flap was drawn around the defect.    The area thus outlined was incised deep to adipose tissue with a #15 scalpel blade.  The skin margins were undermined to an appropriate distance in all directions utilizing iris scissors.
Island Pedicle Flap Text: The defect edges were debeveled with a #15 scalpel blade.  Given the location of the defect, shape of the defect and the proximity to free margins an island pedicle advancement flap was deemed most appropriate.  Using a sterile surgical marker, an appropriate advancement flap was drawn incorporating the defect, outlining the appropriate donor tissue and placing the expected incisions within the relaxed skin tension lines where possible.    The area thus outlined was incised deep to adipose tissue with a #15 scalpel blade.  The skin margins were undermined to an appropriate distance in all directions around the primary defect and laterally outward around the island pedicle utilizing iris scissors.  There was minimal undermining beneath the pedicle flap.
Island Pedicle Flap With Canthal Suspension Text: The defect edges were debeveled with a #15 scalpel blade.  Given the location of the defect, shape of the defect and the proximity to free margins an island pedicle advancement flap was deemed most appropriate.  Using a sterile surgical marker, an appropriate advancement flap was drawn incorporating the defect, outlining the appropriate donor tissue and placing the expected incisions within the relaxed skin tension lines where possible. The area thus outlined was incised deep to adipose tissue with a #15 scalpel blade.  The skin margins were undermined to an appropriate distance in all directions around the primary defect and laterally outward around the island pedicle utilizing iris scissors.  There was minimal undermining beneath the pedicle flap. A suspension suture was placed in the canthal tendon to prevent tension and prevent ectropion.
Alar Island Pedicle Flap Text: The defect edges were debeveled with a #15 scalpel blade.  Given the location of the defect, shape of the defect and the proximity to the alar rim an island pedicle advancement flap was deemed most appropriate.  Using a sterile surgical marker, an appropriate advancement flap was drawn incorporating the defect, outlining the appropriate donor tissue and placing the expected incisions within the nasal ala running parallel to the alar rim. The area thus outlined was incised with a #15 scalpel blade.  The skin margins were undermined minimally to an appropriate distance in all directions around the primary defect and laterally outward around the island pedicle utilizing iris scissors.  There was minimal undermining beneath the pedicle flap.
Double Island Pedicle Flap Text: The defect edges were debeveled with a #15 scalpel blade.  Given the location of the defect, shape of the defect and the proximity to free margins a double island pedicle advancement flap was deemed most appropriate.  Using a sterile surgical marker, an appropriate advancement flap was drawn incorporating the defect, outlining the appropriate donor tissue and placing the expected incisions within the relaxed skin tension lines where possible.    The area thus outlined was incised deep to adipose tissue with a #15 scalpel blade.  The skin margins were undermined to an appropriate distance in all directions around the primary defect and laterally outward around the island pedicle utilizing iris scissors.  There was minimal undermining beneath the pedicle flap.
Island Pedicle Flap-Requiring Vessel Identification Text: The defect edges were debeveled with a #15 scalpel blade.  Given the location of the defect, shape of the defect and the proximity to free margins an island pedicle advancement flap was deemed most appropriate.  Using a sterile surgical marker, an appropriate advancement flap was drawn, based on the axial vessel mentioned above, incorporating the defect, outlining the appropriate donor tissue and placing the expected incisions within the relaxed skin tension lines where possible.    The area thus outlined was incised deep to adipose tissue with a #15 scalpel blade.  The skin margins were undermined to an appropriate distance in all directions around the primary defect and laterally outward around the island pedicle utilizing iris scissors.  There was minimal undermining beneath the pedicle flap.
Keystone Flap Text: The defect edges were debeveled with a #15 scalpel blade.  Given the location of the defect, shape of the defect a keystone flap was deemed most appropriate.  Using a sterile surgical marker, an appropriate keystone flap was drawn incorporating the defect, outlining the appropriate donor tissue and placing the expected incisions within the relaxed skin tension lines where possible. The area thus outlined was incised deep to adipose tissue with a #15 scalpel blade.  The skin margins were undermined to an appropriate distance in all directions around the primary defect and laterally outward around the flap utilizing iris scissors.
O-T Plasty Text: The defect edges were debeveled with a #15 scalpel blade.  Given the location of the defect, shape of the defect and the proximity to free margins an O-T plasty was deemed most appropriate.  Using a sterile surgical marker, an appropriate O-T plasty was drawn incorporating the defect and placing the expected incisions within the relaxed skin tension lines where possible.    The area thus outlined was incised deep to adipose tissue with a #15 scalpel blade.  The skin margins were undermined to an appropriate distance in all directions utilizing iris scissors.
O-Z Plasty Text: The defect edges were debeveled with a #15 scalpel blade.  Given the location of the defect, shape of the defect and the proximity to free margins an O-Z plasty (double transposition flap) was deemed most appropriate.  Using a sterile surgical marker, the appropriate transposition flaps were drawn incorporating the defect and placing the expected incisions within the relaxed skin tension lines where possible.    The area thus outlined was incised deep to adipose tissue with a #15 scalpel blade.  The skin margins were undermined to an appropriate distance in all directions utilizing iris scissors.  Hemostasis was achieved with electrocautery.  The flaps were then transposed into place, one clockwise and the other counterclockwise, and anchored with interrupted buried subcutaneous sutures.
Double O-Z Plasty Text: The defect edges were debeveled with a #15 scalpel blade.  Given the location of the defect, shape of the defect and the proximity to free margins a Double O-Z plasty (double transposition flap) was deemed most appropriate.  Using a sterile surgical marker, the appropriate transposition flaps were drawn incorporating the defect and placing the expected incisions within the relaxed skin tension lines where possible. The area thus outlined was incised deep to adipose tissue with a #15 scalpel blade.  The skin margins were undermined to an appropriate distance in all directions utilizing iris scissors.  Hemostasis was achieved with electrocautery.  The flaps were then transposed into place, one clockwise and the other counterclockwise, and anchored with interrupted buried subcutaneous sutures.
S Plasty Text: Given the location and shape of the defect, and the orientation of relaxed skin tension lines, an S-plasty was deemed most appropriate for repair.  Using a sterile surgical marker, the appropriate outline of the S-plasty was drawn, incorporating the defect and placing the expected incisions within the relaxed skin tension lines where possible.  The area thus outlined was incised deep to adipose tissue with a #15 scalpel blade.  The skin margins were undermined to an appropriate distance in all directions utilizing iris scissors. The skin flaps were advanced over the defect.  The opposing margins were then approximated with interrupted buried subcutaneous sutures.
V-Y Plasty Text: The defect edges were debeveled with a #15 scalpel blade.  Given the location of the defect, shape of the defect and the proximity to free margins an V-Y advancement flap was deemed most appropriate.  Using a sterile surgical marker, an appropriate advancement flap was drawn incorporating the defect and placing the expected incisions within the relaxed skin tension lines where possible.    The area thus outlined was incised deep to adipose tissue with a #15 scalpel blade.  The skin margins were undermined to an appropriate distance in all directions utilizing iris scissors.
H Plasty Text: Given the location of the defect, shape of the defect and the proximity to free margins a H-plasty was deemed most appropriate for repair.  Using a sterile surgical marker, the appropriate advancement arms of the H-plasty were drawn incorporating the defect and placing the expected incisions within the relaxed skin tension lines where possible. The area thus outlined was incised deep to adipose tissue with a #15 scalpel blade. The skin margins were undermined to an appropriate distance in all directions utilizing iris scissors.  The opposing advancement arms were then advanced into place in opposite direction and anchored with interrupted buried subcutaneous sutures.
W Plasty Text: The lesion was extirpated to the level of the fat with a #15 scalpel blade.  Given the location of the defect, shape of the defect and the proximity to free margins a W-plasty was deemed most appropriate for repair.  Using a sterile surgical marker, the appropriate transposition arms of the W-plasty were drawn incorporating the defect and placing the expected incisions within the relaxed skin tension lines where possible.    The area thus outlined was incised deep to adipose tissue with a #15 scalpel blade.  The skin margins were undermined to an appropriate distance in all directions utilizing iris scissors.  The opposing transposition arms were then transposed into place in opposite direction and anchored with interrupted buried subcutaneous sutures.
Z Plasty Text: The lesion was extirpated to the level of the fat with a #15 scalpel blade.  Given the location of the defect, shape of the defect and the proximity to free margins a Z-plasty was deemed most appropriate for repair.  Using a sterile surgical marker, the appropriate transposition arms of the Z-plasty were drawn incorporating the defect and placing the expected incisions within the relaxed skin tension lines where possible.    The area thus outlined was incised deep to adipose tissue with a #15 scalpel blade.  The skin margins were undermined to an appropriate distance in all directions utilizing iris scissors.  The opposing transposition arms were then transposed into place in opposite direction and anchored with interrupted buried subcutaneous sutures.
Cheek Interpolation Flap Text: A decision was made to reconstruct the defect utilizing an interpolation axial flap and a staged reconstruction.  A telfa template was made of the defect.  This telfa template was then used to outline the Cheek Interpolation flap.  The donor area for the pedicle flap was then injected with anesthesia.  The flap was excised through the skin and subcutaneous tissue down to the layer of the underlying musculature.  The interpolation flap was carefully excised within this deep plane to maintain its blood supply.  The edges of the donor site were undermined.   The donor site was closed in a primary fashion.  The pedicle was then rotated into position and sutured.  Once the tube was sutured into place, adequate blood supply was confirmed with blanching and refill.  The pedicle was then wrapped with xeroform gauze and dressed appropriately with a telfa and gauze bandage to ensure continued blood supply and protect the attached pedicle.
Cheek-To-Nose Interpolation Flap Text: A decision was made to reconstruct the defect utilizing an interpolation axial flap and a staged reconstruction.  A telfa template was made of the defect.  This telfa template was then used to outline the Cheek-To-Nose Interpolation flap.  The donor area for the pedicle flap was then injected with anesthesia.  The flap was excised through the skin and subcutaneous tissue down to the layer of the underlying musculature.  The interpolation flap was carefully excised within this deep plane to maintain its blood supply.  The edges of the donor site were undermined.   The donor site was closed in a primary fashion.  The pedicle was then rotated into position and sutured.  Once the tube was sutured into place, adequate blood supply was confirmed with blanching and refill.  The pedicle was then wrapped with xeroform gauze and dressed appropriately with a telfa and gauze bandage to ensure continued blood supply and protect the attached pedicle.
Interpolation Flap Text: A decision was made to reconstruct the defect utilizing an interpolation axial flap and a staged reconstruction.  A telfa template was made of the defect.  This telfa template was then used to outline the interpolation flap.  The donor area for the pedicle flap was then injected with anesthesia.  The flap was excised through the skin and subcutaneous tissue down to the layer of the underlying musculature.  The interpolation flap was carefully excised within this deep plane to maintain its blood supply.  The edges of the donor site were undermined.   The donor site was closed in a primary fashion.  The pedicle was then rotated into position and sutured.  Once the tube was sutured into place, adequate blood supply was confirmed with blanching and refill.  The pedicle was then wrapped with xeroform gauze and dressed appropriately with a telfa and gauze bandage to ensure continued blood supply and protect the attached pedicle.
Melolabial Interpolation Flap Text: A decision was made to reconstruct the defect utilizing an interpolation axial flap and a staged reconstruction.  A telfa template was made of the defect.  This telfa template was then used to outline the melolabial interpolation flap.  The donor area for the pedicle flap was then injected with anesthesia.  The flap was excised through the skin and subcutaneous tissue down to the layer of the underlying musculature.  The pedicle flap was carefully excised within this deep plane to maintain its blood supply.  The edges of the donor site were undermined.   The donor site was closed in a primary fashion.  The pedicle was then rotated into position and sutured.  Once the tube was sutured into place, adequate blood supply was confirmed with blanching and refill.  The pedicle was then wrapped with xeroform gauze and dressed appropriately with a telfa and gauze bandage to ensure continued blood supply and protect the attached pedicle.
Mastoid Interpolation Flap Text: A decision was made to reconstruct the defect utilizing an interpolation axial flap and a staged reconstruction.  A telfa template was made of the defect.  This telfa template was then used to outline the mastoid interpolation flap.  The donor area for the pedicle flap was then injected with anesthesia.  The flap was excised through the skin and subcutaneous tissue down to the layer of the underlying musculature.  The pedicle flap was carefully excised within this deep plane to maintain its blood supply.  The edges of the donor site were undermined.   The donor site was closed in a primary fashion.  The pedicle was then rotated into position and sutured.  Once the tube was sutured into place, adequate blood supply was confirmed with blanching and refill.  The pedicle was then wrapped with xeroform gauze and dressed appropriately with a telfa and gauze bandage to ensure continued blood supply and protect the attached pedicle.
Posterior Auricular Interpolation Flap Text: A decision was made to reconstruct the defect utilizing an interpolation axial flap and a staged reconstruction.  A telfa template was made of the defect.  This telfa template was then used to outline the posterior auricular interpolation flap.  The donor area for the pedicle flap was then injected with anesthesia.  The flap was excised through the skin and subcutaneous tissue down to the layer of the underlying musculature.  The pedicle flap was carefully excised within this deep plane to maintain its blood supply.  The edges of the donor site were undermined.   The donor site was closed in a primary fashion.  The pedicle was then rotated into position and sutured.  Once the tube was sutured into place, adequate blood supply was confirmed with blanching and refill.  The pedicle was then wrapped with xeroform gauze and dressed appropriately with a telfa and gauze bandage to ensure continued blood supply and protect the attached pedicle.
Paramedian Forehead Flap Text: A decision was made to reconstruct the defect utilizing an interpolation axial flap and a staged reconstruction.  A telfa template was made of the defect.  This telfa template was then used to outline the paramedian forehead pedicle flap.  The donor area for the pedicle flap was then injected with anesthesia.  The flap was excised through the skin and subcutaneous tissue down to the layer of the underlying musculature.  The pedicle flap was carefully excised within this deep plane to maintain its blood supply.  The edges of the donor site were undermined.   The donor site was closed in a primary fashion.  The pedicle was then rotated into position and sutured.  Once the tube was sutured into place, adequate blood supply was confirmed with blanching and refill.  The pedicle was then wrapped with xeroform gauze and dressed appropriately with a telfa and gauze bandage to ensure continued blood supply and protect the attached pedicle.
Cheiloplasty (Less Than 50%) Text: A decision was made to reconstruct the defect with a  cheiloplasty.  The defect was undermined extensively.  Additional obicularis oris muscle was excised with a 15 blade scalpel.  The defect was converted into a full thickness wedge, of less than 50% of the vertical height of the lip, to facilite a better cosmetic result.  Small vessels were then tied off with 5-0 monocyrl. The obicularis oris, superficial fascia, adipose and dermis were then reapproximated.  After the deeper layers were approximated the epidermis was reapproximated with particular care given to realign the vermilion border.
Cheiloplasty (Complex) Text: A decision was made to reconstruct the defect with a  cheiloplasty.  The defect was undermined extensively.  Additional obicularis oris muscle was excised with a 15 blade scalpel.  The defect was converted into a full thickness wedge to facilite a better cosmetic result.  Small vessels were then tied off with 5-0 monocyrl. The obicularis oris, superficial fascia, adipose and dermis were then reapproximated.  After the deeper layers were approximated the epidermis was reapproximated with particular care given to realign the vermilion border.
Ear Wedge Repair Text: A wedge excision was completed by carrying down an excision through the full thickness of the ear and cartilage with an inward facing Burow's triangle. The wound was then closed in a layered fashion.
Full Thickness Lip Wedge Repair (Flap) Text: Given the location of the defect and the proximity to free margins a full thickness wedge repair was deemed most appropriate.  Using a sterile surgical marker, the appropriate repair was drawn incorporating the defect and placing the expected incisions perpendicular to the vermilion border.  The vermilion border was also meticulously outlined to ensure appropriate reapproximation during the repair.  The area thus outlined was incised through and through with a #15 scalpel blade.  The muscularis and dermis were reaproximated with deep sutures following hemostasis. Care was taken to realign the vermilion border before proceeding with the superficial closure.  Once the vermilion was realigned the superfical and mucosal closure was finished.
Ftsg Text: The defect edges were debeveled with a #15 scalpel blade.  Given the location of the defect, shape of the defect and the proximity to free margins a full thickness skin graft was deemed most appropriate.  Using a sterile surgical marker, the primary defect shape was transferred to the donor site. The area thus outlined was incised deep to adipose tissue with a #15 scalpel blade.  The harvested graft was then trimmed of adipose tissue until only dermis and epidermis was left.  The skin margins of the secondary defect were undermined to an appropriate distance in all directions utilizing iris scissors.  The secondary defect was closed with interrupted buried subcutaneous sutures.  The skin edges were then re-apposed with running  sutures.  The skin graft was then placed in the primary defect and oriented appropriately.
Split-Thickness Skin Graft Text: The defect edges were debeveled with a #15 scalpel blade.  Given the location of the defect, shape of the defect and the proximity to free margins a split thickness skin graft was deemed most appropriate.  Using a sterile surgical marker, the primary defect shape was transferred to the donor site. The split thickness graft was then harvested.  The skin graft was then placed in the primary defect and oriented appropriately.
Burow's Graft Text: The defect edges were debeveled with a #15 scalpel blade.  Given the location of the defect, shape of the defect, the proximity to free margins and the presence of a standing cone deformity a Burow's skin graft was deemed most appropriate. The standing cone was removed and this tissue was then trimmed to the shape of the primary defect. The adipose tissue was also removed until only dermis and epidermis were left.  The skin margins of the secondary defect were undermined to an appropriate distance in all directions utilizing iris scissors.  The secondary defect was closed with interrupted buried subcutaneous sutures.  The skin edges were then re-apposed with running  sutures.  The skin graft was then placed in the primary defect and oriented appropriately.
Cartilage Graft Text: The defect edges were debeveled with a #15 scalpel blade.  Given the location of the defect, shape of the defect, the fact the defect involved a full thickness cartilage defect a cartilage graft was deemed most appropriate.  An appropriate donor site was identified, cleansed, and anesthetized. The cartilage graft was then harvested and transferred to the recipient site, oriented appropriately and then sutured into place.  The secondary defect was then repaired using a primary closure.
Composite Graft Text: The defect edges were debeveled with a #15 scalpel blade.  Given the location of the defect, shape of the defect, the proximity to free margins and the fact the defect was full thickness a composite graft was deemed most appropriate.  The defect was outline and then transferred to the donor site.  A full thickness graft was then excised from the donor site. The graft was then placed in the primary defect, oriented appropriately and then sutured into place.  The secondary defect was then repaired using a primary closure.
Epidermal Autograft Text: The defect edges were debeveled with a #15 scalpel blade.  Given the location of the defect, shape of the defect and the proximity to free margins an epidermal autograft was deemed most appropriate.  Using a sterile surgical marker, the primary defect shape was transferred to the donor site. The epidermal graft was then harvested.  The skin graft was then placed in the primary defect and oriented appropriately.
Dermal Autograft Text: The defect edges were debeveled with a #15 scalpel blade.  Given the location of the defect, shape of the defect and the proximity to free margins a dermal autograft was deemed most appropriate.  Using a sterile surgical marker, the primary defect shape was transferred to the donor site. The area thus outlined was incised deep to adipose tissue with a #15 scalpel blade.  The harvested graft was then trimmed of adipose and epidermal tissue until only dermis was left.  The skin graft was then placed in the primary defect and oriented appropriately.
Skin Substitute Text: The defect edges were debeveled with a #15 scalpel blade.  Given the location of the defect, shape of the defect and the proximity to free margins a skin substitute graft was deemed most appropriate.  The graft material was trimmed to fit the size of the defect. The graft was then placed in the primary defect and oriented appropriately.
Tissue Cultured Epidermal Autograft Text: The defect edges were debeveled with a #15 scalpel blade.  Given the location of the defect, shape of the defect and the proximity to free margins a tissue cultured epidermal autograft was deemed most appropriate.  The graft was then trimmed to fit the size of the defect.  The graft was then placed in the primary defect and oriented appropriately.
Xenograft Text: The defect edges were debeveled with a #15 scalpel blade.  Given the location of the defect, shape of the defect and the proximity to free margins a xenograft was deemed most appropriate.  The graft was then trimmed to fit the size of the defect.  The graft was then placed in the primary defect and oriented appropriately.
Purse String (Simple) Text: Given the location of the defect and the characteristics of the surrounding skin a purse string closure was deemed most appropriate.  Undermining was performed circumfirentially around the surgical defect.  A purse string suture was then placed and tightened.
Purse String (Intermediate) Text: Given the location of the defect and the characteristics of the surrounding skin a purse string intermediate closure was deemed most appropriate.  Undermining was performed circumfirentially around the surgical defect.  A purse string suture was then placed and tightened.
Partial Purse String (Simple) Text: Given the location of the defect and the characteristics of the surrounding skin a simple purse string closure was deemed most appropriate.  Undermining was performed circumfirentially around the surgical defect.  A purse string suture was then placed and tightened. Wound tension only allowed a partial closure of the circular defect.
Partial Purse String (Intermediate) Text: Given the location of the defect and the characteristics of the surrounding skin an intermediate purse string closure was deemed most appropriate.  Undermining was performed circumfirentially around the surgical defect.  A purse string suture was then placed and tightened. Wound tension only allowed a partial closure of the circular defect.
Localized Dermabrasion With Wire Brush Text: The patient was draped in routine manner.  Localized dermabrasion using 3 x 17 mm wire brush was performed in routine manner to papillary dermis. This spot dermabrasion is being performed to complete skin cancer reconstruction. It also will eliminate the other sun damaged precancerous cells that are known to be part of the regional effect of a lifetime's worth of sun exposure. This localized dermabrasion is therapeutic and should not be considered cosmetic in any regard.
Tarsorrhaphy Text: A tarsorrhaphy was performed using Frost sutures.
Complex Repair And Flap Additional Text (Will Appearing After The Standard Complex Repair Text): The complex repair was not sufficient to completely close the primary defect. The remaining additional defect was repaired with the flap mentioned below.
Complex Repair And Graft Additional Text (Will Appearing After The Standard Complex Repair Text): The complex repair was not sufficient to completely close the primary defect. The remaining additional defect was repaired with the graft mentioned below.
Unique Flap 1 Name: Myocutaneous Island pedicle Flap
Unique Flap 2 Name: Peng Flap
Unique Flap 3 Name: Mercedes Flap
Unique Flap 4 Name: Banner Flap
Unique Flap 1 Text: A decision was made to reconstruct the defect utilizing a myocutaneous Island pedicle Flap based on the levator labii superioris muscle.  A telfa template was made of the defect.  This telfa template was then used to outline the myocutaneous flap, based along the meilolabial fold.  The donor area for the pedicle flap was then injected with anesthesia.  The flap was excised through the skin and subcutaneous tissue down to the layer of the underlying musculature.  The myocutaneous flap was carefully excised within this deep plane to maintain its blood supply. Based on the muscle. The edges of the donor site were undermined.   The donor site was closed in a primary fashion to the point of transposition.  The pedicle was then transposed into position and sutured.  Once the flap was sutured into place, adequate blood supply was confirmed with blanching and refill.
Unique Flap 2 Text: A decision was made to reconstruct the defect utilizing a Peng Flap (Bilateral Advancement Rotation Flap). Given the location of the defect and the proximity to free margins, this flap was deemed most appropriate.  Using a sterile surgical marker, the appropriate rotation flaps were drawn incorporating the defect and placing the expected incisions within the relaxed skin tension lines where possible.    The area thus outlined was incised deep to adipose tissue with a #15 scalpel blade.  The skin margins were undermined to an appropriate distance in all directions utilizing iris scissors.
Unique Flap 3 Text: The defect edges were debeveled with a #15 scalpel blade.  Given the location of the defect, shape of the defect and the proximity to free margins a Mercedes (double advancement flap) was deemed most appropriate.  Using a sterile surgical marker, the appropriate transposition flaps were drawn incorporating the defect and placing the expected incisions within the relaxed skin tension lines where possible.    The area thus outlined was incised deep to adipose tissue with a #15 scalpel blade.  The skin margins were undermined to an appropriate distance in all directions utilizing iris scissors.  Hemostasis was achieved with electrocautery.  The flaps were then advanced into the defect and anchored with interrupted buried subcutaneous sutures.
Unique Flap 4 Text: The defect edges were debeveled with a #15 scalpel blade.  Given the location of the defect and the proximity to free margins a Banner transposition flap was deemed most appropriate.  Using a sterile surgical marker, an appropriate Banner transposition flap was drawn incorporating the defect.    The area thus outlined was incised deep to adipose tissue with a #15 scalpel blade.  The skin margins were undermined to an appropriate distance in all directions utilizing iris scissors.
Manual Repair Warning Statement: We plan on removing the manually selected variable below in favor of our much easier automatic structured text blocks found in the previous tab. We decided to do this to help make the flow better and give you the full power of structured data. Manual selection is never going to be ideal in our platform and I would encourage you to avoid using manual selection from this point on, especially since I will be sunsetting this feature. It is important that you do one of two things with the customized text below. First, you can save all of the text in a word file so you can have it for future reference. Second, transfer the text to the appropriate area in the Library tab. Lastly, if there is a flap or graft type which we do not have you need to let us know right away so I can add it in before the variable is hidden. No need to panic, we plan to give you roughly 6 months to make the change.
Same Histology In Subsequent Stages Text: The pattern and morphology of the tumor is as described in the first stage.
No Residual Tumor Seen Histology Text: There were no malignant cells seen in the sections examined.
Inflammation Suggestive Of Cancer Camouflage Histology Text: There was a dense lymphocytic infiltrate which prevented adequate histologic evaluation of adjacent structures.
Bcc Histology Text: There were numerous aggregates of basaloid cells.
Bcc Infiltrative Histology Text: There were numerous aggregates of basaloid cells demonstrating an infiltrative pattern.
Mart-1 - Positive Histology Text: MART-1 staining demonstrates areas of higher density and clustering of melanocytes with Pagetoid spread upwards within the epidermis. The surgical margins are positive for tumor cells.
Mart-1 - Negative Histology Text: MART-1 staining demonstrates a normal density and pattern of melanocytes along the dermal-epidermal junction. The surgical margins are negative for tumor cells.
Information: Selecting Yes will display possible errors in your note based on the variables you have selected. This validation is only offered as a suggestion for you. PLEASE NOTE THAT THE VALIDATION TEXT WILL BE REMOVED WHEN YOU FINALIZE YOUR NOTE. IF YOU WANT TO FAX A PRELIMINARY NOTE YOU WILL NEED TO TOGGLE THIS TO 'NO' IF YOU DO NOT WANT IT IN YOUR FAXED NOTE.

## 2020-06-29 ENCOUNTER — SLEEP CENTER VISIT (OUTPATIENT)
Dept: SLEEP MEDICINE | Facility: MEDICAL CENTER | Age: 80
End: 2020-06-29
Payer: MEDICARE

## 2020-06-29 VITALS
OXYGEN SATURATION: 91 % | DIASTOLIC BLOOD PRESSURE: 70 MMHG | WEIGHT: 275 LBS | SYSTOLIC BLOOD PRESSURE: 124 MMHG | HEIGHT: 74 IN | BODY MASS INDEX: 35.29 KG/M2 | HEART RATE: 59 BPM

## 2020-06-29 DIAGNOSIS — G47.33 OSA (OBSTRUCTIVE SLEEP APNEA): ICD-10-CM

## 2020-06-29 DIAGNOSIS — Z86.79 HISTORY OF ATRIAL FIBRILLATION: ICD-10-CM

## 2020-06-29 DIAGNOSIS — I15.9 SECONDARY HYPERTENSION: Chronic | ICD-10-CM

## 2020-06-29 PROCEDURE — 99213 OFFICE O/P EST LOW 20 MIN: CPT | Performed by: NURSE PRACTITIONER

## 2020-06-29 ASSESSMENT — FIBROSIS 4 INDEX: FIB4 SCORE: 1.75

## 2020-06-29 NOTE — PROGRESS NOTES
Chief Complaint   Patient presents with   • Follow-Up     MARCI-Last seen 06/05/2019       HPI:      Mr. Stahl is a 81 y/o male patient who is in today for annual MARCI f/u. PMH includes chronic kidney disease stage III, hypertension, paroxysmal atrial fibrillation, hypothyroidism, peripheral autonomic neuropathy, Blackwell's esophagus.    PSG from 2015 indicated an AHI of 57 and low oxygen of 81%.    Compliance report from 5/30/20-6/28/20 was downloaded and reviewed with the patient which showed autoCPAP at 10-16 cmH2O, 100% compliance, 7 hrs 41 min use, AHI of 0.6. He is tolerating the pressure and mask well. He goes to bed at 11 pm and wakes up at 8 am.  He is able to sleep through the night well and wakes up once to use the restroom.  He denies morning headache or snoring.  He stays busy by doing yard work as well as going to PT twice a week for about an hour for chronic low back and knee pain.  He also does pool therapy at that time as well.  He denies any health problems or medications.  He follows up with his PCP on a routine basis typically every 6 months.      ROS:  Constitutional: Denies fevers, chills, sweats, fatigue, weight loss  Eyes: Denies glasses, vision loss, pain, drainage, double vision  Ears/Nose/Mouth/Throat: Denies rhinitis, nasal congestion, ear ache, difficulty hearing, sore throat, persistent hoarseness, decayed teeth/toothache  Cardiovascular: Denies chest pain, tightness, palpitations, swelling in feet/legs, fainting, difficulty breathing when laying down  Respiratory: Denies shortness of breath, cough, sputum, wheezing, painful breathing, coughing up blood  GI: Denies heartburn, difficulty swallowing, nausea, vomiting, abdominal pain, diarrhea, constiptation  : Denies frequent urination, painful urination  Integumentary: Denies rashes, lumps or color changes  Neurological: Denies frequent headaches, dizziness, weakness  Sleep: Denies daytime sleepiness, loud snoring, stops breathing during  sleep, waking up gasping for air, insomnia, morning headaches      Past Medical History:   Diagnosis Date   • Arrhythmia     Atrial fribrillation   • Arthritis    • Bronchitis 2004   • Fibula fracture 1981    right   • High cholesterol    • Hyperlipidemia    • Hypertension    • MEDICAL HOME    • Pain     low back   • Pneumonia 2004   • Sleep apnea        Past Surgical History:   Procedure Laterality Date   • HAMMERTOE CORRECTION  2/2/2015    Performed by Abdifatah Orta D.P.M. at SURGERY [facility]   • EXOSTOSIS EXCISION  6/3/2011    Performed by ABDIFATAH ORTA at SURGERY [facility]   • LESION EXCISION ORTHO  6/3/2011    Performed by ABDIFATAH ORTA at SURGERY [facility]   • TOE ARTHROPLASTY  6/3/2011    Performed by ABDIFATAH ORTA at SURGERY [facility]   • LUMBAR LAMINECTOMY DISKECTOMY  2005    microscopic   • TONSILLECTOMY  1945    adenoidectomy       Family History   Problem Relation Age of Onset   • Cancer Mother         cervical/breast   • Cancer Sister         lung   • Cancer Father         lung cancer   • Cancer Other        Social History     Socioeconomic History   • Marital status:      Spouse name: Not on file   • Number of children: Not on file   • Years of education: Not on file   • Highest education level: Not on file   Occupational History   • Not on file   Social Needs   • Financial resource strain: Not on file   • Food insecurity     Worry: Never true     Inability: Never true   • Transportation needs     Medical: No     Non-medical: No   Tobacco Use   • Smoking status: Never Smoker   • Smokeless tobacco: Never Used   Substance and Sexual Activity   • Alcohol use: No     Alcohol/week: 0.0 oz   • Drug use: No   • Sexual activity: Never   Lifestyle   • Physical activity     Days per week: Not on file     Minutes per session: Not on file   • Stress: Not on file   Relationships   • Social connections     Talks on phone: Not on file     Gets together: Not  on file     Attends Synagogue service: Not on file     Active member of club or organization: Not on file     Attends meetings of clubs or organizations: Not on file     Relationship status: Not on file   • Intimate partner violence     Fear of current or ex partner: Not on file     Emotionally abused: Not on file     Physically abused: Not on file     Forced sexual activity: Not on file   Other Topics Concern   • Not on file   Social History Narrative   • Not on file       Allergies as of 06/29/2020 - Reviewed 06/29/2020   Allergen Reaction Noted   • Flexeril [cyclobenzaprine hcl]  01/21/2014   • Other environmental  06/01/2011        Vitals:  Vitals:    06/29/20 1020   BP: 124/70   Pulse: (!) 59   SpO2: 91%       Current medications as of today   Current Outpatient Medications   Medication Sig Dispense Refill   • levothyroxine (SYNTHROID) 75 MCG Tab TAKE 1 TABLET BY MOUTH EVERY MORNING ON AN EMPTY STOMACH 90 Tab 2   • lisinopril (PRINIVIL) 10 MG Tab TAKE 1 TABLET BY MOUTH EVERY  Tab 1   • pravastatin (PRAVACHOL) 40 MG tablet Take 1 Tab by mouth every day. 100 Tab 2   • fluticasone (FLONASE) 50 MCG/ACT nasal spray SHAKE LIQUID AND USE 2 SPRAYS IN EACH NOSTRIL TWICE DAILY 16 g 5   • carvedilol (COREG) 25 MG Tab Take 1 Tab by mouth 2 times a day, with meals. 180 Tab 3   • omeprazole (PRILOSEC) 40 MG delayed-release capsule Take 1 Cap by mouth every day. 30 Cap 11   • aspirin 81 MG tablet Take 81 mg by mouth every day.       No current facility-administered medications for this visit.          Physical Exam:   Appearance: Well developed, well nourished, no acute distress  Eyes: PERRL, EOM intact, sclera white, conjunctiva moist  Ears: no lesions or deformities  Hearing: grossly intact  Nose: no lesions or deformities  Respiratory effort: no intercostal retractions or use of accessory muscles  Extremities: no cyanosis or edema  Abdomen: soft   Gait and Station: normal  Digits and nails: no clubbing, cyanosis,  petechiae or nodes.  Cranial nerves: grossly intact  Skin: no visible rashes, lesions or ulcers noted  Orientation: Oriented to time, person and place  Mood and affect: mood and affect appropriate, normal interaction with examiner  Judgement: Intact    Assessment:  1. MARCI (obstructive sleep apnea)  DME Mask and Supplies   2. Secondary hypertension     3. BMI 35.0-35.9,adult     4. History of atrial fibrillation           Plan  Discussed the cardiovascular and neuropsychiatric risks of untreated MARCI; including but not limited to: HTN, DM, MI, ASCVD, CVA, CHF, traffic accidents.     1. Compliance report from 5/30/20-6/28/20 was downloaded and reviewed with the patient which showed autoCPAP at 10-16 cmH2O, 100% compliance, 7 hrs 41 min use, AHI of 0.6. Continue autoCPAP. Patient is compliant and benefiting from autoCPAP therapy for management of MARCI.   2. DME order ([facility]) for mask (MOC) and supplies was provided today. Continue to clean mask and supplies weekly.   3. Patient declines renewing his CPAP machine today, as the current machine is still functioning well.   4. Continue to stay active.   5. Follow up with the appropriate healthcare practitioners for all other medical problems and issues.  6. F/u in 1 year, sooner if needed.       KEVYN Cho.      This dictation was created using voice recognition software. The accuracy of the dictation is limited to the abilities of the software. I expect there may be some errors of grammar and possibly content.

## 2020-07-13 DIAGNOSIS — I10 ESSENTIAL HYPERTENSION: ICD-10-CM

## 2020-07-13 RX ORDER — LISINOPRIL 10 MG/1
10 TABLET ORAL
Qty: 100 TAB | Refills: 1 | Status: SHIPPED | OUTPATIENT
Start: 2020-07-13 | End: 2021-01-21 | Stop reason: SDUPTHER

## 2020-08-12 ENCOUNTER — APPOINTMENT (RX ONLY)
Dept: URBAN - METROPOLITAN AREA CLINIC 4 | Facility: CLINIC | Age: 80
Setting detail: DERMATOLOGY
End: 2020-08-12

## 2020-08-12 DIAGNOSIS — L57.0 ACTINIC KERATOSIS: ICD-10-CM

## 2020-08-12 DIAGNOSIS — L81.4 OTHER MELANIN HYPERPIGMENTATION: ICD-10-CM

## 2020-08-12 DIAGNOSIS — D22 MELANOCYTIC NEVI: ICD-10-CM

## 2020-08-12 DIAGNOSIS — L82.1 OTHER SEBORRHEIC KERATOSIS: ICD-10-CM

## 2020-08-12 DIAGNOSIS — Z85.828 PERSONAL HISTORY OF OTHER MALIGNANT NEOPLASM OF SKIN: ICD-10-CM

## 2020-08-12 DIAGNOSIS — L82.0 INFLAMED SEBORRHEIC KERATOSIS: ICD-10-CM

## 2020-08-12 DIAGNOSIS — D18.0 HEMANGIOMA: ICD-10-CM

## 2020-08-12 PROBLEM — D18.01 HEMANGIOMA OF SKIN AND SUBCUTANEOUS TISSUE: Status: ACTIVE | Noted: 2020-08-12

## 2020-08-12 PROBLEM — D22.5 MELANOCYTIC NEVI OF TRUNK: Status: ACTIVE | Noted: 2020-08-12

## 2020-08-12 PROBLEM — D22.62 MELANOCYTIC NEVI OF LEFT UPPER LIMB, INCLUDING SHOULDER: Status: ACTIVE | Noted: 2020-08-12

## 2020-08-12 PROBLEM — D22.61 MELANOCYTIC NEVI OF RIGHT UPPER LIMB, INCLUDING SHOULDER: Status: ACTIVE | Noted: 2020-08-12

## 2020-08-12 PROCEDURE — 17000 DESTRUCT PREMALG LESION: CPT | Mod: 79,59

## 2020-08-12 PROCEDURE — 17003 DESTRUCT PREMALG LES 2-14: CPT | Mod: 59,79

## 2020-08-12 PROCEDURE — ? COUNSELING

## 2020-08-12 PROCEDURE — ? PRESCRIPTION

## 2020-08-12 PROCEDURE — 17110 DESTRUCTION B9 LES UP TO 14: CPT | Mod: 79

## 2020-08-12 PROCEDURE — 99214 OFFICE O/P EST MOD 30 MIN: CPT | Mod: 25,24

## 2020-08-12 PROCEDURE — ? LIQUID NITROGEN

## 2020-08-12 RX ORDER — FLUOROURACIL 5 MG/G
CREAM TOPICAL BID
Qty: 1 | Refills: 2 | Status: ERX | COMMUNITY
Start: 2020-08-12

## 2020-08-12 RX ADMIN — FLUOROURACIL: 5 CREAM TOPICAL at 00:00

## 2020-08-12 ASSESSMENT — LOCATION SIMPLE DESCRIPTION DERM
LOCATION SIMPLE: CHEST
LOCATION SIMPLE: LEFT UPPER ARM
LOCATION SIMPLE: RIGHT EAR
LOCATION SIMPLE: LEFT FOREHEAD
LOCATION SIMPLE: RIGHT CHEEK
LOCATION SIMPLE: RIGHT CLAVICULAR SKIN
LOCATION SIMPLE: RIGHT UPPER ARM
LOCATION SIMPLE: LEFT CHEEK
LOCATION SIMPLE: ABDOMEN

## 2020-08-12 ASSESSMENT — LOCATION ZONE DERM
LOCATION ZONE: TRUNK
LOCATION ZONE: ARM
LOCATION ZONE: FACE
LOCATION ZONE: EAR

## 2020-08-12 ASSESSMENT — LOCATION DETAILED DESCRIPTION DERM
LOCATION DETAILED: LEFT FOREHEAD
LOCATION DETAILED: LEFT INFERIOR CENTRAL MALAR CHEEK
LOCATION DETAILED: RIGHT INFERIOR CENTRAL MALAR CHEEK
LOCATION DETAILED: RIGHT ANTERIOR DISTAL UPPER ARM
LOCATION DETAILED: RIGHT SUPERIOR HELIX
LOCATION DETAILED: RIGHT CLAVICULAR SKIN
LOCATION DETAILED: XIPHOID
LOCATION DETAILED: LEFT LATERAL INFERIOR CHEST
LOCATION DETAILED: RIGHT ANTERIOR PROXIMAL UPPER ARM
LOCATION DETAILED: STERNUM
LOCATION DETAILED: LEFT ANTERIOR DISTAL UPPER ARM
LOCATION DETAILED: EPIGASTRIC SKIN
LOCATION DETAILED: LEFT MEDIAL SUPERIOR CHEST
LOCATION DETAILED: LEFT ANTERIOR PROXIMAL UPPER ARM

## 2020-08-12 NOTE — PROCEDURE: LIQUID NITROGEN
Render Note In Bullet Format When Appropriate: No
Duration Of Freeze Thaw-Cycle (Seconds): 5
Post-Care Instructions: I reviewed with the patient in detail post-care instructions. Patient is to wear sunprotection, and avoid picking at any of the treated lesions. Pt may apply Vaseline to crusted or scabbing areas.
Consent: The patient's consent was obtained including but not limited to risks of crusting, scabbing, blistering, scarring, darker or lighter pigmentary change, recurrence, incomplete removal and infection.
Number Of Freeze-Thaw Cycles: 2 freeze-thaw cycles
Detail Level: Detailed
Medical Necessity Clause: This procedure was medically necessary because the lesions that were treated were:
Medical Necessity Information: It is in your best interest to select a reason for this procedure from the list below. All of these items fulfill various CMS LCD requirements except the new and changing color options.

## 2020-08-12 NOTE — PROCEDURE: MIPS QUALITY
Detail Level: Detailed
Quality 130: Documentation Of Current Medications In The Medical Record: Current Medications Documented
Quality 111:Pneumonia Vaccination Status For Older Adults: Pneumococcal Vaccination Administered
Quality 226: Preventive Care And Screening: Tobacco Use: Screening And Cessation Intervention: Patient screened for tobacco use and is an ex/non-smoker

## 2020-12-08 ENCOUNTER — OFFICE VISIT (OUTPATIENT)
Dept: URGENT CARE | Facility: CLINIC | Age: 80
End: 2020-12-08
Payer: MEDICARE

## 2020-12-08 VITALS
HEIGHT: 74 IN | BODY MASS INDEX: 35.94 KG/M2 | DIASTOLIC BLOOD PRESSURE: 72 MMHG | HEART RATE: 60 BPM | OXYGEN SATURATION: 90 % | WEIGHT: 280 LBS | RESPIRATION RATE: 18 BRPM | SYSTOLIC BLOOD PRESSURE: 130 MMHG | TEMPERATURE: 98.2 F

## 2020-12-08 DIAGNOSIS — J01.01 ACUTE RECURRENT MAXILLARY SINUSITIS: ICD-10-CM

## 2020-12-08 DIAGNOSIS — R05.9 COUGH: ICD-10-CM

## 2020-12-08 PROCEDURE — 99214 OFFICE O/P EST MOD 30 MIN: CPT | Performed by: PHYSICIAN ASSISTANT

## 2020-12-08 RX ORDER — BENZONATATE 200 MG/1
200 CAPSULE ORAL 3 TIMES DAILY PRN
Qty: 60 CAP | Refills: 0 | Status: SHIPPED | OUTPATIENT
Start: 2020-12-08 | End: 2021-01-14

## 2020-12-08 RX ORDER — DOXYCYCLINE HYCLATE 100 MG
100 TABLET ORAL 2 TIMES DAILY
Qty: 20 TAB | Refills: 0 | Status: SHIPPED | OUTPATIENT
Start: 2020-12-08 | End: 2020-12-18

## 2020-12-08 RX ORDER — ALBUTEROL SULFATE 90 UG/1
2 AEROSOL, METERED RESPIRATORY (INHALATION) EVERY 4 HOURS PRN
Qty: 1 EACH | Refills: 0 | Status: SHIPPED | OUTPATIENT
Start: 2020-12-08 | End: 2021-01-14

## 2020-12-08 ASSESSMENT — ENCOUNTER SYMPTOMS
COUGH: 1
SORE THROAT: 0
HEMOPTYSIS: 0
NAUSEA: 0
STRIDOR: 0
HEADACHES: 0
SINUS PAIN: 1
WEIGHT LOSS: 0
DIARRHEA: 0
SWEATS: 0
SPUTUM PRODUCTION: 1
RHINORRHEA: 0
VOMITING: 0
WHEEZING: 0
PND: 0
FEVER: 0
MYALGIAS: 0
CHILLS: 0
SHORTNESS OF BREATH: 0
ORTHOPNEA: 0

## 2020-12-08 ASSESSMENT — FIBROSIS 4 INDEX: FIB4 SCORE: 1.75

## 2020-12-08 NOTE — PROGRESS NOTES
Subjective:      Shane Stahl is a 80 y.o. male who presents with Cough (x2 weeks )    Medications:    • aspirin  • carvedilol Tabs  • fluticasone  • levothyroxine Tabs  • lisinopril Tabs  • omeprazole  • pravastatin    Allergies: Flexeril [cyclobenzaprine hcl] and Other environmental    Problem List: Farhat Stahl has Hypertension; Dyslipidemia; Allergic rhinitis; Status post lumbar surgery; Preventative health care; History foot pain; History of atrial fibrillation; Impaired fasting glucose; Sensorineural hearing loss; History compression fracture; Renal cyst; Sleep apnea; History vertigo; Obesity (BMI 30-39.9); Hypothyroid; Peripheral autonomic neuropathy; Decreased GFR; History of ulcer disease; Tubular adenoma of colon; Blackwell's esophagus; Arthritis of knee; and Squamous cell carcinoma of skin on their problem list.    Surgical History:  Past Surgical History:   Procedure Laterality Date   • HAMMERTOE CORRECTION  2/2/2015    Performed by Abdifatah Orta D.P.M. at [facility]   • EXOSTOSIS EXCISION  6/3/2011    Performed by ABDIFATAH ORTA at [facility]   • LESION EXCISION ORTHO  6/3/2011    Performed by ABDIFATAH ORTA at [facility]   • TOE ARTHROPLASTY  6/3/2011    Performed by ABDIFATAH ORTA at [facility]   • LUMBAR LAMINECTOMY DISKECTOMY  2005    microscopic   • TONSILLECTOMY  1945    adenoidectomy       Past Social Hx: Farhat Stahl  reports that he has never smoked. He has never used smokeless tobacco. He reports that he does not drink alcohol or use drugs.     Past Family Hx:  Farhat Stahl family history includes Cancer in his father, mother, sister, and another family member.     Problem list, medications, and allergies reviewed by myself today in Epic.           Patient presents with:  Cough, sinus congestion, post nasal drip getting progressively worse x 3 weeks. Pt states cough is worse any time he  puts his head back (recliner or bedtime) due to the drainage down his throat. Pt denies fever, chills, ear pain, chest pain, shortness of breath, nausea, vomiting or diarrhea, PND, orthopnea or bilateral lower extremity swelling.  Patient states he thinks he has a sinus infection possibly bronchitis.  Patient has tried some over-the-counter Delsym as well as over-the-counter Mucinex with no improvement.  Pt denies any known COVID exposures. Patient denies any other complaint.        Cough  This is a new problem. The current episode started 1 to 4 weeks ago (3 weeks). The problem has been gradually worsening. The problem occurs every few minutes. The cough is productive of sputum. Associated symptoms include nasal congestion and postnasal drip. Pertinent negatives include no chest pain, chills, ear pain, fever, headaches, hemoptysis, myalgias, rhinorrhea, sore throat, shortness of breath, sweats, weight loss or wheezing. The symptoms are aggravated by lying down (reclined head position). He has tried body position changes, OTC cough suppressant and rest for the symptoms. The treatment provided no relief. His past medical history is significant for bronchitis and environmental allergies. There is no history of asthma.       Review of Systems   Constitutional: Negative for chills, fever, malaise/fatigue and weight loss.   HENT: Positive for congestion, postnasal drip and sinus pain. Negative for ear pain, rhinorrhea and sore throat.    Respiratory: Positive for cough and sputum production. Negative for hemoptysis, shortness of breath, wheezing and stridor.    Cardiovascular: Negative for chest pain, orthopnea, leg swelling and PND.   Gastrointestinal: Negative for diarrhea, nausea and vomiting.   Musculoskeletal: Negative for myalgias.   Neurological: Negative for headaches.   Endo/Heme/Allergies: Positive for environmental allergies.   All other systems reviewed and are negative.         Objective:     /72    "Pulse 60   Temp 36.8 °C (98.2 °F) (Temporal)   Resp 18   Ht 1.88 m (6' 2\")   Wt (!) 127 kg (280 lb)   SpO2 90%   BMI 35.95 kg/m²      Physical Exam  Vitals signs and nursing note reviewed.   Constitutional:       General: He is not in acute distress.     Appearance: Normal appearance. He is well-developed. He is obese. He is not ill-appearing or toxic-appearing.   HENT:      Head: Normocephalic and atraumatic.      Right Ear: Tympanic membrane normal.      Left Ear: Tympanic membrane normal.      Nose: Congestion present.      Right Sinus: Maxillary sinus tenderness present.      Left Sinus: Maxillary sinus tenderness present.      Mouth/Throat:      Lips: Pink.      Mouth: Mucous membranes are moist.      Pharynx: Oropharynx is clear. Uvula midline.   Eyes:      Extraocular Movements: Extraocular movements intact.      Conjunctiva/sclera: Conjunctivae normal.      Pupils: Pupils are equal, round, and reactive to light.   Neck:      Musculoskeletal: Normal range of motion and neck supple.   Cardiovascular:      Rate and Rhythm: Normal rate and regular rhythm.      Pulses: Normal pulses.      Heart sounds: Normal heart sounds.   Pulmonary:      Effort: Pulmonary effort is normal.      Breath sounds: Normal breath sounds and air entry. No decreased air movement. No decreased breath sounds, rhonchi or rales.   Abdominal:      General: Bowel sounds are normal.      Palpations: Abdomen is soft.   Musculoskeletal: Normal range of motion.   Skin:     General: Skin is warm and dry.      Capillary Refill: Capillary refill takes less than 2 seconds.   Neurological:      General: No focal deficit present.      Mental Status: He is alert and oriented to person, place, and time.      Cranial Nerves: No cranial nerve deficit.      Motor: Motor function is intact.      Coordination: Coordination is intact.      Gait: Gait normal.   Psychiatric:         Mood and Affect: Mood normal.                 Assessment/Plan:     1. " Cough  albuterol 108 (90 Base) MCG/ACT Aero Soln inhalation aerosol    doxycycline (VIBRAMYCIN) 100 MG Tab    benzonatate (TESSALON) 200 MG capsule   2. Acute recurrent maxillary sinusitis  albuterol 108 (90 Base) MCG/ACT Aero Soln inhalation aerosol    doxycycline (VIBRAMYCIN) 100 MG Tab    benzonatate (TESSALON) 200 MG capsule     Per protocol for PUI/ISO patients, the patient was evaluated by me while I was wearing PPE.  Per CDC guidelines, patient has been instructed to self quarantine at home for at least 10 days from onset of symptoms and at least 3 full days after resolution of fever/respiratory symptoms.  PT verbalized agreement to do so.      Pt declined covid testing today.     PT to begin medications today as prescribed.    PT can begin or continue OTC medications, increase fluids and rest until symptoms improve.     PT should follow up with PCP in 1-2 days for re-evaluation if symptoms have not improved.  Discussed red flags and reasons to return to UC or ED.  Pt and/or family verbalized understanding of diagnosis and follow up instructions and was offered informational handout on diagnosis.  PT discharged.

## 2021-01-05 ENCOUNTER — TELEPHONE (OUTPATIENT)
Dept: URGENT CARE | Facility: PHYSICIAN GROUP | Age: 81
End: 2021-01-05

## 2021-01-05 ENCOUNTER — OFFICE VISIT (OUTPATIENT)
Dept: URGENT CARE | Facility: PHYSICIAN GROUP | Age: 81
End: 2021-01-05
Payer: MEDICARE

## 2021-01-05 ENCOUNTER — HOSPITAL ENCOUNTER (OUTPATIENT)
Dept: RADIOLOGY | Facility: MEDICAL CENTER | Age: 81
End: 2021-01-05
Attending: PHYSICIAN ASSISTANT
Payer: MEDICARE

## 2021-01-05 ENCOUNTER — NURSE TRIAGE (OUTPATIENT)
Dept: HEALTH INFORMATION MANAGEMENT | Facility: OTHER | Age: 81
End: 2021-01-05

## 2021-01-05 VITALS
DIASTOLIC BLOOD PRESSURE: 76 MMHG | HEART RATE: 68 BPM | BODY MASS INDEX: 35.68 KG/M2 | RESPIRATION RATE: 20 BRPM | TEMPERATURE: 98.3 F | OXYGEN SATURATION: 95 % | SYSTOLIC BLOOD PRESSURE: 150 MMHG | WEIGHT: 278 LBS | HEIGHT: 74 IN

## 2021-01-05 DIAGNOSIS — R05.3 PERSISTENT COUGH FOR 3 WEEKS OR LONGER: ICD-10-CM

## 2021-01-05 DIAGNOSIS — J90 PLEURAL EFFUSION ON LEFT: Primary | ICD-10-CM

## 2021-01-05 DIAGNOSIS — R06.01 ORTHOPNEA: ICD-10-CM

## 2021-01-05 DIAGNOSIS — R06.00 DYSPNEA, UNSPECIFIED TYPE: ICD-10-CM

## 2021-01-05 DIAGNOSIS — R05.9 COUGH: ICD-10-CM

## 2021-01-05 PROCEDURE — 99214 OFFICE O/P EST MOD 30 MIN: CPT | Performed by: PHYSICIAN ASSISTANT

## 2021-01-05 PROCEDURE — 71046 X-RAY EXAM CHEST 2 VIEWS: CPT

## 2021-01-05 ASSESSMENT — ENCOUNTER SYMPTOMS
COUGH: 1
STRIDOR: 0
SINUS PAIN: 0
HEADACHES: 0
SPUTUM PRODUCTION: 1
HEMOPTYSIS: 0
CLAUDICATION: 0
SHORTNESS OF BREATH: 0
FEVER: 0
RHINORRHEA: 1
PALPITATIONS: 0
WHEEZING: 0
ORTHOPNEA: 1
CHILLS: 0
SORE THROAT: 0

## 2021-01-05 ASSESSMENT — COPD QUESTIONNAIRES: COPD: 0

## 2021-01-05 ASSESSMENT — FIBROSIS 4 INDEX: FIB4 SCORE: 1.75

## 2021-01-05 NOTE — PROGRESS NOTES
Subjective:      Shane Stahl is a 80 y.o. male who presents with Cough (x2 mo finished rx, congestion)    Medications:    • albuterol Aers  • aspirin  • benzonatate  • carvedilol Tabs  • fluticasone  • levothyroxine Tabs  • lisinopril Tabs  • omeprazole  • pravastatin    Allergies: Flexeril [cyclobenzaprine hcl] and Other environmental    Problem List: Farhat Stahl has Hypertension; Dyslipidemia; Allergic rhinitis; Status post lumbar surgery; Preventative health care; History foot pain; History of atrial fibrillation; Impaired fasting glucose; Sensorineural hearing loss; History compression fracture; Renal cyst; Sleep apnea; History vertigo; Obesity (BMI 30-39.9); Hypothyroid; Peripheral autonomic neuropathy; Decreased GFR; History of ulcer disease; Tubular adenoma of colon; Blackwell's esophagus; Arthritis of knee; and Squamous cell carcinoma of skin on their problem list.    Surgical History:  Past Surgical History:   Procedure Laterality Date   • HAMMERTOE CORRECTION  2/2/2015    Performed by Newton Orta, JOSÉ [email or web address].P.[email or web address] at [facility]   • EXOSTOSIS EXCISION  6/3/2011    Performed by NEWTON ORTA at [facility]   • LESION EXCISION ORTHO  6/3/2011    Performed by NEWTON ORTA at [facility]   • TOE ARTHROPLASTY  6/3/2011    Performed by NEWTON ORTA at [facility]   • LUMBAR LAMINECTOMY DISKECTOMY  2005    microscopic   • TONSILLECTOMY  1945    adenoidectomy       Past Social Hx: Farhat Stahl  reports that he has never smoked. He has never used smokeless tobacco. He reports current alcohol use. He reports that he does not use drugs.     Past Family Hx:  Farhat Stahl family history includes Cancer in his father, mother, sister, and another family member.     Problem list, medications, and allergies reviewed by myself today in Epic.           Patient presents with:  Cough: x2 mo finished rx, congestion.  Patient was  evaluated on 12/8/2020 for cough, sinus congestion and pressure x1 month.  Patient was treated with antibiotics at that time for recurrent sinusitis and cough.  Patient was given a prescription for antibiotics, which patient has finished, as well as an albuterol inhaler which patient continues to use intermittently.  Patient feels his cough improved for approximately 1 week prior to return of same symptoms.  Patient uses a CPAP at night without difficulty however he must be in a recliner, not in a bed, or he wakes up sob.     Patient denies chest pain, fever, chills, nausea vomiting or diarrhea, lower extremity swelling. Admits to dyspnea on exertion and orthopnea.      Patient has not taken any over-the-counter medications for his symptoms.    Cough  This is a new problem. The current episode started more than 1 month ago. The problem has been waxing and waning. The problem occurs every few minutes. The cough is productive of sputum. Associated symptoms include nasal congestion, postnasal drip and rhinorrhea. Pertinent negatives include no chest pain, chills, fever, headaches, hemoptysis, sore throat, shortness of breath or wheezing. The symptoms are aggravated by exercise, lying down and cold air. He has tried body position changes, a beta-agonist inhaler and rest (Antibiotic prescription which has been completed) for the symptoms. The treatment provided mild relief. His past medical history is significant for environmental allergies and pneumonia. There is no history of asthma, bronchitis or COPD.       Review of Systems   Constitutional: Positive for malaise/fatigue. Negative for chills and fever.   HENT: Positive for congestion, postnasal drip and rhinorrhea. Negative for sinus pain and sore throat.    Respiratory: Positive for cough and sputum production. Negative for hemoptysis, shortness of breath, wheezing and stridor.    Cardiovascular: Positive for orthopnea. Negative for chest pain, palpitations,  "claudication and leg swelling.   Neurological: Negative for headaches.   Endo/Heme/Allergies: Positive for environmental allergies.   All other systems reviewed and are negative.         Objective:     /76   Pulse 68   Temp 36.8 °C (98.3 °F)   Resp 20   Ht 1.88 m (6' 2\")   Wt (!) 126.1 kg (278 lb)   SpO2 95%   BMI 35.69 kg/m²      Physical Exam  Vitals signs and nursing note reviewed.   Constitutional:       General: He is not in acute distress.     Appearance: Normal appearance. He is well-developed. He is obese. He is not ill-appearing or toxic-appearing.   HENT:      Head: Normocephalic and atraumatic.      Right Ear: Tympanic membrane normal.      Left Ear: Tympanic membrane normal.      Nose: Mucosal edema, congestion and rhinorrhea present.      Mouth/Throat:      Lips: Pink.      Mouth: Mucous membranes are moist.      Pharynx: Oropharynx is clear. Uvula midline. No posterior oropharyngeal erythema or uvula swelling.   Eyes:      Extraocular Movements: Extraocular movements intact.      Conjunctiva/sclera: Conjunctivae normal.      Pupils: Pupils are equal, round, and reactive to light.   Neck:      Musculoskeletal: Normal range of motion and neck supple.   Cardiovascular:      Rate and Rhythm: Normal rate and regular rhythm.      Heart sounds: Normal heart sounds.   Pulmonary:      Effort: Pulmonary effort is normal.      Breath sounds: Normal air entry. No stridor. Examination of the left-middle field reveals decreased breath sounds. Examination of the left-lower field reveals decreased breath sounds. Decreased breath sounds present. No wheezing, rhonchi or rales.   Abdominal:      Palpations: Abdomen is soft.   Musculoskeletal: Normal range of motion.   Lymphadenopathy:      Cervical: No cervical adenopathy.   Skin:     General: Skin is warm and dry.      Capillary Refill: Capillary refill takes less than 2 seconds.   Neurological:      General: No focal deficit present.      Mental Status: He " "is alert and oriented to person, place, and time.      Gait: Gait normal.   Psychiatric:         Mood and Affect: Mood normal.         Behavior: Behavior is cooperative.            Xray images viewed and interpreted by me, confirmed by radiology:    Large left pleural effusion, consistent with auscultation.       RADIOLOGY RESULTS   Dx-chest-2 Views    Result Date: 1/5/2021 1/5/2021 10:35 AM HISTORY/REASON FOR EXAM:  Cough. TECHNIQUE/EXAM DESCRIPTION AND NUMBER OF VIEWS: Two views of the chest. COMPARISON:  May 20, 2014     FINDINGS: The heart is normal in size. There is left basilar atelectasis/consolidation. There is a large left pleural effusion. No pneumothorax.   Large left pleural effusion with left basilar atelectasis/consolidation.                Assessment/Plan:          1. Pleural effusion on left  DX-CHEST-2 VIEWS   2. Dyspnea, unspecified type  DX-CHEST-2 VIEWS   3. Persistent cough for 3 weeks or longer  DX-CHEST-2 VIEWS   4. Orthopnea  DX-CHEST-2 VIEWS       Patient was evaluated in clinic today while wearing appropriate personal protective equipment.      PT requires evaluation and treatment at a facility that can provide a higher level of care due to acuity of illness/complaint.  Ddx: includes but is not limited to: PNA, PE, CHF.  This clinic is unable to perform stat diagnostics to confirm diagnosis.      PT declined EKG or EMS transport at this time, states he is perfectly capable of driving himself \"its been 2 months\".      PT discharged.           "

## 2021-01-06 ENCOUNTER — APPOINTMENT (OUTPATIENT)
Dept: RADIOLOGY | Facility: MEDICAL CENTER | Age: 81
DRG: 194 | End: 2021-01-06
Attending: EMERGENCY MEDICINE
Payer: MEDICARE

## 2021-01-06 ENCOUNTER — HOSPITAL ENCOUNTER (INPATIENT)
Facility: MEDICAL CENTER | Age: 81
LOS: 3 days | DRG: 194 | End: 2021-01-09
Attending: EMERGENCY MEDICINE | Admitting: STUDENT IN AN ORGANIZED HEALTH CARE EDUCATION/TRAINING PROGRAM
Payer: MEDICARE

## 2021-01-06 ENCOUNTER — TELEPHONE (OUTPATIENT)
Dept: MEDICAL GROUP | Facility: MEDICAL CENTER | Age: 81
End: 2021-01-06

## 2021-01-06 DIAGNOSIS — J90 LOCULATED PLEURAL EFFUSION: ICD-10-CM

## 2021-01-06 DIAGNOSIS — R06.00 DYSPNEA, UNSPECIFIED TYPE: ICD-10-CM

## 2021-01-06 DIAGNOSIS — J90 PLEURAL EFFUSION ON LEFT: ICD-10-CM

## 2021-01-06 PROBLEM — J18.9 PNEUMONIA: Status: ACTIVE | Noted: 2021-01-06

## 2021-01-06 LAB
ALBUMIN SERPL BCP-MCNC: 3.4 G/DL (ref 3.2–4.9)
ALBUMIN/GLOB SERPL: 1.1 G/DL
ALP SERPL-CCNC: 102 U/L (ref 30–99)
ALT SERPL-CCNC: 15 U/L (ref 2–50)
ANION GAP SERPL CALC-SCNC: 12 MMOL/L (ref 7–16)
AST SERPL-CCNC: 15 U/L (ref 12–45)
BASOPHILS # BLD AUTO: 0.8 % (ref 0–1.8)
BASOPHILS # BLD: 0.07 K/UL (ref 0–0.12)
BILIRUB SERPL-MCNC: 0.5 MG/DL (ref 0.1–1.5)
BUN SERPL-MCNC: 15 MG/DL (ref 8–22)
CALCIUM SERPL-MCNC: 9 MG/DL (ref 8.4–10.2)
CHLORIDE SERPL-SCNC: 104 MMOL/L (ref 96–112)
CO2 SERPL-SCNC: 23 MMOL/L (ref 20–33)
COVID ORDER STATUS COVID19: NORMAL
CREAT SERPL-MCNC: 1.07 MG/DL (ref 0.5–1.4)
EKG IMPRESSION: NORMAL
EOSINOPHIL # BLD AUTO: 0.41 K/UL (ref 0–0.51)
EOSINOPHIL NFR BLD: 5 % (ref 0–6.9)
ERYTHROCYTE [DISTWIDTH] IN BLOOD BY AUTOMATED COUNT: 46.9 FL (ref 35.9–50)
EST. AVERAGE GLUCOSE BLD GHB EST-MCNC: 126 MG/DL
GLOBULIN SER CALC-MCNC: 3.2 G/DL (ref 1.9–3.5)
GLUCOSE SERPL-MCNC: 111 MG/DL (ref 65–99)
HBA1C MFR BLD: 6 % (ref 0–5.6)
HCT VFR BLD AUTO: 43.5 % (ref 42–52)
HGB BLD-MCNC: 14.5 G/DL (ref 14–18)
IMM GRANULOCYTES # BLD AUTO: 0.03 K/UL (ref 0–0.11)
IMM GRANULOCYTES NFR BLD AUTO: 0.4 % (ref 0–0.9)
LIPASE SERPL-CCNC: 28 U/L (ref 7–58)
LYMPHOCYTES # BLD AUTO: 1.26 K/UL (ref 1–4.8)
LYMPHOCYTES NFR BLD: 15.3 % (ref 22–41)
MCH RBC QN AUTO: 32.1 PG (ref 27–33)
MCHC RBC AUTO-ENTMCNC: 33.3 G/DL (ref 33.7–35.3)
MCV RBC AUTO: 96.2 FL (ref 81.4–97.8)
MONOCYTES # BLD AUTO: 0.8 K/UL (ref 0–0.85)
MONOCYTES NFR BLD AUTO: 9.7 % (ref 0–13.4)
NEUTROPHILS # BLD AUTO: 5.69 K/UL (ref 1.82–7.42)
NEUTROPHILS NFR BLD: 68.8 % (ref 44–72)
NRBC # BLD AUTO: 0 K/UL
NRBC BLD-RTO: 0 /100 WBC
PLATELET # BLD AUTO: 209 K/UL (ref 164–446)
PMV BLD AUTO: 9.2 FL (ref 9–12.9)
POTASSIUM SERPL-SCNC: 4.3 MMOL/L (ref 3.6–5.5)
PROT SERPL-MCNC: 6.6 G/DL (ref 6–8.2)
RBC # BLD AUTO: 4.52 M/UL (ref 4.7–6.1)
SARS-COV-2 RNA RESP QL NAA+PROBE: NOTDETECTED
SODIUM SERPL-SCNC: 139 MMOL/L (ref 135–145)
SPECIMEN SOURCE: NORMAL
WBC # BLD AUTO: 8.3 K/UL (ref 4.8–10.8)

## 2021-01-06 PROCEDURE — 99223 1ST HOSP IP/OBS HIGH 75: CPT | Mod: AI | Performed by: STUDENT IN AN ORGANIZED HEALTH CARE EDUCATION/TRAINING PROGRAM

## 2021-01-06 PROCEDURE — 93005 ELECTROCARDIOGRAM TRACING: CPT | Performed by: INTERNAL MEDICINE

## 2021-01-06 PROCEDURE — 83036 HEMOGLOBIN GLYCOSYLATED A1C: CPT

## 2021-01-06 PROCEDURE — 700111 HCHG RX REV CODE 636 W/ 250 OVERRIDE (IP): Performed by: EMERGENCY MEDICINE

## 2021-01-06 PROCEDURE — 700102 HCHG RX REV CODE 250 W/ 637 OVERRIDE(OP): Performed by: STUDENT IN AN ORGANIZED HEALTH CARE EDUCATION/TRAINING PROGRAM

## 2021-01-06 PROCEDURE — 36415 COLL VENOUS BLD VENIPUNCTURE: CPT

## 2021-01-06 PROCEDURE — 700105 HCHG RX REV CODE 258: Performed by: EMERGENCY MEDICINE

## 2021-01-06 PROCEDURE — 96367 TX/PROPH/DG ADDL SEQ IV INF: CPT

## 2021-01-06 PROCEDURE — 96365 THER/PROPH/DIAG IV INF INIT: CPT

## 2021-01-06 PROCEDURE — U0005 INFEC AGEN DETEC AMPLI PROBE: HCPCS

## 2021-01-06 PROCEDURE — 83690 ASSAY OF LIPASE: CPT

## 2021-01-06 PROCEDURE — 71260 CT THORAX DX C+: CPT

## 2021-01-06 PROCEDURE — 80053 COMPREHEN METABOLIC PANEL: CPT

## 2021-01-06 PROCEDURE — 87040 BLOOD CULTURE FOR BACTERIA: CPT

## 2021-01-06 PROCEDURE — 94760 N-INVAS EAR/PLS OXIMETRY 1: CPT

## 2021-01-06 PROCEDURE — 700111 HCHG RX REV CODE 636 W/ 250 OVERRIDE (IP): Performed by: INTERNAL MEDICINE

## 2021-01-06 PROCEDURE — 700117 HCHG RX CONTRAST REV CODE 255: Performed by: EMERGENCY MEDICINE

## 2021-01-06 PROCEDURE — A9270 NON-COVERED ITEM OR SERVICE: HCPCS | Performed by: STUDENT IN AN ORGANIZED HEALTH CARE EDUCATION/TRAINING PROGRAM

## 2021-01-06 PROCEDURE — 99285 EMERGENCY DEPT VISIT HI MDM: CPT

## 2021-01-06 PROCEDURE — 94660 CPAP INITIATION&MGMT: CPT

## 2021-01-06 PROCEDURE — U0003 INFECTIOUS AGENT DETECTION BY NUCLEIC ACID (DNA OR RNA); SEVERE ACUTE RESPIRATORY SYNDROME CORONAVIRUS 2 (SARS-COV-2) (CORONAVIRUS DISEASE [COVID-19]), AMPLIFIED PROBE TECHNIQUE, MAKING USE OF HIGH THROUGHPUT TECHNOLOGIES AS DESCRIBED BY CMS-2020-01-R: HCPCS

## 2021-01-06 PROCEDURE — 93010 ELECTROCARDIOGRAM REPORT: CPT | Performed by: INTERNAL MEDICINE

## 2021-01-06 PROCEDURE — C9803 HOPD COVID-19 SPEC COLLECT: HCPCS | Performed by: EMERGENCY MEDICINE

## 2021-01-06 PROCEDURE — 770021 HCHG ROOM/CARE - ISO PRIVATE

## 2021-01-06 PROCEDURE — 700111 HCHG RX REV CODE 636 W/ 250 OVERRIDE (IP): Performed by: STUDENT IN AN ORGANIZED HEALTH CARE EDUCATION/TRAINING PROGRAM

## 2021-01-06 PROCEDURE — 85025 COMPLETE CBC W/AUTO DIFF WBC: CPT

## 2021-01-06 RX ORDER — LEVOTHYROXINE SODIUM 0.07 MG/1
75 TABLET ORAL
Status: DISCONTINUED | OUTPATIENT
Start: 2021-01-07 | End: 2021-01-09 | Stop reason: HOSPADM

## 2021-01-06 RX ORDER — ASPIRIN 81 MG/1
81 TABLET, CHEWABLE ORAL EVERY EVENING
Status: DISCONTINUED | OUTPATIENT
Start: 2021-01-06 | End: 2021-01-09 | Stop reason: HOSPADM

## 2021-01-06 RX ORDER — LISINOPRIL 5 MG/1
10 TABLET ORAL
Status: DISCONTINUED | OUTPATIENT
Start: 2021-01-07 | End: 2021-01-09 | Stop reason: HOSPADM

## 2021-01-06 RX ORDER — LEVOTHYROXINE SODIUM ANHYDROUS 100 UG/5ML
100 INJECTION, POWDER, LYOPHILIZED, FOR SOLUTION INTRAVENOUS DAILY
Status: DISCONTINUED | OUTPATIENT
Start: 2021-01-06 | End: 2021-01-06

## 2021-01-06 RX ORDER — AZITHROMYCIN 500 MG/1
500 INJECTION, POWDER, LYOPHILIZED, FOR SOLUTION INTRAVENOUS ONCE
Status: COMPLETED | OUTPATIENT
Start: 2021-01-06 | End: 2021-01-06

## 2021-01-06 RX ORDER — IPRATROPIUM BROMIDE AND ALBUTEROL SULFATE 2.5; .5 MG/3ML; MG/3ML
3 SOLUTION RESPIRATORY (INHALATION)
Status: DISCONTINUED | OUTPATIENT
Start: 2021-01-06 | End: 2021-01-09 | Stop reason: HOSPADM

## 2021-01-06 RX ORDER — KETOROLAC TROMETHAMINE 30 MG/ML
15 INJECTION, SOLUTION INTRAMUSCULAR; INTRAVENOUS ONCE
Status: COMPLETED | OUTPATIENT
Start: 2021-01-06 | End: 2021-01-06

## 2021-01-06 RX ORDER — CARVEDILOL 25 MG/1
25 TABLET ORAL 2 TIMES DAILY WITH MEALS
Status: DISCONTINUED | OUTPATIENT
Start: 2021-01-06 | End: 2021-01-09 | Stop reason: HOSPADM

## 2021-01-06 RX ORDER — MORPHINE SULFATE 4 MG/ML
4 INJECTION, SOLUTION INTRAMUSCULAR; INTRAVENOUS
Status: DISCONTINUED | OUTPATIENT
Start: 2021-01-06 | End: 2021-01-09 | Stop reason: HOSPADM

## 2021-01-06 RX ORDER — ONDANSETRON 2 MG/ML
4 INJECTION INTRAMUSCULAR; INTRAVENOUS EVERY 4 HOURS PRN
Status: DISCONTINUED | OUTPATIENT
Start: 2021-01-06 | End: 2021-01-09 | Stop reason: HOSPADM

## 2021-01-06 RX ORDER — OXYCODONE HYDROCHLORIDE AND ACETAMINOPHEN 5; 325 MG/1; MG/1
1 TABLET ORAL EVERY 4 HOURS PRN
Status: DISCONTINUED | OUTPATIENT
Start: 2021-01-06 | End: 2021-01-09 | Stop reason: HOSPADM

## 2021-01-06 RX ORDER — ATORVASTATIN CALCIUM 40 MG/1
40 TABLET, FILM COATED ORAL EVERY EVENING
Status: DISCONTINUED | OUTPATIENT
Start: 2021-01-06 | End: 2021-01-06

## 2021-01-06 RX ORDER — LABETALOL HYDROCHLORIDE 5 MG/ML
10 INJECTION, SOLUTION INTRAVENOUS EVERY 4 HOURS PRN
Status: DISCONTINUED | OUTPATIENT
Start: 2021-01-06 | End: 2021-01-09 | Stop reason: HOSPADM

## 2021-01-06 RX ORDER — PRAVASTATIN SODIUM 20 MG
40 TABLET ORAL
Status: DISCONTINUED | OUTPATIENT
Start: 2021-01-06 | End: 2021-01-09 | Stop reason: HOSPADM

## 2021-01-06 RX ORDER — ACETAMINOPHEN 325 MG/1
650 TABLET ORAL EVERY 4 HOURS PRN
Status: DISCONTINUED | OUTPATIENT
Start: 2021-01-06 | End: 2021-01-09 | Stop reason: HOSPADM

## 2021-01-06 RX ADMIN — AMPICILLIN SODIUM AND SULBACTAM SODIUM 3 G: 2; 1 INJECTION, POWDER, FOR SOLUTION INTRAMUSCULAR; INTRAVENOUS at 12:02

## 2021-01-06 RX ADMIN — ASPIRIN 81 MG: 81 TABLET, CHEWABLE ORAL at 18:00

## 2021-01-06 RX ADMIN — PRAVASTATIN SODIUM 40 MG: 20 TABLET ORAL at 21:30

## 2021-01-06 RX ADMIN — ENOXAPARIN SODIUM 40 MG: 40 INJECTION SUBCUTANEOUS at 15:33

## 2021-01-06 RX ADMIN — CARVEDILOL 25 MG: 25 TABLET, FILM COATED ORAL at 16:59

## 2021-01-06 RX ADMIN — AZITHROMYCIN DIHYDRATE 500 MG: 500 INJECTION, POWDER, LYOPHILIZED, FOR SOLUTION INTRAVENOUS at 12:53

## 2021-01-06 RX ADMIN — KETOROLAC TROMETHAMINE 15 MG: 30 INJECTION, SOLUTION INTRAMUSCULAR; INTRAVENOUS at 21:29

## 2021-01-06 RX ADMIN — IOHEXOL 75 ML: 350 INJECTION, SOLUTION INTRAVENOUS at 10:58

## 2021-01-06 RX ADMIN — MORPHINE SULFATE 4 MG: 4 INJECTION INTRAVENOUS at 21:29

## 2021-01-06 SDOH — HEALTH STABILITY: MENTAL HEALTH: HOW OFTEN DO YOU HAVE A DRINK CONTAINING ALCOHOL?: 2-3 TIMES A WEEK

## 2021-01-06 SDOH — HEALTH STABILITY: MENTAL HEALTH: HOW MANY STANDARD DRINKS CONTAINING ALCOHOL DO YOU HAVE ON A TYPICAL DAY?: 1 OR 2

## 2021-01-06 ASSESSMENT — PATIENT HEALTH QUESTIONNAIRE - PHQ9
2. FEELING DOWN, DEPRESSED, IRRITABLE, OR HOPELESS: NOT AT ALL
1. LITTLE INTEREST OR PLEASURE IN DOING THINGS: NOT AT ALL
SUM OF ALL RESPONSES TO PHQ9 QUESTIONS 1 AND 2: 0

## 2021-01-06 ASSESSMENT — ENCOUNTER SYMPTOMS
MYALGIAS: 1
NAUSEA: 1
COUGH: 1
EYES NEGATIVE: 1
HEADACHES: 1
SHORTNESS OF BREATH: 1
CARDIOVASCULAR NEGATIVE: 1
PSYCHIATRIC NEGATIVE: 1
WHEEZING: 1
SPUTUM PRODUCTION: 1

## 2021-01-06 ASSESSMENT — LIFESTYLE VARIABLES
ALCOHOL_USE: NO
HAVE PEOPLE ANNOYED YOU BY CRITICIZING YOUR DRINKING: NO
EVER FELT BAD OR GUILTY ABOUT YOUR DRINKING: NO
TOTAL SCORE: 0
HAVE YOU EVER FELT YOU SHOULD CUT DOWN ON YOUR DRINKING: NO
TOTAL SCORE: 0
TOTAL SCORE: 0
EVER HAD A DRINK FIRST THING IN THE MORNING TO STEADY YOUR NERVES TO GET RID OF A HANGOVER: NO
CONSUMPTION TOTAL: INCOMPLETE

## 2021-01-06 ASSESSMENT — PAIN SCALES - WONG BAKER: WONGBAKER_NUMERICALRESPONSE: DOESN'T HURT AT ALL

## 2021-01-06 ASSESSMENT — FIBROSIS 4 INDEX: FIB4 SCORE: 1.75

## 2021-01-06 NOTE — ED TRIAGE NOTES
Chief Complaint   Patient presents with   • Shortness of Breath     Pt had cough starting in novemeber, took abx, sob persisted and increased significantly yesterday. Pt went to , told he had pleural effusion.

## 2021-01-06 NOTE — H&P
Hospital Medicine History & Physical Note    Date of Service  1/6/2021    Primary Care Physician  Lavell Tadeo M.D.    Consultants  None    Code Status  Full Code    Chief Complaint  Chief Complaint   Patient presents with   • Shortness of Breath     Pt had cough starting in novemeber, took abx, sob persisted and increased significantly yesterday. Pt went to , told he had pleural effusion.        History of Presenting Illness  80 y.o. male with PMHx of HTN, HLD, CAD, Hypothyroidism who presented 1/6/2021 with Cough and Shortness of Breath. He came into the ED this morning after he was referred for evaluation from a local urgent care center for worsening cough and shortness of breath and a large pleural effusion. He further endorses that he has been suffering from a chronic cough since the beginning of November which initially began as dry but later became more productive with clear, odorless, colorless sputum.  He has also been having exertional dyspnea which he hadn't experienced before. The patient also uses CPAP at night which he states has been helpful. He also added that he was diagnosed with recurrent sinusisitis a month ago and completed an outpatient abx course.  Denies any chest pain, lower extremity edema palpitations, syncope, fever, chills, night sweats, weight loss or loss of appetite. ROS is otherwise negative.     Review of Systems  Review of Systems   Constitutional: Positive for malaise/fatigue.   HENT: Negative.    Eyes: Negative.    Respiratory: Positive for cough, sputum production, shortness of breath and wheezing.    Cardiovascular: Negative.    Gastrointestinal: Positive for nausea.   Genitourinary: Negative.    Musculoskeletal: Positive for myalgias.   Skin: Negative.    Neurological: Positive for headaches.   Endo/Heme/Allergies: Negative.    Psychiatric/Behavioral: Negative.        Past Medical History   has a past medical history of Arrhythmia, Arthritis, Bronchitis (2004), Fibula  fracture (1981), High cholesterol, Hyperlipidemia, Hypertension, MEDICAL HOME, Pain, Pneumonia (2004), and Sleep apnea.    Surgical History   has a past surgical history that includes tonsillectomy (1945); exostosis excision (6/3/2011); lesion excision ortho (6/3/2011); toe arthroplasty (6/3/2011); lumbar laminectomy diskectomy (2005); and hammertoe correction (2/2/2015).     Family History  family history includes Cancer in his father, mother, sister, and another family member.     Social History   reports that he has never smoked. He has never used smokeless tobacco. He reports current alcohol use of about 0.6 - 1.2 oz of alcohol per week. He reports that he does not use drugs.    Allergies  Allergies   Allergen Reactions   • Flexeril [Cyclobenzaprine Hcl]      confusion   • Other Environmental Runny Nose and Itching     Pollens       Medications  Prior to Admission Medications   Prescriptions Last Dose Informant Patient Reported? Taking?   albuterol 108 (90 Base) MCG/ACT Aero Soln inhalation aerosol > 2 days at Unknown Patient No No   Sig: Inhale 2 Puffs every four hours as needed for Shortness of Breath (cough).   aspirin 81 MG tablet 1/5/2021 at 2100 Patient Yes No   Sig: Take 81 mg by mouth every bedtime.   benzonatate (TESSALON) 200 MG capsule Not Taking at Unknown time Patient No No   Sig: Take 1 Cap by mouth 3 times a day as needed for Cough.   Patient not taking: Reported on 1/6/2021   carvedilol (COREG) 25 MG Tab 1/6/2021 at 0700 Patient No No   Sig: TAKE 1 TABLET BY MOUTH TWICE DAILY WITH MEALS   Patient taking differently: Take 25 mg by mouth 2 times a day, with meals.   fluticasone (FLONASE) 50 MCG/ACT nasal spray 1/6/2021 at 0700 Patient No No   Sig: SHAKE LIQUID AND USE 2 SPRAYS IN EACH NOSTRIL TWICE DAILY   Patient taking differently: Administer 1 Spray into affected nostril(S) 2 times a day.   levothyroxine (SYNTHROID) 75 MCG Tab 1/6/2021 at 0700 Patient No No   Sig: TAKE 1 TABLET BY MOUTH EVERY  MORNING ON AN EMPTY STOMACH   Patient taking differently: Take 75 mcg by mouth Every morning on an empty stomach.   lisinopril (PRINIVIL) 10 MG Tab 1/6/2021 at 0700 Patient No No   Sig: Take 1 Tab by mouth every day.   omeprazole (PRILOSEC) 40 MG delayed-release capsule 1/6/2021 at 0700 Patient No No   Sig: Take 1 Cap by mouth every day.   pravastatin (PRAVACHOL) 40 MG tablet 1/5/2021 at 2100 Patient No No   Sig: Take 1 Tab by mouth every day.   Patient taking differently: Take 40 mg by mouth every bedtime.      Facility-Administered Medications: None       Physical Exam  Temp:  [36.5 °C (97.7 °F)-36.6 °C (97.8 °F)] 36.5 °C (97.7 °F)  Pulse:  [62-69] 65  Resp:  [18-20] 18  BP: (137-150)/(68-81) 150/74  SpO2:  [91 %-95 %] 94 %    Physical Exam  Vitals signs reviewed.   Constitutional:       Appearance: He is ill-appearing.   HENT:      Head: Normocephalic and atraumatic.      Right Ear: External ear normal.      Left Ear: External ear normal.      Nose: Nose normal.      Mouth/Throat:      Mouth: Mucous membranes are moist.   Eyes:      Pupils: Pupils are equal, round, and reactive to light.   Neck:      Musculoskeletal: Neck supple.   Cardiovascular:      Rate and Rhythm: Normal rate and regular rhythm.      Pulses: Normal pulses.      Heart sounds: Normal heart sounds.   Pulmonary:      Breath sounds: Wheezing present.   Abdominal:      General: Abdomen is flat.   Musculoskeletal: Normal range of motion.   Skin:     General: Skin is warm.      Capillary Refill: Capillary refill takes less than 2 seconds.   Neurological:      General: No focal deficit present.      Mental Status: He is alert.   Psychiatric:         Mood and Affect: Mood normal.         Laboratory:  Recent Labs     01/06/21  0954   WBC 8.3   RBC 4.52*   HEMOGLOBIN 14.5   HEMATOCRIT 43.5   MCV 96.2   MCH 32.1   MCHC 33.3*   RDW 46.9   PLATELETCT 209   MPV 9.2     Recent Labs     01/06/21  0954   SODIUM 139   POTASSIUM 4.3   CHLORIDE 104   CO2 23    GLUCOSE 111*   BUN 15   CREATININE 1.07   CALCIUM 9.0     Recent Labs     01/06/21  0954   ALTSGPT 15   ASTSGOT 15   ALKPHOSPHAT 102*   TBILIRUBIN 0.5   LIPASE 28   GLUCOSE 111*         No results for input(s): NTPROBNP in the last 72 hours.      No results for input(s): TROPONINT in the last 72 hours.    Imaging:  CT-CHEST (THORAX) WITH   Final Result      1.  Moderate to large left pleural effusion, low density. Extension into the anterior left hemithorax suggests some degree of loculation. Adjacent airspace disease is likely compressive atelectasis. Superimposed pneumonia cannot be excluded.      2.  Atherosclerosis.      3.  Peripheral reticulation with faint groundglass density in mild right lower lobe atelectasis. Findings are consistent with interstitial lung disease.                     Assessment/Plan:  I anticipate this patient will require at least two midnights for appropriate medical management, necessitating inpatient admission.    Pneumonia  Assessment & Plan  Patient presented for chronic cough and worsening dyspnea  Sent from Urgent Care for evaluation of pleural effusion    CT Chest was performed on admission and showed a moderate to large left pleural effusion, low density. Extension into the anterior left hemithorax suggests some degree of loculation. Adjacent airspace disease is likely compressive atelectasis. Superimposed pneumonia cannot be excluded. Peripheral reticulation with faint groundglass density in mild right lower lobe atelectasis. Findings are consistent with interstitial lung disease.    CBC/BMP-WNL  Patient Saturating 92% on Room Air   COVID-Pending  PNA with Parapneumonic Effusion  Patient Started on Unasyn/Azithromycin-Continue with Both  If Pleural Effusion does not improve after 48 hours of IV Abx-Consult IR for possible Thoracocentesis  Patient currently saturating 94% on RA, continue monitoring oxygenation. If Oxygen Saturation increases, place patient on NC and titrate to  Keep O2 Sat 93% and above  CPAP at Night  Trend CBC/BMP  Duoneb Q4H PRN  Pain Control-Tylenol, Percocet  Diet-Cardiac  DVT Prophylaxis-Lovenox  GI Prophylaxis-Not indicated  Zofran for N/V      Hypothyroid- (present on admission)  Assessment & Plan  Continue with Levothyroxine 75mcg 1 Hour Before Breakfast Daily    Sleep apnea- (present on admission)  Assessment & Plan  Continue with CPAP at Night     Dyslipidemia- (present on admission)  Assessment & Plan  Continue with Atorvastatin 40mg QHS  Obtain Lipid Panel  Obtain HbA1C    Hypertension- (present on admission)  Assessment & Plan  Patients BP stable on admission  Continue with Coreg 25mg BID  Continue with Lisinopril 10mg Daily  Resume Anti-Hypertensives with Holding Parameters

## 2021-01-06 NOTE — ASSESSMENT & PLAN NOTE
Patients BP stable on admission  Continue with Coreg 25mg BID  Continue with Lisinopril 10mg Daily  Cont monitoring

## 2021-01-06 NOTE — TELEPHONE ENCOUNTER
I called patient to check to see why he had not gone to the ER yet. He told me he is going to go tomorrow, after he sees his PCP Dr Tadeo.  I encouraged him to go to the ER today instead of waiting. He said he will go tonight if his symptoms , otherwise will wait to see what Dr Tadeo says.

## 2021-01-06 NOTE — DISCHARGE PLANNING
ER CM spoke with pt via cell. ID verified. He is alert and pleasant. He lives with spouse in 1 story home with 2-3 steps in. He used to use a cane. He wears a brace on his knee due to ACL issues. He has brace on. He has used hearing aids in past but they are not in hospital, he lost them at home. He sleeps with a CPAP and has it in his car. He is concerned and wants it brought in for the night. Advised to update RN at bedside when in room.  He will need distilled h20. Will need to advise RT. CPAP is from [facility].  RX [facility]. No Other DME or O2 at home. PCP Dr Tadeo  Care Transition Team Assessment    Information Source  Orientation : Oriented x 4  Information Given By: Patient  Informant's Name: Shane  Who is responsible for making decisions for patient? : Patient         Elopement Risk  Legal Hold: No    Interdisciplinary Discharge Planning  Primary Care Physician: Dr Tadeo  Lives with - Patient's Self Care Capacity: Spouse  Support Systems: Spouse / Significant Other  Housing / Facility: 1 Story House(2-3 steps)  Mobility Issues: Yes(Knee issues)  Assistance Needed: No  Durable Medical Equipment: Other - Specify(cane, brace all time on knee ( ACL issues) CPAP preferred ho)    Discharge Preparedness  What is your plan after discharge?: Uncertain - pending medical team collaboration         Finances  Pres[facility]: [facility]    Vision / Hearing Impairment  Vision Impairment : Yes  Hearing Impairment : Yes(Hearing aid not at hospital ( lost it at home ))  Hearing Impairment: Right Ear  Does Pt Need Special Equipment for the Hearing Impaired?: No              Domestic Abuse  Have you ever been the victim of abuse or violence?: No    Psychological Assessment  History of Substance Abuse: None         Anticipated Discharge Information  Discharge Disposition: Still a Patient (30)

## 2021-01-06 NOTE — TELEPHONE ENCOUNTER
"Patient went to Urgent Care this morning and they did a chest xray. Chest xray showed a pleural effusion. Patient was advised by urgent care to go to ER for treatment of pleural effusion. Patient refusing to go to ED wants to bring wife to appointment in morning then go to ED. Advise to call 911 if he has any difficulty with breathing.       Reason for Disposition  • Chest pain  (Exception: MILD central chest pain, present only when coughing)    Additional Information  • Negative: Severe difficulty breathing (e.g., struggling for each breath, speaks in single words)  • Negative: [1] Lips or face are bluish now AND [2] persists when not coughing  • Negative: Sounds like a life-threatening emergency to the triager  • Negative: Chest pain is main symptom  • Negative: [1] Dry (non-productive) cough AND [2] < 3 weeks duration     (i.e., no sputum or minimal clear sputum)  • Negative: [1] Wet (productive) cough AND [2] < 3 weeks duration     (i.e., white-yellow, yellow, green, or latricia colored sputum)  • Negative: [1] Previous asthma attacks AND [2] this feels like asthma attack    Answer Assessment - Initial Assessment Questions  1. ONSET: \"When did the cough begin?\"      On December 8th went to urgent care received antibiotic has been progressing  2. SEVERITY: \"How bad is the cough today?\"    actual cough is better, worse in evening  3. RESPIRATORY DISTRESS: \"Describe your breathing.\"     No respiratory distress able to talk  4. FEVER: \"Do you have a fever?\" If so, ask: \"What is your temperature, how was it measured, and when did it start?\"     No fever  5. SPUTUM: \"Describe the color of your sputum\" (e.g., clear, white, yellow, green), \"Has there been any change recently?\"    Coughing up sputum clear  6. HEMOPTYSIS: \"Are you coughing up any blood?\" If so ask: \"How much, flecks, streaks, tablespoons, etc.?\"      No blood  7. CARDIAC HISTORY: \"Do you have any history of heart disease?\" (e.g., heart attack, congestive " "heart failure)    none  8. LUNG HISTORY: \"Do you have any history of lung disease?\"  (e.g., pulmonary embolus, asthma, emphysema/COPD)     None,   9. OTHER SYMPTOMS: \"Do you have any other symptoms? (e.g., runny nose, wheezing, chest pain)  No runny nose usually in morning chronically, wheezing little in evening, no chest pain  10. PREGNANCY: \"Is there any chance you are pregnant?\" \"When was your last menstrual period?\"     no  11. TRAVEL: \"Have you traveled out of the country in the last month?\" (e.g., travel history, exposures)    no    Protocols used: COUGH - CHRONIC-A-AH      "

## 2021-01-07 PROBLEM — J90 PLEURAL EFFUSION ON LEFT: Status: ACTIVE | Noted: 2021-01-07

## 2021-01-07 PROBLEM — R73.03 PREDIABETES: Status: ACTIVE | Noted: 2021-01-07

## 2021-01-07 LAB
ANION GAP SERPL CALC-SCNC: 11 MMOL/L (ref 7–16)
BASOPHILS # BLD AUTO: 0.6 % (ref 0–1.8)
BASOPHILS # BLD: 0.04 K/UL (ref 0–0.12)
BUN SERPL-MCNC: 20 MG/DL (ref 8–22)
CALCIUM SERPL-MCNC: 8.7 MG/DL (ref 8.4–10.2)
CHLORIDE SERPL-SCNC: 102 MMOL/L (ref 96–112)
CHOLEST SERPL-MCNC: 103 MG/DL (ref 100–199)
CO2 SERPL-SCNC: 23 MMOL/L (ref 20–33)
CREAT SERPL-MCNC: 1.28 MG/DL (ref 0.5–1.4)
EOSINOPHIL # BLD AUTO: 0.38 K/UL (ref 0–0.51)
EOSINOPHIL NFR BLD: 5.6 % (ref 0–6.9)
ERYTHROCYTE [DISTWIDTH] IN BLOOD BY AUTOMATED COUNT: 47.7 FL (ref 35.9–50)
GLUCOSE SERPL-MCNC: 104 MG/DL (ref 65–99)
HCT VFR BLD AUTO: 41.5 % (ref 42–52)
HDLC SERPL-MCNC: 38 MG/DL
HGB BLD-MCNC: 13.7 G/DL (ref 14–18)
IMM GRANULOCYTES # BLD AUTO: 0.02 K/UL (ref 0–0.11)
IMM GRANULOCYTES NFR BLD AUTO: 0.3 % (ref 0–0.9)
LDLC SERPL CALC-MCNC: 46 MG/DL
LYMPHOCYTES # BLD AUTO: 1.48 K/UL (ref 1–4.8)
LYMPHOCYTES NFR BLD: 21.8 % (ref 22–41)
MCH RBC QN AUTO: 32 PG (ref 27–33)
MCHC RBC AUTO-ENTMCNC: 33 G/DL (ref 33.7–35.3)
MCV RBC AUTO: 97 FL (ref 81.4–97.8)
MONOCYTES # BLD AUTO: 0.79 K/UL (ref 0–0.85)
MONOCYTES NFR BLD AUTO: 11.6 % (ref 0–13.4)
NEUTROPHILS # BLD AUTO: 4.08 K/UL (ref 1.82–7.42)
NEUTROPHILS NFR BLD: 60.1 % (ref 44–72)
NRBC # BLD AUTO: 0 K/UL
NRBC BLD-RTO: 0 /100 WBC
PLATELET # BLD AUTO: 209 K/UL (ref 164–446)
PMV BLD AUTO: 9.3 FL (ref 9–12.9)
POTASSIUM SERPL-SCNC: 4.5 MMOL/L (ref 3.6–5.5)
PROCALCITONIN SERPL-MCNC: 0.04 NG/ML
RBC # BLD AUTO: 4.28 M/UL (ref 4.7–6.1)
SODIUM SERPL-SCNC: 136 MMOL/L (ref 135–145)
TRIGL SERPL-MCNC: 94 MG/DL (ref 0–149)
WBC # BLD AUTO: 6.8 K/UL (ref 4.8–10.8)

## 2021-01-07 PROCEDURE — A9270 NON-COVERED ITEM OR SERVICE: HCPCS | Performed by: STUDENT IN AN ORGANIZED HEALTH CARE EDUCATION/TRAINING PROGRAM

## 2021-01-07 PROCEDURE — 99232 SBSQ HOSP IP/OBS MODERATE 35: CPT | Performed by: STUDENT IN AN ORGANIZED HEALTH CARE EDUCATION/TRAINING PROGRAM

## 2021-01-07 PROCEDURE — 80061 LIPID PANEL: CPT

## 2021-01-07 PROCEDURE — 85025 COMPLETE CBC W/AUTO DIFF WBC: CPT

## 2021-01-07 PROCEDURE — 94760 N-INVAS EAR/PLS OXIMETRY 1: CPT

## 2021-01-07 PROCEDURE — 770006 HCHG ROOM/CARE - MED/SURG/GYN SEMI*

## 2021-01-07 PROCEDURE — 700111 HCHG RX REV CODE 636 W/ 250 OVERRIDE (IP): Performed by: STUDENT IN AN ORGANIZED HEALTH CARE EDUCATION/TRAINING PROGRAM

## 2021-01-07 PROCEDURE — 80048 BASIC METABOLIC PNL TOTAL CA: CPT

## 2021-01-07 PROCEDURE — 700102 HCHG RX REV CODE 250 W/ 637 OVERRIDE(OP): Performed by: STUDENT IN AN ORGANIZED HEALTH CARE EDUCATION/TRAINING PROGRAM

## 2021-01-07 PROCEDURE — 84145 PROCALCITONIN (PCT): CPT

## 2021-01-07 RX ORDER — AZITHROMYCIN 250 MG/1
500 TABLET, FILM COATED ORAL DAILY
Status: DISCONTINUED | OUTPATIENT
Start: 2021-01-07 | End: 2021-01-07

## 2021-01-07 RX ADMIN — ENOXAPARIN SODIUM 40 MG: 40 INJECTION SUBCUTANEOUS at 06:24

## 2021-01-07 RX ADMIN — PRAVASTATIN SODIUM 40 MG: 20 TABLET ORAL at 20:07

## 2021-01-07 RX ADMIN — LEVOTHYROXINE SODIUM 75 MCG: 0.07 TABLET ORAL at 06:25

## 2021-01-07 RX ADMIN — CARVEDILOL 25 MG: 25 TABLET, FILM COATED ORAL at 18:07

## 2021-01-07 RX ADMIN — ASPIRIN 81 MG: 81 TABLET, CHEWABLE ORAL at 18:07

## 2021-01-07 RX ADMIN — CARVEDILOL 25 MG: 25 TABLET, FILM COATED ORAL at 08:36

## 2021-01-07 RX ADMIN — LISINOPRIL 10 MG: 5 TABLET ORAL at 06:24

## 2021-01-07 ASSESSMENT — ENCOUNTER SYMPTOMS
NAUSEA: 0
WHEEZING: 1
BLURRED VISION: 0
PALPITATIONS: 0
CHILLS: 0
COUGH: 1
DEPRESSION: 0
SPUTUM PRODUCTION: 1
VOMITING: 0
BRUISES/BLEEDS EASILY: 0
FEVER: 0
HEMOPTYSIS: 0
MYALGIAS: 0
FOCAL WEAKNESS: 0
SHORTNESS OF BREATH: 1

## 2021-01-07 ASSESSMENT — COGNITIVE AND FUNCTIONAL STATUS - GENERAL
DAILY ACTIVITIY SCORE: 24
SUGGESTED CMS G CODE MODIFIER DAILY ACTIVITY: CH
SUGGESTED CMS G CODE MODIFIER MOBILITY: CI
CLIMB 3 TO 5 STEPS WITH RAILING: A LITTLE
MOBILITY SCORE: 23

## 2021-01-07 ASSESSMENT — PAIN DESCRIPTION - PAIN TYPE: TYPE: ACUTE PAIN

## 2021-01-07 NOTE — PROGRESS NOTES
Report given to ZEHRA Ware, he will resume patient care, patient COVID not detected, will be transferred off floor, to be transferred via bed

## 2021-01-07 NOTE — PROGRESS NOTES
Received shift handoff report from ZEHRA Mendoza. Pt is in bed and stable. Bed locked and in lowest position, upper side rails up, call light in reach. Will continue to monitor.

## 2021-01-07 NOTE — PROGRESS NOTES
[facility] Medicine Daily Progress Note    Date of Service  1/7/2021    Chief Complaint  80 y.o. male admitted 1/6/2021 with   Chief Complaint   Patient presents with   • Shortness of Breath     Pt had cough starting in novemeber, took abx, sob persisted and increased significantly yesterday. Pt went to , told he had pleural effusion.          Hospital Course    80 y.o. male with PMHx of HTN, HLD, CAD, Hypothyroidism who presented 1/6/2021 with Cough and Shortness of Breath. He came into the ED after he was referred for evaluation from a local urgent care center for worsening cough and shortness of breath and a large pleural effusion. He further endorses that he has been suffering from a chronic cough since the beginning of November which initially began as dry but later became more productive with clear, odorless, colorless sputum. He has also been having exertional dyspnea which he hadn't experienced before. The patient also uses CPAP at night which he states has been helpful.     Here CT chest showed moderate to large left pleural effusion, low density. Extension into the anterior left hemithorax suggests some degree of loculation. Adjacent airspace disease is likely compressive atelectasis. Superimposed pneumonia cannot be excluded. Procalcitonin neg. Covid neg. IR consulted for paracentesis. On unasyn and azithromycin 1/6-dc'ed due to neg procalcitonin    Interval Problem Update  Patient was seen and examined at the bedside.   VS reviewed.   Patient reports coughing with wheezing. No chest pain, fever, chills. On RA but intermittently his O2 saturation drops below 90s.    Consultants/Specialty  IR for paracentesis    Code Status  Full Code    Disposition  TBD    Review of Systems  Review of Systems   Constitutional: Negative for chills and fever.   HENT: Negative for ear discharge.    Eyes: Negative for blurred vision.   Respiratory: Positive for cough, sputum production, shortness of breath and wheezing.  Negative for hemoptysis.    Cardiovascular: Negative for chest pain and palpitations.   Gastrointestinal: Negative for nausea and vomiting.   Genitourinary: Negative for dysuria.   Musculoskeletal: Negative for myalgias.   Skin: Negative for rash.   Neurological: Negative for focal weakness.   Endo/Heme/Allergies: Does not bruise/bleed easily.   Psychiatric/Behavioral: Negative for depression.        Physical Exam  Temp:  [36.4 °C (97.6 °F)-37.1 °C (98.8 °F)] 36.4 °C (97.6 °F)  Pulse:  [57-69] 67  Resp:  [14-28] 28  BP: (118-150)/(65-82) 141/65  SpO2:  [90 %-95 %] 90 %    Physical Exam  Vitals signs and nursing note reviewed.   Constitutional:       Appearance: Normal appearance.   HENT:      Head: Normocephalic and atraumatic.      Mouth/Throat:      Pharynx: Oropharynx is clear.   Eyes:      Pupils: Pupils are equal, round, and reactive to light.   Cardiovascular:      Rate and Rhythm: Normal rate and regular rhythm.   Pulmonary:      Effort: Pulmonary effort is normal. No respiratory distress.      Breath sounds: No stridor. Wheezing present. No rhonchi.      Comments: Diminished breath sounds on the left side of chest.    Chest:      Chest wall: No tenderness.   Abdominal:      General: Abdomen is flat. Bowel sounds are normal.      Palpations: Abdomen is soft.   Musculoskeletal: Normal range of motion.   Skin:     General: Skin is warm and dry.   Neurological:      General: No focal deficit present.      Mental Status: He is alert and oriented to person, place, and time.   Psychiatric:         Mood and Affect: Mood normal.         Behavior: Behavior normal.         Fluids    Intake/Output Summary (Last 24 hours) at 1/7/2021 1126  Last data filed at 1/6/2021 1232  Gross per 24 hour   Intake 100 ml   Output --   Net 100 ml       Laboratory  Recent Labs     01/06/21  0954 01/07/21  0425   WBC 8.3 6.8   RBC 4.52* 4.28*   HEMOGLOBIN 14.5 13.7*   HEMATOCRIT 43.5 41.5*   MCV 96.2 97.0   MCH 32.1 32.0   MCHC 33.3*  33.0*   RDW 46.9 47.7   PLATELETCT 209 209   MPV 9.2 9.3     Recent Labs     01/06/21  0954 01/07/21  0425   SODIUM 139 136   POTASSIUM 4.3 4.5   CHLORIDE 104 102   CO2 23 23   GLUCOSE 111* 104*   BUN 15 20   CREATININE 1.07 1.28   CALCIUM 9.0 8.7             Recent Labs     01/07/21  0425   TRIGLYCERIDE 94   HDL 38*   LDL 46       Imaging  CT-CHEST (THORAX) WITH   Final Result      1.  Moderate to large left pleural effusion, low density. Extension into the anterior left hemithorax suggests some degree of loculation. Adjacent airspace disease is likely compressive atelectasis. Superimposed pneumonia cannot be excluded.      2.  Atherosclerosis.      3.  Peripheral reticulation with faint groundglass density in mild right lower lobe atelectasis. Findings are consistent with interstitial lung disease.               IR-THORACENTESIS PUNCTURE LEFT    (Results Pending)        Assessment/Plan  * Pneumonia  Assessment & Plan  Patient presented for chronic cough and worsening dyspnea  CT Chest: moderate to large left pleural effusion, low density. Extension into the anterior left hemithorax suggests some degree of loculation. Adjacent airspace disease is likely compressive atelectasis. Superimposed pneumonia cannot be excluded.  Covid negative  Procalcitonin negative  -IR consulted for paracentesis  -On unasyn and azithromcyin 1/6  -Duoneb prn  -CPAP at Night  -Currently on RA, keep SaO2 >94%        Pleural effusion on left  Assessment & Plan  CT chest: moderate to large left pleural effusion, low density. Extension into the anterior left hemithorax suggests some degree of loculation. Adjacent airspace disease is likely compressive atelectasis. Superimposed pneumonia cannot be excluded.  - IR consulted for paracentesis  -On Unasyn and azithromycin 1/5    Prediabetes  Assessment & Plan  A1C 6.0  Diet and lifestyle modifications recommended    Hypothyroid- (present on admission)  Assessment & Plan  Continue with  Levothyroxine 75mcg 1 Hour Before Breakfast Daily    Sleep apnea- (present on admission)  Assessment & Plan  Continue with CPAP at Night     Dyslipidemia- (present on admission)  Assessment & Plan  Continue with Atorvastatin 40mg QHS  Obtain HbA1C 6.0    Hypertension- (present on admission)  Assessment & Plan  Patients BP stable on admission  Continue with Coreg 25mg BID  Continue with Lisinopril 10mg Daily  Cont monitoring       VTE prophylaxis: lovenox

## 2021-01-07 NOTE — PROGRESS NOTES
Patient c/o chest pain, seen sitting on side of bed, states pain level currently 3/10, relieving on when he is sitting up midsternal pain, MD oliveira paged and updated on chest pain, charge made aware, stat EKG being performed, results pending

## 2021-01-07 NOTE — ASSESSMENT & PLAN NOTE
CT chest: moderate to large left pleural effusion, low density. Extension into the anterior left hemithorax suggests some degree of loculation. Adjacent airspace disease is likely compressive atelectasis. Superimposed pneumonia cannot be excluded.  - IR consulted for paracentesis, scheduled 1/8  -On Unasyn and azithromycin 1/5, dc'ed on 1/7 due to no leukocytosis, no fever, procal neg

## 2021-01-07 NOTE — TELEPHONE ENCOUNTER
Please call the patient, he was seen in urgent care and had an x-ray done showing some fluid in his lungs.      Urgent care advised him to go to the emergency room, if the patient is having any shortness of breath or worsening symptoms he should go directly to the emergency room.    Otherwise please set him up with a follow-up appointment with myself next available.

## 2021-01-07 NOTE — FLOWSHEET NOTE
01/07/21 0235   Events/Summary/Plan   Events/Summary/Plan CPAP (reservoir refilled) continuous   Skin Inspection Respiratory Device Intact   Vital Signs   Pulse 66   Respiration 17   Pulse Oximetry 93 %   $ Pulse Oximetry (Spot Check) Yes   Respiratory Assessment   Respiratory Pattern Within Normal Limits   Chest Exam   Work Of Breathing / Effort Within Normal Limits   Oxygen   O2 (LPM) 0   FiO2% 21 %   O2 Delivery Device CPAP   Non-Invasive Ventilation [facility]   Nocturnal CPAP or BIPAP CPAP - [facility]   Settings (If Known) 7.5   FiO2 or LPM 0

## 2021-01-07 NOTE — FLOWSHEET NOTE
"Pt states home setting between 10-15. Started pt on 12, pt could not tolerate, \"too strong\".  Titrated pressure to 7.5. pt unable to tolerate higher pressure at this time.  Water chamber at appropriate level. Pt resting comfortably  "

## 2021-01-08 ENCOUNTER — APPOINTMENT (OUTPATIENT)
Dept: RADIOLOGY | Facility: MEDICAL CENTER | Age: 81
DRG: 194 | End: 2021-01-08
Attending: STUDENT IN AN ORGANIZED HEALTH CARE EDUCATION/TRAINING PROGRAM
Payer: MEDICARE

## 2021-01-08 LAB
AMYLASE FLD-CCNC: 37 U/L
ANION GAP SERPL CALC-SCNC: 11 MMOL/L (ref 7–16)
APPEARANCE FLD: NORMAL
APTT PPP: 32.1 SEC (ref 24.7–36)
BASOPHILS # BLD AUTO: 0.7 % (ref 0–1.8)
BASOPHILS # BLD: 0.05 K/UL (ref 0–0.12)
BODY FLD TYPE: NORMAL
BUN SERPL-MCNC: 21 MG/DL (ref 8–22)
CALCIUM SERPL-MCNC: 9.1 MG/DL (ref 8.4–10.2)
CHLORIDE SERPL-SCNC: 101 MMOL/L (ref 96–112)
CO2 SERPL-SCNC: 22 MMOL/L (ref 20–33)
COLOR FLD: NORMAL
CREAT SERPL-MCNC: 1.08 MG/DL (ref 0.5–1.4)
CYTOLOGY REG CYTOL: NORMAL
CYTOLOGY REG CYTOL: NORMAL
EOSINOPHIL # BLD AUTO: 0.3 K/UL (ref 0–0.51)
EOSINOPHIL NFR BLD: 4.4 % (ref 0–6.9)
EOSINOPHIL NFR FLD: 5 %
ERYTHROCYTE [DISTWIDTH] IN BLOOD BY AUTOMATED COUNT: 47.3 FL (ref 35.9–50)
GLUCOSE FLD-MCNC: 94 MG/DL
GLUCOSE SERPL-MCNC: 100 MG/DL (ref 65–99)
HCT VFR BLD AUTO: 42.6 % (ref 42–52)
HGB BLD-MCNC: 14.3 G/DL (ref 14–18)
IMM GRANULOCYTES # BLD AUTO: 0.03 K/UL (ref 0–0.11)
IMM GRANULOCYTES NFR BLD AUTO: 0.4 % (ref 0–0.9)
INR PPP: 1.13 (ref 0.87–1.13)
LDH FLD L TO P-CCNC: 273 U/L
LYMPHOCYTES # BLD AUTO: 1.52 K/UL (ref 1–4.8)
LYMPHOCYTES NFR BLD: 22.4 % (ref 22–41)
LYMPHOCYTES NFR FLD: 92 %
MCH RBC QN AUTO: 32 PG (ref 27–33)
MCHC RBC AUTO-ENTMCNC: 33.6 G/DL (ref 33.7–35.3)
MCV RBC AUTO: 95.3 FL (ref 81.4–97.8)
MONOCYTES # BLD AUTO: 0.67 K/UL (ref 0–0.85)
MONOCYTES NFR BLD AUTO: 9.9 % (ref 0–13.4)
MONONUC CELLS NFR FLD: 2 %
NEUTROPHILS # BLD AUTO: 4.21 K/UL (ref 1.82–7.42)
NEUTROPHILS NFR BLD: 62.2 % (ref 44–72)
NEUTROPHILS NFR FLD: 1 %
NRBC # BLD AUTO: 0 K/UL
NRBC BLD-RTO: 0 /100 WBC
PH FLD: 7.5 [PH]
PLATELET # BLD AUTO: 213 K/UL (ref 164–446)
PMV BLD AUTO: 9.2 FL (ref 9–12.9)
POTASSIUM SERPL-SCNC: 4.3 MMOL/L (ref 3.6–5.5)
PROT FLD-MCNC: 4.1 G/DL
PROTHROMBIN TIME: 14.2 SEC (ref 12–14.6)
RBC # BLD AUTO: 4.47 M/UL (ref 4.7–6.1)
RBC # FLD: NORMAL CELLS/UL
SODIUM SERPL-SCNC: 134 MMOL/L (ref 135–145)
WBC # BLD AUTO: 6.8 K/UL (ref 4.8–10.8)
WBC # FLD: 3573 CELLS/UL

## 2021-01-08 PROCEDURE — 71045 X-RAY EXAM CHEST 1 VIEW: CPT

## 2021-01-08 PROCEDURE — 700111 HCHG RX REV CODE 636 W/ 250 OVERRIDE (IP): Performed by: STUDENT IN AN ORGANIZED HEALTH CARE EDUCATION/TRAINING PROGRAM

## 2021-01-08 PROCEDURE — 88341 IMHCHEM/IMCYTCHM EA ADD ANTB: CPT

## 2021-01-08 PROCEDURE — A9270 NON-COVERED ITEM OR SERVICE: HCPCS | Performed by: STUDENT IN AN ORGANIZED HEALTH CARE EDUCATION/TRAINING PROGRAM

## 2021-01-08 PROCEDURE — 85025 COMPLETE CBC W/AUTO DIFF WBC: CPT

## 2021-01-08 PROCEDURE — 770006 HCHG ROOM/CARE - MED/SURG/GYN SEMI*

## 2021-01-08 PROCEDURE — 88305 TISSUE EXAM BY PATHOLOGIST: CPT

## 2021-01-08 PROCEDURE — 84157 ASSAY OF PROTEIN OTHER: CPT

## 2021-01-08 PROCEDURE — C1729 CATH, DRAINAGE: HCPCS

## 2021-01-08 PROCEDURE — 88112 CYTOPATH CELL ENHANCE TECH: CPT

## 2021-01-08 PROCEDURE — 88342 IMHCHEM/IMCYTCHM 1ST ANTB: CPT

## 2021-01-08 PROCEDURE — 85730 THROMBOPLASTIN TIME PARTIAL: CPT

## 2021-01-08 PROCEDURE — 99232 SBSQ HOSP IP/OBS MODERATE 35: CPT | Performed by: STUDENT IN AN ORGANIZED HEALTH CARE EDUCATION/TRAINING PROGRAM

## 2021-01-08 PROCEDURE — 87070 CULTURE OTHR SPECIMN AEROBIC: CPT

## 2021-01-08 PROCEDURE — 700102 HCHG RX REV CODE 250 W/ 637 OVERRIDE(OP): Performed by: STUDENT IN AN ORGANIZED HEALTH CARE EDUCATION/TRAINING PROGRAM

## 2021-01-08 PROCEDURE — 82945 GLUCOSE OTHER FLUID: CPT

## 2021-01-08 PROCEDURE — 80048 BASIC METABOLIC PNL TOTAL CA: CPT

## 2021-01-08 PROCEDURE — 87116 MYCOBACTERIA CULTURE: CPT

## 2021-01-08 PROCEDURE — 83615 LACTATE (LD) (LDH) ENZYME: CPT

## 2021-01-08 PROCEDURE — 82150 ASSAY OF AMYLASE: CPT

## 2021-01-08 PROCEDURE — 83986 ASSAY PH BODY FLUID NOS: CPT

## 2021-01-08 PROCEDURE — 0W9B3ZZ DRAINAGE OF LEFT PLEURAL CAVITY, PERCUTANEOUS APPROACH: ICD-10-PCS | Performed by: RADIOLOGY

## 2021-01-08 PROCEDURE — 94760 N-INVAS EAR/PLS OXIMETRY 1: CPT

## 2021-01-08 PROCEDURE — 85610 PROTHROMBIN TIME: CPT

## 2021-01-08 PROCEDURE — 87015 SPECIMEN INFECT AGNT CONCNTJ: CPT

## 2021-01-08 PROCEDURE — 89051 BODY FLUID CELL COUNT: CPT

## 2021-01-08 PROCEDURE — 94660 CPAP INITIATION&MGMT: CPT

## 2021-01-08 PROCEDURE — 87102 FUNGUS ISOLATION CULTURE: CPT

## 2021-01-08 PROCEDURE — 87205 SMEAR GRAM STAIN: CPT

## 2021-01-08 RX ADMIN — PRAVASTATIN SODIUM 40 MG: 20 TABLET ORAL at 19:58

## 2021-01-08 RX ADMIN — LEVOTHYROXINE SODIUM 75 MCG: 0.07 TABLET ORAL at 05:20

## 2021-01-08 RX ADMIN — CARVEDILOL 25 MG: 25 TABLET, FILM COATED ORAL at 18:19

## 2021-01-08 RX ADMIN — LISINOPRIL 10 MG: 5 TABLET ORAL at 05:21

## 2021-01-08 RX ADMIN — CARVEDILOL 25 MG: 25 TABLET, FILM COATED ORAL at 08:52

## 2021-01-08 RX ADMIN — ENOXAPARIN SODIUM 40 MG: 40 INJECTION SUBCUTANEOUS at 05:20

## 2021-01-08 RX ADMIN — ASPIRIN 81 MG: 81 TABLET, CHEWABLE ORAL at 18:20

## 2021-01-08 ASSESSMENT — ENCOUNTER SYMPTOMS
BLURRED VISION: 0
HEMOPTYSIS: 0
SHORTNESS OF BREATH: 1
WHEEZING: 1
NAUSEA: 0
FEVER: 0
CHILLS: 0
VOMITING: 0
COUGH: 1
SPUTUM PRODUCTION: 1
PALPITATIONS: 0
FOCAL WEAKNESS: 0
BRUISES/BLEEDS EASILY: 0
MYALGIAS: 0
DEPRESSION: 0

## 2021-01-08 NOTE — PROGRESS NOTES
[facility] Medicine Daily Progress Note    Date of Service  1/8/2021    Chief Complaint  80 y.o. male admitted 1/6/2021 with   Chief Complaint   Patient presents with   • Shortness of Breath     Pt had cough starting in novemeber, took abx, sob persisted and increased significantly yesterday. Pt went to , told he had pleural effusion.          Hospital Course    80 y.o. male with PMHx of HTN, HLD, CAD, Hypothyroidism who presented 1/6/2021 with Cough and Shortness of Breath. He came into the ED after he was referred for evaluation from a local urgent care center for worsening cough and shortness of breath and a large pleural effusion. He further endorses that he has been suffering from a chronic cough since the beginning of November which initially began as dry but later became more productive with clear, odorless, colorless sputum. He has also been having exertional dyspnea which he hadn't experienced before. The patient also uses CPAP at night which he states has been helpful.     Here CT chest showed moderate to large left pleural effusion, low density. Extension into the anterior left hemithorax suggests some degree of loculation. Adjacent airspace disease is likely compressive atelectasis. Superimposed pneumonia cannot be excluded. Procalcitonin neg. Covid neg.    Paracentesis 1/8. On unasyn and azithromycin 1/6-dc'ed due to neg procalcitonin    Interval Problem Update  Patient was seen and examined at the bedside.   VS reviewed.   Patient reports coughing with wheezing. No chest pain, fever, chills. On RA but intermittently his O2 saturation drops below 90s.    Consultants/Specialty  IR for paracentesis    Code Status  Full Code    Disposition  TBD    Review of Systems  Review of Systems   Constitutional: Negative for chills and fever.   HENT: Negative for ear discharge.    Eyes: Negative for blurred vision.   Respiratory: Positive for cough, sputum production, shortness of breath and wheezing. Negative for  hemoptysis.    Cardiovascular: Negative for chest pain and palpitations.   Gastrointestinal: Negative for nausea and vomiting.   Genitourinary: Negative for dysuria.   Musculoskeletal: Negative for myalgias.   Skin: Negative for rash.   Neurological: Negative for focal weakness.   Endo/Heme/Allergies: Does not bruise/bleed easily.   Psychiatric/Behavioral: Negative for depression.        Physical Exam  Temp:  [36.2 °C (97.2 °F)-36.8 °C (98.3 °F)] 36.2 °C (97.2 °F)  Pulse:  [58-76] 72  Resp:  [18-20] 20  BP: (121-145)/() 121/70  SpO2:  [90 %-93 %] 93 %    Physical Exam  Vitals signs and nursing note reviewed.   Constitutional:       Appearance: Normal appearance.   HENT:      Head: Normocephalic and atraumatic.      Mouth/Throat:      Pharynx: Oropharynx is clear.   Eyes:      Pupils: Pupils are equal, round, and reactive to light.   Cardiovascular:      Rate and Rhythm: Normal rate and regular rhythm.   Pulmonary:      Effort: Pulmonary effort is normal. No respiratory distress.      Breath sounds: No stridor. Wheezing present. No rhonchi.      Comments: Diminished breath sounds on the left side of chest.    Chest:      Chest wall: No tenderness.   Abdominal:      General: Abdomen is flat. Bowel sounds are normal.      Palpations: Abdomen is soft.   Musculoskeletal: Normal range of motion.   Skin:     General: Skin is warm and dry.   Neurological:      General: No focal deficit present.      Mental Status: He is alert and oriented to person, place, and time.   Psychiatric:         Mood and Affect: Mood normal.         Behavior: Behavior normal.         Fluids  No intake or output data in the 24 hours ending 01/08/21 1116    Laboratory  Recent Labs     01/06/21  0954 01/07/21  0425 01/08/21  0342   WBC 8.3 6.8 6.8   RBC 4.52* 4.28* 4.47*   HEMOGLOBIN 14.5 13.7* 14.3   HEMATOCRIT 43.5 41.5* 42.6   MCV 96.2 97.0 95.3   MCH 32.1 32.0 32.0   MCHC 33.3* 33.0* 33.6*   RDW 46.9 47.7 47.3   PLATELETCT 209 209 213   MPV  9.2 9.3 9.2     Recent Labs     01/06/21  0954 01/07/21  0425 01/08/21  0342   SODIUM 139 136 134*   POTASSIUM 4.3 4.5 4.3   CHLORIDE 104 102 101   CO2 23 23 22   GLUCOSE 111* 104* 100*   BUN 15 20 21   CREATININE 1.07 1.28 1.08   CALCIUM 9.0 8.7 9.1     Recent Labs     01/08/21  0342   APTT 32.1   INR 1.13         Recent Labs     01/07/21  0425   TRIGLYCERIDE 94   HDL 38*   LDL 46       Imaging  CT-CHEST (THORAX) WITH   Final Result      1.  Moderate to large left pleural effusion, low density. Extension into the anterior left hemithorax suggests some degree of loculation. Adjacent airspace disease is likely compressive atelectasis. Superimposed pneumonia cannot be excluded.      2.  Atherosclerosis.      3.  Peripheral reticulation with faint groundglass density in mild right lower lobe atelectasis. Findings are consistent with interstitial lung disease.               US-THORACENTESIS PUNCTURE LEFT    (Results Pending)        Assessment/Plan  * Pneumonia  Assessment & Plan  Patient presented for chronic cough and worsening dyspnea  CT Chest: moderate to large left pleural effusion, low density. Extension into the anterior left hemithorax suggests some degree of loculation. Adjacent airspace disease is likely compressive atelectasis. Superimposed pneumonia cannot be excluded.  Covid negative  Procalcitonin negative  Blood culture negative  -IR consulted for paracentesis, scheduled on 1/8  -On unasyn and azithromcyin 1/6, dc'ed given neg procal  -Duoneb prn  -CPAP at Night  -Currently on RA, keep SaO2 >94%        Pleural effusion on left  Assessment & Plan  CT chest: moderate to large left pleural effusion, low density. Extension into the anterior left hemithorax suggests some degree of loculation. Adjacent airspace disease is likely compressive atelectasis. Superimposed pneumonia cannot be excluded.  - IR consulted for paracentesis, scheduled 1/8  -On Unasyn and azithromycin 1/5, dc'ed on 1/7 due to no  leukocytosis, no fever, procal neg    Prediabetes  Assessment & Plan  A1C 6.0  Diet and lifestyle modifications recommended    Hypothyroid- (present on admission)  Assessment & Plan  Continue with Levothyroxine 75mcg 1 Hour Before Breakfast Daily    Sleep apnea- (present on admission)  Assessment & Plan  Continue with CPAP at Night     Dyslipidemia- (present on admission)  Assessment & Plan  Continue with Atorvastatin 40mg QHS  Obtain HbA1C 6.0    Hypertension- (present on admission)  Assessment & Plan  Patients BP stable on admission  Continue with Coreg 25mg BID  Continue with Lisinopril 10mg Daily  Cont monitoring       VTE prophylaxis: lovenox

## 2021-01-08 NOTE — OR SURGEON
Immediate Post- Operative Note    PostOp Diagnosis: Effusion    Procedure(s): Lt thoracentesis with sonographic guidance    Estimated Blood Loss: Less than 2 ml    Complications: None    1/8/2021     12:39 PM     Trae Henry M.D.

## 2021-01-08 NOTE — PROGRESS NOTES
Bedside report give to ZEHRA Jimenes. POC discussed. Patient resting comfortably in bed. Care released

## 2021-01-08 NOTE — PROGRESS NOTES
Report received from Maryana SHAHID. Plan of care reviewed. Pt sitting up in bed. Denies any needs at this time. Safety precautions in place. Call light within reach.

## 2021-01-09 VITALS
WEIGHT: 274.47 LBS | TEMPERATURE: 98.6 F | HEIGHT: 74 IN | RESPIRATION RATE: 18 BRPM | HEART RATE: 70 BPM | BODY MASS INDEX: 35.23 KG/M2 | OXYGEN SATURATION: 93 % | SYSTOLIC BLOOD PRESSURE: 113 MMHG | DIASTOLIC BLOOD PRESSURE: 67 MMHG

## 2021-01-09 PROBLEM — J18.9 PNEUMONIA: Status: RESOLVED | Noted: 2021-01-06 | Resolved: 2021-01-09

## 2021-01-09 LAB
ALBUMIN SERPL BCP-MCNC: 3.4 G/DL (ref 3.2–4.9)
ALBUMIN/GLOB SERPL: 1 G/DL
ALP SERPL-CCNC: 85 U/L (ref 30–99)
ALT SERPL-CCNC: 12 U/L (ref 2–50)
ANION GAP SERPL CALC-SCNC: 12 MMOL/L (ref 7–16)
AST SERPL-CCNC: 13 U/L (ref 12–45)
BILIRUB SERPL-MCNC: 0.6 MG/DL (ref 0.1–1.5)
BUN SERPL-MCNC: 22 MG/DL (ref 8–22)
CALCIUM SERPL-MCNC: 9.3 MG/DL (ref 8.4–10.2)
CHLORIDE SERPL-SCNC: 99 MMOL/L (ref 96–112)
CO2 SERPL-SCNC: 24 MMOL/L (ref 20–33)
CREAT SERPL-MCNC: 1.22 MG/DL (ref 0.5–1.4)
ERYTHROCYTE [DISTWIDTH] IN BLOOD BY AUTOMATED COUNT: 47.7 FL (ref 35.9–50)
GLOBULIN SER CALC-MCNC: 3.4 G/DL (ref 1.9–3.5)
GLUCOSE SERPL-MCNC: 99 MG/DL (ref 65–99)
GRAM STN SPEC: NORMAL
HCT VFR BLD AUTO: 45.1 % (ref 42–52)
HGB BLD-MCNC: 14.9 G/DL (ref 14–18)
LDH SERPL L TO P-CCNC: 177 U/L (ref 107–266)
MCH RBC QN AUTO: 32 PG (ref 27–33)
MCHC RBC AUTO-ENTMCNC: 33 G/DL (ref 33.7–35.3)
MCV RBC AUTO: 96.8 FL (ref 81.4–97.8)
PLATELET # BLD AUTO: 219 K/UL (ref 164–446)
PMV BLD AUTO: 9.1 FL (ref 9–12.9)
POTASSIUM SERPL-SCNC: 4.1 MMOL/L (ref 3.6–5.5)
PROCALCITONIN SERPL-MCNC: 0.05 NG/ML
PROT SERPL-MCNC: 6.8 G/DL (ref 6–8.2)
RBC # BLD AUTO: 4.66 M/UL (ref 4.7–6.1)
SIGNIFICANT IND 70042: NORMAL
SITE SITE: NORMAL
SODIUM SERPL-SCNC: 135 MMOL/L (ref 135–145)
SOURCE SOURCE: NORMAL
WBC # BLD AUTO: 8.3 K/UL (ref 4.8–10.8)

## 2021-01-09 PROCEDURE — A9270 NON-COVERED ITEM OR SERVICE: HCPCS | Performed by: STUDENT IN AN ORGANIZED HEALTH CARE EDUCATION/TRAINING PROGRAM

## 2021-01-09 PROCEDURE — 94760 N-INVAS EAR/PLS OXIMETRY 1: CPT

## 2021-01-09 PROCEDURE — 85027 COMPLETE CBC AUTOMATED: CPT

## 2021-01-09 PROCEDURE — 84145 PROCALCITONIN (PCT): CPT

## 2021-01-09 PROCEDURE — 83615 LACTATE (LD) (LDH) ENZYME: CPT

## 2021-01-09 PROCEDURE — 700111 HCHG RX REV CODE 636 W/ 250 OVERRIDE (IP): Performed by: STUDENT IN AN ORGANIZED HEALTH CARE EDUCATION/TRAINING PROGRAM

## 2021-01-09 PROCEDURE — 80053 COMPREHEN METABOLIC PANEL: CPT

## 2021-01-09 PROCEDURE — 99239 HOSP IP/OBS DSCHRG MGMT >30: CPT | Performed by: STUDENT IN AN ORGANIZED HEALTH CARE EDUCATION/TRAINING PROGRAM

## 2021-01-09 PROCEDURE — 700102 HCHG RX REV CODE 250 W/ 637 OVERRIDE(OP): Performed by: STUDENT IN AN ORGANIZED HEALTH CARE EDUCATION/TRAINING PROGRAM

## 2021-01-09 RX ADMIN — LEVOTHYROXINE SODIUM 75 MCG: 0.07 TABLET ORAL at 05:33

## 2021-01-09 RX ADMIN — CARVEDILOL 25 MG: 25 TABLET, FILM COATED ORAL at 08:34

## 2021-01-09 RX ADMIN — ENOXAPARIN SODIUM 40 MG: 40 INJECTION SUBCUTANEOUS at 05:33

## 2021-01-09 RX ADMIN — LISINOPRIL 10 MG: 5 TABLET ORAL at 05:33

## 2021-01-09 NOTE — DISCHARGE SUMMARY
Discharge Summary    CHIEF COMPLAINT ON ADMISSION  Chief Complaint   Patient presents with   • Shortness of Breath     Pt had cough starting in novemeber, took abx, sob persisted and increased significantly yesterday. Pt went to , told he had pleural effusion.        Reason for Admission  Sent by ,Shortness of Breath     Admission Date  1/6/2021    CODE STATUS  Full Code    HPI & HOSPITAL COURSE  80 y.o. male with PMHx of HTN, HLD, CAD, Hypothyroidism who presented 1/6/2021 with Cough and Shortness of Breath. He came into the ED after he was referred for evaluation from a local urgent care center for worsening cough and shortness of breath and a large pleural effusion. He further endorses that he has been suffering from a chronic cough since the beginning of November which initially began as dry but later became more productive with clear, odorless, colorless sputum. He has also been having exertional dyspnea which he hadn't experienced before. The patient also uses CPAP at night which he states has been helpful.      Here CT chest showed moderate to large left pleural effusion, low density. Extension into the anterior left hemithorax suggests some degree of loculation. Adjacent airspace disease is likely compressive atelectasis. Superimposed pneumonia cannot be excluded. Procalcitonin neg. Covid neg.     IR consulted, s/p paracentesis 1/8 and fluid pH 7.5, , total protein 4.1, c/w exudative.  Fluid Gram stain negative and fluid culture negative.  Procalcitonin  Negative. He was onn unasyn and azithromycin 1/6 and GA'ed on 1/7.     He is on room air while resting, oxygen saturation 90 to 91% while ambulation.    Therefore, he is discharged in fair and stable condition to home with close outpatient follow-up.  He is advised to follow-up with PCP in 1 week and pulmonology for further evaluation for pleural effusion    The patient met 2-midnight criteria for an inpatient stay at the time of  discharge.    Discharge Date  1/9/2021    FOLLOW UP ITEMS POST DISCHARGE  PCP  Pulmonary    DISCHARGE DIAGNOSES  Principal Problem (Resolved):    Pneumonia POA: Unknown  Active Problems:    Pleural effusion on left POA: Unknown    Hypertension (Chronic) POA: Yes      Overview: 6/07 echocardiogram borderline LVH      1/09 VEENA left 1.3, right 1.3      9/10 urine mac 2      3/11 u/s vascular lower ext negative      5/11 [facility] note       5/12 [facility] note on coreg 25 bid for paroxysmal atrial fibrillation and       vasotec 10 mg qday      6/12 on coreg 25 bid      id, off vasotec      7/2/12 [facility] note      7/13 cardiology note      1/21/14 ultrasound carotid negative; on coreg 25 mg bid, asa      5/9/14 echo normal ejection fraction 65%, mild LVH      5/16/14 decrease coreg to 12.5 mg bid, cont asa      8/21/14 cardiology note follow up one year      3/21/16 urine mac <8 on coreg 12.5 mg      3/10/17 urine mac 3 on coreg 12.5 mg       5/2/17 cardiology note one episode atrial fibrillation 2012 will continue       on aspirin, encourage weight loss, follow-up one year      9/27/18 on coreg 25 mg bid add lisinopril 10 mg      4/24/19 bun 16,creat 1.4,urine mac 33      6/13/19 bun 17,creat 1.35,GFR 51 on lisinopril 10 mg                                   Dyslipidemia (Chronic) POA: Yes      Overview: 4/09 chol 189,trig 87,hdl 45,ldl 127      9/09 chol 206,trig 90,hdl 52,ldl 136 off lipitor/zocor      12/09 chol 183,trig 84,hdl 42,ldl 124      2/10 [facility] note chol 153,ldl 124 started by cards on pravastatin 40 since       ?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      9/10 chol 135,trig 52,hdl 46,ldl 79 on pravachol      9/11 chol 145,trig 64,hdl 52,ldl 80 on pravachol      5/12 chol 146,trig 98,hdl 46,ldl 80 on pravachol 40 mg      10/12 chol 133,trig 65,hdl 46,ldl 74 on pravachol 40 mg      7/19/13 chol 121,trig 65,hdl 48,ldl 60 on pravachol 40 mg      9/3/13 chol 131,trig 75,hdl 49,ldl 67 on pravachol 40 mg      4/11/14 chol 150,trig  42,hdl 62,ldl 80 on pravachol 40 mg      8/22/14 chol 134,trig 73,hdl 54,ldl 65 on pravachol 40 mg      4/28/15 chol 114,trig 59,hdl 51,ldl 51 on pravachol 40 mg      3/21/16 chol 124,trig 57,hdl 47,ldl 66 on pravachol 40 mg      3/10/17 chol 151,trig 89,hdl 53,ldl 80 on pravachol 40 mg      4/10/18 chol 158,trig 134,hdl 56,ldl 75 on pravachol 40 mg      4/24/19 chol 118,trig 83,hdl 51,ldl 50 on pravachol 40 mg      2/24/20 chol 132,trig 96,hdl 46,ldl 67 on pravachol 40 mg                            Sleep apnea (Chronic) POA: Yes      Overview: 2/18/14 overnight pulse oximetry room air time less than 88% saturation       182 minutes, low saturation 67%, basal saturation 87%, apnea index 42       events per hour      4/11/14 pt would like to repeat tests, initial test done while he had       bronchitis      6/18/14 nocturnal oximetry <89% for 231 minutes, low 63%, AHI per hour       55;will refer to [facility]      2/11/15 [facility] note, recommend cpap titration      3/21/15 [facility] sleep study 191 minutes,AHI 57,hypopnea index 42,minimum       saturation 81%,improved on cpap 11 titration study      4/30/15 [facility] sleep note mask fit auto CPAP 10-15, followup one month      12/14/15 [facility] sleep note AHI 57 on autopap 10-16, 100% compliance AHI       decreased to 0.6      3/2/17 sleep note       6/14/18 sleep note continue cpap 10-16 follow up one year      6/5/19 sleep note on cpap 10-16 compliance 100%                Hypothyroid (Chronic) POA: Yes      Overview: 5/19/12 tsh 7.45      4/28/15 tsh 7.49 start synthroid 50 mcg repeat tsh 6 weeks      8/28/15 tsh 3.2 on synthroid 50 mcg      3/21/16 hypothyroid 6.1 on synthroid 50 mcg, increase to synthroid 75 mcg       repeat tsh 6 weeks      5/6/16 tsh 2.4 on synthroid 75 mcg      3/10/17 tsh 3.9 on synthroid 75 mcg      4/10/18 tsh 3.6 on synthroid 75 mcg      6/13/19 tsh 3.2 on synthroid 75 mcg      2/24/20 tsh 2.9 on synthroid 75 mcg          Prediabetes POA: Unknown      FOLLOW  UP  Future Appointments   Date Time Provider Department Center   1/18/2021 10:20 AM Stefanie Zavaleta M.D. [facility] None     Lavell NIKOLAS Tadeo M.D.  [address]  [id number]  [place] [id number]  [phone]    In 1 week  Post discharge follow-up      MEDICATIONS ON DISCHARGE     Medication List      CHANGE how you take these medications      Instructions   carvedilol 25 MG Tabs  What changed: when to take this  Commonly known as: COREG   TAKE 1 TABLET BY MOUTH TWICE DAILY WITH MEALS     fluticasone 50 MCG/ACT nasal spray  What changed: See the new instructions.  Commonly known as: FLONASE   SHAKE LIQUID AND USE 2 SPRAYS IN EACH NOSTRIL TWICE DAILY     levothyroxine 75 MCG Tabs  What changed:   · when to take this  · additional instructions  Commonly known as: SYNTHROID   TAKE 1 TABLET BY MOUTH EVERY MORNING ON AN EMPTY STOMACH     pravastatin 40 MG tablet  What changed: when to take this  Commonly known as: PRAVACHOL   Take 1 Tab by mouth every day.  Dose: 40 mg        CONTINUE taking these medications      Instructions   albuterol 108 (90 Base) MCG/ACT Aers inhalation aerosol   Inhale 2 Puffs every four hours as needed for Shortness of Breath (cough).  Dose: 2 Puff     aspirin 81 MG tablet   Take 81 mg by mouth every bedtime.  Dose: 81 mg     benzonatate 200 MG capsule  Commonly known as: TESSALON   Take 1 Cap by mouth 3 times a day as needed for Cough.  Dose: 200 mg     lisinopril 10 MG Tabs  Commonly known as: PRINIVIL   Take 1 Tab by mouth every day.  Dose: 10 mg     omeprazole 40 MG delayed-release capsule  Commonly known as: PRILOSEC   Take 1 Cap by mouth every day.  Dose: 40 mg            Allergies  Allergies   Allergen Reactions   • Flexeril [Cyclobenzaprine Hcl]      confusion   • Other Environmental Runny Nose and Itching     Pollens       DIET  Orders Placed This Encounter   Procedures   • Diet Order Diet: Cardiac     Standing Status:   Standing     Number of Occurrences:   1     Order Specific Question:    Diet:     Answer:   Cardiac [6]       ACTIVITY  As tolerated.  Weight bearing as tolerated    CONSULTATIONS  IR-thoracentesis    PROCEDURES  Thoracentesis on 1/8    LABORATORY  Lab Results   Component Value Date    SODIUM 135 01/09/2021    POTASSIUM 4.1 01/09/2021    CHLORIDE 99 01/09/2021    CO2 24 01/09/2021    GLUCOSE 99 01/09/2021    BUN 22 01/09/2021    CREATININE 1.22 01/09/2021        Lab Results   Component Value Date    WBC 8.3 01/09/2021    HEMOGLOBIN 14.9 01/09/2021    HEMATOCRIT 45.1 01/09/2021    PLATELETCT 219 01/09/2021        Total time of the discharge process exceeds 40 minutes.

## 2021-01-09 NOTE — FLOWSHEET NOTE
01/08/21 2037   Events/Summary/Plan   Events/Summary/Plan CPAP started reservoir filled, sterile H20, patient was asleep when this RT entered room   Skin Inspection Respiratory Device Intact   Vital Signs   Pulse 63   Respiration 16   Pulse Oximetry 93 %   $ Pulse Oximetry (Spot Check) Yes   Respiratory Assessment   Respiratory Pattern Within Normal Limits   Level of Consciousness Alert   Chest Exam   Work Of Breathing / Effort Within Normal Limits   Oxygen   O2 (LPM) 0   FiO2% 21 %   O2 Delivery Device CPAP   Non-Invasive Ventilation [facility] [employer]   Nocturnal CPAP or BIPAP CPAP - [facility]   $ System Evaluation Yes   Settings (If Known) 7.5   FiO2 or LPM 0

## 2021-01-09 NOTE — FLOWSHEET NOTE
01/09/21 0200   Events/Summary/Plan   Events/Summary/Plan CPAP continuous   Skin Inspection Respiratory Device Intact   Vital Signs   Pulse 63   Respiration 17   Pulse Oximetry 91 %   $ Pulse Oximetry (Spot Check) Yes   Oxygen   O2 (LPM) 0   FiO2% 21 %   O2 Delivery Device CPAP   Non-Invasive Ventilation [facility]   Nocturnal CPAP or BIPAP CPAP - [facility]   Settings (If Known) 7.5   FiO2 or LPM 0

## 2021-01-09 NOTE — DISCHARGE INSTRUCTIONS
Discharge Instructions    Discharged to home by car with self. Discharged via wheelchair, hospital escort: Yes.  Special equipment needed: Not Applicable    Be sure to schedule a follow-up appointment with your primary care doctor or any specialists as instructed.     Discharge Plan:   Diet Plan: Discussed  Activity Level: Discussed  Confirmed Follow up Appointment: Appointment Scheduled  Confirmed Symptoms Management: Discussed  Medication Reconciliation Updated: Yes  Influenza Vaccine Indication: Patient Refuses    I understand that a diet low in cholesterol, fat, and sodium is recommended for good health. Unless I have been given specific instructions below for another diet, I accept this instruction as my diet prescription.   Other diet: cardiac    Special Instructions:     · Is patient discharged on Warfarin / Coumadin?   No     Depression / Suicide Risk    As [facility] are [facility], it is important to learn how to keep safe from harming yourself.    Recognize the warning signs:  · Abrupt changes in personality, positive or negative- including increase in energy   · Giving away possessions  · Change in eating patterns- significant weight changes-  positive or negative  · Change in sleeping patterns- unable to sleep or sleeping all the time   · Unwillingness or inability to communicate  · Depression  · Unusual sadness, discouragement and loneliness  · Talk of wanting to die  · Neglect of personal appearance   · Rebelliousness- reckless behavior  · Withdrawal from people/activities they love  · Confusion- inability to concentrate     If you or a loved one observes any of these behaviors or has concerns about self-harm, here's what you can do:  · Talk about it- your feelings and reasons for harming yourself  · Remove any means that you might use to hurt yourself (examples: pills, rope, extension cords, firearm)  · Get professional help from the community (Mental Health, Substance  Abuse, psychological counseling)  · Do not be alone:Call your Safe Contact- someone whom you trust who will be there for you.  · Call your local CRISIS HOTLINE 338-4422 or 338-189-6179  · Call your local Children's Mobile Crisis Response Team [place] (042) 428-5891 or www.[employer]  · Call the toll free National Suicide Prevention Hotlines   · National Suicide Prevention Lifeline 016-564-DCSP (7218)  · [place] [employer] Line Network 800-SUICIDE (592-4068)    Discharge Instructions per Shelton Snowden M.D.    1. Please follow-up with PCP as outpatient.   2. You might need to follow up with pulmonologist for further evaluation of pleural effusion    DIET: low salt diet    ACTIVITY: As tolerated    DIAGNOSIS: Left pleural effusion s/p thoracentesis on 1/8; prediabetes; hypothyroidism; sleep apnea on CPAP; hyperlipidemia; hypertension    Return to ER in the event of new or worsening symptoms. Please note importance of compliance and the patient has agreed to proceed with all medical recommendations and follow up plan indicated above. All medications come with benefits and risks. Risks may include permanent injury or death and these risks can be minimized with close reassessment and monitoring. Please make it to your scheduled follow ups with PCP in one week

## 2021-01-09 NOTE — PROGRESS NOTES
Handoff report given to Yudy.  POC reviewed.  Pt's daughter, Farideh, at the bedside.  Pt states he is comfortable.  Bed in low position, safety measures in place, call light within reach.

## 2021-01-11 DIAGNOSIS — Z23 NEED FOR VACCINATION: ICD-10-CM

## 2021-01-11 LAB
BACTERIA BLD CULT: NORMAL
BACTERIA BLD CULT: NORMAL
SIGNIFICANT IND 70042: NORMAL
SIGNIFICANT IND 70042: NORMAL
SITE SITE: NORMAL
SITE SITE: NORMAL
SOURCE SOURCE: NORMAL
SOURCE SOURCE: NORMAL

## 2021-01-12 ENCOUNTER — TELEPHONE (OUTPATIENT)
Dept: MEDICAL GROUP | Facility: MEDICAL CENTER | Age: 81
End: 2021-01-12

## 2021-01-12 NOTE — TELEPHONE ENCOUNTER
ESTABLISHED PATIENT PRE-VISIT PLANNING     Patient was NOT contacted to complete PVP.     Note: Patient will not be contacted if there is no indication to call.     1.  Reviewed notes from the last few office visits within the medical group: Yes    2.  If any orders were placed at last visit or intended to be done for this visit (i.e. 6 mos follow-up), do we have Results/Consult Notes?         •  Labs - Labs were not ordered at last office visit.  Note: If patient appointment is for lab review and patient did not complete labs, check with provider if OK to reschedule patient until labs completed.       •  Imaging - Imaging was not ordered at last office visit.       •  Referrals - No referrals were ordered at last office visit.    3. Is this appointment scheduled as a Hospital Follow-Up? No    4.  Immunizations were updated in Epic using Reconcile Outside Information activity? Yes    5.  Patient is due for the following Health Maintenance Topics:   Health Maintenance Due   Topic Date Due   • COVID-19 Vaccine (1 of 2) 03/11/1956   • IMM ZOSTER VACCINES (2 of 3) 01/26/2010       6.  AHA (Pulse[employer]) form printed for Provider? Email sent to [facility] requesting form

## 2021-01-13 LAB
BACTERIA FLD AEROBE CULT: NORMAL
GRAM STN SPEC: NORMAL
SIGNIFICANT IND 70042: NORMAL
SITE SITE: NORMAL
SOURCE SOURCE: NORMAL

## 2021-01-14 ENCOUNTER — OFFICE VISIT (OUTPATIENT)
Dept: MEDICAL GROUP | Facility: MEDICAL CENTER | Age: 81
End: 2021-01-14
Payer: MEDICARE

## 2021-01-14 ENCOUNTER — HOSPITAL ENCOUNTER (OUTPATIENT)
Dept: RADIOLOGY | Facility: MEDICAL CENTER | Age: 81
End: 2021-01-14
Attending: NURSE PRACTITIONER
Payer: MEDICARE

## 2021-01-14 ENCOUNTER — TELEPHONE (OUTPATIENT)
Dept: MEDICAL GROUP | Facility: MEDICAL CENTER | Age: 81
End: 2021-01-14

## 2021-01-14 VITALS
SYSTOLIC BLOOD PRESSURE: 132 MMHG | DIASTOLIC BLOOD PRESSURE: 66 MMHG | BODY MASS INDEX: 33.75 KG/M2 | HEIGHT: 74 IN | RESPIRATION RATE: 16 BRPM | TEMPERATURE: 97.7 F | HEART RATE: 69 BPM | WEIGHT: 263 LBS | OXYGEN SATURATION: 95 %

## 2021-01-14 DIAGNOSIS — J90 PLEURAL EFFUSION ON LEFT: ICD-10-CM

## 2021-01-14 PROCEDURE — 71046 X-RAY EXAM CHEST 2 VIEWS: CPT

## 2021-01-14 PROCEDURE — 99214 OFFICE O/P EST MOD 30 MIN: CPT | Performed by: NURSE PRACTITIONER

## 2021-01-14 ASSESSMENT — PATIENT HEALTH QUESTIONNAIRE - PHQ9: CLINICAL INTERPRETATION OF PHQ2 SCORE: 0

## 2021-01-14 ASSESSMENT — FIBROSIS 4 INDEX: FIB4 SCORE: 1.39

## 2021-01-14 NOTE — TELEPHONE ENCOUNTER
Please let pt know that the cxr shows mod pleural effusion currently.  If SOB worsens over next wk until he sees Zsikla or pulmonary then let me know and i will start lasix.

## 2021-01-14 NOTE — PROGRESS NOTES
Subjective:     Farhat Stahl is a 80 y.o. male who presents with after hospitalization for left large pleural effusion.    HPI:   Seen in f/u after hospitalization for left large pleural effusion.  heh as had a chronic cough since nov.  He was recently seen in  and send to ER for large left pleural effusion.  They did a thoracentesis with 1000 cc fluid.  The pathology results showed no cancer.  There were reactive changes.  No other swelling.  Still has the cough.  It is sl better.  He has thoracentesis.  He needs referral to pulm for further evaluation.   No fever, chills or sweating.  SOB is sl improved but still occurs with mild exertion. He is not on diuretics at this time.  No current edema.  His last CXR on 1/8/2021 shows improved effusion and interstitial opacity prob r/t edema.  He was not sent home with diuretics or pulm referral.  He is going to establish care with new provider, Sarah Zavaleta,  Next week.    Patient Active Problem List    Diagnosis Date Noted   • Pleural effusion on left 01/07/2021     Priority: High   • Prediabetes 01/07/2021   • History of squamous cell carcinoma of skin 02/28/2020   • Arthritis of knee 03/09/2017   • Tubular adenoma of colon 07/06/2016   • Blackwell's esophagus 07/06/2016   • History of ulcer disease 06/23/2016   • Decreased GFR 03/21/2016   • Peripheral autonomic neuropathy 09/23/2015   • Hypothyroid 04/28/2015   • Obesity (BMI 30-39.9) 08/22/2014   • History of vertigo 05/13/2014   • Sleep apnea 02/20/2014   • Renal cyst 01/22/2014   • History of compression fracture 01/21/2014   • Sensorineural hearing loss 11/26/2012   • Impaired fasting glucose 10/10/2012   • History of atrial fibrillation 05/22/2012   • History of foot pain 03/06/2010   • Hypertension 09/16/2009   • Dyslipidemia 09/16/2009   • Allergic rhinitis 09/16/2009   • S/p lumbar surgery 09/16/2009   • Preventative health care 09/16/2009       Current medicines (including changes today)  Current  Outpatient Medications   Medication Sig Dispense Refill   • carvedilol (COREG) 25 MG Tab TAKE 1 TABLET BY MOUTH TWICE DAILY WITH MEALS (Patient taking differently: Take 25 mg by mouth 2 times a day, with meals.) 180 Tab 1   • lisinopril (PRINIVIL) 10 MG Tab Take 1 Tab by mouth every day. 100 Tab 1   • levothyroxine (SYNTHROID) 75 MCG Tab TAKE 1 TABLET BY MOUTH EVERY MORNING ON AN EMPTY STOMACH (Patient taking differently: Take 75 mcg by mouth Every morning on an empty stomach.) 90 Tab 2   • pravastatin (PRAVACHOL) 40 MG tablet Take 1 Tab by mouth every day. (Patient taking differently: Take 40 mg by mouth every bedtime.) 100 Tab 2   • fluticasone (FLONASE) 50 MCG/ACT nasal spray SHAKE LIQUID AND USE 2 SPRAYS IN EACH NOSTRIL TWICE DAILY (Patient taking differently: Administer 1 Spray into affected nostril(S) 2 times a day.) 16 g 5   • omeprazole (PRILOSEC) 40 MG delayed-release capsule Take 1 Cap by mouth every day. 30 Cap 11   • aspirin 81 MG tablet Take 81 mg by mouth every bedtime.       No current facility-administered medications for this visit.        Allergies   Allergen Reactions   • Flexeril [Cyclobenzaprine Hcl]      confusion   • Other Environmental Runny Nose and Itching     Pollens       ROS  Constitutional: Negative. Negative for fever, chills, weight loss, malaise/fatigue and diaphoresis.   HENT: Negative. Negative for hearing loss, ear pain, nosebleeds, sore throat, neck pain, tinnitus and ear discharge.   Respiratory: Negative. Negative forhemoptysis, sputum production, shortness of breath, wheezing and stridor.   Cardiovascular: Negative. Negative for chest pain, palpitations, orthopnea, claudication, leg swelling and PND.   Gastrointestinal: Denies nausea, vomiting, diarrhea, constipation, heartburn, melena or hematochezia.  Genitourinary: Denies dysuria, hematuria, urinary incontinence, frequency or urgency.        Objective:     /66 (BP Location: Right arm, Patient Position: Sitting, BP  "Cuff Size: Adult)   Pulse 69   Temp 36.5 °C (97.7 °F) (Temporal)   Resp 16   Ht 1.88 m (6' 2\")   Wt 119.3 kg (263 lb)   SpO2 95%  Body mass index is 33.77 kg/m².    Physical Exam:  Vitals reviewed.  Constitutional: Oriented to person, place, and time. appears well-developed and well-nourished. No distress.   Cardiovascular: Normal rate, regular rhythm, normal heart sounds and intact distal pulses. Exam reveals no gallop and no friction rub. No murmur heard. No carotid bruits.   Pulmonary/Chest: Effort normal. No stridor. No respiratory distress. no wheezes.  Crackles noted entire left lung, clear on rt. exhibits no tenderness.   Musculoskeletal: Normal range of motion. exhibits no edema. sumi pedal pulses trace.  Lymphadenopathy: No cervical or supraclavicular adenopathy.   Neurological: Alert and oriented to person, place, and time. exhibits normal muscle tone.  Skin: Skin is warm and dry. No diaphoresis.   Psychiatric: Normal mood and affect. Behavior is normal.      Assessment and Plan:     The following treatment plan was discussed:    1. Pleural effusion on left  REFERRAL TO PULMONARY AND SLEEP MEDICINE    DX-CHEST-2 VIEWS    CXR today.  refer to pulmonary.  f/u w/pt w/results.  f/u with new PCP as sched next week         Followup: Return if symptoms worsen or fail to improve.  "

## 2021-01-18 ENCOUNTER — APPOINTMENT (OUTPATIENT)
Dept: MEDICAL GROUP | Facility: PHYSICIAN GROUP | Age: 81
End: 2021-01-18
Payer: MEDICARE

## 2021-01-18 DIAGNOSIS — E03.9 HYPOTHYROIDISM, UNSPECIFIED TYPE: Chronic | ICD-10-CM

## 2021-01-18 DIAGNOSIS — I10 ESSENTIAL HYPERTENSION: ICD-10-CM

## 2021-01-18 DIAGNOSIS — E78.5 DYSLIPIDEMIA: ICD-10-CM

## 2021-01-19 ENCOUNTER — TELEPHONE (OUTPATIENT)
Dept: MEDICAL GROUP | Facility: MEDICAL CENTER | Age: 81
End: 2021-01-19

## 2021-01-19 NOTE — TELEPHONE ENCOUNTER
Dr. Kristen Mccullough notified via voice mail re: need for admission orders. ESTABLISHED PATIENT PRE-VISIT PLANNING     Patient was NOT contacted to complete PVP.     Note: Patient will not be contacted if there is no indication to call.     1.  Reviewed notes from the last few office visits within the medical group: No    2.  If any orders were placed at last visit or intended to be done for this visit (i.e. 6 mos follow-up), do we have Results/Consult Notes?         •  Labs - Labs were not ordered at last office visit.  Note: If patient appointment is for lab review and patient did not complete labs, check with provider if OK to reschedule patient until labs completed.       •  Imaging - Imaging ordered, completed and results are in chart.       •  Referrals - Referral ordered, patient has NOT been seen.    3. Is this appointment scheduled as a Hospital Follow-Up? No    4.  Immunizations were updated in Epic using Reconcile Outside Information activity? Yes    5.  Patient is due for the following Health Maintenance Topics:   Health Maintenance Due   Topic Date Due   • COVID-19 Vaccine (1 of 2) 03/11/1956   • IMM ZOSTER VACCINES (2 of 3) 01/26/2010       6.  AHA (Pulse8) form printed for Provider? Yes

## 2021-01-21 ENCOUNTER — OFFICE VISIT (OUTPATIENT)
Dept: SLEEP MEDICINE | Facility: MEDICAL CENTER | Age: 81
End: 2021-01-21
Payer: MEDICARE

## 2021-01-21 ENCOUNTER — OFFICE VISIT (OUTPATIENT)
Dept: MEDICAL GROUP | Facility: PHYSICIAN GROUP | Age: 81
End: 2021-01-21
Payer: MEDICARE

## 2021-01-21 VITALS
HEIGHT: 74 IN | SYSTOLIC BLOOD PRESSURE: 114 MMHG | TEMPERATURE: 98.2 F | RESPIRATION RATE: 16 BRPM | BODY MASS INDEX: 35.29 KG/M2 | DIASTOLIC BLOOD PRESSURE: 68 MMHG | WEIGHT: 275 LBS | OXYGEN SATURATION: 93 % | HEART RATE: 70 BPM

## 2021-01-21 VITALS
BODY MASS INDEX: 34.39 KG/M2 | WEIGHT: 268 LBS | HEIGHT: 74 IN | TEMPERATURE: 98.8 F | SYSTOLIC BLOOD PRESSURE: 124 MMHG | OXYGEN SATURATION: 96 % | HEART RATE: 70 BPM | DIASTOLIC BLOOD PRESSURE: 72 MMHG

## 2021-01-21 DIAGNOSIS — I10 ESSENTIAL HYPERTENSION: Chronic | ICD-10-CM

## 2021-01-21 DIAGNOSIS — B35.1 ONYCHOMYCOSIS: ICD-10-CM

## 2021-01-21 DIAGNOSIS — E03.9 ACQUIRED HYPOTHYROIDISM: Chronic | ICD-10-CM

## 2021-01-21 DIAGNOSIS — J90 PLEURAL EFFUSION: ICD-10-CM

## 2021-01-21 DIAGNOSIS — E03.9 HYPOTHYROIDISM, UNSPECIFIED TYPE: Chronic | ICD-10-CM

## 2021-01-21 DIAGNOSIS — R73.01 IMPAIRED FASTING GLUCOSE: Chronic | ICD-10-CM

## 2021-01-21 DIAGNOSIS — K21.9 GASTROESOPHAGEAL REFLUX DISEASE WITHOUT ESOPHAGITIS: ICD-10-CM

## 2021-01-21 DIAGNOSIS — E66.9 CLASS 1 OBESITY WITH BODY MASS INDEX (BMI) OF 33.0 TO 33.9 IN ADULT, UNSPECIFIED OBESITY TYPE, UNSPECIFIED WHETHER SERIOUS COMORBIDITY PRESENT: ICD-10-CM

## 2021-01-21 DIAGNOSIS — G47.33 OSA (OBSTRUCTIVE SLEEP APNEA): ICD-10-CM

## 2021-01-21 DIAGNOSIS — J84.9 ILD (INTERSTITIAL LUNG DISEASE) (HCC): ICD-10-CM

## 2021-01-21 DIAGNOSIS — Z87.898 HISTORY OF ULCER DISEASE: Chronic | ICD-10-CM

## 2021-01-21 DIAGNOSIS — Z86.79 HISTORY OF ATRIAL FIBRILLATION: ICD-10-CM

## 2021-01-21 DIAGNOSIS — E78.5 DYSLIPIDEMIA: Chronic | ICD-10-CM

## 2021-01-21 DIAGNOSIS — J90 PLEURAL EFFUSION ON LEFT: ICD-10-CM

## 2021-01-21 PROBLEM — L60.2 ONYCHOGRYPOSIS OF TOENAIL: Status: ACTIVE | Noted: 2021-01-21

## 2021-01-21 PROCEDURE — 99214 OFFICE O/P EST MOD 30 MIN: CPT | Performed by: INTERNAL MEDICINE

## 2021-01-21 RX ORDER — LISINOPRIL 10 MG/1
10 TABLET ORAL
Qty: 90 TAB | Refills: 1 | Status: SHIPPED | OUTPATIENT
Start: 2021-01-21 | End: 2021-01-25 | Stop reason: SDUPTHER

## 2021-01-21 RX ORDER — PRAVASTATIN SODIUM 40 MG
40 TABLET ORAL DAILY
Qty: 90 TAB | Refills: 2 | Status: SHIPPED | OUTPATIENT
Start: 2021-01-21 | End: 2022-02-22 | Stop reason: SDUPTHER

## 2021-01-21 RX ORDER — CARVEDILOL 25 MG/1
25 TABLET ORAL 2 TIMES DAILY
Qty: 180 TAB | Refills: 1 | Status: SHIPPED | OUTPATIENT
Start: 2021-01-21 | End: 2021-07-26

## 2021-01-21 RX ORDER — LEVOTHYROXINE SODIUM 0.07 MG/1
75 TABLET ORAL EVERY MORNING
Qty: 90 TAB | Refills: 2 | Status: SHIPPED | OUTPATIENT
Start: 2021-01-21 | End: 2021-10-26

## 2021-01-21 RX ORDER — OMEPRAZOLE 40 MG/1
40 CAPSULE, DELAYED RELEASE ORAL DAILY
Qty: 90 CAP | Refills: 2 | Status: SHIPPED | OUTPATIENT
Start: 2021-01-21 | End: 2021-10-26

## 2021-01-21 SDOH — HEALTH STABILITY: MENTAL HEALTH: HOW OFTEN DO YOU HAVE 6 OR MORE DRINKS ON ONE OCCASION?: NEVER

## 2021-01-21 ASSESSMENT — ENCOUNTER SYMPTOMS
WEAKNESS: 0
CLAUDICATION: 0
DIAPHORESIS: 0
PALPITATIONS: 0
PND: 0
SHORTNESS OF BREATH: 0
HEARTBURN: 0
SINUS PAIN: 0
TREMORS: 0
ABDOMINAL PAIN: 0
BACK PAIN: 0
WEIGHT LOSS: 1
HEADACHES: 0
DIZZINESS: 0
ORTHOPNEA: 0
MYALGIAS: 0
NECK PAIN: 0
FALLS: 0
DEPRESSION: 0
EYE REDNESS: 0
CONSTIPATION: 0
STRIDOR: 0
COUGH: 1
BLURRED VISION: 0
FEVER: 0
NAUSEA: 0
EYE DISCHARGE: 0
SPEECH CHANGE: 0
FOCAL WEAKNESS: 0
VOMITING: 0
EYE PAIN: 0
SORE THROAT: 0
PHOTOPHOBIA: 0
DOUBLE VISION: 0
SPUTUM PRODUCTION: 0
HEMOPTYSIS: 0
WHEEZING: 0
CHILLS: 0
DIARRHEA: 0

## 2021-01-21 ASSESSMENT — FIBROSIS 4 INDEX
FIB4 SCORE: 1.39
FIB4 SCORE: 1.39

## 2021-01-21 NOTE — PROGRESS NOTES
CC: Establish medical care    HPI:  Farhat presents with the following  Prior patient of Dr. Huang    1. Dyslipidemia  Patient taking Pravachol 40 mg daily.  Patient requesting 90-day refills.  Lipid panel up-to-date.  Total cholesterol 103, triglyceride 94, HDL 38.    2. History of atrial fibrillation  History of transient atrial fibrillation.  No longer followed by cardiology.  Patient taking Coreg 25 mg twice daily.    3. History of ulcer disease  Patient is treated with Prilosec 40 mg daily.  Patient has scheduled EGD/colonoscopy this summer with Dr. Ricci.     4. Essential hypertension  Blood pressure stable lisinopril 10 mg daily and Coreg.  Patient requesting refills.    5. Acquired hypothyroidism  TSH within normal range and February 2020.  Taking levothyroxine 75 MCG's daily.  No recent medication changes.  Asymptomatic.    6. Impaired fasting glucose  No history of diabetes.  Hemoglobin A1c 6.0.    7. Pleural effusion on left  Patient was recently diagnosed with pleural effusion and seen in the emergency room on January 6.  Patient underwent thoracentesis and 1 L fluid removed.  Fluid was noted to be bloody with lymphocytic predominance.  LDH and total protein were also elevated consistent with an exudate.  Cytology negative for malignancy.  Patient was seen by pulmonology this morning and is scheduled for repeat thoracentesis next Thursday.  Plan of care to include possible bronchoscopy for further diagnosis.  CT scan in the hospital was suggestive of underlying ILD.    8. Onychomycosis  Patient has complaints of right great toe nail fungus.  Patient completed a treatment with oral antifungal for 30 days however symptoms did not resolve.      Patient Active Problem List    Diagnosis Date Noted   • Pleural effusion on left 01/07/2021     Priority: High   • Onychogryposis of toenail 01/21/2021   • Onychomycosis 01/21/2021   • Prediabetes 01/07/2021   • History of squamous cell carcinoma of skin  02/28/2020   • Arthritis of knee 03/09/2017   • Tubular adenoma of colon 07/06/2016   • Blackwell's esophagus 07/06/2016   • History of ulcer disease 06/23/2016   • Decreased GFR 03/21/2016   • Peripheral autonomic neuropathy 09/23/2015   • Hypothyroid 04/28/2015   • Obesity (BMI 30-39.9) 08/22/2014   • History of vertigo 05/13/2014   • Sleep apnea 02/20/2014   • Renal cyst 01/22/2014   • History of compression fracture 01/21/2014   • Sensorineural hearing loss 11/26/2012   • Impaired fasting glucose 10/10/2012   • History of atrial fibrillation 05/22/2012   • History of foot pain 03/06/2010   • Hypertension 09/16/2009   • Dyslipidemia 09/16/2009   • Allergic rhinitis 09/16/2009   • S/p lumbar surgery 09/16/2009   • Preventative health care 09/16/2009       Current Outpatient Medications   Medication Sig Dispense Refill   • carvedilol (COREG) 25 MG Tab Take 1 Tab by mouth 2 times a day. 180 Tab 1   • lisinopril (PRINIVIL) 10 MG Tab Take 1 Tab by mouth every day. 90 Tab 1   • levothyroxine (SYNTHROID) 75 MCG Tab Take 1 Tab by mouth every morning. ON A EMPTY STOMACH 90 Tab 2   • pravastatin (PRAVACHOL) 40 MG tablet Take 1 Tab by mouth every day. 90 Tab 2   • omeprazole (PRILOSEC) 40 MG delayed-release capsule Take 1 Cap by mouth every day. 90 Cap 2   • fluticasone (FLONASE) 50 MCG/ACT nasal spray SHAKE LIQUID AND USE 2 SPRAYS IN EACH NOSTRIL TWICE DAILY (Patient taking differently: Administer 1 Spray into affected nostril(S) 2 times a day.) 16 g 5   • aspirin 81 MG tablet Take 81 mg by mouth every bedtime.       No current facility-administered medications for this visit.          Allergies as of 01/21/2021 - Reviewed 01/21/2021   Allergen Reaction Noted   • Flexeril [cyclobenzaprine hcl]  01/21/2014   • Other environmental Runny Nose and Itching 06/01/2011        Social History     Socioeconomic History   • Marital status:      Spouse name: Not on file   • Number of children: Not on file   • Years of education:  Not on file   • Highest education level: Not on file   Occupational History   • Not on file   Social Needs   • Financial resource strain: Not on file   • Food insecurity     Worry: Never true     Inability: Never true   • Transportation needs     Medical: No     Non-medical: No   Tobacco Use   • Smoking status: Never Smoker   • Smokeless tobacco: Never Used   Substance and Sexual Activity   • Alcohol use: Yes     Alcohol/week: 1.2 - 1.8 oz     Types: 2 - 3 Standard drinks or equivalent per week     Frequency: 2-3 times a week     Drinks per session: 1 or 2     Binge frequency: Never     Comment: occ   • Drug use: Not on file   • Sexual activity: Not Currently   Lifestyle   • Physical activity     Days per week: Not on file     Minutes per session: Not on file   • Stress: Not on file   Relationships   • Social connections     Talks on phone: Not on file     Gets together: Not on file     Attends Church service: Not on file     Active member of club or organization: Not on file     Attends meetings of clubs or organizations: Not on file     Relationship status: Not on file   • Intimate partner violence     Fear of current or ex partner: Not on file     Emotionally abused: Not on file     Physically abused: Not on file     Forced sexual activity: Not on file   Other Topics Concern   • Not on file   Social History Narrative   • Not on file       Family History   Problem Relation Age of Onset   • Cancer Mother         cervical/breast   • Cancer Sister         lung   • Cancer Father         lung cancer   • Cancer Other        Past Surgical History:   Procedure Laterality Date   • HAMMERTOE CORRECTION  2/2/2015    Performed by Abdifatah Orta, D.P.M. at SURGERY [facility]   • EXOSTOSIS EXCISION  6/3/2011    Performed by ABDIFATAH ORTA at [facility]   • LESION EXCISION ORTHO  6/3/2011    Performed by ABDIFATAH ORTA at [facility] ORS   • TOE ARTHROPLASTY  6/3/2011    Performed by  "ABDIFATAH YOUNG at SURGERY [facility] ORS   • LUMBAR LAMINECTOMY DISKECTOMY  2005    microscopic   • TONSILLECTOMY  1945    adenoidectomy       ROS:  Denies any Headache,Chest pain,   Abdominal pain, Changes of bowel or bladder, Lower ext edema, Fevers, Nights sweats, Weight Changes, Focal weakness or numbness.  All other systems are negative.  Positive for mild shortness of breath.  Positive cough    /72 (BP Location: Right arm, Patient Position: Sitting, BP Cuff Size: Adult)   Pulse 70   Temp 37.1 °C (98.8 °F) (Temporal)   Ht 1.88 m (6' 2\")   Wt 121.6 kg (268 lb)   SpO2 96%   BMI 34.41 kg/m²      Physical Exam:  Gen:         Alert and oriented, No apparent distress.  HEENT:   Perrla, TM clear,  Oralpharynx no erythema or exudates.  Neck:       No Jugular venous distension, Lymphadenopathy, Thyromegaly, Bruits.  Lungs:     Moderate decrease in breath sounds left lung field.  Crackles at the bases bilaterally.  CV:          Regular rate and rhythm. No murmurs, rubs or gallops.  Abd:         Soft non tender, non distended. Normal active bowel sounds.             Ext:          No lower extremity edema bilaterally.  Noted onychomycosis on right great toe.      Assessment and Plan.   80 y.o. male presenting with the following.     1. Dyslipidemia  Stable  - pravastatin (PRAVACHOL) 40 MG tablet; Take 1 Tab by mouth every day.  Dispense: 90 Tab; Refill: 2    2. History of atrial fibrillation  Stable, continue to monitor    3. History of ulcer disease  Continue PPI  EGD/colonoscopy scheduled for this summer with GI.    4. Essential hypertension    - carvedilol (COREG) 25 MG Tab; Take 1 Tab by mouth 2 times a day.  Dispense: 180 Tab; Refill: 1  - lisinopril (PRINIVIL) 10 MG Tab; Take 1 Tab by mouth every day.  Dispense: 90 Tab; Refill: 1    5. Acquired hypothyroidism  Patient will be due for his follow-up thyroid function panel  Continue levothyroxine at current dose    6. Impaired fasting glucose  Diet " and lifestyle modifications    7. Pleural effusion on left  Pulmonology plan of care reviewed.  Patient scheduled for repeat thoracentesis  Follow-up with pulmonology in 3 weeks.    8. Onychomycosis    - REFERRAL TO PODIATRY    9. Hypothyroidism, unspecified type    - levothyroxine (SYNTHROID) 75 MCG Tab; Take 1 Tab by mouth every morning. ON A EMPTY STOMACH  Dispense: 90 Tab; Refill: 2    10. Gastroesophageal reflux disease without esophagitis    - omeprazole (PRILOSEC) 40 MG delayed-release capsule; Take 1 Cap by mouth every day.  Dispense: 90 Cap; Refill: 2

## 2021-01-21 NOTE — PROGRESS NOTES
Chief Complaint   Patient presents with   • Establish Care     referral 2020 tonya Jeff DX SOB    • Follow-Up     HOS DX SOB 21-21    • Results     CXR 2021, thoracentsis 21    • Apnea     last seen 2020 tonya [place]        HPI: This patient is a 80 y.o. male presenting for evaluation of left-sided pleural effusion.  The patient's past medical history significant for hypertension currently well controlled, obstructive sleep apnea on CPAP therapy and followed in our sleep medicine clinic, dyslipidemia, hypothyroid, gastroesophageal reflux disease and seasonal allergies for which he uses intranasal steroids.  He is a lifelong non-smoker.  He is retired from a career as a clinical .  No known exposures that he is aware of.  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  No travel to third world countries.  Family history is notable for sister with lung cancer who was a heavy smoker and a father also who was a heavy smoker with lung cancer.  Mother who was also a smoker  from vaginal cancer.  The patient presents today for follow-up of left-sided pleural effusion.  He reports onset of cough that was dry to occasionally productive of clear white sputum that occurred worse in the evening starting late last .  He thought this was related to his seasonal allergies but when cough persisted for over a month and he began experiencing shortness of breath with activities as simple as speaking, he had chest x-ray done on  which showed large left-sided pleural effusion with some loculation.  He was admitted to the hospital and underwent thoracentesis with removal of 1 L of fluid.  This was bloody with a lymphocytic predominance, white blood cells were 92% lymphocytes.  LDH and total protein were elevated consistent with exudate.  Cytology was negative for malignancy.  No growth on routine bacterial, fungal or AFB  cultures to date.  AFB smear negative.  He was discharged in stable condition with plans to follow-up with PCP and a repeat chest x-ray on January 14 appears to show recurrence of loculated left pleural effusion although not quite as severe as on January 5.  Patient does note unintentional 15 pound weight loss which he attributes to dietary changes his wife has made.  No fevers, chills, night sweats, weight loss.  No chest pain.  He denies shortness of breath coming into our clinic from his car today.  He has mild cough but actually improved since his hospital stay.  Of note he did have bilateral reticular changes on his CT chest done during his hospital stay.  This showed large left-sided pleural effusion with compressive atelectasis.  Imaging was not able to rule out underlying pneumonia or airway obstruction.    Past Medical History:   Diagnosis Date   • Arrhythmia     Atrial fribrillation   • Arthritis    • Bronchitis 2004   • Fibula fracture 1981    right   • High cholesterol    • Hyperlipidemia    • Hypertension    • MEDICAL HOME    • Pain     low back   • Pneumonia 2004   • Sleep apnea        Social History     Socioeconomic History   • Marital status:      Spouse name: Not on file   • Number of children: Not on file   • Years of education: Not on file   • Highest education level: Not on file   Occupational History   • Not on file   Social Needs   • Financial resource strain: Not on file   • Food insecurity     Worry: Never true     Inability: Never true   • Transportation needs     Medical: No     Non-medical: No   Tobacco Use   • Smoking status: Never Smoker   • Smokeless tobacco: Never Used   Substance and Sexual Activity   • Alcohol use: Yes     Alcohol/week: 0.6 - 1.2 oz     Types: 1 - 2 Shots of liquor per week     Frequency: 2-3 times a week     Drinks per session: 1 or 2     Comment: occ   • Drug use: No   • Sexual activity: Never   Lifestyle   • Physical activity     Days per week: Not on file      Minutes per session: Not on file   • Stress: Not on file   Relationships   • Social connections     Talks on phone: Not on file     Gets together: Not on file     Attends [employer] service: Not on file     Active member of club or organization: Not on file     Attends meetings of clubs or organizations: Not on file     Relationship status: Not on file   • Intimate partner violence     Fear of current or ex partner: Not on file     Emotionally abused: Not on file     Physically abused: Not on file     Forced sexual activity: Not on file   Other Topics Concern   • Not on file   Social History Narrative   • Not on file       Family History   Problem Relation Age of Onset   • Cancer Mother         cervical/breast   • Cancer Sister         lung   • Cancer Father         lung cancer   • Cancer Other        Current Outpatient Medications on File Prior to Visit   Medication Sig Dispense Refill   • carvedilol (COREG) 25 MG Tab TAKE 1 TABLET BY MOUTH TWICE DAILY WITH MEALS (Patient taking differently: Take 25 mg by mouth 2 times a day, with meals.) 180 Tab 1   • lisinopril (PRINIVIL) 10 MG Tab Take 1 Tab by mouth every day. 100 Tab 1   • levothyroxine (SYNTHROID) 75 MCG Tab TAKE 1 TABLET BY MOUTH EVERY MORNING ON AN EMPTY STOMACH (Patient taking differently: Take 75 mcg by mouth Every morning on an empty stomach.) 90 Tab 2   • pravastatin (PRAVACHOL) 40 MG tablet Take 1 Tab by mouth every day. (Patient taking differently: Take 40 mg by mouth every bedtime.) 100 Tab 2   • fluticasone (FLONASE) 50 MCG/ACT nasal spray SHAKE LIQUID AND USE 2 SPRAYS IN EACH NOSTRIL TWICE DAILY (Patient taking differently: Administer 1 Spray into affected nostril(S) 2 times a day.) 16 g 5   • omeprazole (PRILOSEC) 40 MG delayed-release capsule Take 1 Cap by mouth every day. 30 Cap 11   • aspirin 81 MG tablet Take 81 mg by mouth every bedtime.       No current facility-administered medications on file prior to visit.        Allergies: Flexeril  [cyclobenzaprine hcl] and Other environmental    ROS:   Review of Systems   Constitutional: Positive for weight loss. Negative for chills, diaphoresis, fever and malaise/fatigue.   HENT: Negative for congestion, ear discharge, ear pain, hearing loss, nosebleeds, sinus pain, sore throat and tinnitus.    Eyes: Negative for blurred vision, double vision, photophobia, pain, discharge and redness.   Respiratory: Positive for cough. Negative for hemoptysis, sputum production, shortness of breath, wheezing and stridor.    Cardiovascular: Negative for chest pain, palpitations, orthopnea, claudication, leg swelling and PND.   Gastrointestinal: Negative for abdominal pain, constipation, diarrhea, heartburn, nausea and vomiting.   Genitourinary: Negative for dysuria and urgency.   Musculoskeletal: Negative for back pain, falls, joint pain, myalgias and neck pain.   Skin: Negative for itching and rash.   Neurological: Negative for dizziness, tremors, speech change, focal weakness, weakness and headaches.   Endo/Heme/Allergies: Negative for environmental allergies.   Psychiatric/Behavioral: Negative for depression.       There were no vitals taken for this visit.    Physical Exam:  Physical Exam  Vitals signs reviewed.   Constitutional:       General: He is not in acute distress.     Appearance: Normal appearance. He is obese.   HENT:      Head: Normocephalic and atraumatic.      Right Ear: External ear normal.      Left Ear: External ear normal.      Nose: Nose normal. No congestion.      Mouth/Throat:      Mouth: Mucous membranes are moist.      Pharynx: Oropharynx is clear. No oropharyngeal exudate.   Eyes:      General: No scleral icterus.     Extraocular Movements: Extraocular movements intact.      Conjunctiva/sclera: Conjunctivae normal.      Pupils: Pupils are equal, round, and reactive to light.   Neck:      Musculoskeletal: Normal range of motion and neck supple.   Cardiovascular:      Rate and Rhythm: Normal rate and  regular rhythm.      Heart sounds: Normal heart sounds. No murmur. No gallop.    Pulmonary:      Effort: Pulmonary effort is normal. No respiratory distress.      Breath sounds: No wheezing.      Comments: Decreased bs 25% up posterior L lung field, mild inspiratory crackles b/l at bases   Abdominal:      Palpations: Abdomen is soft.      Comments: obese   Musculoskeletal: Normal range of motion.      Right lower leg: No edema.      Left lower leg: No edema.   Skin:     General: Skin is warm and dry.      Findings: No rash.   Neurological:      Mental Status: He is alert and oriented to person, place, and time.      Cranial Nerves: No cranial nerve deficit.   Psychiatric:         Mood and Affect: Mood normal.         Behavior: Behavior normal.         PFTs as reviewed by me personally: None  Imaging as reviewed by me personally: As per HPI    Assessment:  1. Pleural effusion  IR-THORACENTESIS PUNCTURE LEFT    FLUID AMYLASE    FLUID PH    FLUID TOTAL PROTEIN    FLUID LDH    FLUID GLUCOSE    FLUID CELL COUNT    CYTOLOGY    FLUID CULTURE W/GRAM STAIN    AFB CULTURE    FUNGAL CULTURE   2. ILD (interstitial lung disease) (HCC)     3. Class 1 obesity with body mass index (BMI) of 33.0 to 33.9 in adult, unspecified obesity type, unspecified whether serious comorbidity present     4. MARCI (obstructive sleep apnea)         Plan:  1.  Chronicity is unknown but he does have recurrence or reaccumulation of fluid based on most recent chest x-ray.  Initial fluid consistent with exudate and cell count with lymphocytic predominance as well as significant number of RBCs is highly concerning for underlying malignancy.  I would like to repeat thoracentesis given sensitivity for detecting malignant cells increases with increased thoracentesis.  If this remains nondiagnostic we will proceed with bronchoscopy.  2.  Patient with mild reticular changes on CT during hospital stay suggestive of underlying ILD which would be a new diagnosis  for him.  We will proceed with evaluation for his pleural effusion and pending diagnostic results and treatment, further evaluate his interstitial findings.  3.  This test with patient at increased risk for obesity related pulmonary complications.  Encouraged healthy lifestyle habits.  4.  Patient is followed by sleep medicine clinic.  He reports compliance with the benefit from CPAP therapy.  Return in about 3 weeks (around 2/11/2021) for thoracentesis.

## 2021-01-25 DIAGNOSIS — I10 ESSENTIAL HYPERTENSION: Chronic | ICD-10-CM

## 2021-01-25 RX ORDER — LISINOPRIL 10 MG/1
10 TABLET ORAL
Qty: 100 TAB | Refills: 1 | Status: SHIPPED | OUTPATIENT
Start: 2021-01-25 | End: 2021-12-21

## 2021-01-26 ENCOUNTER — APPOINTMENT (OUTPATIENT)
Dept: RADIOLOGY | Facility: MEDICAL CENTER | Age: 81
End: 2021-01-26
Attending: INTERNAL MEDICINE
Payer: MEDICARE

## 2021-01-28 ENCOUNTER — HOSPITAL ENCOUNTER (OUTPATIENT)
Dept: RADIOLOGY | Facility: MEDICAL CENTER | Age: 81
End: 2021-01-28
Attending: INTERNAL MEDICINE
Payer: MEDICARE

## 2021-01-28 DIAGNOSIS — J90 PLEURAL EFFUSION: ICD-10-CM

## 2021-01-28 LAB
AMYLASE FLD-CCNC: 37 U/L
APPEARANCE FLD: NORMAL
BODY FLD TYPE: NORMAL
COLOR FLD: NORMAL
CYTOLOGY REG CYTOL: NORMAL
EOSINOPHIL NFR FLD: 12 %
GLUCOSE FLD-MCNC: 83 MG/DL
GRAM STN SPEC: NORMAL
LDH FLD L TO P-CCNC: 287 U/L
LYMPHOCYTES NFR FLD: 88 %
PH FLD: 8 [PH]
PROT FLD-MCNC: 4.1 G/DL
RBC # FLD: NORMAL CELLS/UL
SIGNIFICANT IND 70042: NORMAL
SITE SITE: NORMAL
SOURCE SOURCE: NORMAL
WBC # FLD: 4128 CELLS/UL

## 2021-01-28 PROCEDURE — 87070 CULTURE OTHR SPECIMN AEROBIC: CPT

## 2021-01-28 PROCEDURE — 87116 MYCOBACTERIA CULTURE: CPT

## 2021-01-28 PROCEDURE — 89051 BODY FLUID CELL COUNT: CPT

## 2021-01-28 PROCEDURE — 83615 LACTATE (LD) (LDH) ENZYME: CPT

## 2021-01-28 PROCEDURE — 83986 ASSAY PH BODY FLUID NOS: CPT

## 2021-01-28 PROCEDURE — 87205 SMEAR GRAM STAIN: CPT

## 2021-01-28 PROCEDURE — 82150 ASSAY OF AMYLASE: CPT

## 2021-01-28 PROCEDURE — 87102 FUNGUS ISOLATION CULTURE: CPT

## 2021-01-28 PROCEDURE — 71045 X-RAY EXAM CHEST 1 VIEW: CPT

## 2021-01-28 PROCEDURE — 32555 ASPIRATE PLEURA W/ IMAGING: CPT | Mod: LT

## 2021-01-28 PROCEDURE — 82945 GLUCOSE OTHER FLUID: CPT

## 2021-01-28 PROCEDURE — 84157 ASSAY OF PROTEIN OTHER: CPT

## 2021-02-03 ENCOUNTER — APPOINTMENT (RX ONLY)
Dept: URBAN - METROPOLITAN AREA CLINIC 4 | Facility: CLINIC | Age: 81
Setting detail: DERMATOLOGY
End: 2021-02-03

## 2021-02-03 DIAGNOSIS — Z85.828 PERSONAL HISTORY OF OTHER MALIGNANT NEOPLASM OF SKIN: ICD-10-CM | Status: STABLE

## 2021-02-03 DIAGNOSIS — D18.0 HEMANGIOMA: ICD-10-CM

## 2021-02-03 DIAGNOSIS — D22 MELANOCYTIC NEVI: ICD-10-CM

## 2021-02-03 DIAGNOSIS — L82.0 INFLAMED SEBORRHEIC KERATOSIS: ICD-10-CM

## 2021-02-03 DIAGNOSIS — Z71.89 OTHER SPECIFIED COUNSELING: ICD-10-CM

## 2021-02-03 DIAGNOSIS — L57.0 ACTINIC KERATOSIS: ICD-10-CM

## 2021-02-03 DIAGNOSIS — L72.8 OTHER FOLLICULAR CYSTS OF THE SKIN AND SUBCUTANEOUS TISSUE: ICD-10-CM

## 2021-02-03 DIAGNOSIS — L81.4 OTHER MELANIN HYPERPIGMENTATION: ICD-10-CM

## 2021-02-03 DIAGNOSIS — L82.1 OTHER SEBORRHEIC KERATOSIS: ICD-10-CM

## 2021-02-03 PROBLEM — D22.5 MELANOCYTIC NEVI OF TRUNK: Status: ACTIVE | Noted: 2021-02-03

## 2021-02-03 PROBLEM — D18.01 HEMANGIOMA OF SKIN AND SUBCUTANEOUS TISSUE: Status: ACTIVE | Noted: 2021-02-03

## 2021-02-03 LAB
FUNGUS SPEC CULT: NORMAL
SIGNIFICANT IND 70042: NORMAL
SITE SITE: NORMAL
SOURCE SOURCE: NORMAL

## 2021-02-03 PROCEDURE — 17110 DESTRUCTION B9 LES UP TO 14: CPT

## 2021-02-03 PROCEDURE — 99213 OFFICE O/P EST LOW 20 MIN: CPT | Mod: 25

## 2021-02-03 PROCEDURE — 17003 DESTRUCT PREMALG LES 2-14: CPT | Mod: 59

## 2021-02-03 PROCEDURE — ? SUNSCREEN TREATMENT REGIMEN

## 2021-02-03 PROCEDURE — 17000 DESTRUCT PREMALG LESION: CPT | Mod: 59

## 2021-02-03 PROCEDURE — ? DEFER

## 2021-02-03 PROCEDURE — ? LIQUID NITROGEN

## 2021-02-03 PROCEDURE — ? COUNSELING

## 2021-02-03 ASSESSMENT — LOCATION SIMPLE DESCRIPTION DERM
LOCATION SIMPLE: SCALP
LOCATION SIMPLE: RIGHT EAR
LOCATION SIMPLE: RIGHT OCCIPITAL SCALP
LOCATION SIMPLE: LEFT CLAVICULAR SKIN
LOCATION SIMPLE: RIGHT HAND
LOCATION SIMPLE: RIGHT ZYGOMA
LOCATION SIMPLE: LOWER BACK
LOCATION SIMPLE: LEFT ANTERIOR NECK
LOCATION SIMPLE: RIGHT UPPER BACK
LOCATION SIMPLE: ABDOMEN
LOCATION SIMPLE: RIGHT CHEEK
LOCATION SIMPLE: UPPER BACK
LOCATION SIMPLE: INFERIOR FOREHEAD
LOCATION SIMPLE: LEFT UPPER BACK
LOCATION SIMPLE: RIGHT FOREHEAD
LOCATION SIMPLE: CHEST
LOCATION SIMPLE: RIGHT CLAVICULAR SKIN
LOCATION SIMPLE: LEFT HAND
LOCATION SIMPLE: LEFT CHEEK

## 2021-02-03 ASSESSMENT — LOCATION DETAILED DESCRIPTION DERM
LOCATION DETAILED: PERIUMBILICAL SKIN
LOCATION DETAILED: SUPERIOR LUMBAR SPINE
LOCATION DETAILED: LEFT SUPERIOR CENTRAL MALAR CHEEK
LOCATION DETAILED: LEFT INFERIOR CENTRAL MALAR CHEEK
LOCATION DETAILED: RIGHT INFERIOR FOREHEAD
LOCATION DETAILED: LEFT CLAVICULAR NECK
LOCATION DETAILED: RIGHT MEDIAL UPPER BACK
LOCATION DETAILED: STERNUM
LOCATION DETAILED: RIGHT SUPERIOR CENTRAL MALAR CHEEK
LOCATION DETAILED: RIGHT SUPERIOR HELIX
LOCATION DETAILED: RIGHT SUPERIOR OCCIPITAL SCALP
LOCATION DETAILED: LEFT SUPERIOR PARIETAL SCALP
LOCATION DETAILED: LEFT ULNAR DORSAL HAND
LOCATION DETAILED: EPIGASTRIC SKIN
LOCATION DETAILED: INFERIOR THORACIC SPINE
LOCATION DETAILED: RIGHT SUPERIOR PARIETAL SCALP
LOCATION DETAILED: RIGHT RADIAL DORSAL HAND
LOCATION DETAILED: RIGHT MEDIAL FOREHEAD
LOCATION DETAILED: INFERIOR MID FOREHEAD
LOCATION DETAILED: LEFT MEDIAL SUPERIOR CHEST
LOCATION DETAILED: LEFT MEDIAL INFERIOR CHEST
LOCATION DETAILED: RIGHT SUPERIOR MEDIAL UPPER BACK
LOCATION DETAILED: RIGHT MEDIAL ZYGOMA
LOCATION DETAILED: RIGHT CLAVICULAR SKIN
LOCATION DETAILED: LEFT CLAVICULAR SKIN
LOCATION DETAILED: LEFT INFERIOR MEDIAL UPPER BACK

## 2021-02-03 ASSESSMENT — LOCATION ZONE DERM
LOCATION ZONE: SCALP
LOCATION ZONE: TRUNK
LOCATION ZONE: FACE
LOCATION ZONE: EAR
LOCATION ZONE: NECK
LOCATION ZONE: HAND

## 2021-02-03 NOTE — PROCEDURE: LIQUID NITROGEN
Consent: The patient's consent was obtained including but not limited to risks of crusting, scabbing, blistering, scarring, darker or lighter pigmentary change, recurrence, incomplete removal and infection.
Duration Of Freeze Thaw-Cycle (Seconds): 5
Detail Level: Detailed
Render Note In Bullet Format When Appropriate: No
Post-Care Instructions: I reviewed with the patient in detail post-care instructions. Patient is to wear sunprotection, and avoid picking at any of the treated lesions. Pt may apply Vaseline to crusted or scabbing areas.
Number Of Freeze-Thaw Cycles: 2 freeze-thaw cycles
Medical Necessity Information: It is in your best interest to select a reason for this procedure from the list below. All of these items fulfill various CMS LCD requirements except the new and changing color options.
Medical Necessity Clause: This procedure was medically necessary because the lesions that were treated were:

## 2021-02-03 NOTE — PROCEDURE: MIPS QUALITY
Quality 226: Preventive Care And Screening: Tobacco Use: Screening And Cessation Intervention: Patient screened for tobacco use and is an ex/non-smoker
Quality 265: Biopsy Follow-Up: Biopsy results reviewed, communicated, tracked, and documented
Quality 130: Documentation Of Current Medications In The Medical Record: Current Medications Documented
Detail Level: Detailed
Quality 111:Pneumonia Vaccination Status For Older Adults: Pneumococcal Vaccination Previously Received

## 2021-02-17 ENCOUNTER — APPOINTMENT (OUTPATIENT)
Dept: SLEEP MEDICINE | Facility: MEDICAL CENTER | Age: 81
End: 2021-02-17
Payer: MEDICARE

## 2021-02-17 ENCOUNTER — IMMUNIZATION (OUTPATIENT)
Dept: FAMILY PLANNING/WOMEN'S HEALTH CLINIC | Facility: IMMUNIZATION CENTER | Age: 81
End: 2021-02-17
Attending: INTERNAL MEDICINE
Payer: MEDICARE

## 2021-02-17 DIAGNOSIS — Z23 NEED FOR VACCINATION: ICD-10-CM

## 2021-02-17 DIAGNOSIS — Z23 ENCOUNTER FOR VACCINATION: Primary | ICD-10-CM

## 2021-02-17 PROCEDURE — 0001A PFIZER SARS-COV-2 VACCINE: CPT

## 2021-02-17 PROCEDURE — 91300 PFIZER SARS-COV-2 VACCINE: CPT

## 2021-02-22 ENCOUNTER — TELEPHONE (OUTPATIENT)
Dept: MEDICAL GROUP | Facility: PHYSICIAN GROUP | Age: 81
End: 2021-02-22

## 2021-02-22 NOTE — TELEPHONE ENCOUNTER
ESTABLISHED PATIENT PRE-VISIT PLANNING     Patient WAS contacted to complete PVP.     Note: Patient will not be contacted if there is no indication to call.     1.  Reviewed notes from the last few office visits within the medical group: Yes    2.  If any orders were placed at last visit or intended to be done for this visit (i.e. 6 mos follow-up), do we have Results/Consult Notes?         •  Labs - Labs were not ordered at last office visit.  Note: If patient appointment is for lab review and patient did not complete labs, check with provider if OK to reschedule patient until labs completed.       •  Imaging - Imaging was not ordered at last office visit.       •  Referrals - Pt has not scheduled appointment yet. Stated he will call them to schedule.    3. Is this appointment scheduled as a Hospital Follow-Up? No    4.  Immunizations were updated in Epic using Reconcile Outside Information activity? Yes    5.  Patient is due for the following Health Maintenance Topics:   Health Maintenance Due   Topic Date Due   • IMM ZOSTER VACCINES (2 of 3) 01/26/2010       6.  AHA (Pulse[employer]) form printed for Provider? Email sent to [facility] requesting form

## 2021-02-24 LAB
FUNGUS SPEC CULT: NORMAL
SIGNIFICANT IND 70042: NORMAL
SITE SITE: NORMAL
SOURCE SOURCE: NORMAL

## 2021-03-02 ENCOUNTER — OFFICE VISIT (OUTPATIENT)
Dept: SLEEP MEDICINE | Facility: MEDICAL CENTER | Age: 81
End: 2021-03-02
Payer: MEDICARE

## 2021-03-02 ENCOUNTER — OFFICE VISIT (OUTPATIENT)
Dept: MEDICAL GROUP | Facility: PHYSICIAN GROUP | Age: 81
End: 2021-03-02
Payer: MEDICARE

## 2021-03-02 ENCOUNTER — HOSPITAL ENCOUNTER (OUTPATIENT)
Dept: LAB | Facility: MEDICAL CENTER | Age: 81
End: 2021-03-02
Attending: INTERNAL MEDICINE
Payer: MEDICARE

## 2021-03-02 VITALS
HEIGHT: 74 IN | TEMPERATURE: 97.9 F | OXYGEN SATURATION: 96 % | HEART RATE: 64 BPM | BODY MASS INDEX: 34.78 KG/M2 | WEIGHT: 271 LBS | SYSTOLIC BLOOD PRESSURE: 132 MMHG | RESPIRATION RATE: 20 BRPM | DIASTOLIC BLOOD PRESSURE: 82 MMHG

## 2021-03-02 VITALS
WEIGHT: 271 LBS | DIASTOLIC BLOOD PRESSURE: 84 MMHG | HEIGHT: 74 IN | RESPIRATION RATE: 18 BRPM | BODY MASS INDEX: 34.78 KG/M2 | HEART RATE: 63 BPM | TEMPERATURE: 97.5 F | SYSTOLIC BLOOD PRESSURE: 132 MMHG | OXYGEN SATURATION: 97 %

## 2021-03-02 DIAGNOSIS — J84.9 INTERSTITIAL PULMONARY DISEASE (HCC): ICD-10-CM

## 2021-03-02 DIAGNOSIS — G47.30 SLEEP APNEA, UNSPECIFIED TYPE: Chronic | ICD-10-CM

## 2021-03-02 DIAGNOSIS — G47.33 OSA (OBSTRUCTIVE SLEEP APNEA): ICD-10-CM

## 2021-03-02 DIAGNOSIS — E66.9 CLASS 1 OBESITY WITH BODY MASS INDEX (BMI) OF 34.0 TO 34.9 IN ADULT, UNSPECIFIED OBESITY TYPE, UNSPECIFIED WHETHER SERIOUS COMORBIDITY PRESENT: ICD-10-CM

## 2021-03-02 DIAGNOSIS — J90 PLEURAL EFFUSION ON LEFT: ICD-10-CM

## 2021-03-02 LAB — RHEUMATOID FACT SER IA-ACNC: <10 IU/ML (ref 0–14)

## 2021-03-02 PROCEDURE — 86038 ANTINUCLEAR ANTIBODIES: CPT

## 2021-03-02 PROCEDURE — 86431 RHEUMATOID FACTOR QUANT: CPT

## 2021-03-02 PROCEDURE — 99214 OFFICE O/P EST MOD 30 MIN: CPT | Performed by: INTERNAL MEDICINE

## 2021-03-02 PROCEDURE — 99213 OFFICE O/P EST LOW 20 MIN: CPT | Performed by: INTERNAL MEDICINE

## 2021-03-02 PROCEDURE — 86480 TB TEST CELL IMMUN MEASURE: CPT

## 2021-03-02 PROCEDURE — 86200 CCP ANTIBODY: CPT

## 2021-03-02 PROCEDURE — 36415 COLL VENOUS BLD VENIPUNCTURE: CPT

## 2021-03-02 ASSESSMENT — ENCOUNTER SYMPTOMS
SINUS PAIN: 0
PND: 0
NECK PAIN: 0
SORE THROAT: 0
DIARRHEA: 0
SPEECH CHANGE: 0
WHEEZING: 0
VOMITING: 0
EYE REDNESS: 0
WEAKNESS: 0
EYE PAIN: 0
TREMORS: 0
BACK PAIN: 0
DIAPHORESIS: 0
DIZZINESS: 0
SHORTNESS OF BREATH: 1
DEPRESSION: 0
STRIDOR: 0
CLAUDICATION: 0
BLURRED VISION: 0
NAUSEA: 0
ABDOMINAL PAIN: 0
MYALGIAS: 0
FEVER: 0
SPUTUM PRODUCTION: 1
EYE DISCHARGE: 0
DOUBLE VISION: 0
PALPITATIONS: 0
CONSTIPATION: 0
FOCAL WEAKNESS: 0
PHOTOPHOBIA: 0
HEADACHES: 0
HEARTBURN: 0
FALLS: 0
ORTHOPNEA: 0
CHILLS: 0
COUGH: 1
WEIGHT LOSS: 0
HEMOPTYSIS: 0

## 2021-03-02 ASSESSMENT — FIBROSIS 4 INDEX
FIB4 SCORE: 1.39
FIB4 SCORE: 1.39

## 2021-03-02 NOTE — PROGRESS NOTES
Chief Complaint   Patient presents with   • Follow-Up     1/21/21   • Pulmonary Embolism     thoracentisis, cxr 1/28/21          HPI: This patient is a 80 y.o. male whom is followed in our clinic for pleural effusion last seen by me on 1/21/2021. The patient's past medical history significant for hypertension currently well controlled, obstructive sleep apnea on CPAP therapy and followed in our sleep medicine clinic, dyslipidemia, hypothyroid, gastroesophageal reflux disease and seasonal allergies for which he uses intranasal steroids.  He is a lifelong non-smoker.  He is retired from a career as a clinical .  No known exposures that he is aware of.  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    The patient establish care with me at the end of January for left pleural effusion that was found to evaluate dry cough that started last fall.  When cough persisted and he began experiencing shortness of breath chest x-ray done on January 5 of this year showed a large left-sided pleural effusion with some loculation.  He was admitted for thoracentesis which showed lymphocytic predominant exudative effusion.  92% lymphocytes.  LDH and total protein were elevated consistent with exudate.  Cytology was negative and there was no growth on bacterial fungal or AFB cultures.  He was discharged with plans to follow-up with me.  A repeat chest x-ray on January 14 showed recurrence of loculated left pleural effusion although not quite as severe.  He also had some bilateral reticular changes noted on CT chest done during his hospital stay consistent with early interstitial lung disease.  We ordered a repeat thoracentesis which again showed lymphocytic predominant exudative effusion with cytology negative for malignancy.  Lymphocytes were 88%.  He had mild eosinophilia on this thoracentesis at 12% but not on prior.  Patient is fairly asymptomatic and appears well today.   Presents for routine follow-up and discuss next steps.  I again reviewed the CT from his hospital stay which showed no hilar or distended lymphadenopathy.  Patient denies fevers, chills, night sweats.  No joint pain aside from chronic right knee where he has history of remote injury.    Past Medical History:   Diagnosis Date   • Arrhythmia     Atrial fribrillation   • Arthritis    • Bronchitis 2004   • Fibula fracture 1981    right   • High cholesterol    • Hyperlipidemia    • Hypertension    • MEDICAL HOME    • Onychogryposis of toenail 1/21/2021   • Onychomycosis 1/21/2021   • Pain     low back   • Pneumonia 2004   • Sleep apnea        Social History     Socioeconomic History   • Marital status:      Spouse name: Not on file   • Number of children: Not on file   • Years of education: Not on file   • Highest education level: Not on file   Occupational History   • Not on file   Tobacco Use   • Smoking status: Never Smoker   • Smokeless tobacco: Never Used   Substance and Sexual Activity   • Alcohol use: Yes     Alcohol/week: 1.2 - 1.8 oz     Types: 2 - 3 Standard drinks or equivalent per week     Comment: occ   • Drug use: Never   • Sexual activity: Not Currently   Other Topics Concern   • Not on file   Social History Narrative   • Not on file     Social Determinants of Health     Financial Resource Strain:    • Difficulty of Paying Living Expenses:    Food Insecurity:    • Worried About Running Out of Food in the Last Year:    • Ran Out of Food in the Last Year:    Transportation Needs:    • Lack of Transportation (Medical):    • Lack of Transportation (Non-Medical):    Physical Activity:    • Days of Exercise per Week:    • Minutes of Exercise per Session:    Stress:    • Feeling of Stress :    Social Connections:    • Frequency of Communication with Friends and Family:    • Frequency of Social Gatherings with Friends and Family:    • Attends Buddhism Services:    • Active Member of Clubs or Organizations:     • Attends Club or Organization Meetings:    • Marital Status:    Intimate Partner Violence:    • Fear of Current or Ex-Partner:    • Emotionally Abused:    • Physically Abused:    • Sexually Abused:        Family History   Problem Relation Age of Onset   • Cancer Mother         cervical/breast   • Cancer Sister         lung   • Cancer Father         lung cancer   • Cancer Other        Current Outpatient Medications on File Prior to Visit   Medication Sig Dispense Refill   • fluticasone (FLONASE) 50 MCG/ACT nasal spray SHAKE LIQUID AND USE 2 SPRAYS IN EACH NOSTRIL TWICE DAILY 16 g 5   • lisinopril (PRINIVIL) 10 MG Tab Take 1 Tab by mouth every day. 100 Tab 1   • carvedilol (COREG) 25 MG Tab Take 1 Tab by mouth 2 times a day. 180 Tab 1   • levothyroxine (SYNTHROID) 75 MCG Tab Take 1 Tab by mouth every morning. ON A EMPTY STOMACH 90 Tab 2   • pravastatin (PRAVACHOL) 40 MG tablet Take 1 Tab by mouth every day. 90 Tab 2   • omeprazole (PRILOSEC) 40 MG delayed-release capsule Take 1 Cap by mouth every day. 90 Cap 2   • aspirin 81 MG tablet Take 81 mg by mouth every bedtime.       No current facility-administered medications on file prior to visit.       Other environmental      ROS:   Review of Systems   Constitutional: Negative for chills, diaphoresis, fever, malaise/fatigue and weight loss.   HENT: Negative for congestion, ear discharge, ear pain, hearing loss, nosebleeds, sinus pain, sore throat and tinnitus.    Eyes: Negative for blurred vision, double vision, photophobia, pain, discharge and redness.   Respiratory: Positive for cough, sputum production and shortness of breath. Negative for hemoptysis, wheezing and stridor.    Cardiovascular: Negative for chest pain, palpitations, orthopnea, claudication, leg swelling and PND.   Gastrointestinal: Negative for abdominal pain, constipation, diarrhea, heartburn, nausea and vomiting.   Genitourinary: Negative for dysuria and urgency.   Musculoskeletal: Negative for  "back pain, falls, joint pain, myalgias and neck pain.   Skin: Negative for itching and rash.   Neurological: Negative for dizziness, tremors, speech change, focal weakness, weakness and headaches.   Endo/Heme/Allergies: Negative for environmental allergies.   Psychiatric/Behavioral: Negative for depression.       /64 (BP Location: Right arm, Patient Position: Sitting, BP Cuff Size: Large adult)   Pulse 63   Temp 36.4 °C (97.5 °F) (Temporal)   Resp 18   Ht 1.88 m (6' 2\")   Wt 123 kg (271 lb)   SpO2 97%   Physical Exam  Vitals reviewed.   Constitutional:       General: He is not in acute distress.     Appearance: Normal appearance. He is obese.   HENT:      Head: Normocephalic and atraumatic.      Right Ear: External ear normal.      Left Ear: External ear normal.      Nose: Nose normal. No congestion.      Mouth/Throat:      Mouth: Mucous membranes are moist.      Pharynx: Oropharynx is clear. No oropharyngeal exudate.   Eyes:      General: No scleral icterus.     Extraocular Movements: Extraocular movements intact.      Conjunctiva/sclera: Conjunctivae normal.      Pupils: Pupils are equal, round, and reactive to light.   Cardiovascular:      Rate and Rhythm: Normal rate and regular rhythm.      Heart sounds: Normal heart sounds. No murmur. No gallop.    Pulmonary:      Effort: Pulmonary effort is normal. No respiratory distress.      Breath sounds: No wheezing.      Comments: Crackles at b/l bases, restricted air movement on L  Abdominal:      General: There is no distension.      Palpations: Abdomen is soft.   Musculoskeletal:         General: Normal range of motion.      Cervical back: Normal range of motion and neck supple.      Right lower leg: No edema.      Left lower leg: No edema.   Skin:     General: Skin is warm and dry.      Findings: No rash.   Neurological:      Mental Status: He is alert and oriented to person, place, and time.      Cranial Nerves: No cranial nerve deficit.   Psychiatric:  "        Mood and Affect: Mood normal.         Behavior: Behavior normal.         PFTs as reviewed by me personally: None    Imaging as reviewed by me personally: As per HPI    Assessment:  1. Pleural effusion on left  CCP-CYCLIC CITRULLINATED PEPTID    RHEUMATOID ARTHRITIS FACTOR    FAVIO IGG FERNANDO W/RFLX TO FAVIO IGG IFA    QUANTIFERON TB GOLD (IN TUBE)   2. Interstitial pulmonary disease (HCC)  CT-CHEST, HIGH RESOLUTION LUNG   3. Class 1 obesity with body mass index (BMI) of 34.0 to 34.9 in adult, unspecified obesity type, unspecified whether serious comorbidity present     4. MARCI (obstructive sleep apnea)         Plan:  1.  This patient now has persistent, recurrent exudative did left pleural effusion with lymphocytic predominance for which the differential diagnosis is actually fairly small.  No evidence for malignancy on 2 evaluations for cytology.  No other findings to suggest sarcoidosis although he does have interstitial changes.  He has had negative PPDs but we have not yet sent QuantiFERON.  We have not worked him up for connective tissue disease.  At this point he is fairly asymptomatic therefore there is no urgency to start any therapy but I would like to order serum QuantiFERON, rheumatoid factor, CCP and FAVIO.  I would like to see him back with repeat CT chest in the next 6 to 8 weeks.  Pending results he may need referral to rheumatology versus initiation of therapy.  If pleural effusion returns we can repeat thoracentesis as sensitivity does increase for cytology with repeat thoracenteses and consider pleural biopsy/decortication.  2.  See discussion above.  This is concerning for underlying interstitial lung disease versus sarcoidosis which he feels less likely given no lymphadenopathy.  Work-up as per above.  With repeat CTA will do high-resolution protocol.  3.  This test with patient increased risk for obesity related pulmonary complications.  Encourage healthy lifestyle habits.  4.  Patient is  compliant with and benefiting from CPAP therapy.  Return in about 8 weeks (around 4/27/2021) for labs, HRCT.

## 2021-03-03 LAB
GAMMA INTERFERON BACKGROUND BLD IA-ACNC: 0.04 IU/ML
M TB IFN-G BLD-IMP: NEGATIVE
M TB IFN-G CD4+ BCKGRND COR BLD-ACNC: 0 IU/ML
MITOGEN IGNF BCKGRD COR BLD-ACNC: 7.71 IU/ML
QFT TB2 - NIL TBQ2: 0.01 IU/ML

## 2021-03-03 NOTE — PROGRESS NOTES
CC: Follow-up pleural effusion.    HPI:  Farhat presents with the following    1. Pleural effusion on left  Patient is here for follow-up appointment for recently diagnosed pleural effusion in January 2020.  Patient was seen and evaluated by pulmonology, Dr. Syed this morning.  Patient completed second thoracentesis.  Thus far negative for malignancy.  Additional work-up sent to lab.  If nondiagnostic, consider bronchoscopy.  Patient will have a follow-up CT scan of the thorax..    2. Sleep apnea, unspecified type  History of sleep apnea, using CPAP machine for the last 6 years.  Patient is due for follow-up appointment in June 2021.  Patient feels symptomatically improved.      Patient Active Problem List    Diagnosis Date Noted   • Pleural effusion on left 01/07/2021   • Onychogryposis of toenail 01/21/2021   • Onychomycosis 01/21/2021   • Prediabetes 01/07/2021   • History of squamous cell carcinoma of skin 02/28/2020   • Arthritis of knee 03/09/2017   • Tubular adenoma of colon 07/06/2016   • Blackwell's esophagus 07/06/2016   • History of ulcer disease 06/23/2016   • Decreased GFR 03/21/2016   • Peripheral autonomic neuropathy 09/23/2015   • Hypothyroid 04/28/2015   • Obesity (BMI 30-39.9) 08/22/2014   • History of vertigo 05/13/2014   • Sleep apnea 02/20/2014   • Renal cyst 01/22/2014   • History of compression fracture 01/21/2014   • Sensorineural hearing loss 11/26/2012   • Impaired fasting glucose 10/10/2012   • History of atrial fibrillation 05/22/2012   • History of foot pain 03/06/2010   • Hypertension 09/16/2009   • Dyslipidemia 09/16/2009   • Allergic rhinitis 09/16/2009   • S/p lumbar surgery 09/16/2009   • Preventative health care 09/16/2009       Current Outpatient Medications   Medication Sig Dispense Refill   • fluticasone (FLONASE) 50 MCG/ACT nasal spray SHAKE LIQUID AND USE 2 SPRAYS IN EACH NOSTRIL TWICE DAILY 16 g 5   • lisinopril (PRINIVIL) 10 MG Tab Take 1 Tab by mouth every day. 100 Tab  1   • carvedilol (COREG) 25 MG Tab Take 1 Tab by mouth 2 times a day. 180 Tab 1   • levothyroxine (SYNTHROID) 75 MCG Tab Take 1 Tab by mouth every morning. ON A EMPTY STOMACH 90 Tab 2   • pravastatin (PRAVACHOL) 40 MG tablet Take 1 Tab by mouth every day. 90 Tab 2   • omeprazole (PRILOSEC) 40 MG delayed-release capsule Take 1 Cap by mouth every day. 90 Cap 2   • aspirin 81 MG tablet Take 81 mg by mouth every bedtime.       No current facility-administered medications for this visit.         Allergies as of 03/02/2021 - Reviewed 03/02/2021   Allergen Reaction Noted   • Other environmental Runny Nose and Itching 06/01/2011        Social History     Socioeconomic History   • Marital status:      Spouse name: Not on file   • Number of children: Not on file   • Years of education: Not on file   • Highest education level: Not on file   Occupational History   • Not on file   Tobacco Use   • Smoking status: Never Smoker   • Smokeless tobacco: Never Used   Substance and Sexual Activity   • Alcohol use: Yes     Alcohol/week: 1.2 - 1.8 oz     Types: 2 - 3 Standard drinks or equivalent per week     Comment: occ   • Drug use: Never   • Sexual activity: Not Currently   Other Topics Concern   • Not on file   Social History Narrative   • Not on file     Social Determinants of Health     Financial Resource Strain:    • Difficulty of Paying Living Expenses:    Food Insecurity:    • Worried About Running Out of Food in the Last Year:    • Ran Out of Food in the Last Year:    Transportation Needs:    • Lack of Transportation (Medical):    • Lack of Transportation (Non-Medical):    Physical Activity:    • Days of Exercise per Week:    • Minutes of Exercise per Session:    Stress:    • Feeling of Stress :    Social Connections:    • Frequency of Communication with Friends and Family:    • Frequency of Social Gatherings with Friends and Family:    • Attends Hinduism Services:    • Active Member of Clubs or Organizations:    •  "Attends Club or Organization Meetings:    • Marital Status:    Intimate Partner Violence:    • Fear of Current or Ex-Partner:    • Emotionally Abused:    • Physically Abused:    • Sexually Abused:        Family History   Problem Relation Age of Onset   • Cancer Mother         cervical/breast   • Cancer Sister         lung   • Cancer Father         lung cancer   • Cancer Other        Past Surgical History:   Procedure Laterality Date   • HAMMERTOE CORRECTION  2/2/2015    Performed by Abdifatah Orta D.P.M. at SURGERY [facility]   • EXOSTOSIS EXCISION  6/3/2011    Performed by ABDIFATAH ORTA at [facility]   • LESION EXCISION ORTHO  6/3/2011    Performed by ABDIFATAH ORTA at [facility]   • TOE ARTHROPLASTY  6/3/2011    Performed by ABDIFATAH ORTA at [facility]   • LUMBAR LAMINECTOMY DISKECTOMY  2005    microscopic   • TONSILLECTOMY  1945    adenoidectomy       ROS:  Denies any Headache,Chest pain,  Shortness of breath,  Abdominal pain, Changes of bowel or bladder, Lower ext edema, Fevers, Nights sweats, Weight Changes, Focal weakness or numbness.  All other systems are negative.    /82 (BP Location: Left arm, Patient Position: Sitting, BP Cuff Size: Adult)   Pulse 64   Temp 36.6 °C (97.9 °F) (Temporal)   Resp 20   Ht 1.88 m (6' 2\")   Wt 123 kg (271 lb)   SpO2 96%   BMI 34.79 kg/m²      Physical Exam:  Gen:         Alert and oriented, No apparent distress.  Obese.  HEENT:   Perrla, TM clear,  Oralpharynx no erythema or exudates.  Neck:       No Jugular venous distension, Lymphadenopathy, Thyromegaly, Bruits.  Lungs:     Decreased breath sounds to left lower lung fields.  Mild crackles at the bases.  CV:          Regular rate and rhythm. No murmurs, rubs or gallops.  Abd:         Soft non tender, non distended. Normal active bowel sounds.             Ext:          No clubbing, cyanosis, edema.      Assessment and Plan.   80 y.o. male " presenting with the following.     1. Pleural effusion on left  Pulmonology notes reviewed and discussed with patient.  Stable at this time    2. Sleep apnea, unspecified type  Continue CPAP  Discussed benefits of weight loss, diet and lifestyle modifications    My total time spent caring for the patient on the day of the encounter was 20 minutes.   This does not include time spent on separately billable procedures/tests.

## 2021-03-03 NOTE — PROGRESS NOTES
"Subjective:     CC: ***    HPI:   Farhat presents today with ***    1. Pleural effusion on left  ***    2. Sleep apnea, unspecified type  ***      Past Medical History:   Diagnosis Date   • Arrhythmia     Atrial fribrillation   • Arthritis    • Bronchitis 2004   • Fibula fracture 1981    right   • High cholesterol    • Hyperlipidemia    • Hypertension    • MEDICAL HOME    • Onychogryposis of toenail 1/21/2021   • Onychomycosis 1/21/2021   • Pain     low back   • Pneumonia 2004   • Sleep apnea        Social History     Tobacco Use   • Smoking status: Never Smoker   • Smokeless tobacco: Never Used   Substance Use Topics   • Alcohol use: Yes     Alcohol/week: 1.2 - 1.8 oz     Types: 2 - 3 Standard drinks or equivalent per week     Comment: occ   • Drug use: Never       Current Outpatient Medications Ordered in Epic   Medication Sig Dispense Refill   • fluticasone (FLONASE) 50 MCG/ACT nasal spray SHAKE LIQUID AND USE 2 SPRAYS IN EACH NOSTRIL TWICE DAILY 16 g 5   • lisinopril (PRINIVIL) 10 MG Tab Take 1 Tab by mouth every day. 100 Tab 1   • carvedilol (COREG) 25 MG Tab Take 1 Tab by mouth 2 times a day. 180 Tab 1   • levothyroxine (SYNTHROID) 75 MCG Tab Take 1 Tab by mouth every morning. ON A EMPTY STOMACH 90 Tab 2   • pravastatin (PRAVACHOL) 40 MG tablet Take 1 Tab by mouth every day. 90 Tab 2   • omeprazole (PRILOSEC) 40 MG delayed-release capsule Take 1 Cap by mouth every day. 90 Cap 2   • aspirin 81 MG tablet Take 81 mg by mouth every bedtime.       No current Epic-ordered facility-administered medications on file.       Allergies:  Other environmental    Review of Systems      Objective:       Exam:  /82 (BP Location: Left arm, Patient Position: Sitting, BP Cuff Size: Adult)   Pulse 64   Temp 36.6 °C (97.9 °F) (Temporal)   Resp 20   Ht 1.88 m (6' 2\")   Wt 123 kg (271 lb)   SpO2 96%   BMI 34.79 kg/m²  Body mass index is 34.79 kg/m².    Physical Exam    Labs: ***    Assessment & Plan:     80 y.o. male " with the following -     1. Pleural effusion on left    2. Sleep apnea, unspecified type      No follow-ups on file.    Please note that this dictation was created using voice recognition software. I have made every reasonable attempt to correct obvious errors, but I expect that there are errors of grammar and possibly content that I did not discover before finalizing the note.

## 2021-03-03 NOTE — PROGRESS NOTES
Annual Health Assessment Questions:    1.  Are you currently engaging in any exercise or physical activity? Yes    2.  How would you describe your mood or emotional well-being today? good    3.  Have you had any falls in the last year? Yes    4.  Have you noticed any problems with your balance or had difficulty walking? Yes    5.  In the last six months have you experienced any leakage of urine? No    6. DPA/Advanced Directive: Patient has Living Will, but it is not on file. Instructed to bring in a copy to scan into their chart.

## 2021-03-04 LAB
CCP IGG SERPL-ACNC: 4 UNITS (ref 0–19)
NUCLEAR IGG SER QL IA: NORMAL

## 2021-03-08 LAB
FINAL REPORT Q0603: NORMAL
PRELIMINARY RPT Q0601: NORMAL
RHODAMINE-AURAMINE STN SPEC: NORMAL

## 2021-03-10 ENCOUNTER — IMMUNIZATION (OUTPATIENT)
Dept: FAMILY PLANNING/WOMEN'S HEALTH CLINIC | Facility: IMMUNIZATION CENTER | Age: 81
End: 2021-03-10
Attending: INTERNAL MEDICINE
Payer: MEDICARE

## 2021-03-10 DIAGNOSIS — Z23 ENCOUNTER FOR VACCINATION: Primary | ICD-10-CM

## 2021-03-10 PROCEDURE — 91300 PFIZER SARS-COV-2 VACCINE: CPT | Performed by: NURSE PRACTITIONER

## 2021-03-10 PROCEDURE — 0002A PFIZER SARS-COV-2 VACCINE: CPT | Performed by: NURSE PRACTITIONER

## 2021-03-11 NOTE — PROGRESS NOTES
Called and wish the member a happy birthday. There was nothing the member needed at this time. Used Epic and PQ

## 2021-03-12 ENCOUNTER — HOSPITAL ENCOUNTER (OUTPATIENT)
Dept: RADIOLOGY | Facility: MEDICAL CENTER | Age: 81
End: 2021-03-12
Attending: INTERNAL MEDICINE
Payer: MEDICARE

## 2021-03-12 DIAGNOSIS — J84.9 INTERSTITIAL PULMONARY DISEASE (HCC): ICD-10-CM

## 2021-03-12 PROCEDURE — 71250 CT THORAX DX C-: CPT

## 2021-03-26 LAB
FINAL REPORT Q0603: NORMAL
PRELIMINARY RPT Q0601: NORMAL
RHODAMINE-AURAMINE STN SPEC: NORMAL

## 2021-03-27 ENCOUNTER — HOSPITAL ENCOUNTER (OUTPATIENT)
Dept: LAB | Facility: MEDICAL CENTER | Age: 81
End: 2021-03-27
Attending: ANESTHESIOLOGY
Payer: MEDICARE

## 2021-03-27 LAB
COVID ORDER STATUS COVID19: NORMAL
SARS-COV-2 RNA RESP QL NAA+PROBE: NOTDETECTED
SPECIMEN SOURCE: NORMAL

## 2021-03-27 PROCEDURE — U0005 INFEC AGEN DETEC AMPLI PROBE: HCPCS

## 2021-03-27 PROCEDURE — U0003 INFECTIOUS AGENT DETECTION BY NUCLEIC ACID (DNA OR RNA); SEVERE ACUTE RESPIRATORY SYNDROME CORONAVIRUS 2 (SARS-COV-2) (CORONAVIRUS DISEASE [COVID-19]), AMPLIFIED PROBE TECHNIQUE, MAKING USE OF HIGH THROUGHPUT TECHNOLOGIES AS DESCRIBED BY CMS-2020-01-R: HCPCS

## 2021-03-27 PROCEDURE — C9803 HOPD COVID-19 SPEC COLLECT: HCPCS

## 2021-04-17 ENCOUNTER — HOSPITAL ENCOUNTER (OUTPATIENT)
Dept: LAB | Facility: MEDICAL CENTER | Age: 81
End: 2021-04-17
Attending: OPHTHALMOLOGY
Payer: MEDICARE

## 2021-04-17 PROCEDURE — C9803 HOPD COVID-19 SPEC COLLECT: HCPCS

## 2021-04-17 PROCEDURE — U0003 INFECTIOUS AGENT DETECTION BY NUCLEIC ACID (DNA OR RNA); SEVERE ACUTE RESPIRATORY SYNDROME CORONAVIRUS 2 (SARS-COV-2) (CORONAVIRUS DISEASE [COVID-19]), AMPLIFIED PROBE TECHNIQUE, MAKING USE OF HIGH THROUGHPUT TECHNOLOGIES AS DESCRIBED BY CMS-2020-01-R: HCPCS

## 2021-04-17 PROCEDURE — U0005 INFEC AGEN DETEC AMPLI PROBE: HCPCS

## 2021-05-05 NOTE — HPI: SKIN LESIONS
62
Is This A New Presentation, Or A Follow-Up?: Skin Lesions
How Severe Is Your Skin Lesion?: mild
Have Your Skin Lesions Been Treated?: not been treated

## 2021-05-18 ENCOUNTER — OFFICE VISIT (OUTPATIENT)
Dept: SLEEP MEDICINE | Facility: MEDICAL CENTER | Age: 81
End: 2021-05-18
Payer: MEDICARE

## 2021-05-18 VITALS
TEMPERATURE: 98.2 F | WEIGHT: 274 LBS | OXYGEN SATURATION: 93 % | SYSTOLIC BLOOD PRESSURE: 112 MMHG | RESPIRATION RATE: 16 BRPM | BODY MASS INDEX: 35.18 KG/M2 | HEART RATE: 67 BPM | DIASTOLIC BLOOD PRESSURE: 60 MMHG

## 2021-05-18 DIAGNOSIS — E66.9 OBESITY, UNSPECIFIED CLASSIFICATION, UNSPECIFIED OBESITY TYPE, UNSPECIFIED WHETHER SERIOUS COMORBIDITY PRESENT: ICD-10-CM

## 2021-05-18 DIAGNOSIS — J84.9 ILD (INTERSTITIAL LUNG DISEASE) (HCC): ICD-10-CM

## 2021-05-18 DIAGNOSIS — J90 PLEURAL EFFUSION ON LEFT: ICD-10-CM

## 2021-05-18 DIAGNOSIS — G47.33 OSA (OBSTRUCTIVE SLEEP APNEA): ICD-10-CM

## 2021-05-18 PROCEDURE — 99214 OFFICE O/P EST MOD 30 MIN: CPT | Performed by: INTERNAL MEDICINE

## 2021-05-18 ASSESSMENT — ENCOUNTER SYMPTOMS
EYE PAIN: 0
STRIDOR: 0
HEADACHES: 0
FALLS: 0
BLURRED VISION: 0
ORTHOPNEA: 0
CLAUDICATION: 0
SPUTUM PRODUCTION: 0
VOMITING: 0
HEARTBURN: 0
SINUS PAIN: 0
EYE DISCHARGE: 0
BACK PAIN: 0
SHORTNESS OF BREATH: 1
ABDOMINAL PAIN: 0
EYE REDNESS: 0
CHILLS: 0
TREMORS: 0
DEPRESSION: 0
PHOTOPHOBIA: 0
COUGH: 0
WEIGHT LOSS: 0
NAUSEA: 0
DIAPHORESIS: 0
SPEECH CHANGE: 0
NECK PAIN: 0
MYALGIAS: 0
HEMOPTYSIS: 0
CONSTIPATION: 0
DIZZINESS: 0
FOCAL WEAKNESS: 0
DIARRHEA: 0
PALPITATIONS: 0
SORE THROAT: 0
WEAKNESS: 0
FEVER: 0
PND: 0
WHEEZING: 0
DOUBLE VISION: 0

## 2021-05-18 ASSESSMENT — FIBROSIS 4 INDEX: FIB4 SCORE: 1.41

## 2021-05-18 NOTE — PROGRESS NOTES
Chief Complaint   Patient presents with   • Follow-Up     last seen 3/2/21    • Results     hrct 3/12/21, labs 3/2/21          HPI: This patient is a 81 y.o. male whom is followed in our clinic for pleural effusion last seen by me on 3/2/2021. The patient's past medical history significant for hypertension currently well controlled, obstructive sleep apnea on CPAP therapy and followed in our sleep medicine clinic, dyslipidemia, hypothyroid, gastroesophageal reflux disease and seasonal allergies for which he uses intranasal steroids.  He is a lifelong non-smoker.The patient establish care with me at the end of January for left pleural effusion that was found to evaluate dry cough that started last fall.  When cough persisted and he began experiencing shortness of breath chest x-ray done on January 5 of this year showed a large left-sided pleural effusion with some loculation.  He was admitted for thoracentesis which showed lymphocytic predominant exudative effusion.  92% lymphocytes.  LDH and total protein were elevated consistent with exudate.  Cytology was negative and there was no growth on bacterial fungal or AFB cultures.  A repeat chest x-ray on January 14 showed recurrence of loculated left pleural effusion although not quite as severe.  He also had some bilateral reticular changes noted on CT chest done during his hospital stay consistent with early ILD.  Repeat thoracentesis  again showed lymphocytic predominant exudative effusion with cytology negative for malignancy.  Lymphocytes were 88%.  He had mild eosinophilia on last thoracentesis at 12% but not on first.    Due to findings of interstitial lung disease we ordered connective tissue disease screening at our last clinic visit which was unremarkable, specifically FAVIO negative, rheumatoid factor negative, CCP negative.  QuantiFERON gold was negative.  A repeat CT chest done with high-resolution protocol shows peripheral reticular thickening bilateral  upper and lower lobes but no traction bronchiectasis or honeycombing.  No significant groundglass opacities.  He had small amount of loculated pleural fluid on the left slightly decreased from prior imaging.  He presents today for routine follow-up.  He continues to deny fevers, chills, night sweats, weight loss.  He has noted increased shortness of breath over the past 3 to 4 weeks and feels fluid may be reaccumulating.  No chest pain.    Past Medical History:   Diagnosis Date   • Arrhythmia     Atrial fribrillation   • Arthritis    • Bronchitis 2004   • Fibula fracture 1981    right   • High cholesterol    • Hyperlipidemia    • Hypertension    • MEDICAL HOME    • Onychogryposis of toenail 1/21/2021   • Onychomycosis 1/21/2021   • Pain     low back   • Pneumonia 2004   • Sleep apnea        Social History     Socioeconomic History   • Marital status:      Spouse name: Not on file   • Number of children: Not on file   • Years of education: Not on file   • Highest education level: Not on file   Occupational History   • Not on file   Tobacco Use   • Smoking status: Never Smoker   • Smokeless tobacco: Never Used   Vaping Use   • Vaping Use: Never used   Substance and Sexual Activity   • Alcohol use: Yes     Alcohol/week: 1.2 - 1.8 oz     Types: 2 - 3 Standard drinks or equivalent per week     Comment: occ   • Drug use: Never   • Sexual activity: Not Currently   Other Topics Concern   • Not on file   Social History Narrative   • Not on file     Social Determinants of Health     Financial Resource Strain:    • Difficulty of Paying Living Expenses:    Food Insecurity:    • Worried About Running Out of Food in the Last Year:    • Ran Out of Food in the Last Year:    Transportation Needs:    • Lack of Transportation (Medical):    • Lack of Transportation (Non-Medical):    Physical Activity:    • Days of Exercise per Week:    • Minutes of Exercise per Session:    Stress:    • Feeling of Stress :    Social Connections:     • Frequency of Communication with Friends and Family:    • Frequency of Social Gatherings with Friends and Family:    • Attends [employer] Services:    • Active Member of Clubs or Organizations:    • Attends Club or Organization Meetings:    • Marital Status:    Intimate Partner Violence:    • Fear of Current or Ex-Partner:    • Emotionally Abused:    • Physically Abused:    • Sexually Abused:        Family History   Problem Relation Age of Onset   • Cancer Mother         cervical/breast   • Cancer Sister         lung   • Cancer Father         lung cancer   • Cancer Other        Current Outpatient Medications on File Prior to Visit   Medication Sig Dispense Refill   • fluticasone (FLONASE) 50 MCG/ACT nasal spray SHAKE LIQUID AND USE 2 SPRAYS IN EACH NOSTRIL TWICE DAILY 16 g 5   • lisinopril (PRINIVIL) 10 MG Tab Take 1 Tab by mouth every day. 100 Tab 1   • carvedilol (COREG) 25 MG Tab Take 1 Tab by mouth 2 times a day. 180 Tab 1   • levothyroxine (SYNTHROID) 75 MCG Tab Take 1 Tab by mouth every morning. ON A EMPTY STOMACH 90 Tab 2   • pravastatin (PRAVACHOL) 40 MG tablet Take 1 Tab by mouth every day. 90 Tab 2   • omeprazole (PRILOSEC) 40 MG delayed-release capsule Take 1 Cap by mouth every day. 90 Cap 2   • aspirin 81 MG tablet Take 81 mg by mouth every bedtime.       No current facility-administered medications on file prior to visit.       Other environmental      ROS:   Review of Systems   Constitutional: Negative for chills, diaphoresis, fever, malaise/fatigue and weight loss.   HENT: Negative for congestion, ear discharge, ear pain, hearing loss, nosebleeds, sinus pain, sore throat and tinnitus.    Eyes: Negative for blurred vision, double vision, photophobia, pain, discharge and redness.   Respiratory: Positive for shortness of breath. Negative for cough, hemoptysis, sputum production, wheezing and stridor.    Cardiovascular: Negative for chest pain, palpitations, orthopnea, claudication, leg swelling and  PND.   Gastrointestinal: Negative for abdominal pain, constipation, diarrhea, heartburn, nausea and vomiting.   Genitourinary: Negative for dysuria and urgency.   Musculoskeletal: Negative for back pain, falls, joint pain, myalgias and neck pain.   Skin: Negative for itching and rash.   Neurological: Negative for dizziness, tremors, speech change, focal weakness, weakness and headaches.   Endo/Heme/Allergies: Negative for environmental allergies.   Psychiatric/Behavioral: Negative for depression.       /60 (BP Location: Right arm, Patient Position: Sitting, BP Cuff Size: Large adult)   Pulse 67   Temp 36.8 °C (98.2 °F) (Temporal)   Resp 16   Wt 124 kg (274 lb)   SpO2 93%   Physical Exam  Vitals reviewed.   Constitutional:       General: He is not in acute distress.     Appearance: Normal appearance. He is obese.   HENT:      Head: Normocephalic and atraumatic.      Right Ear: External ear normal.      Left Ear: External ear normal.      Nose: Nose normal. No congestion.      Mouth/Throat:      Mouth: Mucous membranes are moist.      Pharynx: Oropharynx is clear. No oropharyngeal exudate.   Eyes:      General: No scleral icterus.     Extraocular Movements: Extraocular movements intact.      Conjunctiva/sclera: Conjunctivae normal.      Pupils: Pupils are equal, round, and reactive to light.   Cardiovascular:      Rate and Rhythm: Normal rate and regular rhythm.      Heart sounds: Normal heart sounds. No murmur heard.   No gallop.    Pulmonary:      Effort: Pulmonary effort is normal. No respiratory distress.      Breath sounds: No wheezing or rales.      Comments: Early inspiratory crackles heard intermittently throughout with decreased breath sounds on the left  Abdominal:      Palpations: Abdomen is soft.   Musculoskeletal:         General: Normal range of motion.      Cervical back: Normal range of motion and neck supple.      Right lower leg: No edema.      Left lower leg: No edema.   Skin:      General: Skin is warm and dry.      Findings: No rash.   Neurological:      Mental Status: He is alert and oriented to person, place, and time.      Cranial Nerves: No cranial nerve deficit.   Psychiatric:         Mood and Affect: Mood normal.         Behavior: Behavior normal.         PFTs as reviewed by me personally: None    Imagaing as reviewed by me personally: As per HPI    Assessment:  1. Pleural effusion on left  DX-CHEST-2 VIEWS   2. ILD (interstitial lung disease) (HCC)     3. Obesity, unspecified classification, unspecified obesity type, unspecified whether serious comorbidity present     4. MARCI (obstructive sleep apnea)  DME CPAP       Plan:  1.  Patient with recurrent lymphocytic predominant pleural effusion for which the differential diagnosis is fairly narrow as per my last clinic visit.  Malignancy remains in the differential as to sarcoidosis given interstitial lung disease.  I see no clear indication to start systemic steroids today but would like to obtain chest x-ray.  If pleural fluid has reaccumulated I would like to refer him to surgery for VATS with drainage and pleurodesis as well as pleural biopsy.  If no reaccumulation, then I would obtain pulmonary function testing and consider a trial of immunosuppression.  2.  This is not a clear UIP pattern given new honeycombing or traction bronchiectasis.  There is interstitial thickening in the periphery of bilateral upper and lower lobes.  Rheumatoid factor, FAVIO and CCP antibodies all negative.  Given his age, the most likely ILD would be IPF however the lymphocytic pleural effusion is confusing the picture somewhat.  For this reason I would consider a trial of immunosuppression.  See above.  3.  This test with patient increased risk for obesity related pulmonary complications.  Encouraged healthy lifestyle habits.  4.  Patient is followed by sleep medicine.  He is compliant with auto CPAP.  He is due for new machine and would like to have this  ordered today.  I have ordered auto CPAP from 10 to 16 cm of water.  Up with sleep medicine.  Return in about 3 months (around 8/18/2021) for cxr.

## 2021-05-19 ENCOUNTER — HOSPITAL ENCOUNTER (OUTPATIENT)
Dept: RADIOLOGY | Facility: MEDICAL CENTER | Age: 81
End: 2021-05-19
Attending: INTERNAL MEDICINE
Payer: MEDICARE

## 2021-05-19 DIAGNOSIS — J90 PLEURAL EFFUSION ON LEFT: ICD-10-CM

## 2021-05-19 PROCEDURE — 71046 X-RAY EXAM CHEST 2 VIEWS: CPT

## 2021-05-21 DIAGNOSIS — J90 PLEURAL EFFUSION ON LEFT: ICD-10-CM

## 2021-05-21 DIAGNOSIS — J84.9 ILD (INTERSTITIAL LUNG DISEASE) (HCC): ICD-10-CM

## 2021-05-21 NOTE — PROGRESS NOTES
Reviewed chest x-ray results.  Patient has loculated pleural effusion on the left.  Lateral film does not appear to show significant reaccumulation however patient was more short of breath when I saw him in clinic and has now had thoracentesis on 2 occasions with lymphocytic predominance.  I recommended consultation with CT surgery for possible pleural biopsy.  Patient is not sure he would want to do anything invasive but is willing to discuss with surgeon.  In the meantime I will order pulmonary function testing.  He has follow-up with me in September.  If no intervention is decided upon with regards to pleural biopsy, we will repeat CT chest at that time.

## 2021-06-08 DIAGNOSIS — J30.9 ALLERGIC RHINITIS: ICD-10-CM

## 2021-06-08 RX ORDER — FLUTICASONE PROPIONATE 50 MCG
SPRAY, SUSPENSION (ML) NASAL
Qty: 16 G | Refills: 5 | Status: CANCELLED | OUTPATIENT
Start: 2021-06-08

## 2021-06-10 ENCOUNTER — OFFICE VISIT (OUTPATIENT)
Dept: MEDICAL GROUP | Facility: PHYSICIAN GROUP | Age: 81
End: 2021-06-10
Payer: MEDICARE

## 2021-06-10 VITALS
WEIGHT: 275 LBS | RESPIRATION RATE: 20 BRPM | HEART RATE: 60 BPM | SYSTOLIC BLOOD PRESSURE: 136 MMHG | TEMPERATURE: 98.4 F | BODY MASS INDEX: 37.25 KG/M2 | DIASTOLIC BLOOD PRESSURE: 78 MMHG | OXYGEN SATURATION: 95 % | HEIGHT: 72 IN

## 2021-06-10 DIAGNOSIS — J30.1 SEASONAL ALLERGIC RHINITIS DUE TO POLLEN: ICD-10-CM

## 2021-06-10 DIAGNOSIS — G47.30 SLEEP APNEA, UNSPECIFIED TYPE: Chronic | ICD-10-CM

## 2021-06-10 DIAGNOSIS — I10 ESSENTIAL HYPERTENSION: Chronic | ICD-10-CM

## 2021-06-10 DIAGNOSIS — N18.30 STAGE 3 CHRONIC KIDNEY DISEASE, UNSPECIFIED WHETHER STAGE 3A OR 3B CKD: ICD-10-CM

## 2021-06-10 DIAGNOSIS — J30.9 ALLERGIC RHINITIS: ICD-10-CM

## 2021-06-10 DIAGNOSIS — D12.6 TUBULAR ADENOMA OF COLON: ICD-10-CM

## 2021-06-10 DIAGNOSIS — Z23 NEED FOR VACCINATION: ICD-10-CM

## 2021-06-10 DIAGNOSIS — J90 PLEURAL EFFUSION ON LEFT: ICD-10-CM

## 2021-06-10 DIAGNOSIS — K22.70 BARRETT'S ESOPHAGUS WITHOUT DYSPLASIA: ICD-10-CM

## 2021-06-10 DIAGNOSIS — E03.8 OTHER SPECIFIED HYPOTHYROIDISM: Chronic | ICD-10-CM

## 2021-06-10 DIAGNOSIS — E66.9 OBESITY (BMI 30-39.9): ICD-10-CM

## 2021-06-10 DIAGNOSIS — Z00.00 ENCOUNTER FOR ANNUAL WELLNESS VISIT (AWV) IN MEDICARE PATIENT: ICD-10-CM

## 2021-06-10 PROBLEM — B35.1 ONYCHOMYCOSIS: Status: RESOLVED | Noted: 2021-01-21 | Resolved: 2021-06-10

## 2021-06-10 PROBLEM — L60.2 ONYCHOGRYPOSIS OF TOENAIL: Status: RESOLVED | Noted: 2021-01-21 | Resolved: 2021-06-10

## 2021-06-10 PROCEDURE — 90471 IMMUNIZATION ADMIN: CPT | Performed by: INTERNAL MEDICINE

## 2021-06-10 PROCEDURE — G0439 PPPS, SUBSEQ VISIT: HCPCS | Performed by: INTERNAL MEDICINE

## 2021-06-10 PROCEDURE — 90750 HZV VACC RECOMBINANT IM: CPT | Performed by: INTERNAL MEDICINE

## 2021-06-10 RX ORDER — FLUTICASONE PROPIONATE 50 MCG
SPRAY, SUSPENSION (ML) NASAL
Qty: 16 G | Refills: 5 | Status: CANCELLED | OUTPATIENT
Start: 2021-06-10

## 2021-06-10 RX ORDER — CIPROFLOXACIN HYDROCHLORIDE 3.5 MG/ML
SOLUTION/ DROPS TOPICAL
COMMUNITY
Start: 2021-03-16 | End: 2021-06-15

## 2021-06-10 RX ORDER — FLUTICASONE PROPIONATE 50 MCG
SPRAY, SUSPENSION (ML) NASAL
Qty: 16 G | Refills: 2 | Status: SHIPPED | OUTPATIENT
Start: 2021-06-10 | End: 2021-10-26

## 2021-06-10 ASSESSMENT — ACTIVITIES OF DAILY LIVING (ADL): BATHING_REQUIRES_ASSISTANCE: 0

## 2021-06-10 ASSESSMENT — ENCOUNTER SYMPTOMS: GENERAL WELL-BEING: GOOD

## 2021-06-10 ASSESSMENT — PATIENT HEALTH QUESTIONNAIRE - PHQ9: CLINICAL INTERPRETATION OF PHQ2 SCORE: 0

## 2021-06-10 ASSESSMENT — FIBROSIS 4 INDEX: FIB4 SCORE: 1.41

## 2021-06-10 NOTE — PROGRESS NOTES
Chief Complaint   Patient presents with   • Annual Wellness Visit       HPI:  Farhat Stahl is a 81 y.o. here for Medicare Annual Wellness Visit     1. Encounter for annual wellness visit (AWV) in Medicare patient  Due for annual health and wellness visit    2. Seasonal allergic rhinitis due to pollen  Patient requesting refill on Flonase.  Otherwise symptoms are stable.    3. 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  Stable.  Followed by GI.  Up-to-date with EGD.    4. Pleural effusion on left  Patient diagnosed pleural effusion in January 2021.  Patient was seen and evaluated by pulmonology, Dr. Syed this morning.  Patient completed second thoracentesis.  Thus far negative for malignancy.  Additional work-up sent to lab.  If nondiagnostic, consider bronchoscopy.  Patient will have a follow-up CT scan of the thorax..    6/10/2021: Patient has had follow-up with pulmonology status post thoracentesis x2.  Cytology negative.  Questionable interstitial lung disease.  Patient had follow-up chest x-ray in May, questionable sarcoidosis.  Lab work-up thus far negative.  Patient was referred to [facility] for VATS, biopsy, pleurodesis.    5. Tubular adenoma of colon  Followed by GI.  Up-to-date with colonoscopy.    6. Essential hypertension  Blood pressure stable on lisinopril and Coreg at current dose.    7. Other specified hypothyroidism  Patient currently taking levothyroxine 75 MCG's daily.  TSH 2.9 in February 2020.  Asymptomatic.    8. Obesity (BMI 30-39.9)  BMI 39.46.  Patient currently not following a diet or exercise routine.  Patient does stay active and irrigates his property and works in the garden.  Patient also has a recumbent bike.    9. Sleep apnea, unspecified type  Patient just received a new CPAP machine.  Adjustments made.  Stable    10. Need for vaccination  - Shingles Vaccine (Shingrix)      11. Stage 3 chronic kidney disease, unspecified whether stage 3a or 3b CKD  (HCC)  Chronic.  Stable.  Most recent GFR 57.    12. Allergic rhinitis  Requesting refill on Flonase.  90-day supply.      Patient Active Problem List    Diagnosis Date Noted   • Onychogryposis of toenail 01/21/2021   • Onychomycosis 01/21/2021   • Prediabetes 01/07/2021   • Pleural effusion on left 01/07/2021   • History of squamous cell carcinoma of skin 02/28/2020   • Arthritis of knee 03/09/2017   • Tubular adenoma of colon 07/06/2016   • Blackwell's esophagus 07/06/2016   • History of ulcer disease 06/23/2016   • Decreased GFR 03/21/2016   • Peripheral autonomic neuropathy 09/23/2015   • Hypothyroid 04/28/2015   • Obesity (BMI 30-39.9) 08/22/2014   • History of vertigo 05/13/2014   • Sleep apnea 02/20/2014   • Renal cyst 01/22/2014   • History of compression fracture 01/21/2014   • Sensorineural hearing loss 11/26/2012   • Impaired fasting glucose 10/10/2012   • History of atrial fibrillation 05/22/2012   • History of foot pain 03/06/2010   • Hypertension 09/16/2009   • Dyslipidemia 09/16/2009   • Allergic rhinitis 09/16/2009   • S/p lumbar surgery 09/16/2009   • Preventative health care 09/16/2009       Current Outpatient Medications   Medication Sig Dispense Refill   • VITAMIN D PO Take  by mouth.     • fluticasone (FLONASE) 50 MCG/ACT nasal spray SHAKE LIQUID AND USE 2 SPRAYS IN EACH NOSTRIL TWICE DAILY 16 g 5   • lisinopril (PRINIVIL) 10 MG Tab Take 1 Tab by mouth every day. 100 Tab 1   • carvedilol (COREG) 25 MG Tab Take 1 Tab by mouth 2 times a day. 180 Tab 1   • levothyroxine (SYNTHROID) 75 MCG Tab Take 1 Tab by mouth every morning. ON A EMPTY STOMACH 90 Tab 2   • pravastatin (PRAVACHOL) 40 MG tablet Take 1 Tab by mouth every day. 90 Tab 2   • omeprazole (PRILOSEC) 40 MG delayed-release capsule Take 1 Cap by mouth every day. 90 Cap 2   • aspirin 81 MG tablet Take 81 mg by mouth every bedtime.     • ciprofloxacin (CILOXIN) 0.3 % Solution INSTILL 1 DROP IN RIGHT EYE FOUR TIMES DAILY (Patient not taking:  Reported on 6/10/2021)       No current facility-administered medications for this visit.          Current supplements as per medication list.     Allergies: Other environmental    Current social contact/activities: Gardening/ grand kids/ [employer]      He  reports that he has never smoked. He has never used smokeless tobacco. He reports current alcohol use of about 1.2 - 1.8 oz of alcohol per week. He reports that he does not use drugs.  Counseling given: Not Answered      DPA/Advanced Directive:  Patient has Advanced Directive, but it is not on file. Instructed to bring in a copy to scan into their chart.    ROS:    Gait: Uses a cane  Ostomy: No  Other tubes: No  Amputations: No  Chronic oxygen use: No  Last eye exam: 06/2021  Wears hearing aids: No   : Denies any urinary leakage during the last 6 months  Annual Health Assessment Questions:    1.  Are you currently engaging in any exercise or physical activity? Yes    2.  How would you describe your mood or emotional well-being today? good    3.  Have you had any falls in the last year? Yes    4.  Have you noticed any problems with your balance or had difficulty walking? Yes    5.  In the last six months have you experienced any leakage of urine? No    6. DPA/Advanced Directive: Patient has Advanced Directive, but it is not on file. Instructed to bring in a copy to scan into their chart.  Screening:  Annual Exam  Depression Screening    Little interest or pleasure in doing things?  0 - not at all  Feeling down, depressed , or hopeless? 0 - not at all  Patient Health Questionnaire Score: 0     If depressive symptoms identified deferred to follow up visit unless specifically addressed in assessment and plan.    Interpretation of PHQ-9 Total Score   Score Severity   1-4 No Depression   5-9 Mild Depression   10-14 Moderate Depression   15-19 Moderately Severe Depression   20-27 Severe Depression    Screening for Cognitive Impairment    Three Minute Recall  (captain, garden, picture) 2/3    Shaun clock face with all 12 numbers and set the hands to show 5 past 8.  Yes    Cognitive concerns identified deferred for follow up unless specifically addressed in assessment and plan.    Fall Risk Assessment    Has the patient had two or more falls in the last year or any fall with injury in the last year?  Yes    Safety Assessment    Throw rugs on floor.  Yes  Handrails on all stairs.  Yes  Good lighting in all hallways.  Yes  Difficulty hearing.  No  Patient counseled about all safety risks that were identified.    Functional Assessment ADLs    Are there any barriers preventing you from cooking for yourself or meeting nutritional needs?  No.    Are there any barriers preventing you from driving safely or obtaining transportation?  No.    Are there any barriers preventing you from using a telephone or calling for help?  No.    Are there any barriers preventing you from shopping?  No.    Are there any barriers preventing you from taking care of your own finances?  No.    Are there any barriers preventing you from managing your medications?  No.    Are there any barriers preventing you from showering, bathing or dressing yourself?  No.    Are you currently engaging in any exercise or physical activity?  Yes.  Recumbent bar, gardening,   What is your perception of your health?  Good.      Health Maintenance Summary                IMM ZOSTER VACCINES Overdue 1/26/2010      Done 12/1/2009 Imm Admin: Zoster Vaccine Live (ZVL) (Zostavax) - HISTORICAL DATA    Annual Wellness Visit Overdue 2/25/2021      Done 2/25/2020 Visit Dx: Medicare annual wellness visit, subsequent     Patient has more history with this topic...    COLONOSCOPY Overdue 6/2/2021      Done 6/2/2016 AMB REFERRAL TO GI FOR COLONOSCOPY    IMM DTaP/Tdap/Td Vaccine Next Due 5/2/2023      Done 5/2/2013 Imm Admin: Tdap Vaccine     Patient has more history with this topic...          Patient Care Team:  Stefanie Zavaleta M.D.  "as PCP - General (Internal Medicine)  Newton Young D.P.M. as Consulting Physician (Podiatry)  Umberto Hayes O.D. as Consulting Physician (Optometry)  Sven Bolanos M.D. as Consulting Physician (Gastroenterology)  [facility] as Home Health Provider (DME Supplier)  Radha Maria as [facility]         Social History     Tobacco Use   • Smoking status: Never Smoker   • Smokeless tobacco: Never Used   Vaping Use   • Vaping Use: Never used   Substance Use Topics   • Alcohol use: Yes     Alcohol/week: 1.2 - 1.8 oz     Types: 2 - 3 Standard drinks or equivalent per week     Comment: occ   • Drug use: Never     Family History   Problem Relation Age of Onset   • Cancer Mother         cervical/breast   • Cancer Sister         lung   • Cancer Father         lung cancer   • Cancer Other      He  has a past medical history of Arrhythmia, Arthritis, Bronchitis (2004), Fibula fracture (1981), High cholesterol, Hyperlipidemia, Hypertension, [facility] HOME, Onychogryposis of toenail (1/21/2021), Onychomycosis (1/21/2021), Pain, Pneumonia (2004), and Sleep apnea.   Past Surgical History:   Procedure Laterality Date   • HAMMERTOE CORRECTION  2/2/2015    Performed by Newton Young D.P.M. at [facility]   • EXOSTOSIS EXCISION  6/3/2011    Performed by NEWTON YOUNG at [facility]   • LESION EXCISION ORTHO  6/3/2011    Performed by NEWTON YOUNG at [facility]   • TOE ARTHROPLASTY  6/3/2011    Performed by NEWTON YOUNG at [facility]   • LUMBAR LAMINECTOMY DISKECTOMY  2005    microscopic   • TONSILLECTOMY  1945    adenoidectomy       Exam:   /78 (BP Location: Left arm, Patient Position: Sitting, BP Cuff Size: Adult)   Pulse 60   Temp 36.9 °C (98.4 °F) (Temporal)   Resp 20   Ht 1.778 m (5' 10\")   Wt 125 kg (275 lb)   SpO2 95%  Body mass index is 39.46 kg/m².    Hearing good.    Dentition good  Alert, oriented in no " acute distress.  Eye contact is good, speech goal directed, affect calm    Constitutional: Alert, no distress.  Obese.  Skin: Warm, dry, good turgor, no rashes in visible areas.  Eye: Equal, round and reactive, conjunctiva clear, lids normal.  ENMT: Lips without lesions, good dentition, oropharynx clear.  Tympanic membrane intact.  Neck: Trachea midline, no masses, no thyromegaly. No cervical or supraclavicular lymphadenopathy  Respiratory: Unlabored respiratory effort, decreased lung sounds left midlung to base.  Mild crackles noted.  Cardiovascular: Normal S1, S2, no murmur, no edema.  Abdomen: Soft, non-tender, no masses, no hepatosplenomegaly.  Psych: Alert and oriented x3, normal affect and mood.    Assessment and Plan. The following treatment and monitoring plan is recommended:    There are no diagnoses linked to this encounter.    1. Encounter for annual wellness visit (AWV) in Medicare patient    - Shingles Vaccine (Shingrix)  - Subsequent Annual Wellness Visit - Includes PPPS ()  - THYROID PANEL WITH TSH  - Comp Metabolic Panel; Future    2. Seasonal allergic rhinitis due to pollen  Refill Flonase as requested for 90-day supply.    - Subsequent Annual Wellness Visit - Includes PPPS ()    3. Blackwell's esophagus without dysplasia  Keep follow-up appointment with GI  Asymptomatic.    - Subsequent Annual Wellness Visit - Includes PPPS ()    4. Pleural effusion on left  Keep follow-up appointment with pulmonology and [facility].    - Subsequent Annual Wellness Visit - Includes PPPS ()    5. Tubular adenoma of colon  Follow-up with GI    - Subsequent Annual Wellness Visit - Includes PPPS ()    6. Essential hypertension  Stable.  Continue current plan of care    - Subsequent Annual Wellness Visit - Includes PPPS ()    7. Other specified hypothyroidism  Continue levothyroxine at current dose    - Subsequent Annual Wellness Visit - Includes PPPS ()  - THYROID PANEL WITH  TSH    8. Obesity (BMI 30-39.9)  Recommend referral to weight loss counseling through [facility]    - Subsequent Annual Wellness Visit - Includes PPPS ()    9. Sleep apnea, unspecified type  Continue follow-up with sleep medicine clinic  Stable with current CPAP settings    - Subsequent Annual Wellness Visit - Includes PPPS ()    10. Need for vaccination    - Shingles Vaccine (Shingrix)  - Subsequent Annual Wellness Visit - Includes PPPS ()    11. Stage 3 chronic kidney disease, unspecified whether stage 3a or 3b CKD (HCC)  Chronic.  Stable.  Continue to monitor with CMP    - Subsequent Annual Wellness Visit - Includes PPPS ()  - Comp Metabolic Panel; Future    12. Allergic rhinitis  Refill Flonase    Services suggested: No services needed at this time  Health Care Screening: Age-appropriate preventive services recommended by USPTF and ACIP covered by Medicare were discussed today. Services ordered if indicated and agreed upon by the patient.  Referrals offered: Community-based lifestyle interventions to reduce health risks and promote self-management and wellness, fall prevention, nutrition, physical activity, tobacco-use cessation, weight loss, and mental health services as per orders if indicated.    Discussion today about general wellness and lifestyle habits:    · Prevent falls and reduce trip hazards; Cautioned about securing or removing rugs.  · Have a working fire alarm and carbon monoxide detector;   · Engage in regular physical activity and social activities     Follow-up: No follow-ups on file.

## 2021-06-16 ENCOUNTER — HOSPITAL ENCOUNTER (OUTPATIENT)
Dept: LAB | Facility: MEDICAL CENTER | Age: 81
End: 2021-06-16
Attending: INTERNAL MEDICINE
Payer: MEDICARE

## 2021-06-16 DIAGNOSIS — N18.30 STAGE 3 CHRONIC KIDNEY DISEASE, UNSPECIFIED WHETHER STAGE 3A OR 3B CKD: ICD-10-CM

## 2021-06-16 DIAGNOSIS — Z00.00 ENCOUNTER FOR ANNUAL WELLNESS VISIT (AWV) IN MEDICARE PATIENT: ICD-10-CM

## 2021-06-16 LAB
ALBUMIN SERPL BCP-MCNC: 3.6 G/DL (ref 3.2–4.9)
ALBUMIN/GLOB SERPL: 1.2 G/DL
ALP SERPL-CCNC: 95 U/L (ref 30–99)
ALT SERPL-CCNC: 9 U/L (ref 2–50)
ANION GAP SERPL CALC-SCNC: 9 MMOL/L (ref 7–16)
AST SERPL-CCNC: 14 U/L (ref 12–45)
BILIRUB SERPL-MCNC: 0.6 MG/DL (ref 0.1–1.5)
BUN SERPL-MCNC: 14 MG/DL (ref 8–22)
CALCIUM SERPL-MCNC: 9 MG/DL (ref 8.4–10.2)
CHLORIDE SERPL-SCNC: 104 MMOL/L (ref 96–112)
CO2 SERPL-SCNC: 22 MMOL/L (ref 20–33)
CREAT SERPL-MCNC: 1.12 MG/DL (ref 0.5–1.4)
FASTING STATUS PATIENT QL REPORTED: NORMAL
GLOBULIN SER CALC-MCNC: 3.1 G/DL (ref 1.9–3.5)
GLUCOSE SERPL-MCNC: 106 MG/DL (ref 65–99)
POTASSIUM SERPL-SCNC: 4.3 MMOL/L (ref 3.6–5.5)
PROT SERPL-MCNC: 6.7 G/DL (ref 6–8.2)
SODIUM SERPL-SCNC: 135 MMOL/L (ref 135–145)
T4 FREE SERPL-MCNC: 1.24 NG/DL (ref 0.93–1.7)
TSH SERPL DL<=0.005 MIU/L-ACNC: 2.32 UIU/ML (ref 0.38–5.33)

## 2021-06-16 PROCEDURE — 36415 COLL VENOUS BLD VENIPUNCTURE: CPT

## 2021-06-16 PROCEDURE — 84439 ASSAY OF FREE THYROXINE: CPT

## 2021-06-16 PROCEDURE — 84443 ASSAY THYROID STIM HORMONE: CPT

## 2021-06-16 PROCEDURE — 80053 COMPREHEN METABOLIC PANEL: CPT

## 2021-07-13 ENCOUNTER — APPOINTMENT (RX ONLY)
Dept: URBAN - METROPOLITAN AREA CLINIC 4 | Facility: CLINIC | Age: 81
Setting detail: DERMATOLOGY
End: 2021-07-13

## 2021-07-13 DIAGNOSIS — Z71.89 OTHER SPECIFIED COUNSELING: ICD-10-CM

## 2021-07-13 DIAGNOSIS — D22 MELANOCYTIC NEVI: ICD-10-CM

## 2021-07-13 DIAGNOSIS — D18.0 HEMANGIOMA: ICD-10-CM

## 2021-07-13 DIAGNOSIS — L57.0 ACTINIC KERATOSIS: ICD-10-CM

## 2021-07-13 DIAGNOSIS — L82.0 INFLAMED SEBORRHEIC KERATOSIS: ICD-10-CM

## 2021-07-13 DIAGNOSIS — Z85.828 PERSONAL HISTORY OF OTHER MALIGNANT NEOPLASM OF SKIN: ICD-10-CM

## 2021-07-13 DIAGNOSIS — L73.8 OTHER SPECIFIED FOLLICULAR DISORDERS: ICD-10-CM

## 2021-07-13 DIAGNOSIS — L82.1 OTHER SEBORRHEIC KERATOSIS: ICD-10-CM

## 2021-07-13 DIAGNOSIS — L81.4 OTHER MELANIN HYPERPIGMENTATION: ICD-10-CM

## 2021-07-13 DIAGNOSIS — L21.8 OTHER SEBORRHEIC DERMATITIS: ICD-10-CM | Status: INADEQUATELY CONTROLLED

## 2021-07-13 PROBLEM — D18.01 HEMANGIOMA OF SKIN AND SUBCUTANEOUS TISSUE: Status: ACTIVE | Noted: 2021-07-13

## 2021-07-13 PROBLEM — D22.5 MELANOCYTIC NEVI OF TRUNK: Status: ACTIVE | Noted: 2021-07-13

## 2021-07-13 PROCEDURE — ? COUNSELING

## 2021-07-13 PROCEDURE — ? LIQUID NITROGEN

## 2021-07-13 PROCEDURE — 17110 DESTRUCTION B9 LES UP TO 14: CPT

## 2021-07-13 PROCEDURE — ? SUNSCREEN TREATMENT REGIMEN

## 2021-07-13 PROCEDURE — ? PRESCRIPTION

## 2021-07-13 PROCEDURE — 17003 DESTRUCT PREMALG LES 2-14: CPT | Mod: 59

## 2021-07-13 PROCEDURE — 17000 DESTRUCT PREMALG LESION: CPT | Mod: 59

## 2021-07-13 PROCEDURE — ? BENIGN DESTRUCTION COSMETIC

## 2021-07-13 PROCEDURE — 99214 OFFICE O/P EST MOD 30 MIN: CPT | Mod: 25

## 2021-07-13 RX ORDER — CLOBETASOL PROPIONATE 0.5 MG/ML
SOLUTION TOPICAL QD
Qty: 1 | Refills: 3 | Status: ERX | COMMUNITY
Start: 2021-07-13

## 2021-07-13 RX ADMIN — CLOBETASOL PROPIONATE: 0.5 SOLUTION TOPICAL at 00:00

## 2021-07-13 ASSESSMENT — LOCATION SIMPLE DESCRIPTION DERM
LOCATION SIMPLE: LEFT HAND
LOCATION SIMPLE: RIGHT CALF
LOCATION SIMPLE: SCALP
LOCATION SIMPLE: RIGHT HAND
LOCATION SIMPLE: RIGHT EYEBROW
LOCATION SIMPLE: RIGHT FOREHEAD
LOCATION SIMPLE: LEFT CALF
LOCATION SIMPLE: ANTERIOR SCALP
LOCATION SIMPLE: LEFT CHEEK
LOCATION SIMPLE: ABDOMEN
LOCATION SIMPLE: RIGHT EAR
LOCATION SIMPLE: LEFT EAR
LOCATION SIMPLE: CHEST
LOCATION SIMPLE: RIGHT CHEEK

## 2021-07-13 ASSESSMENT — LOCATION DETAILED DESCRIPTION DERM
LOCATION DETAILED: RIGHT CENTRAL MALAR CHEEK
LOCATION DETAILED: LEFT SUPERIOR PARIETAL SCALP
LOCATION DETAILED: LEFT MEDIAL INFERIOR CHEST
LOCATION DETAILED: LEFT ULNAR DORSAL HAND
LOCATION DETAILED: LEFT INFERIOR CENTRAL MALAR CHEEK
LOCATION DETAILED: RIGHT SUPERIOR HELIX
LOCATION DETAILED: LEFT DISTAL LATERAL CALF
LOCATION DETAILED: EPIGASTRIC SKIN
LOCATION DETAILED: RIGHT DISTAL CALF
LOCATION DETAILED: LEFT INFERIOR HELIX
LOCATION DETAILED: RIGHT SUPERIOR CENTRAL MALAR CHEEK
LOCATION DETAILED: LEFT DISTAL CALF
LOCATION DETAILED: RIGHT CENTRAL EYEBROW
LOCATION DETAILED: STERNUM
LOCATION DETAILED: RIGHT SUPERIOR LATERAL MALAR CHEEK
LOCATION DETAILED: RIGHT LATERAL EYEBROW
LOCATION DETAILED: PERIUMBILICAL SKIN
LOCATION DETAILED: RIGHT MEDIAL FOREHEAD
LOCATION DETAILED: RIGHT RADIAL DORSAL HAND
LOCATION DETAILED: MID-FRONTAL SCALP

## 2021-07-13 ASSESSMENT — LOCATION ZONE DERM
LOCATION ZONE: FACE
LOCATION ZONE: LEG
LOCATION ZONE: HAND
LOCATION ZONE: TRUNK
LOCATION ZONE: EAR
LOCATION ZONE: SCALP

## 2021-07-13 NOTE — PROCEDURE: LIQUID NITROGEN
Include Z78.9 (Other Specified Conditions Influencing Health Status) As An Associated Diagnosis?: No
Show Applicator Variable?: Yes
Pared With?: Dermablade
Detail Level: Detailed
Post-Care Instructions: I reviewed with the patient in detail post-care instructions. Patient is to wear sunprotection, and avoid picking at any of the treated lesions. Pt may apply Vaseline to crusted or scabbing areas.
Number Of Freeze-Thaw Cycles: 3 freeze-thaw cycles
Consent: The patient's consent was obtained including but not limited to risks of crusting, scabbing, blistering, scarring, darker or lighter pigmentary change, recurrence, incomplete removal and infection.
Duration Of Freeze Thaw-Cycle (Seconds): 10-15
Medical Necessity Information: It is in your best interest to select a reason for this procedure from the list below. All of these items fulfill various CMS LCD requirements except the new and changing color options.
Medical Necessity Clause: This procedure was medically necessary because the lesions that were treated were:
Duration Of Freeze Thaw-Cycle (Seconds): 5
Number Of Freeze-Thaw Cycles: 2 freeze-thaw cycles

## 2021-07-19 ENCOUNTER — PATIENT MESSAGE (OUTPATIENT)
Dept: HEALTH INFORMATION MANAGEMENT | Facility: OTHER | Age: 81
End: 2021-07-19

## 2021-07-24 DIAGNOSIS — I10 ESSENTIAL HYPERTENSION: Chronic | ICD-10-CM

## 2021-07-26 RX ORDER — CARVEDILOL 25 MG/1
TABLET ORAL
Qty: 180 TABLET | Refills: 1 | Status: SHIPPED | OUTPATIENT
Start: 2021-07-26 | End: 2021-12-21

## 2021-08-05 DIAGNOSIS — J30.9 ALLERGIC RHINITIS: ICD-10-CM

## 2021-08-05 RX ORDER — FLUTICASONE PROPIONATE 50 MCG
SPRAY, SUSPENSION (ML) NASAL
Qty: 48 G | Refills: 1 | Status: SHIPPED | OUTPATIENT
Start: 2021-08-05 | End: 2022-07-05

## 2021-08-05 NOTE — TELEPHONE ENCOUNTER
Patient came in today we only gave him a 30 supply of the fluticasone. He is needing 90 day supply

## 2021-09-08 ENCOUNTER — SLEEP CENTER VISIT (OUTPATIENT)
Dept: SLEEP MEDICINE | Facility: MEDICAL CENTER | Age: 81
End: 2021-09-08
Payer: MEDICARE

## 2021-09-08 VITALS
WEIGHT: 275 LBS | DIASTOLIC BLOOD PRESSURE: 60 MMHG | TEMPERATURE: 97.4 F | RESPIRATION RATE: 16 BRPM | SYSTOLIC BLOOD PRESSURE: 120 MMHG | BODY MASS INDEX: 37.25 KG/M2 | HEART RATE: 67 BPM | HEIGHT: 72 IN | OXYGEN SATURATION: 92 %

## 2021-09-08 DIAGNOSIS — J90 PLEURAL EFFUSION ON LEFT: ICD-10-CM

## 2021-09-08 DIAGNOSIS — G47.33 OSA (OBSTRUCTIVE SLEEP APNEA): ICD-10-CM

## 2021-09-08 DIAGNOSIS — J84.9 INTERSTITIAL PULMONARY DISEASE (HCC): ICD-10-CM

## 2021-09-08 DIAGNOSIS — E66.9 CLASS 2 OBESITY WITH BODY MASS INDEX (BMI) OF 37.0 TO 37.9 IN ADULT, UNSPECIFIED OBESITY TYPE, UNSPECIFIED WHETHER SERIOUS COMORBIDITY PRESENT: ICD-10-CM

## 2021-09-08 PROCEDURE — 99214 OFFICE O/P EST MOD 30 MIN: CPT | Performed by: INTERNAL MEDICINE

## 2021-09-08 ASSESSMENT — ENCOUNTER SYMPTOMS
FEVER: 0
COUGH: 0
DOUBLE VISION: 0
HEARTBURN: 0
DIARRHEA: 0
EYE REDNESS: 0
SORE THROAT: 0
NAUSEA: 0
BACK PAIN: 0
CONSTIPATION: 0
HEADACHES: 0
MYALGIAS: 0
SPUTUM PRODUCTION: 0
TREMORS: 0
HEMOPTYSIS: 0
CHILLS: 0
PHOTOPHOBIA: 0
FOCAL WEAKNESS: 0
VOMITING: 0
STRIDOR: 0
WEIGHT LOSS: 0
SPEECH CHANGE: 0
EYE PAIN: 0
FALLS: 0
WHEEZING: 0
DIAPHORESIS: 0
DIZZINESS: 0
DEPRESSION: 0
ABDOMINAL PAIN: 0
SINUS PAIN: 0
ORTHOPNEA: 0
PND: 0
BLURRED VISION: 0
WEAKNESS: 0
PALPITATIONS: 0
CLAUDICATION: 0
SHORTNESS OF BREATH: 1
EYE DISCHARGE: 0
NECK PAIN: 0

## 2021-09-08 ASSESSMENT — FIBROSIS 4 INDEX: FIB4 SCORE: 1.75

## 2021-09-09 ENCOUNTER — PATIENT MESSAGE (OUTPATIENT)
Dept: HEALTH INFORMATION MANAGEMENT | Facility: OTHER | Age: 81
End: 2021-09-09

## 2021-09-09 ENCOUNTER — TELEPHONE (OUTPATIENT)
Dept: HEALTH INFORMATION MANAGEMENT | Facility: OTHER | Age: 81
End: 2021-09-09

## 2021-09-09 NOTE — TELEPHONE ENCOUNTER
Pt called in to schedule BALDEMAR. Verified HIPAA. I scheduled the appointment and provided all needed information.

## 2021-09-14 ENCOUNTER — APPOINTMENT (OUTPATIENT)
Dept: RADIOLOGY | Facility: MEDICAL CENTER | Age: 81
End: 2021-09-14
Attending: INTERNAL MEDICINE
Payer: MEDICARE

## 2021-09-17 ENCOUNTER — HOSPITAL ENCOUNTER (OUTPATIENT)
Dept: RADIOLOGY | Facility: MEDICAL CENTER | Age: 81
End: 2021-09-17
Attending: INTERNAL MEDICINE
Payer: MEDICARE

## 2021-09-17 DIAGNOSIS — J84.9 INTERSTITIAL PULMONARY DISEASE (HCC): ICD-10-CM

## 2021-09-17 PROCEDURE — 71250 CT THORAX DX C-: CPT | Mod: MG

## 2021-11-02 ENCOUNTER — NON-PROVIDER VISIT (OUTPATIENT)
Dept: MEDICAL GROUP | Facility: PHYSICIAN GROUP | Age: 81
End: 2021-11-02
Payer: MEDICARE

## 2021-11-02 DIAGNOSIS — Z23 NEED FOR VACCINATION: ICD-10-CM

## 2021-11-02 DIAGNOSIS — J30.9 ALLERGIC RHINITIS: ICD-10-CM

## 2021-11-02 PROCEDURE — 90750 HZV VACC RECOMBINANT IM: CPT | Performed by: INTERNAL MEDICINE

## 2021-11-02 PROCEDURE — G0008 ADMIN INFLUENZA VIRUS VAC: HCPCS | Performed by: INTERNAL MEDICINE

## 2021-11-02 PROCEDURE — 90472 IMMUNIZATION ADMIN EACH ADD: CPT | Performed by: INTERNAL MEDICINE

## 2021-11-02 PROCEDURE — 90662 IIV NO PRSV INCREASED AG IM: CPT | Performed by: INTERNAL MEDICINE

## 2021-11-02 NOTE — PROGRESS NOTES
"Shane Stahl is a 81 y.o. male here for a non-provider visit for:   SHINGRIX (Shingles), Flu     Reason for immunization: Annual Flu Vaccine, Finishing the series   Immunization records indicate need for vaccine: Yes, confirmed with Epic  Minimum interval has been met for this vaccine: Yes  ABN completed: Yes    VIS Dated   was given to patient: Yes  All IAC Questionnaire questions were answered \"No.\"    Patient tolerated injection and no adverse effects were observed or reported: Yes    Pt scheduled for next dose in series: No  "

## 2021-11-03 ENCOUNTER — APPOINTMENT (OUTPATIENT)
Dept: MEDICAL GROUP | Facility: PHYSICIAN GROUP | Age: 81
End: 2021-11-03
Payer: MEDICARE

## 2021-12-13 ENCOUNTER — TELEPHONE (OUTPATIENT)
Dept: MEDICAL GROUP | Facility: PHYSICIAN GROUP | Age: 81
End: 2021-12-13

## 2021-12-13 NOTE — TELEPHONE ENCOUNTER
ESTABLISHED PATIENT PRE-VISIT PLANNING     Patient was NOT contacted to complete PVP.     Note: Patient will not be contacted if there is no indication to call.     1.  Reviewed notes from the last few office visits within the medical group: Yes    2.  If any orders were placed at last visit or intended to be done for this visit (i.e. 6 mos follow-up), do we have Results/Consult Notes?         •  Labs - Labs ordered, completed on 6/16/2021 and results are in chart.  Note: If patient appointment is for lab review and patient did not complete labs, check with provider if OK to reschedule patient until labs completed.       •  Imaging - Imaging was not ordered at last office visit.       •  Referrals - No referrals were ordered at last office visit.    3. Is this appointment scheduled as a Hospital Follow-Up? No    4.  Immunizations were updated in Epic using Reconcile Outside Information activity? Yes    5.  Patient is due for the following Health Maintenance Topics:   Health Maintenance Due   Topic Date Due   • COLORECTAL CANCER SCREENING  06/02/2021   • COVID-19 Vaccine (3 - Booster for Pfizer series) 09/10/2021     6.  AHA (Pulse8) form printed for Provider? No, already completed

## 2021-12-15 ENCOUNTER — OFFICE VISIT (OUTPATIENT)
Dept: MEDICAL GROUP | Facility: PHYSICIAN GROUP | Age: 81
End: 2021-12-15
Payer: MEDICARE

## 2021-12-15 VITALS
TEMPERATURE: 99.2 F | DIASTOLIC BLOOD PRESSURE: 80 MMHG | HEIGHT: 72 IN | BODY MASS INDEX: 37.25 KG/M2 | RESPIRATION RATE: 15 BRPM | OXYGEN SATURATION: 91 % | HEART RATE: 63 BPM | WEIGHT: 275 LBS | SYSTOLIC BLOOD PRESSURE: 140 MMHG

## 2021-12-15 DIAGNOSIS — M17.11 PRIMARY OSTEOARTHRITIS OF RIGHT KNEE: ICD-10-CM

## 2021-12-15 DIAGNOSIS — R73.01 IMPAIRED FASTING GLUCOSE: Chronic | ICD-10-CM

## 2021-12-15 DIAGNOSIS — R91.8 MULTIPLE PULMONARY NODULES: ICD-10-CM

## 2021-12-15 DIAGNOSIS — J90 PLEURAL EFFUSION ON LEFT: ICD-10-CM

## 2021-12-15 DIAGNOSIS — E66.9 OBESITY (BMI 30-39.9): ICD-10-CM

## 2021-12-15 PROBLEM — M17.9 OSTEOARTHRITIS OF KNEE: Status: ACTIVE | Noted: 2017-03-09

## 2021-12-15 LAB
HBA1C MFR BLD: 5.7 % (ref 0–5.6)
INT CON NEG: NEGATIVE
INT CON POS: POSITIVE

## 2021-12-15 PROCEDURE — 99214 OFFICE O/P EST MOD 30 MIN: CPT | Performed by: INTERNAL MEDICINE

## 2021-12-15 PROCEDURE — 83036 HEMOGLOBIN GLYCOSYLATED A1C: CPT | Performed by: INTERNAL MEDICINE

## 2021-12-15 ASSESSMENT — FIBROSIS 4 INDEX: FIB4 SCORE: 1.75

## 2021-12-16 NOTE — PROGRESS NOTES
CC: 6-month follow-up visit, pleural effusion    HPI:  Farhat presents with the following    1. Primary osteoarthritis of right knee  Patient requesting DMV paperwork be filled out.    2. Obesity (BMI 30-39.9)  Chronic.  Stable.  Current weight is 275 pounds, BMI of 37.3.  Patient is physically limited from exercise due to arthritic pains.  Patient's wife is a diabetic.  Tries to eat a healthy low-fat low carbohydrate diet.    3. Impaired fasting glucose  Hemoglobin A1c 5.6 down from 6.0.    4. Pleural effusion on left  Patient  with PMH of MARCI diagnosed pleural effusion in January 2021.  Patient was seen and evaluated by pulmonology, Dr. Syed this morning.  Patient completed second thoracentesis.  Thus far negative for malignancy.  Additional work-up sent to lab.  If nondiagnostic, consider bronchoscopy.  Patient will have a follow-up CT scan of the thorax..     6/10/2021: Patient has had follow-up with pulmonology status post thoracentesis x2.  Cytology negative.  Questionable interstitial lung disease.  Patient had follow-up chest x-ray in May, questionable sarcoidosis.  Lab work-up thus far negative.  Patient was referred to [facility] for VATS, biopsy, pleurodesis.    12/15/2021:.  Patient was seen by pulmonologist, Dr. Syed.  Patient did not undergo VATS or lung biopsy with [facility].  Most recent CT scan of the thorax completed September 2021.  Showed stable loculated left pleural effusion with associated consolidation.  Pulmonary fibrotic changes seen.  Possible early ILD.  Stable right middle lobe nodules measuring 6 mm.  Patient has a pulmonary function test ordered for January.  Patient continues to have mild shortness of breath.  Patient denies cough, chest pain, fevers or chills, weight loss or night sweats.    5. Multiple pulmonary nodules  Noted on the CT CT scan of the thorax.  Follow-up with pulmonology to monitor nodules.      Patient Active Problem List    Diagnosis Date Noted   •  Multiple pulmonary nodules 12/15/2021   • Prediabetes 01/07/2021   • Pleural effusion on left 01/07/2021   • History of squamous cell carcinoma of skin 02/28/2020   • Osteoarthritis of knee 03/09/2017   • Tubular adenoma of colon 07/06/2016   • Blackwell's esophagus 07/06/2016   • History of ulcer disease 06/23/2016   • Decreased GFR 03/21/2016   • Peripheral autonomic neuropathy 09/23/2015   • Hypothyroid 04/28/2015   • Obesity (BMI 30-39.9) 08/22/2014   • History of vertigo 05/13/2014   • Morbid obesity (HCC) 05/09/2014   • Sleep apnea 02/20/2014   • Renal cyst 01/22/2014   • History of compression fracture 01/21/2014   • Sensorineural hearing loss 11/26/2012   • Impaired fasting glucose 10/10/2012   • History of atrial fibrillation 05/22/2012   • History of foot pain 03/06/2010   • Hypertension 09/16/2009   • Dyslipidemia 09/16/2009   • Allergic rhinitis 09/16/2009   • S/p lumbar surgery 09/16/2009       Current Outpatient Medications   Medication Sig Dispense Refill   • levothyroxine (SYNTHROID) 75 MCG Tab TAKE 1 TABLET BY MOUTH EVERY MORNING ON AN EMPTY STOMACH 100 Tablet 2   • omeprazole (PRILOSEC) 40 MG delayed-release capsule TAKE 1 CAPSULE BY MOUTH EVERY  Capsule 2   • fluticasone (FLONASE) 50 MCG/ACT nasal spray SHAKE LIQUID AND USE 2 SPRAYS IN EACH NOSTRIL TWICE DAILY 48 g 1   • carvedilol (COREG) 25 MG Tab TAKE 1 TABLET BY MOUTH TWICE DAILY 180 tablet 1   • VITAMIN D PO Take  by mouth.     • lisinopril (PRINIVIL) 10 MG Tab Take 1 Tab by mouth every day. 100 Tab 1   • pravastatin (PRAVACHOL) 40 MG tablet Take 1 Tab by mouth every day. 90 Tab 2   • aspirin 81 MG tablet Take 81 mg by mouth every bedtime.       No current facility-administered medications for this visit.         Allergies as of 12/15/2021 - Reviewed 12/15/2021   Allergen Reaction Noted   • Other environmental Runny Nose and Itching 06/01/2011        Social History     Socioeconomic History   • Marital status:      Spouse name:  Not on file   • Number of children: Not on file   • Years of education: Not on file   • Highest education level: Not on file   Occupational History   • Not on file   Tobacco Use   • Smoking status: Never Smoker   • Smokeless tobacco: Never Used   Vaping Use   • Vaping Use: Never used   Substance and Sexual Activity   • Alcohol use: Yes     Alcohol/week: 1.2 - 1.8 oz     Types: 2 - 3 Standard drinks or equivalent per week     Comment: occ   • Drug use: Never   • Sexual activity: Not Currently   Other Topics Concern   • Not on file   Social History Narrative   • Not on file     Social Determinants of Health     Financial Resource Strain:    • Difficulty of Paying Living Expenses: Not on file   Food Insecurity:    • Worried About Running Out of Food in the Last Year: Not on file   • Ran Out of Food in the Last Year: Not on file   Transportation Needs:    • Lack of Transportation (Medical): Not on file   • Lack of Transportation (Non-Medical): Not on file   Physical Activity:    • Days of Exercise per Week: Not on file   • Minutes of Exercise per Session: Not on file   Stress:    • Feeling of Stress : Not on file   Social Connections:    • Frequency of Communication with Friends and Family: Not on file   • Frequency of Social Gatherings with Friends and Family: Not on file   • Attends Jainism Services: Not on file   • Active Member of Clubs or Organizations: Not on file   • Attends Club or Organization Meetings: Not on file   • Marital Status: Not on file   Intimate Partner Violence:    • Fear of Current or Ex-Partner: Not on file   • Emotionally Abused: Not on file   • Physically Abused: Not on file   • Sexually Abused: Not on file   Housing Stability:    • Unable to Pay for Housing in the Last Year: Not on file   • Number of Places Lived in the Last Year: Not on file   • Unstable Housing in the Last Year: Not on file       Family History   Problem Relation Age of Onset   • Cancer Mother         cervical/breast   •  Cancer Sister         lung   • Cancer Father         lung cancer   • Cancer Other        Past Surgical History:   Procedure Laterality Date   • HAMMERTOE CORRECTION  2/2/2015    Performed by RENAY ChoiPCHAYO at [facility]   • EXOSTOSIS EXCISION  6/3/2011    Performed by ABDIFATAH YOUNG at [facility]   • LESION EXCISION ORTHO  6/3/2011    Performed by ABDIFATAH YOUNG at [facility]   • TOE ARTHROPLASTY  6/3/2011    Performed by ABDIFATAH YOUNG at [facility]   • LUMBAR LAMINECTOMY DISKECTOMY  2005    microscopic   • TONSILLECTOMY  1945    adenoidectomy   • EYE SURGERY Bilateral     cataracts       ROS: Positive ROS per HPI.  Denies any Headache,Chest pain,  Abdominal pain, Changes of bowel or bladder, Lower ext edema, Fevers, Nights sweats, Weight Changes, Focal weakness or numbness.  All other systems are negative.    /80 (BP Location: Left arm, Patient Position: Sitting, BP Cuff Size: Adult)   Pulse 63   Temp 37.3 °C (99.2 °F) (Temporal)   Resp 15   Ht 1.829 m (6')   Wt 125 kg (275 lb)   SpO2 91%   BMI 37.30 kg/m²      Constitutional: Alert, no distress, well-groomed.  Skin: Warm, dry, good turgor, no rashes in visible areas.  Eye: Equal, round and reactive, conjunctiva clear, lids normal.  ENMT: Lips without lesions, good dentition, moist mucous membranes.  Neck: Trachea midline, no masses, no thyromegaly.  Respiratory: Unlabored respiratory effort, no cough.  Abdomen: Soft, no gross masses.  MSK: Normal gait, moves all extremities.  Neuro: Grossly non-focal. No cranial nerve deficit. Strength and sensation intact.   Psych: Alert and oriented x3, normal affect and mood.      Assessment and Plan.   81 y.o. male presenting with the following.     1. Primary osteoarthritis of right knee  DMV paperwork filled out.    2. Obesity (BMI 30-39.9)  Counseled patient on diet and lifestyle modifications.  Consider referral to  nutritionist.    3. Impaired fasting glucose  Stable.  Diet controlled  - POCT Hemoglobin A1C    4. Pleural effusion on left  Follow-up with pulmonology    5. Multiple pulmonary nodules  Follow-up with pulmonology    My total time spent caring for the patient on the day of the encounter was 30 minutes.   This does not include time spent on separately billable procedures/tests.

## 2022-01-26 ENCOUNTER — NON-PROVIDER VISIT (OUTPATIENT)
Dept: SLEEP MEDICINE | Facility: MEDICAL CENTER | Age: 82
End: 2022-01-26
Attending: INTERNAL MEDICINE
Payer: MEDICARE

## 2022-01-26 VITALS — HEIGHT: 71 IN | WEIGHT: 280 LBS | BODY MASS INDEX: 39.2 KG/M2

## 2022-01-26 DIAGNOSIS — J84.9 ILD (INTERSTITIAL LUNG DISEASE) (HCC): ICD-10-CM

## 2022-01-26 DIAGNOSIS — J90 PLEURAL EFFUSION ON LEFT: ICD-10-CM

## 2022-01-26 PROCEDURE — 94060 EVALUATION OF WHEEZING: CPT | Performed by: INTERNAL MEDICINE

## 2022-01-26 PROCEDURE — 94726 PLETHYSMOGRAPHY LUNG VOLUMES: CPT | Performed by: INTERNAL MEDICINE

## 2022-01-26 PROCEDURE — 94729 DIFFUSING CAPACITY: CPT | Performed by: INTERNAL MEDICINE

## 2022-01-26 ASSESSMENT — PULMONARY FUNCTION TESTS
FVC: 2.75
FEV1/FVC: 86.55
FEV1/FVC: 84
FEV1_PERCENT_CHANGE: 1
FVC_LLN: 3.31
FEV1/FVC: 84
FEV1/FVC_PERCENT_PREDICTED: 115
FVC_PREDICTED: 3.96
FVC: 2.71
FEV1/FVC_PREDICTED: 74
FEV1/FVC_PERCENT_LLN: 62
FEV1/FVC_PERCENT_PREDICTED: 117
FEV1_PERCENT_PREDICTED: 78
FEV1_PREDICTED: 2.91
FVC_PERCENT_PREDICTED: 69
FVC_PERCENT_PREDICTED: 68
FEV1/FVC: 87
FEV1_PERCENT_CHANGE: 4
FEV1/FVC_PERCENT_PREDICTED: 73
FEV1/FVC_PERCENT_PREDICTED: 113
FEV1: 2.27
FEV1_PERCENT_PREDICTED: 81
FEV1/FVC_PERCENT_CHANGE: 3
FEV1/FVC_PERCENT_PREDICTED: 116
FEV1/FVC_PERCENT_CHANGE: 400
FEV1_LLN: 2.43
FEV1: 2.38

## 2022-01-26 ASSESSMENT — FIBROSIS 4 INDEX: FIB4 SCORE: 1.75

## 2022-01-26 NOTE — PROCEDURES
"Technician: Shonna Minaya RRT, CPFT  Good patient effort & cooperation. Patient needs extra coaching on FVC\"s to continue the breath, reminder no holding.  The results of this test meet the ATS/ERS standards for acceptability & reproducibility.  Test was performed on the Solfo Body Plethysmograph-Elite DX system.  Predicted equations for Spirometry are GLI-2012, ITS for lung volumes, and GLI-2017 for DLCO.  The DLCO was uncorrected for Hgb.  A bronchodilator of Ventolin HFA -2puffs via spacer administered.  DLCO performed during dilation period.    Interpretation:  1.  There is a proportional reduction in the forced volumes on spirometry.  There is no significant obstruction or bronchodilator response.  2.  There is a severe restrictive ventilatory defect on lung volumes.  Total lung capacity 4.11 L, or 56% predicted.  This is likely at least some component due to extrathoracic restriction given the reported BMI of 39.0.  3.  There is a borderline mild diffusion impairment which normalizes with alveolar volume, consistent with restrictive lung disease.  4.  Flow volume loop is consistent with the above interpretation.  5.  No prior PFTs for comparison.  __________  Florencio Davis MD  Pulmonary and Critical Care Medicine  [facility]  "

## 2022-02-09 ENCOUNTER — OFFICE VISIT (OUTPATIENT)
Dept: SLEEP MEDICINE | Facility: MEDICAL CENTER | Age: 82
End: 2022-02-09
Payer: MEDICARE

## 2022-02-09 VITALS
HEIGHT: 72 IN | WEIGHT: 273 LBS | SYSTOLIC BLOOD PRESSURE: 126 MMHG | DIASTOLIC BLOOD PRESSURE: 72 MMHG | OXYGEN SATURATION: 93 % | HEART RATE: 62 BPM | BODY MASS INDEX: 36.98 KG/M2 | RESPIRATION RATE: 16 BRPM

## 2022-02-09 DIAGNOSIS — J84.9 ILD (INTERSTITIAL LUNG DISEASE) (HCC): ICD-10-CM

## 2022-02-09 DIAGNOSIS — J90 PLEURAL EFFUSION ON LEFT: ICD-10-CM

## 2022-02-09 PROCEDURE — 99214 OFFICE O/P EST MOD 30 MIN: CPT | Performed by: INTERNAL MEDICINE

## 2022-02-09 ASSESSMENT — ENCOUNTER SYMPTOMS
DOUBLE VISION: 0
WEIGHT LOSS: 0
SPEECH CHANGE: 0
DIZZINESS: 0
PALPITATIONS: 0
WHEEZING: 0
CHILLS: 0
HEMOPTYSIS: 0
TREMORS: 0
CLAUDICATION: 0
NECK PAIN: 0
EYE PAIN: 0
HEARTBURN: 0
BLURRED VISION: 0
SHORTNESS OF BREATH: 0
BACK PAIN: 0
FOCAL WEAKNESS: 0
DIARRHEA: 0
ORTHOPNEA: 0
ABDOMINAL PAIN: 0
DIAPHORESIS: 0
STRIDOR: 0
EYE DISCHARGE: 0
WEAKNESS: 0
PND: 0
HEADACHES: 0
FEVER: 0
CONSTIPATION: 0
MYALGIAS: 0
NAUSEA: 0
VOMITING: 0
PHOTOPHOBIA: 0
FALLS: 0
SINUS PAIN: 0
SPUTUM PRODUCTION: 0
COUGH: 0
DEPRESSION: 0
EYE REDNESS: 0
SORE THROAT: 0

## 2022-02-09 ASSESSMENT — FIBROSIS 4 INDEX: FIB4 SCORE: 1.75

## 2022-02-10 NOTE — PROGRESS NOTES
Chief Complaint   Patient presents with   • Follow-Up     last seen 9/8/21    • Results     CT Chest Thorax 9/17/21, PFT 1/26/22         HPI: This patient is a 81 y.o. male whom is followed in our clinic for recurrent L pleural effusion last seen by me on 9/8/21.  The patient's past medical history significant for hypertension currently well controlled, obstructive sleep apnea on CPAP therapy and followed in our sleep medicine clinic, dyslipidemia, hypothyroid, gastroesophageal reflux disease and seasonal allergies for which he uses intranasal steroids.  He is a lifelong non-smoker.The patient establish care with me at the end of January for left pleural effusion that was found to evaluate dry cough that started last fall.  When cough persisted and he began experiencing shortness of breath chest x-ray done on January 5 of last year showed a large left-sided pleural effusion with some loculation.  He was admitted for thoracentesis which showed lymphocytic predominant exudative effusion.  92% lymphocytes.  LDH and total protein were elevated consistent with exudate.  Cytology was negative and there was no growth on bacterial fungal or AFB cultures.  A repeat chest x-ray  showed recurrence of loculated left pleural effusion although not quite as severe.  He also had some bilateral reticular changes noted on CT chest done during his hospital stay consistent with early ILD.  Repeat thoracentesis  again showed lymphocytic predominant exudative effusion with cytology negative for malignancy.  Lymphocytes were 88%.  He had mild eosinophilia on last thoracentesis at 12% but not on first.    Due to findings of interstitial lung disease we ordered connective tissue disease screening at our last clinic visit which was unremarkable, specifically FAVIO negative, rheumatoid factor negative, CCP negative.  QuantiFERON gold was negative.  A repeat CT chest done in March with high-resolution protocol shows peripheral reticular  thickening bilateral upper and lower lobes with mild bronchiectasis but no clear honeycombing and only small amt of loculated fluid on L slightly less than prior. We ordered PFTs which showed reduced FVC 2.75L or 69% pred, TLC of 4.11L or 56% pred and DLCO of 80% pred and 146% when corrected for VA. Repeat CT chest in September showed not significant changes in ILD findings and mild, persistent LLL effusion with associated atelectasis. He denies increase in SOB, no cough, no CP or unintended weight loss.     Past Medical History:   Diagnosis Date   • Arrhythmia     Atrial fribrillation   • Arthritis    • Bronchitis 2004   • Fibula fracture 1981    right   • High cholesterol    • Hyperlipidemia    • Hypertension    • MEDICAL HOME    • Multiple pulmonary nodules 12/15/2021   • Onychogryposis of toenail 1/21/2021   • Onychomycosis 1/21/2021   • Pain     low back   • Pneumonia 2004   • Sleep apnea        Social History     Socioeconomic History   • Marital status:      Spouse name: Not on file   • Number of children: Not on file   • Years of education: Not on file   • Highest education level: Not on file   Occupational History   • Not on file   Tobacco Use   • Smoking status: Never Smoker   • Smokeless tobacco: Never Used   Vaping Use   • Vaping Use: Never used   Substance and Sexual Activity   • Alcohol use: Yes     Alcohol/week: 1.2 - 1.8 oz     Types: 2 - 3 Standard drinks or equivalent per week     Comment: occ   • Drug use: Never   • Sexual activity: Not Currently   Other Topics Concern   • Not on file   Social History Narrative   • Not on file     Social Determinants of Health     Financial Resource Strain:    • Difficulty of Paying Living Expenses: Not on file   Food Insecurity:    • Worried About Running Out of Food in the Last Year: Not on file   • Ran Out of Food in the Last Year: Not on file   Transportation Needs:    • Lack of Transportation (Medical): Not on file   • Lack of Transportation  (Non-Medical): Not on file   Physical Activity:    • Days of Exercise per Week: Not on file   • Minutes of Exercise per Session: Not on file   Stress:    • Feeling of Stress : Not on file   Social Connections:    • Frequency of Communication with Friends and Family: Not on file   • Frequency of Social Gatherings with Friends and Family: Not on file   • Attends Latter-day Services: Not on file   • Active Member of Clubs or Organizations: Not on file   • Attends Club or Organization Meetings: Not on file   • Marital Status: Not on file   Intimate Partner Violence:    • Fear of Current or Ex-Partner: Not on file   • Emotionally Abused: Not on file   • Physically Abused: Not on file   • Sexually Abused: Not on file   Housing Stability:    • Unable to Pay for Housing in the Last Year: Not on file   • Number of Places Lived in the Last Year: Not on file   • Unstable Housing in the Last Year: Not on file       Family History   Problem Relation Age of Onset   • Cancer Mother         cervical/breast   • Cancer Sister         lung   • Cancer Father         lung cancer   • Cancer Other        Current Outpatient Medications on File Prior to Visit   Medication Sig Dispense Refill   • carvedilol (COREG) 25 MG Tab TAKE 1 TABLET BY MOUTH TWICE DAILY 180 Tablet 1   • lisinopril (PRINIVIL) 10 MG Tab TAKE 1 TABLET BY MOUTH EVERY  Tablet 1   • levothyroxine (SYNTHROID) 75 MCG Tab TAKE 1 TABLET BY MOUTH EVERY MORNING ON AN EMPTY STOMACH 100 Tablet 2   • omeprazole (PRILOSEC) 40 MG delayed-release capsule TAKE 1 CAPSULE BY MOUTH EVERY  Capsule 2   • fluticasone (FLONASE) 50 MCG/ACT nasal spray SHAKE LIQUID AND USE 2 SPRAYS IN EACH NOSTRIL TWICE DAILY 48 g 1   • VITAMIN D PO Take  by mouth.     • pravastatin (PRAVACHOL) 40 MG tablet Take 1 Tab by mouth every day. 90 Tab 2   • aspirin 81 MG tablet Take 81 mg by mouth every bedtime.       No current facility-administered medications on file prior to visit.       Other  environmental      ROS:   Review of Systems   Constitutional: Negative for chills, diaphoresis, fever, malaise/fatigue and weight loss.   HENT: Negative for congestion, ear discharge, ear pain, hearing loss, nosebleeds, sinus pain, sore throat and tinnitus.    Eyes: Negative for blurred vision, double vision, photophobia, pain, discharge and redness.   Respiratory: Negative for cough, hemoptysis, sputum production, shortness of breath, wheezing and stridor.    Cardiovascular: Negative for chest pain, palpitations, orthopnea, claudication, leg swelling and PND.   Gastrointestinal: Negative for abdominal pain, constipation, diarrhea, heartburn, nausea and vomiting.   Genitourinary: Negative for dysuria and urgency.   Musculoskeletal: Negative for back pain, falls, joint pain, myalgias and neck pain.   Skin: Negative for itching and rash.   Neurological: Negative for dizziness, tremors, speech change, focal weakness, weakness and headaches.   Endo/Heme/Allergies: Negative for environmental allergies.   Psychiatric/Behavioral: Negative for depression.       /72 (BP Location: Right arm, Patient Position: Sitting, BP Cuff Size: Large adult)   Pulse 62   Resp 16   Ht 1.829 m (6')   Wt 124 kg (273 lb)   SpO2 93%   Physical Exam  Vitals reviewed.   Constitutional:       General: He is not in acute distress.     Appearance: Normal appearance. He is normal weight.   HENT:      Head: Normocephalic and atraumatic.      Right Ear: External ear normal.      Left Ear: External ear normal.      Nose: Nose normal. No congestion.      Mouth/Throat:      Mouth: Mucous membranes are moist.      Pharynx: Oropharynx is clear. No oropharyngeal exudate.   Eyes:      General: No scleral icterus.     Extraocular Movements: Extraocular movements intact.      Conjunctiva/sclera: Conjunctivae normal.      Pupils: Pupils are equal, round, and reactive to light.   Cardiovascular:      Rate and Rhythm: Normal rate and regular rhythm.       Heart sounds: Normal heart sounds. No murmur heard.  No gallop.    Pulmonary:      Effort: Pulmonary effort is normal. No respiratory distress.      Breath sounds: No wheezing or rales.      Comments: Inspiratory crackles RLL  Abdominal:      General: There is no distension.      Palpations: Abdomen is soft.   Musculoskeletal:         General: Normal range of motion.      Cervical back: Normal range of motion and neck supple.      Right lower leg: No edema.      Left lower leg: No edema.   Skin:     General: Skin is warm and dry.      Findings: No rash.   Neurological:      Mental Status: He is alert and oriented to person, place, and time.      Cranial Nerves: No cranial nerve deficit.   Psychiatric:         Mood and Affect: Mood normal.         Behavior: Behavior normal.         PFTs as reviewed by me personally:as per HPI    Imaging as reviewed by me personally:  As per hPI    Assessment:  1. Pleural effusion on left  CT-CHEST (THORAX) W/O   2. ILD (interstitial lung disease) ([facility])  CT-CHEST (THORAX) W/O    PULMONARY FUNCTION TESTS -Test requested: Complete Pulmonary Function Test       Plan:  1. This has not reaccumulated in about a year now. Pt declined pleural bx when offered previously. Continue to  Monitor observationally with repeat CT in 6 mos  2. Most likely would be ILD in pt of this demographic but HRCT is not typical of this. Will monitor with repeat PFTs in 6 mos  Return in about 6 months (around 8/9/2022) for ct chest in 6 mos, pfts in 6 mos.

## 2022-03-25 PROBLEM — R94.30 NONSPECIFIC ABNORMAL CARDIOVASCULAR FUNCTION STUDY: Status: ACTIVE | Noted: 2022-03-25

## 2022-03-25 PROBLEM — J84.9 INTERSTITIAL LUNG DISEASE (HCC): Status: ACTIVE | Noted: 2022-03-25

## 2022-03-25 PROBLEM — C44.92 SQUAMOUS CELL CARCINOMA OF SKIN: Status: RESOLVED | Noted: 2020-02-28 | Resolved: 2022-03-25

## 2022-03-25 PROBLEM — I70.0 ATHEROSCLEROSIS OF AORTA (HCC): Status: ACTIVE | Noted: 2022-03-25

## 2022-03-25 PROBLEM — G31.9 CEREBRAL ATROPHY (HCC): Status: ACTIVE | Noted: 2022-03-25

## 2022-04-11 DIAGNOSIS — I10 ESSENTIAL HYPERTENSION: Chronic | ICD-10-CM

## 2022-04-11 NOTE — TELEPHONE ENCOUNTER
----- Message from Farhat Stahl sent at 4/11/2022  2:53 PM PDT -----  Regarding: prescription refill  please refill prescription for lisinopril 10mg, 90 day tabs,   Thank you  
Received request via: Patient    Was the patient seen in the last year in this department? Yes    Does the patient have an active prescription (recently filled or refills available) for medication(s) requested? No  
(2) chairfast

## 2022-04-12 RX ORDER — LISINOPRIL 10 MG/1
10 TABLET ORAL
Qty: 100 TABLET | Refills: 2 | Status: SHIPPED | OUTPATIENT
Start: 2022-04-12 | End: 2023-02-02

## 2022-06-15 ENCOUNTER — TELEPHONE (OUTPATIENT)
Dept: MEDICAL GROUP | Facility: PHYSICIAN GROUP | Age: 82
End: 2022-06-15
Payer: MEDICARE

## 2022-06-15 NOTE — TELEPHONE ENCOUNTER
ESTABLISHED PATIENT PRE-VISIT PLANNING     Patient was NOT contacted to complete PVP.     Note: Patient will not be contacted if there is no indication to call.     1.  Reviewed notes from the last few office visits within the medical group: Yes    2.  If any orders were placed at last visit or intended to be done for this visit (i.e. 6 mos follow-up), do we have Results/Consult Notes?         •  Labs - Labs ordered, completed on 12/15/2021 and results are in chart.  Note: If patient appointment is for lab review and patient did not complete labs, check with provider if OK to reschedule patient until labs completed.       •  Imaging - Imaging was not ordered at last office visit.       •  Referrals - No referrals were ordered at last office visit.    3. Is this appointment scheduled as a Hospital Follow-Up? No    4.  Immunizations were updated in Epic using Reconcile Outside Information activity? Yes    5.  Patient is due for the following Health Maintenance Topics:   There are no preventive care reminders to display for this patient.      6.  AHA (Pulse8) form printed for Provider? No, already completed

## 2022-06-16 ENCOUNTER — OFFICE VISIT (OUTPATIENT)
Dept: MEDICAL GROUP | Facility: PHYSICIAN GROUP | Age: 82
End: 2022-06-16
Payer: MEDICARE

## 2022-06-16 ENCOUNTER — OFFICE VISIT (OUTPATIENT)
Dept: SLEEP MEDICINE | Facility: MEDICAL CENTER | Age: 82
End: 2022-06-16

## 2022-06-16 VITALS
RESPIRATION RATE: 16 BRPM | BODY MASS INDEX: 36.57 KG/M2 | OXYGEN SATURATION: 93 % | HEIGHT: 72 IN | DIASTOLIC BLOOD PRESSURE: 84 MMHG | WEIGHT: 270 LBS | SYSTOLIC BLOOD PRESSURE: 124 MMHG | HEART RATE: 68 BPM

## 2022-06-16 VITALS
SYSTOLIC BLOOD PRESSURE: 100 MMHG | HEIGHT: 72 IN | OXYGEN SATURATION: 93 % | RESPIRATION RATE: 15 BRPM | DIASTOLIC BLOOD PRESSURE: 60 MMHG | BODY MASS INDEX: 36.9 KG/M2 | HEART RATE: 67 BPM | TEMPERATURE: 98.6 F | WEIGHT: 272.4 LBS

## 2022-06-16 DIAGNOSIS — M25.561 CHRONIC PAIN OF RIGHT KNEE: ICD-10-CM

## 2022-06-16 DIAGNOSIS — G47.33 OSA ON CPAP: ICD-10-CM

## 2022-06-16 DIAGNOSIS — E78.5 DYSLIPIDEMIA: ICD-10-CM

## 2022-06-16 DIAGNOSIS — E66.9 CLASS 2 OBESITY WITH BODY MASS INDEX (BMI) OF 37.0 TO 37.9 IN ADULT, UNSPECIFIED OBESITY TYPE, UNSPECIFIED WHETHER SERIOUS COMORBIDITY PRESENT: ICD-10-CM

## 2022-06-16 DIAGNOSIS — I10 PRIMARY HYPERTENSION: ICD-10-CM

## 2022-06-16 DIAGNOSIS — E55.9 VITAMIN D DEFICIENCY: ICD-10-CM

## 2022-06-16 DIAGNOSIS — J84.9 INTERSTITIAL LUNG DISEASE (HCC): ICD-10-CM

## 2022-06-16 DIAGNOSIS — E03.8 OTHER SPECIFIED HYPOTHYROIDISM: ICD-10-CM

## 2022-06-16 DIAGNOSIS — G89.29 CHRONIC PAIN OF RIGHT KNEE: ICD-10-CM

## 2022-06-16 DIAGNOSIS — J90 PLEURAL EFFUSION ON LEFT: ICD-10-CM

## 2022-06-16 DIAGNOSIS — R21 RASH: ICD-10-CM

## 2022-06-16 DIAGNOSIS — R06.02 SOB (SHORTNESS OF BREATH): ICD-10-CM

## 2022-06-16 DIAGNOSIS — R73.9 HYPERGLYCEMIA: ICD-10-CM

## 2022-06-16 PROCEDURE — 99214 OFFICE O/P EST MOD 30 MIN: CPT | Performed by: INTERNAL MEDICINE

## 2022-06-16 PROCEDURE — 99214 OFFICE O/P EST MOD 30 MIN: CPT | Performed by: PREVENTIVE MEDICINE

## 2022-06-16 RX ORDER — CARVEDILOL 25 MG/1
1 TABLET ORAL 2 TIMES DAILY
COMMUNITY
Start: 2021-12-21 | End: 2022-06-16

## 2022-06-16 RX ORDER — LISINOPRIL 10 MG/1
1 TABLET ORAL
COMMUNITY
Start: 2021-12-21 | End: 2022-06-16

## 2022-06-16 RX ORDER — OMEPRAZOLE 40 MG/1
1 CAPSULE, DELAYED RELEASE ORAL
COMMUNITY
Start: 2022-01-26 | End: 2022-06-16

## 2022-06-16 RX ORDER — CLOBETASOL PROPIONATE 0.46 MG/ML
SOLUTION TOPICAL
COMMUNITY
Start: 2022-04-11 | End: 2023-02-21

## 2022-06-16 RX ORDER — PRAVASTATIN SODIUM 40 MG
1 TABLET ORAL
COMMUNITY
Start: 2022-02-23 | End: 2022-06-16

## 2022-06-16 ASSESSMENT — FIBROSIS 4 INDEX
FIB4 SCORE: 1.77
FIB4 SCORE: 1.77

## 2022-06-16 ASSESSMENT — PATIENT HEALTH QUESTIONNAIRE - PHQ9: CLINICAL INTERPRETATION OF PHQ2 SCORE: 0

## 2022-06-16 NOTE — PROGRESS NOTES
CHIEF COMPLAINT: Compliance check/Annual Visit  LAST SEEN: JAXSON Lopez    6/29/2020  HISTORY OF PRESENT ILLNESS:  Farhat Stahl is a 82 y.o.male   who is here today to annual visit.. He has a past medical history of  chronic kidney disease stage III, hypertension, paroxysmal atrial fibrillation, hypothyroidism, peripheral autonomic neuropathy, Blackwell's esophagus.     A split-night PSG was done on 3/21/2015 through [facility]. It revealed  an AHI of 57 (diagnostic) and low oxygen of 80%.  (Treatment)    COMPLIANCE DATA: Patient uses preferred home health care as his DME   he is 100% compliant today  Machine type: ResMed air sense 10 AutoSet  Date range: 5/17/2022 through 6/15/2022  AHI: 0.4  TIME USED: 7 hours and 58 minutes  PRESSURE SETTINGS: Minimum pressure of 10 maximum pressure of 16  LEAK: Minimal    This patient is using PAP therapy consistently and is benefiting from it tremendously.     The patient reports improved symptoms using positive airway pressure. Has experienced no significant problems with mask fit, mask leak, pressure tolerance, excessive airway dryness, nasal congestion, aerophagia, or chest pain.       Significant comorbidities and modifying factors: see HPI    PROBLEM LIST:  Patient Active Problem List    Diagnosis Date Noted   • Cerebral atrophy (HCC) 03/25/2022   • Atherosclerosis of aorta (HCC) 03/25/2022   • Interstitial lung disease (HCC) 03/25/2022   • Nonspecific abnormal cardiovascular function study 03/25/2022   • Multiple pulmonary nodules 12/15/2021   • Prediabetes 01/07/2021   • Pleural effusion on left 01/07/2021   • Osteoarthritis of knee 03/09/2017   • Tubular adenoma of colon 07/06/2016   • Blackwell's esophagus without dysplasia 07/06/2016   • Idiopathic neuropathy 09/23/2015   • Hypothyroid 04/28/2015   • Severe obesity (BMI 35.0-39.9) with comorbidity (HCC) 08/22/2014   • History of vertigo 05/13/2014   • Obstructive sleep apnea 02/20/2014   • Renal cyst 01/22/2014   • Sensorineural  hearing loss 11/26/2012   • History of atrial fibrillation 05/22/2012   • Hypertension 09/16/2009   • Dyslipidemia 09/16/2009   • Allergic rhinitis 09/16/2009     PAST MEDICAL HISTORY:  Past Medical History:   Diagnosis Date   • Arrhythmia     Atrial fribrillation   • Arthritis    • Bronchitis 2004   • Fibula fracture 1981    right   • High cholesterol    • Hyperlipidemia    • Hypertension    • MEDICAL HOME    • Multiple pulmonary nodules 12/15/2021   • Onychogryposis of toenail 1/21/2021   • Onychomycosis 1/21/2021   • Pain     low back   • Pneumonia 2004   • Sleep apnea       PAST SOCIAL HISTORY:  Past Surgical History:   Procedure Laterality Date   • HAMMERTOE CORRECTION  2/2/2015    Performed by Abdifatah Orta, JOSÉ MIGUEL.P.[email or web address] at SURGERY [facility]   • EXOSTOSIS EXCISION  6/3/2011    Performed by ABDIFATAH ORTA at [facility]   • LESION EXCISION ORTHO  6/3/2011    Performed by ABDIFATAH ORTA at [facility]   • TOE ARTHROPLASTY  6/3/2011    Performed by ABDIFATAH ORTA at [facility]   • LUMBAR LAMINECTOMY DISKECTOMY  2005    microscopic   • TONSILLECTOMY  1945    adenoidectomy   • EYE SURGERY Bilateral     cataracts     PAST FAMILY HISTORY:  Family History   Problem Relation Age of Onset   • Cancer Mother         cervical/breast   • Cancer Sister         lung   • Cancer Father         lung cancer   • Cancer Other      SOCIAL HISTORY:  Social History     Socioeconomic History   • Marital status:      Spouse name: Not on file   • Number of children: Not on file   • Years of education: Not on file   • Highest education level: Not on file   Occupational History   • Not on file   Tobacco Use   • Smoking status: Never Smoker   • Smokeless tobacco: Never Used   Vaping Use   • Vaping Use: Never used   Substance and Sexual Activity   • Alcohol use: Yes     Alcohol/week: 1.2 - 1.8 oz     Types: 2 - 3 Standard drinks or equivalent per week     Comment: occ   •  "Drug use: Never   • Sexual activity: Not Currently   Other Topics Concern   • Not on file   Social History Narrative   • Not on file     Social Determinants of Health     Financial Resource Strain: Not on file   Food Insecurity: Not on file   Transportation Needs: Not on file   Physical Activity: Not on file   Stress: Not on file   Social Connections: Not on file   Intimate Partner Violence: Not on file   Housing Stability: Not on file     ALLERGIES: Other environmental  MEDICATIONS:  Current Outpatient Medications   Medication Sig Dispense Refill   • lisinopril (PRINIVIL) 10 MG Tab Take 1 Tablet by mouth every day. 100 Tablet 2   • pravastatin (PRAVACHOL) 40 MG tablet Take 1 Tablet by mouth every day. 100 Tablet 3   • carvedilol (COREG) 25 MG Tab TAKE 1 TABLET BY MOUTH TWICE DAILY 180 Tablet 1   • levothyroxine (SYNTHROID) 75 MCG Tab TAKE 1 TABLET BY MOUTH EVERY MORNING ON AN EMPTY STOMACH 100 Tablet 2   • omeprazole (PRILOSEC) 40 MG delayed-release capsule TAKE 1 CAPSULE BY MOUTH EVERY  Capsule 2   • fluticasone (FLONASE) 50 MCG/ACT nasal spray SHAKE LIQUID AND USE 2 SPRAYS IN EACH NOSTRIL TWICE DAILY 48 g 1   • VITAMIN D PO Take  by mouth every day. 2000 units     • aspirin 81 MG tablet Take 81 mg by mouth every bedtime.       No current facility-administered medications for this visit.    \"CURRENT RX\"    REVIEW OF SYSTEMS:  Constitutional: Denies weight loss, denies chronic daytime fatigue  Eyes: Denies vision changes  Ears/Nose/Mouth/Throat: Denies rhinitis/nasal congestion, injury, decayed teeth/toothaches.  Cardiovascular: Denies chest pain, tightness, palpitations, swelling in legs/feet, difficulty breathing when lying down but gets better when sitting up.   Respiratory: Endorses shortness of breath while awake,  Sleep: per HPI  Gastrointestinal: Denies  difficulty swallowing,  heartburn.  Genitourinary: REPORTS nocturia  Musculoskeletal: Denies painful joints, sore muscles, back pain.   Neurological: " Denies frequent headaches,weakness, dizziness.    PHYSICAL EXAM/VITALS:  /84 (BP Location: Left arm, Patient Position: Sitting, BP Cuff Size: Adult)   Pulse 68   Resp 16   Ht 1.829 m (6')   Wt 122 kg (270 lb)   SpO2 93%   BMI 36.62 kg/m²   Appearance: Well-nourished, well-developed,  looks stated age, no acute distress  Eyes:   EOMI  ENMT: MASKED   Neck: Supple, trachea midline  Respiratory effort:  No intercostal retractions or use of accessory muscles  Musculoskeletal:    Antalgic gait   R knee brace  Neurologic:  oriented to person, time, place, and purpose; judgement intact  Psychiatric:  No depression, anxiety, agitation    MEDICAL DECISION MAKING:  • The medical record was reviewed in its as pertains to this referral. This includes records from primary care, consultants notes,  referral request, hospital records, labs, imaging. Any available diagnostic and titration nocturnal polysomnograms, home sleep apnea tests, continuous nocturnal oximetry results, multiple sleep latency tests, and compliance reports were reviewed with the patient.    ASSESSMENT/PLAN: This patient is doing very well on his Pap therapy.  He did not he benefits from it significantly in terms of a good night sleep and good energy the following day.    1. MARCI on CPAP    2. SOB (shortness of breath)    3. Class 2 obesity with body mass index (BMI) of 37.0 to 37.9 in adult, unspecified obesity type, unspecified whether serious comorbidity present    • Has been advised to continue the current Pap Therapy.  Also advised to clean equipment frequently, and get new mask and supplies as allowed by insurance and DME.  Patient was advised to NEVER use  OZONE containing cleaning systems such as So Clean.    • The risks of untreated sleep apnea were discussed with the patient at length. Patients with MARCI are at increased risk of cardiovascular disease including coronary artery disease, systemic arterial hypertension, pulmonary arterial  hypertension, cardiac arrhythmias, and stroke. MARCI patients have an increased risk of motor vehicle accidents, type 2 diabetes, chronic kidney disease, and non-alcoholic liver disease. The patient was advised to avoid driving a motor vehicle when drowsy.  • Have advised the patient to follow up with the appropriate healthcare practitioners for all other medical problems and issues.    RETURN TO CLINIC: Return in about 1 year (around 6/16/2023) for Annual visit.  My total time spent caring for the patient on the day of the encounter was 40minutes. This includes time time spent on a thorough chart review including other physician notes, any type of sleep study, as well as critical labs and pulmonary and cardiac studies.  Additionally it includes discussions of good sleep hygiene, stimulus control and going over the need for consistency in terms of sleep preparation and practice.     Please note that this dictation was created using voice recognition software.  I have made every reasonable attempt to correct obvious errors, I expect that there are errors of grammar and possibly content that I did not discover before finalizing this note.

## 2022-06-17 ENCOUNTER — HOSPITAL ENCOUNTER (OUTPATIENT)
Dept: LAB | Facility: MEDICAL CENTER | Age: 82
End: 2022-06-17
Attending: INTERNAL MEDICINE
Payer: MEDICARE

## 2022-06-17 DIAGNOSIS — E55.9 VITAMIN D DEFICIENCY: ICD-10-CM

## 2022-06-17 DIAGNOSIS — E03.8 OTHER SPECIFIED HYPOTHYROIDISM: ICD-10-CM

## 2022-06-17 DIAGNOSIS — E78.5 DYSLIPIDEMIA: ICD-10-CM

## 2022-06-17 DIAGNOSIS — I10 PRIMARY HYPERTENSION: ICD-10-CM

## 2022-06-17 DIAGNOSIS — R73.9 HYPERGLYCEMIA: ICD-10-CM

## 2022-06-17 LAB
25(OH)D3 SERPL-MCNC: 33 NG/ML (ref 30–100)
ALBUMIN SERPL BCP-MCNC: 3.7 G/DL (ref 3.2–4.9)
ALBUMIN/GLOB SERPL: 1.2 G/DL
ALP SERPL-CCNC: 95 U/L (ref 30–99)
ALT SERPL-CCNC: 14 U/L (ref 2–50)
ANION GAP SERPL CALC-SCNC: 8 MMOL/L (ref 7–16)
AST SERPL-CCNC: 13 U/L (ref 12–45)
BASOPHILS # BLD AUTO: 0.8 % (ref 0–1.8)
BASOPHILS # BLD: 0.06 K/UL (ref 0–0.12)
BILIRUB SERPL-MCNC: 0.7 MG/DL (ref 0.1–1.5)
BUN SERPL-MCNC: 21 MG/DL (ref 8–22)
CALCIUM SERPL-MCNC: 9.1 MG/DL (ref 8.4–10.2)
CHLORIDE SERPL-SCNC: 100 MMOL/L (ref 96–112)
CHOLEST SERPL-MCNC: 130 MG/DL (ref 100–199)
CO2 SERPL-SCNC: 27 MMOL/L (ref 20–33)
CREAT SERPL-MCNC: 1.39 MG/DL (ref 0.5–1.4)
EOSINOPHIL # BLD AUTO: 0.25 K/UL (ref 0–0.51)
EOSINOPHIL NFR BLD: 3.2 % (ref 0–6.9)
ERYTHROCYTE [DISTWIDTH] IN BLOOD BY AUTOMATED COUNT: 46.6 FL (ref 35.9–50)
EST. AVERAGE GLUCOSE BLD GHB EST-MCNC: 114 MG/DL
FASTING STATUS PATIENT QL REPORTED: NORMAL
GFR SERPLBLD CREATININE-BSD FMLA CKD-EPI: 50 ML/MIN/1.73 M 2
GLOBULIN SER CALC-MCNC: 3 G/DL (ref 1.9–3.5)
GLUCOSE SERPL-MCNC: 99 MG/DL (ref 65–99)
HBA1C MFR BLD: 5.6 % (ref 4–5.6)
HCT VFR BLD AUTO: 45.8 % (ref 42–52)
HDLC SERPL-MCNC: 43 MG/DL
HGB BLD-MCNC: 15.4 G/DL (ref 14–18)
IMM GRANULOCYTES # BLD AUTO: 0.02 K/UL (ref 0–0.11)
IMM GRANULOCYTES NFR BLD AUTO: 0.3 % (ref 0–0.9)
LDLC SERPL CALC-MCNC: 73 MG/DL
LYMPHOCYTES # BLD AUTO: 1.38 K/UL (ref 1–4.8)
LYMPHOCYTES NFR BLD: 17.5 % (ref 22–41)
MCH RBC QN AUTO: 32.8 PG (ref 27–33)
MCHC RBC AUTO-ENTMCNC: 33.6 G/DL (ref 33.7–35.3)
MCV RBC AUTO: 97.7 FL (ref 81.4–97.8)
MONOCYTES # BLD AUTO: 0.74 K/UL (ref 0–0.85)
MONOCYTES NFR BLD AUTO: 9.4 % (ref 0–13.4)
NEUTROPHILS # BLD AUTO: 5.42 K/UL (ref 1.82–7.42)
NEUTROPHILS NFR BLD: 68.8 % (ref 44–72)
NRBC # BLD AUTO: 0 K/UL
NRBC BLD-RTO: 0 /100 WBC
PLATELET # BLD AUTO: 189 K/UL (ref 164–446)
PMV BLD AUTO: 9.2 FL (ref 9–12.9)
POTASSIUM SERPL-SCNC: 4.8 MMOL/L (ref 3.6–5.5)
PROT SERPL-MCNC: 6.7 G/DL (ref 6–8.2)
RBC # BLD AUTO: 4.69 M/UL (ref 4.7–6.1)
SODIUM SERPL-SCNC: 135 MMOL/L (ref 135–145)
T3FREE SERPL-MCNC: 2.66 PG/ML (ref 2–4.4)
TRIGL SERPL-MCNC: 71 MG/DL (ref 0–149)
TSH SERPL DL<=0.005 MIU/L-ACNC: 2.22 UIU/ML (ref 0.38–5.33)
WBC # BLD AUTO: 7.9 K/UL (ref 4.8–10.8)

## 2022-06-17 PROCEDURE — 84443 ASSAY THYROID STIM HORMONE: CPT

## 2022-06-17 PROCEDURE — 84481 FREE ASSAY (FT-3): CPT

## 2022-06-17 PROCEDURE — 82306 VITAMIN D 25 HYDROXY: CPT

## 2022-06-17 PROCEDURE — 85025 COMPLETE CBC W/AUTO DIFF WBC: CPT

## 2022-06-17 PROCEDURE — 36415 COLL VENOUS BLD VENIPUNCTURE: CPT

## 2022-06-17 PROCEDURE — 80061 LIPID PANEL: CPT

## 2022-06-17 PROCEDURE — 80053 COMPREHEN METABOLIC PANEL: CPT

## 2022-06-17 PROCEDURE — 83036 HEMOGLOBIN GLYCOSYLATED A1C: CPT

## 2022-07-06 ENCOUNTER — HOSPITAL ENCOUNTER (OUTPATIENT)
Dept: RADIOLOGY | Facility: MEDICAL CENTER | Age: 82
End: 2022-07-06
Attending: ORTHOPAEDIC SURGERY
Payer: MEDICARE

## 2022-07-06 DIAGNOSIS — M25.561 RIGHT KNEE PAIN, UNSPECIFIED CHRONICITY: ICD-10-CM

## 2022-07-06 PROCEDURE — 73564 X-RAY EXAM KNEE 4 OR MORE: CPT | Mod: RT

## 2022-07-08 NOTE — PROGRESS NOTES
CC: 6-month follow-up visit, rash, referral needed.    HPI:  Farhat presents with the following    1. Chronic pain of right knee  Patient complains of chronic right knee pain due to osteoarthritis.  Requesting referral to Dr. Giorgio Landon.  Patient has been struggling with joint instability, using a brace.  Increase level of pain.  No recent steroid injection.  No recent x-ray or MRI.    2. Rash  Patient complains of a itchy rash around his neck and chest for the last 2 months.  Not getting any better.  Not using anything over-the-counter.  No other associated symptoms.    3. Primary hypertension  Blood pressure stable with lisinopril 10 mg tablets daily.  Taking Coreg 25 mg tablets.    4. Other specified hypothyroidism  Patient taking levothyroxine 75 MCG's daily.  TSH 2.3.  Labs completed in June 2021.  Labs are due.    5. Pleural effusion on left  Patient was seen by pulmonology in February.  No reaccumulation of pleural effusion.  Patient declined pleural biopsy.  We will continue to monitor.  Repeat CT in 6 months.    6. Interstitial lung disease (HCC)  Chronic.  Stable.  Monitored by pulmonary with repeat pulmonary function and CT scan of chest test in 6 months.  Patient has an appointment with pulmonology in November.        Patient Active Problem List    Diagnosis Date Noted   • Chronic pain of right knee 06/16/2022   • Rash 06/16/2022   • Cerebral atrophy (HCC) 03/25/2022   • Atherosclerosis of aorta (HCC) 03/25/2022   • Interstitial lung disease (HCC) 03/25/2022   • Nonspecific abnormal cardiovascular function study 03/25/2022   • Multiple pulmonary nodules 12/15/2021   • Prediabetes 01/07/2021   • Pleural effusion on left 01/07/2021   • Osteoarthritis of knee 03/09/2017   • Tubular adenoma of colon 07/06/2016   • Blackwell's esophagus without dysplasia 07/06/2016   • Idiopathic neuropathy 09/23/2015   • Hypothyroid 04/28/2015   • Severe obesity (BMI 35.0-39.9) with comorbidity (HCC) 08/22/2014   •  History of vertigo 05/13/2014   • Obstructive sleep apnea 02/20/2014   • Renal cyst 01/22/2014   • Sensorineural hearing loss 11/26/2012   • History of atrial fibrillation 05/22/2012   • Hypertension 09/16/2009   • Dyslipidemia 09/16/2009   • Allergic rhinitis 09/16/2009       Current Outpatient Medications   Medication Sig Dispense Refill   • clobetasol (TEMOVATE) 0.05 % external solution PLEASE SEE ATTACHED FOR DETAILED DIRECTIONS     • lisinopril (PRINIVIL) 10 MG Tab Take 1 Tablet by mouth every day. 100 Tablet 2   • pravastatin (PRAVACHOL) 40 MG tablet Take 1 Tablet by mouth every day. 100 Tablet 3   • carvedilol (COREG) 25 MG Tab TAKE 1 TABLET BY MOUTH TWICE DAILY 180 Tablet 1   • VITAMIN D PO Take  by mouth every day. 2000 units     • aspirin 81 MG tablet Take 81 mg by mouth every bedtime.     • fluticasone (FLONASE) 50 MCG/ACT nasal spray SHAKE AND INSTILL 2 SPRAYS INTO EACH NOSTRILE TWICE DAILY 48 mL 1   • levothyroxine (SYNTHROID) 75 MCG Tab TAKR 1 TAB BY MOUTH EVERY MORNING ON AN EMPTY STOMACH 100 Tablet 3   • omeprazole (PRILOSEC) 40 MG delayed-release capsule TAKE 1 CAPSULE BY MOUTH EVERY  Capsule 3     No current facility-administered medications for this visit.         Allergies as of 06/16/2022 - Reviewed 06/16/2022   Allergen Reaction Noted   • Other environmental Runny Nose and Itching 06/01/2011        Social History     Socioeconomic History   • Marital status:      Spouse name: Not on file   • Number of children: Not on file   • Years of education: Not on file   • Highest education level: Not on file   Occupational History   • Not on file   Tobacco Use   • Smoking status: Never Smoker   • Smokeless tobacco: Never Used   Vaping Use   • Vaping Use: Never used   Substance and Sexual Activity   • Alcohol use: Yes     Alcohol/week: 1.2 - 1.8 oz     Types: 2 - 3 Standard drinks or equivalent per week     Comment: occ   • Drug use: Never   • Sexual activity: Not Currently   Other Topics  Concern   • Not on file   Social History Narrative   • Not on file     Social Determinants of Health     Financial Resource Strain: Not on file   Food Insecurity: Not on file   Transportation Needs: Not on file   Physical Activity: Not on file   Stress: Not on file   Social Connections: Not on file   Intimate Partner Violence: Not on file   Housing Stability: Not on file       Family History   Problem Relation Age of Onset   • Cancer Mother         cervical/breast   • Cancer Sister         lung   • Cancer Father         lung cancer   • Cancer Other        Past Surgical History:   Procedure Laterality Date   • HAMMERTOE CORRECTION  2/2/2015    Performed by Abdifatah Orta D.P.M. at SURGERY [facility]   • EXOSTOSIS EXCISION  6/3/2011    Performed by ABDIFATAH ORTA at [facility]   • LESION EXCISION ORTHO  6/3/2011    Performed by ABDIFATAH ORTA at [facility]   • TOE ARTHROPLASTY  6/3/2011    Performed by ABDIFATAH ORTA at [facility]   • LUMBAR LAMINECTOMY DISKECTOMY  2005    microscopic   • TONSILLECTOMY  1945    adenoidectomy   • EYE SURGERY Bilateral     cataracts       ROS:  Denies any Headache,Chest pain,  Shortness of breath,  Abdominal pain, Changes of bowel or bladder, Lower ext edema, Fevers, Nights sweats, Weight Changes, Focal weakness or numbness.  All other systems are negative.    /60 (BP Location: Left arm, Patient Position: Sitting, BP Cuff Size: Adult)   Pulse 67   Temp 37 °C (98.6 °F) (Temporal)   Resp 15   Ht 1.829 m (6')   Wt 124 kg (272 lb 6.4 oz)   SpO2 93%   BMI 36.94 kg/m²      Constitutional: Alert, no distress, well-groomed.  Skin: Warm, dry, good turgor, no rashes in visible areas.  Eye: Equal, round and reactive, conjunctiva clear, lids normal.  ENMT: Lips without lesions, good dentition, moist mucous membranes.  Neck: Trachea midline, no masses, no thyromegaly.  Respiratory: Unlabored respiratory effort, no  cough.  Abdomen: Soft, no gross masses.  MSK: Normal gait, moves all extremities.  Neuro: Grossly non-focal. No cranial nerve deficit. Strength and sensation intact.   Psych: Alert and oriented x3, normal affect and mood.      Assessment and Plan.   82 y.o. male presenting with the following.     1. Chronic pain of right knee    - Referral to Orthopedics    2. Rash  Continue to monitor.  Over-the-counter cortisone recommended.    3. Primary hypertension  Continue current plan of care.  Stable.  - Comp Metabolic Panel; Future  - CBC WITH DIFFERENTIAL; Future    4. Other specified hypothyroidism  Continue current dose of levothyroxine.  - TSH WITH REFLEX TO FT4; Future  - T3 FREE; Future    5. Pleural effusion on left  Stable.  Follow-up with pulmonology.    6. Interstitial lung disease (HCC)  Chronic.  Stable.  Continue follow-up with pulmonology.    7. Vitamin D deficiency    - VITAMIN D,25 HYDROXY; Future    8. Hyperglycemia    - HEMOGLOBIN A1C; Future    9. Dyslipidemia    - Comp Metabolic Panel; Future  - CBC WITH DIFFERENTIAL; Future  - Lipid Profile; Future    My total time spent caring for the patient on the day of the encounter was 32 minutes.   This does not include time spent on separately billable procedures/tests.

## 2022-07-22 ENCOUNTER — OFFICE VISIT (OUTPATIENT)
Dept: URGENT CARE | Facility: CLINIC | Age: 82
End: 2022-07-22
Payer: MEDICARE

## 2022-07-22 VITALS
DIASTOLIC BLOOD PRESSURE: 68 MMHG | RESPIRATION RATE: 18 BRPM | HEIGHT: 73 IN | WEIGHT: 273 LBS | SYSTOLIC BLOOD PRESSURE: 124 MMHG | TEMPERATURE: 98.9 F | OXYGEN SATURATION: 96 % | BODY MASS INDEX: 36.18 KG/M2 | HEART RATE: 57 BPM

## 2022-07-22 DIAGNOSIS — N28.9 DECREASED RENAL FUNCTION: ICD-10-CM

## 2022-07-22 DIAGNOSIS — U07.1 COVID-19 VIRUS INFECTION: ICD-10-CM

## 2022-07-22 DIAGNOSIS — Z20.822 SUSPECTED COVID-19 VIRUS INFECTION: ICD-10-CM

## 2022-07-22 LAB
EXTERNAL QUALITY CONTROL: ABNORMAL
SARS-COV+SARS-COV-2 AG RESP QL IA.RAPID: POSITIVE

## 2022-07-22 PROCEDURE — 87426 SARSCOV CORONAVIRUS AG IA: CPT | Performed by: NURSE PRACTITIONER

## 2022-07-22 PROCEDURE — 99214 OFFICE O/P EST MOD 30 MIN: CPT | Mod: CS | Performed by: NURSE PRACTITIONER

## 2022-07-22 ASSESSMENT — FIBROSIS 4 INDEX: FIB4 SCORE: 1.53

## 2022-07-22 ASSESSMENT — ENCOUNTER SYMPTOMS
HEADACHES: 1
COUGH: 1

## 2022-07-22 NOTE — PROGRESS NOTES
Subjective     Farhat Stahl is a 82 y.o. male who presents with Cough (X2days, congestion, headache, )            Cough  This is a new problem. Episode onset: pt reports new onset of cough, HA and fatigue that started yesterday. He states he is still coughing today. no SOB or wheezing. no fevers or chills. fatigue is worse today. he is fully vaccinated for COVID. The cough is non-productive. Associated symptoms include headaches. Treatments tried: advil for his HA. The treatment provided mild relief.       Review of Systems   Constitutional: Positive for malaise/fatigue.   HENT: Positive for congestion.    Respiratory: Positive for cough.    Neurological: Positive for headaches.   All other systems reviewed and are negative.         Past Medical History:   Diagnosis Date   • Arrhythmia     Atrial fribrillation   • Arthritis    • Bronchitis 2004   • Chronic pain of right knee 6/16/2022   • Fibula fracture 1981    right   • High cholesterol    • Hyperlipidemia    • Hypertension    • MEDICAL HOME    • Multiple pulmonary nodules 12/15/2021   • Onychogryposis of toenail 1/21/2021   • Onychomycosis 1/21/2021   • Pain     low back   • Pneumonia 2004   • Rash 6/16/2022   • Sleep apnea       Past Surgical History:   Procedure Laterality Date   • HAMMERTOE CORRECTION  2/2/2015    Performed by Abdifatah Young, D.P.M. at [facility]   • EXOSTOSIS EXCISION  6/3/2011    Performed by ABDIFATAH YOUNG at [facility]   • LESION EXCISION ORTHO  6/3/2011    Performed by ABDIFATAH YOUNG at [facility]   • TOE ARTHROPLASTY  6/3/2011    Performed by ABDIFATAH YOUNG at [facility]   • LUMBAR LAMINECTOMY DISKECTOMY  2005    microscopic   • TONSILLECTOMY  1945    adenoidectomy   • EYE SURGERY Bilateral     cataracts      Social History     Socioeconomic History   • Marital status:      Spouse name: Not on file   • Number of children: Not on file   • Years of  "education: Not on file   • Highest education level: Not on file   Occupational History   • Not on file   Tobacco Use   • Smoking status: Never Smoker   • Smokeless tobacco: Never Used   Vaping Use   • Vaping Use: Never used   Substance and Sexual Activity   • Alcohol use: Yes     Alcohol/week: 1.2 - 1.8 oz     Types: 2 - 3 Standard drinks or equivalent per week     Comment: occ   • Drug use: Never   • Sexual activity: Not Currently   Other Topics Concern   • Not on file   Social History Narrative   • Not on file     Social Determinants of Health     Financial Resource Strain: Not on file   Food Insecurity: Not on file   Transportation Needs: Not on file   Physical Activity: Not on file   Stress: Not on file   Social Connections: Not on file   Intimate Partner Violence: Not on file   Housing Stability: Not on file         Objective     /68   Pulse (!) 57   Temp 37.2 °C (98.9 °F) (Temporal)   Resp 18   Ht 1.854 m (6' 1\")   Wt 124 kg (273 lb)   SpO2 96%   BMI 36.02 kg/m²      Physical Exam  Vitals and nursing note reviewed.   Constitutional:       Appearance: Normal appearance.   HENT:      Head: Normocephalic and atraumatic.      Nose: Nose normal.      Mouth/Throat:      Mouth: Mucous membranes are moist.      Pharynx: Oropharynx is clear.   Eyes:      Extraocular Movements: Extraocular movements intact.      Pupils: Pupils are equal, round, and reactive to light.   Cardiovascular:      Rate and Rhythm: Normal rate and regular rhythm.      Heart sounds: Normal heart sounds.   Pulmonary:      Effort: Pulmonary effort is normal.      Breath sounds: Normal breath sounds.   Musculoskeletal:         General: Normal range of motion.      Cervical back: Normal range of motion and neck supple.   Skin:     General: Skin is warm and dry.      Capillary Refill: Capillary refill takes less than 2 seconds.   Neurological:      General: No focal deficit present.      Mental Status: He is alert and oriented to person, " place, and time. Mental status is at baseline.   Psychiatric:         Mood and Affect: Mood normal.         Thought Content: Thought content normal.         Judgment: Judgment normal.                             Assessment & Plan        1. Suspected COVID-19 virus infection  - POCT SARS-COV Antigen SHERYL (Symptomatic only)    2. COVID-19 virus infection  - Nirmatrelvir & Ritonavir 20 x 150 MG & 10 x 100MG Tablet Therapy Pack; Take 150 mg nirmatrelvir (one 150 mg tablet) with 100 mg ritonavir (one 100 mg tablet) by mouth, with both tablets taken together twice daily for 5 days.  Dispense: 20 Each; Refill: 0    3. Decreased renal function    Advised pt of test result  He is ok with stopping statin while taking paxlovid  Encouraged rest and fluids  Tylenol and ibuprofen as needed for HA  • Patient's vital signs are reassuring and they appear hemodynamically stable and do not require higher level care at this time  • I discussed self isolation and provided printed instructions (if applicable)  • I discussed ER precautions and provided printed instructions (if applicable)  • If requested, I provided the patient with a work note to provide to their employer or school regarding returning to work and discontinuation of self isolation.  • All questions were answered and patient demonstrated verbal understanding of above.  • I followed all reasonable PPE precautions during this encounter including but not limited to use of an N95 mask, gloves, and gown if indicated.    Supportive care, differential diagnoses, and indications for immediate follow-up discussed with patient.    Pathogenesis of diagnosis discussed including typical length and natural progression.      Instructed to return to  or nearest emergency department if symptoms fail to improve, for any change in condition, further concerns, or new concerning symptoms.  Patient states understanding of the plan of care and discharge instructions.

## 2022-08-09 ENCOUNTER — APPOINTMENT (OUTPATIENT)
Dept: RADIOLOGY | Facility: MEDICAL CENTER | Age: 82
End: 2022-08-09
Attending: INTERNAL MEDICINE
Payer: MEDICARE

## 2022-08-11 ENCOUNTER — OFFICE VISIT (OUTPATIENT)
Dept: MEDICAL GROUP | Facility: PHYSICIAN GROUP | Age: 82
End: 2022-08-11
Payer: MEDICARE

## 2022-08-11 VITALS
OXYGEN SATURATION: 95 % | WEIGHT: 272.4 LBS | RESPIRATION RATE: 15 BRPM | DIASTOLIC BLOOD PRESSURE: 70 MMHG | BODY MASS INDEX: 36.1 KG/M2 | HEIGHT: 73 IN | HEART RATE: 72 BPM | SYSTOLIC BLOOD PRESSURE: 130 MMHG | TEMPERATURE: 99 F

## 2022-08-11 DIAGNOSIS — J84.9 INTERSTITIAL LUNG DISEASE (HCC): ICD-10-CM

## 2022-08-11 DIAGNOSIS — U07.1 LAB TEST POSITIVE FOR DETECTION OF COVID-19 VIRUS: ICD-10-CM

## 2022-08-11 DIAGNOSIS — M17.11 PRIMARY OSTEOARTHRITIS OF RIGHT KNEE: ICD-10-CM

## 2022-08-11 DIAGNOSIS — H10.023 OTHER MUCOPURULENT CONJUNCTIVITIS OF BOTH EYES: ICD-10-CM

## 2022-08-11 PROCEDURE — 99214 OFFICE O/P EST MOD 30 MIN: CPT | Performed by: INTERNAL MEDICINE

## 2022-08-11 RX ORDER — POLYMYXIN B SULFATE AND TRIMETHOPRIM 1; 10000 MG/ML; [USP'U]/ML
1 SOLUTION OPHTHALMIC EVERY 4 HOURS
Qty: 10 ML | Refills: 0 | Status: SHIPPED | OUTPATIENT
Start: 2022-08-11 | End: 2022-08-16

## 2022-08-11 ASSESSMENT — FIBROSIS 4 INDEX: FIB4 SCORE: 1.53

## 2022-08-12 ENCOUNTER — HOSPITAL ENCOUNTER (OUTPATIENT)
Dept: RADIOLOGY | Facility: MEDICAL CENTER | Age: 82
End: 2022-08-12
Attending: INTERNAL MEDICINE
Payer: MEDICARE

## 2022-08-12 DIAGNOSIS — J90 PLEURAL EFFUSION ON LEFT: ICD-10-CM

## 2022-08-12 DIAGNOSIS — J84.9 ILD (INTERSTITIAL LUNG DISEASE) (HCC): ICD-10-CM

## 2022-08-12 PROCEDURE — 71250 CT THORAX DX C-: CPT

## 2022-08-12 NOTE — PROGRESS NOTES
CC: Positive COVID, eye infection.    HPI:  Farhat presents with the following    1. Lab test positive for detection of COVID-19 virus  Patient presented to the urgent care on July 22 with complaints of a mild dry cough, congestion and a mild headache.  Patient is fully vaccinated for COVID-19.  Over-the-counter Advil did not help relieve his symptoms.  Patient completed a 5-day course of Paxlovid.  Patient's symptoms have almost completely resolved except for a mild cough.  Patient tested positive for COVID 2 days ago.  His wife presented with the same symptoms and was treated with Molnupiravir and her symptoms resolved and has been testing negative.  Patient is isolating and using a mask per CDC guidelines.  Patient would like to be retested for COVID at this time.    2. Other mucopurulent conjunctivitis of both eyes  Patient presents with a  2 day history of right eye redness and discharge which is now affecting his left eye.  Patient has allergies and has been trying to use allergy drops which have not been effective.  Patient denies any exposures.  Patient denies any pain or visual abnormalities.  Patient with recent bilateral cataract surgery with Dr. Lynn.     3. Interstitial lung disease (HCC)  Chronic.  Stable.  Patient with PMH of ILD, pulmonary nodules is followed by pulmonology.  Patient is scheduled for CT scan of the thorax and pulmonary function test in the upcoming weeks.    4. Primary osteoarthritis of right knee  Patient recently received a cortisone injection for his right knee with Dr. Giorgio Landon and was referred to physical therapy.  Steroid injection has helped with his mobility and pain.  Patient also completes HEP.      Patient Active Problem List    Diagnosis Date Noted    Lab test positive for detection of COVID-19 virus 08/11/2022    Chronic pain of right knee 06/16/2022    Rash 06/16/2022    Cerebral atrophy (HCC) 03/25/2022    Atherosclerosis of aorta (HCC) 03/25/2022     Interstitial lung disease (HCC) 03/25/2022    Nonspecific abnormal cardiovascular function study 03/25/2022    Multiple pulmonary nodules 12/15/2021    Prediabetes 01/07/2021    Pleural effusion on left 01/07/2021    Osteoarthritis of knee 03/09/2017    Tubular adenoma of colon 07/06/2016    Blackwell's esophagus without dysplasia 07/06/2016    Idiopathic neuropathy 09/23/2015    Hypothyroid 04/28/2015    Severe obesity (BMI 35.0-39.9) with comorbidity (HCC) 08/22/2014    History of vertigo 05/13/2014    Obstructive sleep apnea 02/20/2014    Renal cyst 01/22/2014    Sensorineural hearing loss 11/26/2012    History of atrial fibrillation 05/22/2012    Hypertension 09/16/2009    Dyslipidemia 09/16/2009    Allergic rhinitis 09/16/2009       Current Outpatient Medications   Medication Sig Dispense Refill    polymixin-trimethoprim (POLYTRIM) 26751-6.1 UNIT/ML-% Solution Administer 1 Drop into both eyes every 4 hours for 5 days. 10 mL 0    fluticasone (FLONASE) 50 MCG/ACT nasal spray SHAKE AND INSTILL 2 SPRAYS INTO EACH NOSTRILE TWICE DAILY 48 mL 1    levothyroxine (SYNTHROID) 75 MCG Tab TAKR 1 TAB BY MOUTH EVERY MORNING ON AN EMPTY STOMACH 100 Tablet 3    omeprazole (PRILOSEC) 40 MG delayed-release capsule TAKE 1 CAPSULE BY MOUTH EVERY  Capsule 3    clobetasol (TEMOVATE) 0.05 % external solution PLEASE SEE ATTACHED FOR DETAILED DIRECTIONS      lisinopril (PRINIVIL) 10 MG Tab Take 1 Tablet by mouth every day. 100 Tablet 2    pravastatin (PRAVACHOL) 40 MG tablet Take 1 Tablet by mouth every day. 100 Tablet 3    carvedilol (COREG) 25 MG Tab TAKE 1 TABLET BY MOUTH TWICE DAILY 180 Tablet 1    VITAMIN D PO Take  by mouth every day. 2000 units      aspirin 81 MG tablet Take 81 mg by mouth every bedtime.      Nirmatrelvir & Ritonavir 20 x 150 MG & 10 x 100MG Tablet Therapy Pack Take 150 mg nirmatrelvir (one 150 mg tablet) with 100 mg ritonavir (one 100 mg tablet) by mouth, with both tablets taken together twice daily for 5  days. (Patient not taking: Reported on 8/11/2022) 20 Each 0     No current facility-administered medications for this visit.         Allergies as of 08/11/2022 - Reviewed 08/11/2022   Allergen Reaction Noted    Other environmental Runny Nose and Itching 06/01/2011        Social History     Socioeconomic History    Marital status:      Spouse name: Not on file    Number of children: Not on file    Years of education: Not on file    Highest education level: Not on file   Occupational History    Not on file   Tobacco Use    Smoking status: Never    Smokeless tobacco: Never   Vaping Use    Vaping Use: Never used   Substance and Sexual Activity    Alcohol use: Yes     Alcohol/week: 1.2 - 1.8 oz     Types: 2 - 3 Standard drinks or equivalent per week     Comment: occ    Drug use: Never    Sexual activity: Not Currently   Other Topics Concern    Not on file   Social History Narrative    Not on file     Social Determinants of Health     Financial Resource Strain: Not on file   Food Insecurity: Not on file   Transportation Needs: Not on file   Physical Activity: Not on file   Stress: Not on file   Social Connections: Not on file   Intimate Partner Violence: Not on file   Housing Stability: Not on file       Family History   Problem Relation Age of Onset    Cancer Mother         cervical/breast    Cancer Sister         lung    Cancer Father         lung cancer    Cancer Other        Past Surgical History:   Procedure Laterality Date    HAMMERTOE CORRECTION  2/2/2015    Performed by Abdifatah Orta, D.P.MEmmanuelle at SURGERY [facility]    EXOSTOSIS EXCISION  6/3/2011    Performed by ABDIFATAH ORTA at [facility] ORS    LESION EXCISION ORTHO  6/3/2011    Performed by ABDIFATAH ORTA at [facility] ORS    TOE ARTHROPLASTY  6/3/2011    Performed by ABDIFATAH ORTA at [facility] ORS    LUMBAR LAMINECTOMY DISKECTOMY  2005    microscopic    TONSILLECTOMY  1945    adenoidectomy    EYE  "SURGERY Bilateral     cataracts       ROS: Positive ROS per HPI.  Denies any Headache,Chest pain,  Shortness of breath,  Abdominal pain, Changes of bowel or bladder, Lower ext edema, Fevers, Nights sweats, Weight Changes, Focal weakness or numbness.  All other systems are negative.    /70 (BP Location: Right arm, Patient Position: Sitting, BP Cuff Size: Small adult)   Pulse 72   Temp 37.2 °C (99 °F) (Temporal)   Resp 15   Ht 1.854 m (6' 1\")   Wt 124 kg (272 lb 6.4 oz)   SpO2 95%   BMI 35.94 kg/m²      Physical Exam:  Gen:         Alert and oriented, No apparent distress.  HEENT:   Perrla, EOMI , bilateral injected sclera, minimal discharge on right eye.  Oralpharynx no erythema or exudates.  Neck:       No Jugular venous distension, Lymphadenopathy, Thyromegaly, Bruits.  Lungs:     Clear to auscultation bilaterally, no wheezing rhonchi or crackles.  CV:          Regular rate and rhythm. No murmurs, rubs or gallops.  Abd:         Soft non tender, non distended. Normal active bowel sounds.             Ext:          No clubbing, cyanosis, edema.      Assessment and Plan.   82 y.o. male presenting with the following.     1. Lab test positive for detection of COVID-19 virus  Rapid COVID-negative.  Continue supportive treatment.  Recommend vitamin C, D3, zinc.  No treatment indicated.    2. Other mucopurulent conjunctivitis of both eyes  Prescription for Polytrim eyedrops sent to pharmacy with instruction.  Patient is to call his ophthalmologist or follow-up in urgent care if symptoms worsen in any way.    3. Interstitial lung disease (HCC)  Chronic.  Stable.  Follow-up with pulmonology as scheduled.  Complete PFT and CT scan of thorax.    4. Primary osteoarthritis of right knee  Stable.  Status post cortisone injection.  Continue physical therapy.    My total time spent caring for the patient on the day of the encounter was 34 minutes.   This does not include time spent on separately billable " procedures/tests.

## 2022-11-08 ENCOUNTER — APPOINTMENT (OUTPATIENT)
Dept: SLEEP MEDICINE | Facility: MEDICAL CENTER | Age: 82
End: 2022-11-08
Attending: INTERNAL MEDICINE
Payer: MEDICARE

## 2022-11-08 ENCOUNTER — APPOINTMENT (OUTPATIENT)
Dept: SLEEP MEDICINE | Facility: MEDICAL CENTER | Age: 82
End: 2022-11-08
Payer: MEDICARE

## 2023-01-06 ENCOUNTER — APPOINTMENT (OUTPATIENT)
Dept: SLEEP MEDICINE | Facility: MEDICAL CENTER | Age: 83
End: 2023-01-06
Payer: MEDICARE

## 2023-01-06 ENCOUNTER — APPOINTMENT (OUTPATIENT)
Dept: SLEEP MEDICINE | Facility: MEDICAL CENTER | Age: 83
End: 2023-01-06
Attending: INTERNAL MEDICINE
Payer: MEDICARE

## 2023-02-18 ENCOUNTER — APPOINTMENT (OUTPATIENT)
Dept: RADIOLOGY | Facility: MEDICAL CENTER | Age: 83
End: 2023-02-18
Attending: EMERGENCY MEDICINE
Payer: MEDICARE

## 2023-02-18 ENCOUNTER — HOME HEALTH ADMISSION (OUTPATIENT)
Dept: HOME HEALTH SERVICES | Facility: HOME HEALTHCARE | Age: 83
End: 2023-02-18
Payer: MEDICARE

## 2023-02-18 ENCOUNTER — HOSPITAL ENCOUNTER (EMERGENCY)
Facility: MEDICAL CENTER | Age: 83
End: 2023-02-18
Attending: EMERGENCY MEDICINE
Payer: MEDICARE

## 2023-02-18 VITALS
HEART RATE: 66 BPM | SYSTOLIC BLOOD PRESSURE: 166 MMHG | TEMPERATURE: 98 F | RESPIRATION RATE: 18 BRPM | OXYGEN SATURATION: 95 % | DIASTOLIC BLOOD PRESSURE: 81 MMHG
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
## 2023-02-18 DIAGNOSIS — R42 DIZZINESS: ICD-10-CM

## 2023-02-18 DIAGNOSIS — M54.50 ACUTE MIDLINE LOW BACK PAIN WITHOUT SCIATICA: ICD-10-CM

## 2023-02-18 LAB
ALBUMIN SERPL BCP-MCNC: 3.3 G/DL (ref 3.2–4.9)
ALBUMIN/GLOB SERPL: 1.2 G/DL
ALP SERPL-CCNC: 76 U/L (ref 30–99)
ALT SERPL-CCNC: 21 U/L (ref 2–50)
ANION GAP SERPL CALC-SCNC: 11 MMOL/L (ref 7–16)
AST SERPL-CCNC: 17 U/L (ref 12–45)
BASOPHILS # BLD AUTO: 0.7 % (ref 0–1.8)
BASOPHILS # BLD: 0.06 K/UL (ref 0–0.12)
BILIRUB SERPL-MCNC: 0.4 MG/DL (ref 0.1–1.5)
BUN SERPL-MCNC: 15 MG/DL (ref 8–22)
CALCIUM ALBUM COR SERPL-MCNC: 9.2 MG/DL (ref 8.5–10.5)
CALCIUM SERPL-MCNC: 8.6 MG/DL (ref 8.4–10.2)
CHLORIDE SERPL-SCNC: 101 MMOL/L (ref 96–112)
CO2 SERPL-SCNC: 20 MMOL/L (ref 20–33)
CREAT SERPL-MCNC: 0.99 MG/DL (ref 0.5–1.4)
EKG IMPRESSION: NORMAL
EOSINOPHIL # BLD AUTO: 0.18 K/UL (ref 0–0.51)
EOSINOPHIL NFR BLD: 2.2 % (ref 0–6.9)
ERYTHROCYTE [DISTWIDTH] IN BLOOD BY AUTOMATED COUNT: 44.9 FL (ref 35.9–50)
GFR SERPLBLD CREATININE-BSD FMLA CKD-EPI: 75 ML/MIN/1.73 M 2
GLOBULIN SER CALC-MCNC: 2.8 G/DL (ref 1.9–3.5)
GLUCOSE SERPL-MCNC: 111 MG/DL (ref 65–99)
HCT VFR BLD AUTO: 41.5 % (ref 42–52)
HGB BLD-MCNC: 14.4 G/DL (ref 14–18)
IMM GRANULOCYTES # BLD AUTO: 0.04 K/UL (ref 0–0.11)
IMM GRANULOCYTES NFR BLD AUTO: 0.5 % (ref 0–0.9)
LYMPHOCYTES # BLD AUTO: 1.01 K/UL (ref 1–4.8)
LYMPHOCYTES NFR BLD: 12.3 % (ref 22–41)
MCH RBC QN AUTO: 33.2 PG (ref 27–33)
MCHC RBC AUTO-ENTMCNC: 34.7 G/DL (ref 33.7–35.3)
MCV RBC AUTO: 95.6 FL (ref 81.4–97.8)
MONOCYTES # BLD AUTO: 0.94 K/UL (ref 0–0.85)
MONOCYTES NFR BLD AUTO: 11.4 % (ref 0–13.4)
NEUTROPHILS # BLD AUTO: 6 K/UL (ref 1.82–7.42)
NEUTROPHILS NFR BLD: 72.9 % (ref 44–72)
NRBC # BLD AUTO: 0 K/UL
NRBC BLD-RTO: 0 /100 WBC
PLATELET # BLD AUTO: 192 K/UL (ref 164–446)
PMV BLD AUTO: 9.3 FL (ref 9–12.9)
POTASSIUM SERPL-SCNC: 4 MMOL/L (ref 3.6–5.5)
PROT SERPL-MCNC: 6.1 G/DL (ref 6–8.2)
RBC # BLD AUTO: 4.34 M/UL (ref 4.7–6.1)
SODIUM SERPL-SCNC: 132 MMOL/L (ref 135–145)
TROPONIN T SERPL-MCNC: 19 NG/L (ref 6–19)
WBC # BLD AUTO: 8.2 K/UL (ref 4.8–10.8)

## 2023-02-18 PROCEDURE — 96375 TX/PRO/DX INJ NEW DRUG ADDON: CPT

## 2023-02-18 PROCEDURE — 700101 HCHG RX REV CODE 250: Performed by: EMERGENCY MEDICINE

## 2023-02-18 PROCEDURE — 72131 CT LUMBAR SPINE W/O DYE: CPT

## 2023-02-18 PROCEDURE — 80053 COMPREHEN METABOLIC PANEL: CPT

## 2023-02-18 PROCEDURE — 84484 ASSAY OF TROPONIN QUANT: CPT

## 2023-02-18 PROCEDURE — 93005 ELECTROCARDIOGRAM TRACING: CPT | Performed by: EMERGENCY MEDICINE

## 2023-02-18 PROCEDURE — 36415 COLL VENOUS BLD VENIPUNCTURE: CPT

## 2023-02-18 PROCEDURE — 85025 COMPLETE CBC W/AUTO DIFF WBC: CPT

## 2023-02-18 PROCEDURE — 72192 CT PELVIS W/O DYE: CPT

## 2023-02-18 PROCEDURE — 96374 THER/PROPH/DIAG INJ IV PUSH: CPT

## 2023-02-18 PROCEDURE — 700111 HCHG RX REV CODE 636 W/ 250 OVERRIDE (IP): Performed by: EMERGENCY MEDICINE

## 2023-02-18 PROCEDURE — 70450 CT HEAD/BRAIN W/O DYE: CPT

## 2023-02-18 PROCEDURE — 99285 EMERGENCY DEPT VISIT HI MDM: CPT

## 2023-02-18 RX ORDER — LIDOCAINE 50 MG/G
1 PATCH TOPICAL EVERY 24 HOURS
Status: DISCONTINUED | OUTPATIENT
Start: 2023-02-18 | End: 2023-02-18 | Stop reason: HOSPADM

## 2023-02-18 RX ORDER — LIDOCAINE 50 MG/G
1 PATCH TOPICAL EVERY 24 HOURS
Qty: 10 PATCH | Refills: 0 | Status: SHIPPED | OUTPATIENT
Start: 2023-02-18 | End: 2023-03-27

## 2023-02-18 RX ORDER — OXYCODONE HYDROCHLORIDE AND ACETAMINOPHEN 5; 325 MG/1; MG/1
1 TABLET ORAL EVERY 8 HOURS PRN
Qty: 15 TABLET | Refills: 0 | Status: SHIPPED | OUTPATIENT
Start: 2023-02-18 | End: 2023-02-23

## 2023-02-18 RX ORDER — ONDANSETRON 2 MG/ML
4 INJECTION INTRAMUSCULAR; INTRAVENOUS ONCE
Status: COMPLETED | OUTPATIENT
Start: 2023-02-18 | End: 2023-02-18

## 2023-02-18 RX ADMIN — FENTANYL CITRATE 50 MCG: 50 INJECTION, SOLUTION INTRAMUSCULAR; INTRAVENOUS at 13:04

## 2023-02-18 RX ADMIN — LIDOCAINE 1 PATCH: 50 PATCH TOPICAL at 14:41

## 2023-02-18 RX ADMIN — ONDANSETRON 4 MG: 2 INJECTION INTRAMUSCULAR; INTRAVENOUS at 13:04

## 2023-02-18 NOTE — DISCHARGE PLANNING
Call from Dr Tenorio at [facility] ED requesting assistance for HH (they do not have CM in ED today). CM requested MD place face to face and order. Voalte msg sent to Wilma AHUMADA who is covering the floors at [facility] today and asked her to obtain choice.

## 2023-02-18 NOTE — DISCHARGE PLANNING
MENDEZW spoke with pt's wife Sammi for HHC Choice. Sammi's first choice is for [facility]. Choice form faxed to Malka AQUINO.

## 2023-02-18 NOTE — DISCHARGE PLANNING
Received Choice form at 9143  Agency/Facility Name: [facility]  Referral sent per Choice form @ 9022

## 2023-02-18 NOTE — ED PROVIDER NOTES
ED Provider Note    CHIEF COMPLAINT  Chief Complaint   Patient presents with    GLF     A couple days ago. Pt denies thinners or LOC.     Back Pain     Low back/tailbone       EXTERNAL RECORDS REVIEWED  Outpatient Notes has had office visits in July and August that related to possible COVID infection and conjunctivitis as well as some osteoarthritis of the knee.  No recent visits to primary care in [facility].    HPI/ROS  LIMITATION TO HISTORY   Select: : None  OUTSIDE HISTORIAN(S):  Family wife states he does use his walker when he is trying to get around the house.    Farhat Stahl is a 82 y.o. male who presents back pain.  Patient fell a couple days ago.  Initially the pain was not that bad but over the last 2 days it is gotten quite significant.  He is taken Tylenol and ibuprofen for the pain but it has not helped.  He denies hitting his head or neck pain from the fall.  Patient states he has chronic neuropathy and it makes him prone to falling as he cannot feel his feet.  He has had an increase in falls over the last month.  States he is fallen maybe 10 times.  States he tries to use his walker but occasionally loses balance and falls.  He is not on anticoagulation.  He denies any bowel or bladder incontinence.  He denies any new leg weakness.  He states his legs are unsteady chronically from his neuropathy but no new weakness from the fall.  He denies any chest pain or shortness of breath.  He has a history of vertigo.  He states he had physical therapy for this and it improved greatly.  Over the last several weeks he has had some intermittent dizziness as well.  He denies any speech problems, vision problems, numbness or weakness on one side of his body.  Wife denies any mental status changes.    PAST MEDICAL HISTORY   has a past medical history of Arrhythmia, Arthritis, Bronchitis (2004), Chronic pain of right knee (6/16/2022), Fibula fracture (1981), High cholesterol, Hyperlipidemia, Hypertension, Lab test  positive for detection of COVID-19 virus (8/11/2022), MEDICAL HOME, Multiple pulmonary nodules (12/15/2021), Onychogryposis of toenail (1/21/2021), Onychomycosis (1/21/2021), Pain, Pneumonia (2004), Rash (6/16/2022), and Sleep apnea.    SURGICAL HISTORY   has a past surgical history that includes tonsillectomy (1945); exostosis excision (6/3/2011); lesion excision ortho (6/3/2011); toe arthroplasty (6/3/2011); lumbar laminectomy diskectomy (2005); hammertoe correction (2/2/2015); and eye surgery (Bilateral).    FAMILY HISTORY  Family History   Problem Relation Age of Onset    Cancer Mother         cervical/breast    Cancer Sister         lung    Cancer Father         lung cancer    Cancer Other        SOCIAL HISTORY  Social History     Tobacco Use    Smoking status: Never    Smokeless tobacco: Never   Vaping Use    Vaping Use: Never used   Substance and Sexual Activity    Alcohol use: Yes     Alcohol/week: 1.2 - 1.8 oz     Types: 2 - 3 Standard drinks or equivalent per week     Comment: occ    Drug use: Never    Sexual activity: Not Currently       CURRENT MEDICATIONS  Home Medications    **Home medications have not yet been reviewed for this encounter**         ALLERGIES  Allergies   Allergen Reactions    Other Environmental Runny Nose and Itching     Pollens       PHYSICAL EXAM  VITAL SIGNS: BP (!) 166/81   Pulse 66   Temp 36.7 °C (98 °F) (Temporal)   Resp 18   SpO2 95%    Constitutional:  Well developed, mild acute distress, Non-toxic appearance.  Lying on his left side due to pain in his buttock area.  HENT: Normocephalic, Atraumatic, Bilateral external ears normal, Nose normal.   Eyes: PERRL, EOMI, Conjunctiva normal  Neck: Normal range of motion, No tenderness, Supple  Cardiovascular: Normal heart rate, Normal rhythm  Thorax & Lungs: Normal breath sounds, No respiratory distress,  No chest tenderness.   Abdomen: Benign abdominal exam, no guarding no rebound,  no tenderness, no distention  Skin: Warm, Dry,  No erythema, No rash.  Multiple contusions to upper and lower extremities.  Also bruising to his coccyx area without open wounds or abrasions.  Back: Midline lower lumbar coccyx area tenderness palpation, No CVA tenderness.  No bowel or bladder dysfunction, intact sensation in the perineum, chronic sensory changes to bilateral lower extremities which is is not new.  Able to lift and move legs without difficulty.  Extremities: Intact distal pulses, No edema, No tenderness   Neurologic: Alert & oriented x 3, Normal motor function,  No focal deficits noted.  Normal speech, no aphasia, no facial droop, good upper extremity strength.  Psychiatric: appropriate     DIAGNOSTIC STUDIES / PROCEDURES  EKG  I have independently interpreted this EKG    LABS  Results for orders placed or performed during the hospital encounter of 02/18/23   CBC WITH DIFFERENTIAL   Result Value Ref Range    WBC 8.2 4.8 - 10.8 K/uL    RBC 4.34 (L) 4.70 - 6.10 M/uL    Hemoglobin 14.4 14.0 - 18.0 g/dL    Hematocrit 41.5 (L) 42.0 - 52.0 %    MCV 95.6 81.4 - 97.8 fL    MCH 33.2 (H) 27.0 - 33.0 pg    MCHC 34.7 33.7 - 35.3 g/dL    RDW 44.9 35.9 - 50.0 fL    Platelet Count 192 164 - 446 K/uL    MPV 9.3 9.0 - 12.9 fL    Neutrophils-Polys 72.90 (H) 44.00 - 72.00 %    Lymphocytes 12.30 (L) 22.00 - 41.00 %    Monocytes 11.40 0.00 - 13.40 %    Eosinophils 2.20 0.00 - 6.90 %    Basophils 0.70 0.00 - 1.80 %    Immature Granulocytes 0.50 0.00 - 0.90 %    Nucleated RBC 0.00 /100 WBC    Neutrophils (Absolute) 6.00 1.82 - 7.42 K/uL    Lymphs (Absolute) 1.01 1.00 - 4.80 K/uL    Monos (Absolute) 0.94 (H) 0.00 - 0.85 K/uL    Eos (Absolute) 0.18 0.00 - 0.51 K/uL    Baso (Absolute) 0.06 0.00 - 0.12 K/uL    Immature Granulocytes (abs) 0.04 0.00 - 0.11 K/uL    NRBC (Absolute) 0.00 K/uL   COMP METABOLIC PANEL   Result Value Ref Range    Sodium 132 (L) 135 - 145 mmol/L    Potassium 4.0 3.6 - 5.5 mmol/L    Chloride 101 96 - 112 mmol/L    Co2 20 20 - 33 mmol/L    Anion Gap  11.0 7.0 - 16.0    Glucose 111 (H) 65 - 99 mg/dL    Bun 15 8 - 22 mg/dL    Creatinine 0.99 0.50 - 1.40 mg/dL    Calcium 8.6 8.4 - 10.2 mg/dL    AST(SGOT) 17 12 - 45 U/L    ALT(SGPT) 21 2 - 50 U/L    Alkaline Phosphatase 76 30 - 99 U/L    Total Bilirubin 0.4 0.1 - 1.5 mg/dL    Albumin 3.3 3.2 - 4.9 g/dL    Total Protein 6.1 6.0 - 8.2 g/dL    Globulin 2.8 1.9 - 3.5 g/dL    A-G Ratio 1.2 g/dL   TROPONIN   Result Value Ref Range    Troponin T 19 6 - 19 ng/L   CORRECTED CALCIUM   Result Value Ref Range    Correct Calcium 9.2 8.5 - 10.5 mg/dL   ESTIMATED GFR   Result Value Ref Range    GFR (CKD-EPI) 75 >60 mL/min/1.73 m 2   EKG (NOW)   Result Value Ref Range    Report       [facility]    Test Date:  2023  Pt Name:    SALO RICHARDS                 Department: [facility]  MRN:        [id number]                      Room:       [facility]  Gender:     Male                         Technician: KAM  :        [id number]                   Requested By:FARIDEH JOSE  Order #:    [id number]                    Reading MD: Farideh Cody MD    Measurements  Intervals                                Axis  Rate:       58                           P:          4  KS:         215                          QRS:        74  QRSD:       91                           T:          89  QT:         415  QTc:        408    Interpretive Statements  Sinus rhythm  Atrial premature complex  Borderline prolonged KS interval  Anteroseptal infarct, age indeterminate  Compared to ECG 2021 21:10:42  Atrial premature complex(es) now present  T-wave abnormality no longer present  Electronically Signed On 2023 15:43:22 PST by Farideh Cody MD          RADIOLOGY    CT-LSPINE W/O PLUS RECONS   Final Result      1.  No evidence of acute fracture of the lumbar spine.      2.  Mild chronic supratentorial a compression fracture T12 and L1.      3.  Prominent multilevel degenerative disc disease and facet  degeneration.      4.  Multilevel degenerative subluxation.      CT-PELVIS W/O PLUS RECONS   Final Result      No evidence of pelvic or proximal femoral fracture.      CT-HEAD W/O   Final Result      1.  No evidence of acute intracranial process.      2.  Cerebral atrophy as well as periventricular chronic small vessel ischemic change.               COURSE & MEDICAL DECISION MAKING    ED Observation Status? Yes; I am placing the patient in to an observation status due to a diagnostic uncertainty as well as therapeutic intensity. Patient placed in observation status at PM, 2/18/2023.     Observation plan is as follows: Resolution of his pain, his ability to ambulate, and laboratory and CT imaging.    Upon Reevaluation, the patient's condition has: Improved; and will be discharged.    Patient discharged from ED Observation status at 12:50 (Time) 02/18/23  (Date).     INITIAL ASSESSMENT, COURSE AND PLAN  Care Narrative: Patient presents to the emergency department with lower back pain from a fall.  There is no evidence of acute cord compression on exam.  He has chronic decreased sensation in both legs from neuropathy which is still present.  He does not have any sensory changes in his perineum.  Pain is significant enough now where he cannot ambulate.  Will obtain CT imaging to rule out compression fracture.  Patient has had increased falls recently.  Will CT is head due to his age to rule out intracranial process.  Also get basic laboratory studies to rule out a syncopal episode causing his falls although his history sounds more like he is losing his balance.  He does have a history of chronic dizziness that has been worsening over the last few weeks as well.    Patient's laboratory studies show a white count of 8.2.  No anemia.  Sodium is 132.  No significant other electrolyte abnormalities.  Glucose was 111.  Normal renal function.  Normal liver function.  Troponin is 19.  EKG shows no evidence of an acute MI.  CT  imaging has returned and shows no evidence of acute lumbar fracture.  Pelvis CT also shows no acute fracture.  He does have significant degenerative changes in his lower spine.  CT of the head also shows no acute intracranial process.  Patient actually had quite good relief with the fentanyl.  He has been able to sit up and ambulate with his walker.  He is feeling much better.  I also applied a lidocaine patch to the area.  At this point I do not feel there is an acute indication for admission to the hospital.  I do however think he needs outpatient home health to evaluate for possible PT and OT to help with his neuropathy and his falling.  I also think a recheck of his meds would be beneficial to make sure that is not contributing to his dizziness.    Concerning his back pain I do not see any evidence on exam that would suggest acute cord compression.  I do not feel he needs emergent MRI.  He is able to ambulate at this point and therefore will follow-up with his regular doctor concerning this.  We did discuss different treatment options.  He will be discharged with some lidocaine prescription patches.  Patient also received a few narcotic pain pills.  Patient and wife understand the risk benefits of this.  Return precautions were given.  Patient is discharged in stable condition.      DISPOSITION AND DISCUSSIONS    Discussion of management with other [facility] or appropriate source(s): Case Management filled out the face-to-face for home health eval for pt/ot and medication eval.      Escalation of care considered, and ultimately not performed:acute inpatient care management, however at this time, the patient is most appropriate for outpatient management      Decision tools and prescription drugs considered including, but not limited to: Pain Medications patient currently taking Tylenol and ibuprofen without relief of his symptoms.  In prescribing controlled substances to this patient, I certify that I have obtained and  reviewed the medical history of Farhat Stahl. I have also made a good jose effort to obtain applicable records from other providers who have treated the patient and records did not demonstrate any increased risk of substance abuse that would prevent me from prescribing controlled substances.     I have conducted a physical exam and documented it. I have reviewed Mr. Stahl’s prescription history as maintained by the [place] Prescription Monitoring Program.     I have assessed the patient’s risk for abuse, dependency, and addiction using the validated Opioid Risk Tool available at https://www.mdcalc.com/viumbc-ofcb-wckj-ort-narcotic-abuse.     Given the above, I believe the benefits of controlled substance therapy outweigh the risks. The reasons for prescribing controlled substances include non-narcotic, oral analgesic alternatives have been inadequate for pain control. Accordingly, I have discussed the risk and benefits, treatment plan, and alternative therapies with the patient.  .    FINAL DIAGNOSIS  1. Fall, initial encounter    2. Acute midline low back pain without sciatica    3. Dizziness           Electronically signed by: Farideh Tenorio M.D., 2/18/2023 12:39 PM

## 2023-02-18 NOTE — FACE TO FACE
Face to Face Supporting Documentation - Home Health    The encounter with this patient was in whole or in part the primary reason for home health admission.    Date of encounter:   Patient:                    MRN:                       YOB: 2024  Farhat Stahl  [id number]  1940     Home health to see patient for:  Skilled Nursing care for assessment, interventions & education, Home health aide, Physical Therapy evaluation and treatment, and Occupational therapy evaluation and treatment    Skilled need for:  Exacerbation of Chronic Disease State multiple falls related to his chronic neuropathy and dizziness.    Skilled nursing interventions to include:  Comment: For assessment and evaluation of patient's dizziness and chronic falls.    Homebound status evidenced by:  Need the aid of supportive devices such as crutches, canes, wheelchairs or walkers. Leaving home requires a considerable and taxing effort. There is a normal inability to leave the home.    Community Physician to provide follow up care: Stefanie Zavaleta M.D.     Optional Interventions? No      I certify the face to face encounter for this home health care referral meets the CMS requirements and the encounter/clinical assessment with the patient was, in whole, or in part, for the medical condition(s) listed above, which is the primary reason for home health care. Based on my clinical findings: the service(s) are medically necessary, support the need for home health care, and the homebound criteria are met.  I certify that this patient has had a face to face encounter by myself.  Farideh Tenorio M.D. - NPI: [id number]

## 2023-02-18 NOTE — DISCHARGE INSTRUCTIONS
You should receive a call on Monday to help get home health established.  I have also referred you to pharmacotherapy clinic to evaluate your meds to make sure those are not contributing to your falls.  Use the lidocaine patch to help with the pain.  The Percocet is for severe pain.  Be careful taking it as it could make you sleepy or unbalanced.  Bowel or bladder incontinence, increased weakness in your legs or any other concerns return for recheck.  I hope you feel better soon.

## 2023-02-19 NOTE — DISCHARGE PLANNING
ATTN: Case Management  RE: Referral for Home Health    As of 2/18/2023, we have accepted the Home Health referral for the patient listed above.    A [facility] clinician will be out to see the patient within 48 hours. If you have any questions or concerns regarding the patient's transition to Home Health, please do not hesitate to contact us at [phone].      We look forward to collaborating with you,  [facility] Team

## 2023-02-21 ENCOUNTER — HOME CARE VISIT (OUTPATIENT)
Dept: HOME HEALTH SERVICES | Facility: HOME HEALTHCARE | Age: 83
End: 2023-02-21
Payer: MEDICARE

## 2023-02-21 ENCOUNTER — DOCUMENTATION (OUTPATIENT)
Dept: CARDIOLOGY | Facility: MEDICAL CENTER | Age: 83
End: 2023-02-21
Payer: MEDICARE

## 2023-02-21 VITALS
HEIGHT: 72 IN | OXYGEN SATURATION: 96 % | HEART RATE: 62 BPM | DIASTOLIC BLOOD PRESSURE: 78 MMHG | RESPIRATION RATE: 16 BRPM | TEMPERATURE: 97.6 F | SYSTOLIC BLOOD PRESSURE: 148 MMHG | WEIGHT: 265.25 LBS | BODY MASS INDEX: 35.93 KG/M2

## 2023-02-21 PROCEDURE — 665001 SOC-HOME HEALTH

## 2023-02-21 PROCEDURE — G0493 RN CARE EA 15 MIN HH/HOSPICE: HCPCS

## 2023-02-21 ASSESSMENT — ENCOUNTER SYMPTOMS
PERSON REPORTING PAIN: PATIENT
PAIN LOCATION - PAIN SEVERITY: 4/10
PAIN SEVERITY GOAL: 0/10
CONSTIPATION: 1
PAIN LOCATION - EXACERBATING FACTORS: MOVEMENT, SITTING
SUBJECTIVE PAIN PROGRESSION: GRADUALLY IMPROVING
PAIN: 1
HIGHEST PAIN SEVERITY IN PAST 24 HOURS: 8/10
DEBILITATING PAIN: 1
LOWEST PAIN SEVERITY IN PAST 24 HOURS: 4/10
LAST BOWEL MOVEMENT: 66523
PAIN LOCATION: LOW BACK
HYPERTENSION: 1
FATIGUES EASILY: 1
DYSPNEA ACTIVITY LEVEL: AFTER AMBULATING MORE THAN 20 FT
PAIN LOCATION - PAIN FREQUENCY: CONSTANT
SHORTNESS OF BREATH: 1
PAIN LOCATION - PAIN QUALITY: ACHE
FATIGUE: 1

## 2023-02-21 ASSESSMENT — ACTIVITIES OF DAILY LIVING (ADL)
AMBULATION ASSISTANCE: 1
AMBULATION ASSISTANCE: MINIMUM ASSIST
OASIS_M1830: 03

## 2023-02-21 ASSESSMENT — FIBROSIS 4 INDEX: FIB4 SCORE: 1.6

## 2023-02-21 NOTE — Clinical Note
Primary dx/Skilled need:hypertension, falls  SN frequency:SOC only  Zip code:[id number]  Disciplines ordered:PT, MSW  Isurance & authorization:SCP  Certification period:2/21/2023-4/21/2023  Special considerations:none

## 2023-02-22 ENCOUNTER — DOCUMENTATION (OUTPATIENT)
Dept: VASCULAR LAB | Facility: MEDICAL CENTER | Age: 83
End: 2023-02-22
Payer: MEDICARE

## 2023-02-22 ENCOUNTER — HOME CARE VISIT (OUTPATIENT)
Dept: HOME HEALTH SERVICES | Facility: HOME HEALTHCARE | Age: 83
End: 2023-02-22
Payer: MEDICARE

## 2023-02-22 RX ORDER — FLUTICASONE PROPIONATE 50 MCG
2 SPRAY, SUSPENSION (ML) NASAL 2 TIMES DAILY
Qty: 48 ML | Refills: 3 | Status: SHIPPED | OUTPATIENT
Start: 2023-02-22 | End: 2023-05-09 | Stop reason: SDUPTHER

## 2023-02-22 NOTE — CASE COMMUNICATION
noted  ----- Message -----  From: Jennifer Fernandez R.N.  Sent: 2/21/2023   1:07 PM PST  To: Rachel Bird R.N., Ivanna Casas R.N., *      Primary dx/Skilled need:hypertension, falls  SN frequency:SOC only  Zip code:[address]  Disciplines ordered:PT, MSW  Isurance & authorization:SCP  Certification period:2/21/2023-4/21/2023  Special considerations:none

## 2023-02-22 NOTE — PROGRESS NOTES
[facility] Heart and Vascular Health and Pharmacotherapy Programs    Received pharmacotherapy referral for polypharmacy/medication review  from ED discharge team on 2-18-23.    S/w patient regarding referral.  He states he has no transportation for appt's.  Will look into setting up phone visit through one of our med groups and get back to patient.    Insurance: SCP  PCP: [facility]  Locations to be seen: Any    [facility] Anticoagulation/Pharmacotherapy Clinic at [phone], fax [phone]    Demar Jameson, OcD, BCACP

## 2023-02-22 NOTE — Clinical Note
P.T. called and left voicemail to schedule P.T. eval. Left call back number and will await return call.

## 2023-02-22 NOTE — PROGRESS NOTES
Medication chart review for [facility] services    Received referral from [facility].   Medications reviewed  compared with discharge summary if available.    Current medication list per [facility]     Current Outpatient Medications:     D3, 1 Tablet, Oral, DAILY    ibuprofen, 400 mg, Oral, Q6HRS PRN    docusate sodium, 200 mg, Oral, QDAY PRN    oxyCODONE-acetaminophen, 1 Tablet, Oral, Q8HRS PRN    lidocaine, 1 Patch, Transdermal, Q24HRS    lisinopril, 10 mg, Oral, QDAY    fluticasone, SHAKE AND INSTILL 2 SPRAYS INTO EACH NOSTRILE TWICE DAILY (Patient taking differently: SHAKE AND INSTILL 2 SPRAYS INTO EACH NOSTRILE daily)    carvedilol, TAKE 1 TAB BY MOUTH TWICE DAILY    Nirmatrelvir & Ritonavir, Take 150 mg nirmatrelvir (one 150 mg tablet) with 100 mg ritonavir (one 100 mg tablet) by mouth, with both tablets taken together twice daily for 5 days. (Patient not taking: Reported on 2/21/2023)    levothyroxine, TAKR 1 TAB BY MOUTH EVERY MORNING ON AN EMPTY STOMACH    omeprazole, TAKE 1 CAPSULE BY MOUTH EVERY DAY    pravastatin, 40 mg, Oral, DAILY    aspirin, 81 mg, Oral, QHS    Location of hospital, and discharge summary date, if applicable:   2/23-[facility] ED visit  Discharge medication summary, per discharge summary:      Allergies   Allergen Reactions    Other Environmental Runny Nose and Itching     Pollens       Labs     Lab Results   Component Value Date/Time    SODIUM 132 (L) 02/18/2023 12:53 PM    POTASSIUM 4.0 02/18/2023 12:53 PM    CHLORIDE 101 02/18/2023 12:53 PM    CO2 20 02/18/2023 12:53 PM    GLUCOSE 111 (H) 02/18/2023 12:53 PM    BUN 15 02/18/2023 12:53 PM    CREATININE 0.99 02/18/2023 12:53 PM     Lab Results   Component Value Date/Time    ALKPHOSPHAT 76 02/18/2023 12:53 PM    ASTSGOT 17 02/18/2023 12:53 PM    ALTSGPT 21 02/18/2023 12:53 PM    TBILIRUBIN 0.4 02/18/2023 12:53 PM    INR 1.13 01/08/2021 03:42 AM    ALBUMIN 3.3 02/18/2023 12:53 PM    ALBUMIN 4.03 03/10/2017 10:49 AM         Assessment for clinically significant drug interactions, drug omissions/additions, duplicative therapies.            CC   Stefanie Zavaleta M.D.  [address]  [facility] [address]  Fax: [phone]    [facility]  Phone [phone] fax [phone]    This note was created using voice recognition software (Dragon). The accuracy of the dictation is limited by the abilities of the software. I have reviewed the note prior to signing, however some errors in grammar and context are still possible. If you have any questions related to this note please do not hesitate to contact our office.

## 2023-02-23 ENCOUNTER — HOME CARE VISIT (OUTPATIENT)
Dept: HOME HEALTH SERVICES | Facility: HOME HEALTHCARE | Age: 83
End: 2023-02-23
Payer: MEDICARE

## 2023-02-23 NOTE — CASE COMMUNICATION
noted  ----- Message -----  From: Rachel Estevez, PT  Sent: 2/22/2023   7:31 PM PST  To: Rachel Bird R.N., Ivanna Casas R.N., *      P.T. called and left voicemail to schedule P.T. eval. Left call back number and will await return call.

## 2023-02-27 ENCOUNTER — HOME CARE VISIT (OUTPATIENT)
Dept: HOME HEALTH SERVICES | Facility: HOME HEALTHCARE | Age: 83
End: 2023-02-27
Payer: MEDICARE

## 2023-02-27 ENCOUNTER — TELEPHONE (OUTPATIENT)
Dept: VASCULAR LAB | Facility: MEDICAL CENTER | Age: 83
End: 2023-02-27
Payer: MEDICARE

## 2023-02-27 NOTE — TELEPHONE ENCOUNTER
[facility] Heart and Vascular Health and Pharmacotherapy Programs     Received pharmacotherapy referral for polypharmacy/medication review  from ED discharge team on 2-18-23.     Per last phone call:   He states he has no transportation for appt's.  Will look into setting up phone visit through one of our med groups and get back to patient.     Scheduled NP for 3/13    Insurance: [facility]  PCP: [facility]  Locations to be seen: Any     [facility] Anticoagulation/Pharmacotherapy Clinic at [phone], fax [phone]     Rhea Gasca, PharmD

## 2023-02-28 ENCOUNTER — HOME CARE VISIT (OUTPATIENT)
Dept: HOME HEALTH SERVICES | Facility: HOME HEALTHCARE | Age: 83
End: 2023-02-28
Payer: MEDICARE

## 2023-02-28 NOTE — CASE COMMUNICATION
Quality Review for SOC OASIS by RENAY Jones RN on  February 28, 2023     Edits completed by RENAY Jones RN:  1.  and  diagnosis coding updated per chart review.  2.  is 2/21/23 per [facility] order  3. Per narrative that patient needs moderate assistance and a walker for safe ambulation,  is 3  4.  is 3 per ambulation status  5. Safety measures checked ambulate only with assistance  6. Completed F2F informa tion

## 2023-02-28 NOTE — Clinical Note
P.T. called patient and left voicemail with return number. Will await return call to schedule P.T. evaluation.

## 2023-03-01 ENCOUNTER — HOME CARE VISIT (OUTPATIENT)
Dept: HOME HEALTH SERVICES | Facility: HOME HEALTHCARE | Age: 83
End: 2023-03-01
Payer: MEDICARE

## 2023-03-01 NOTE — CASE COMMUNICATION
noted  ----- Message -----  From: Rachel Estevez, PT  Sent: 2/28/2023   2:37 PM PST  To: Rachel Bird R.N., Ivanna Casas R.N., *      P.T. called patient and left voicemail with return number. Will await return call to schedule P.T. evaluation.

## 2023-03-01 NOTE — Clinical Note
I had to call the wife who answered on the first call.       ----- Message -----  From: Rachel Estevez, PT  Sent: 3/1/2023   7:19 PM PST  To: Rachel Bird R.N., Ivanna Casas R.N., *      P.T. has now called and left 3 messages in attempts to schedule P.T. eval. Left voicemail and will await return call.

## 2023-03-01 NOTE — Clinical Note
P.T. has now called and left 3 messages in attempts to schedule P.T. eval. Left voicemail and will await return call.

## 2023-03-02 ENCOUNTER — HOME CARE VISIT (OUTPATIENT)
Dept: HOME HEALTH SERVICES | Facility: HOME HEALTHCARE | Age: 83
End: 2023-03-02
Payer: MEDICARE

## 2023-03-02 PROCEDURE — G0155 HHCP-SVS OF CSW,EA 15 MIN: HCPCS

## 2023-03-02 NOTE — CASE COMMUNICATION
I agree with these changes.   ----- Message -----  From: Chastity Jones R.N.  Sent: 2/28/2023   2:37 PM PST  To: Jennifer Fernandez R.N.         Quality Review for SOC OASIS by RENAY Jones RN on  February 28, 2023     Edits completed by RENAY Jones RN:  1.  and  diagnosis coding updated per chart review.  2.  is 2/21/23 per [facility] order  3. Per narrative that patient needs moderate assistance and a walker for safe  ambulation,  is 3  4.  is 3 per ambulation status  5. Safety measures checked ambulate only with assistance  6. Completed F2F information

## 2023-03-02 NOTE — CASE COMMUNICATION
noted  ----- Message -----  From: Rachel Estevez, PT  Sent: 3/1/2023   7:19 PM PST  To: Rachel Bird R.N., Ivanna Casas R.N., *      P.T. has now called and left 3 messages in attempts to schedule P.T. eval. Left voicemail and will await return call.

## 2023-03-03 ENCOUNTER — HOME CARE VISIT (OUTPATIENT)
Dept: HOME HEALTH SERVICES | Facility: HOME HEALTHCARE | Age: 83
End: 2023-03-03
Payer: MEDICARE

## 2023-03-03 VITALS
RESPIRATION RATE: 18 BRPM | DIASTOLIC BLOOD PRESSURE: 62 MMHG | HEART RATE: 64 BPM | OXYGEN SATURATION: 94 % | TEMPERATURE: 98.1 F | SYSTOLIC BLOOD PRESSURE: 118 MMHG

## 2023-03-03 PROCEDURE — G0151 HHCP-SERV OF PT,EA 15 MIN: HCPCS

## 2023-03-03 ASSESSMENT — ACTIVITIES OF DAILY LIVING (ADL)
LAUNDRY_REQUIRES_ASSISTANCE: 1
SHOPPING_REQUIRES_ASSISTANCE: 1
TRANSPORTATION COMMENTS: PATIENT REQUIRES ASSIST AND ASSISTIVE DEVICE TO LEAVE HOME; FALL RISK
AMBULATION_REQUIRES_ASSISTANCE: 1

## 2023-03-03 ASSESSMENT — ENCOUNTER SYMPTOMS
HIGHEST PAIN SEVERITY IN PAST 24 HOURS: 8/10
PAIN: 1
PERSON REPORTING PAIN: PATIENT
PAIN LOCATION - PAIN SEVERITY: 4/10
LOWEST PAIN SEVERITY IN PAST 24 HOURS: 0/10
PAIN LOCATION: COCCYX

## 2023-03-04 ENCOUNTER — HOME CARE VISIT (OUTPATIENT)
Dept: HOME HEALTH SERVICES | Facility: HOME HEALTHCARE | Age: 83
End: 2023-03-04
Payer: MEDICARE

## 2023-03-04 SDOH — ECONOMIC STABILITY: HOUSING INSECURITY
HOME SAFETY: PATIENT'S WALKWAY AND DRIVEWAY IS NOT PLOWED; AT THIS TIME, PATIENT AND WIFE ARE UNABLE TO LEAVE HOME DUE TO SNOW

## 2023-03-04 ASSESSMENT — ACTIVITIES OF DAILY LIVING (ADL)
AMBULATION ASSISTANCE: 1
PHYSICAL TRANSFERS ASSESSED: 1
AMBULATION ASSISTANCE: SUPERVISION
CURRENT_FUNCTION: SUPERVISION
AMBULATION_DISTANCE/DURATION_TOLERATED: 30 FT X 2
AMBULATION ASSISTANCE ON FLAT SURFACES: 1

## 2023-03-04 ASSESSMENT — ENCOUNTER SYMPTOMS
DEBILITATING PAIN: 1
SUBJECTIVE PAIN PROGRESSION: GRADUALLY IMPROVING
PAIN SEVERITY GOAL: 0/10

## 2023-03-05 PROCEDURE — G0180 MD CERTIFICATION HHA PATIENT: HCPCS | Performed by: INTERNAL MEDICINE

## 2023-03-06 NOTE — CASE COMMUNICATION
noted  ----- Message -----  From: Rachel Estevez, PT  Sent: 3/4/2023  10:51 AM PST  To: Rachel Bird R.N., Ivanna Casas R.N., *      PANGELY ramirez completed on 3/3/2023. Requesting 1w1, 2w4 effective 3/3/2023.

## 2023-03-07 ENCOUNTER — HOME CARE VISIT (OUTPATIENT)
Dept: HOME HEALTH SERVICES | Facility: HOME HEALTHCARE | Age: 83
End: 2023-03-07
Payer: MEDICARE

## 2023-03-07 VITALS
OXYGEN SATURATION: 96 % | TEMPERATURE: 98.1 F | HEART RATE: 59 BPM | DIASTOLIC BLOOD PRESSURE: 70 MMHG | SYSTOLIC BLOOD PRESSURE: 122 MMHG | RESPIRATION RATE: 16 BRPM

## 2023-03-07 PROCEDURE — G0157 HHC PT ASSISTANT EA 15: HCPCS | Mod: CQ

## 2023-03-07 ASSESSMENT — ENCOUNTER SYMPTOMS
PAIN SEVERITY GOAL: 3/10
LOWEST PAIN SEVERITY IN PAST 24 HOURS: 4/10
PAIN: 1
SUBJECTIVE PAIN PROGRESSION: UNCHANGED
PERSON REPORTING PAIN: PATIENT
PAIN LOCATION: GENERALIZED
HIGHEST PAIN SEVERITY IN PAST 24 HOURS: 4/10

## 2023-03-08 ENCOUNTER — HOME CARE VISIT (OUTPATIENT)
Dept: HOME HEALTH SERVICES | Facility: HOME HEALTHCARE | Age: 83
End: 2023-03-08
Payer: MEDICARE

## 2023-03-08 NOTE — CASE COMMUNICATION
Farhat did well today with instruction for HEP Will monitor for carry over He appears that he has the ablity to do more that what he does and at this time does nto appear limited due to pain. Will cont with POC to increase his functional mob and ind to prevent further decline in function. S/B Irineo Sanderson PT

## 2023-03-09 ENCOUNTER — HOME CARE VISIT (OUTPATIENT)
Dept: HOME HEALTH SERVICES | Facility: HOME HEALTHCARE | Age: 83
End: 2023-03-09
Payer: MEDICARE

## 2023-03-09 VITALS
HEART RATE: 66 BPM | RESPIRATION RATE: 16 BRPM | SYSTOLIC BLOOD PRESSURE: 120 MMHG | OXYGEN SATURATION: 96 % | DIASTOLIC BLOOD PRESSURE: 70 MMHG | TEMPERATURE: 97.5 F

## 2023-03-09 PROCEDURE — G0493 RN CARE EA 15 MIN HH/HOSPICE: HCPCS

## 2023-03-09 ASSESSMENT — ENCOUNTER SYMPTOMS
PAIN LOCATION - RELIEVING FACTORS: REST, TYLENOL
STOOL FREQUENCY: DAILY
PAIN LOCATION - PAIN QUALITY: ACHE
PERSON REPORTING PAIN: PATIENT
DIFFICULTY THINKING: 1
PAIN: 1
BOWEL PATTERN NORMAL: 1
PAIN LOCATION - EXACERBATING FACTORS: SITTING
PAIN LOCATION - PAIN SEVERITY: 4/10
LOWEST PAIN SEVERITY IN PAST 24 HOURS: 4/10
PAIN SEVERITY GOAL: 4/10
PAIN LOCATION - PAIN FREQUENCY: CONSTANT
HIGHEST PAIN SEVERITY IN PAST 24 HOURS: 4/10
SUBJECTIVE PAIN PROGRESSION: UNCHANGED
PAIN LOCATION - PAIN DURATION: SINCE FALL

## 2023-03-09 ASSESSMENT — ACTIVITIES OF DAILY LIVING (ADL): TRANSPORTATION COMMENTS: WALKER

## 2023-03-10 ENCOUNTER — HOME CARE VISIT (OUTPATIENT)
Dept: HOME HEALTH SERVICES | Facility: HOME HEALTHCARE | Age: 83
End: 2023-03-10
Payer: MEDICARE

## 2023-03-11 ENCOUNTER — HOME CARE VISIT (OUTPATIENT)
Dept: HOME HEALTH SERVICES | Facility: HOME HEALTHCARE | Age: 83
End: 2023-03-11
Payer: MEDICARE

## 2023-03-11 PROCEDURE — G0157 HHC PT ASSISTANT EA 15: HCPCS | Mod: CQ

## 2023-03-11 NOTE — CASE COMMUNICATION
Left message on wife's phone yesterday regarding CNA appointment for today. No callback received. Another call placed today. No answer and no callback received.

## 2023-03-11 NOTE — CASE COMMUNICATION
noted  ----- Message -----  From: TUSHAR FernandezNSRI.  Sent: 3/10/2023   4:08 PM PST  To: Rachel Bird R.N., Stefanie Zavaleta M.D.      Left message on wife's phone yesterday regarding CNA appointment for today. No callback received. Another call placed today. No answer and no callback received.

## 2023-03-12 VITALS
TEMPERATURE: 98.5 F | OXYGEN SATURATION: 95 % | SYSTOLIC BLOOD PRESSURE: 118 MMHG | RESPIRATION RATE: 16 BRPM | DIASTOLIC BLOOD PRESSURE: 60 MMHG | HEART RATE: 64 BPM

## 2023-03-12 ASSESSMENT — ENCOUNTER SYMPTOMS
LOWEST PAIN SEVERITY IN PAST 24 HOURS: 3/10
PERSON REPORTING PAIN: PATIENT
HIGHEST PAIN SEVERITY IN PAST 24 HOURS: 4/10
SUBJECTIVE PAIN PROGRESSION: UNCHANGED
PAIN LOCATION: GENERALIZED
PAIN: 1
PAIN SEVERITY GOAL: 2/10

## 2023-03-12 NOTE — CASE COMMUNICATION
Falls Template         Date & Time of fall: 3/10/23 9:00pm           Cause of fall:  was walking down [address] turned to the R to go lock door and tripped over his feet            Location:  living room            Was the fall witnessed?  no                If yes, by who? n/a            Actions taken by patient:  he had upright walker with him and used it to pull himself up            Any new injury?  no                 If yes, what kind ?  N/A            Any recent medication changes?   no            Did patient have appropriate DME available? yes                 If no, please explain:  N/A            Actions Taken: Educated him on wide turns and slowing down on his turns as he has more diffucluty turning to R side

## 2023-03-12 NOTE — CASE COMMUNICATION
Farhat reported a noninjury fall. Worked on him making corrections with his turns with upright RW however he did better amb today with hurri cane and he does reprot that he feels better with it. Fall report complete and HH team notified. Will cont with POC to increase his functional mob and ind to prevent further decline in function. S/B Irineo Sanderson PT

## 2023-03-13 ENCOUNTER — NON-PROVIDER VISIT (OUTPATIENT)
Dept: MEDICAL GROUP | Facility: PHYSICIAN GROUP | Age: 83
End: 2023-03-13
Payer: MEDICARE

## 2023-03-13 ENCOUNTER — HOME CARE VISIT (OUTPATIENT)
Dept: HOME HEALTH SERVICES | Facility: HOME HEALTHCARE | Age: 83
End: 2023-03-13
Payer: MEDICARE

## 2023-03-13 VITALS
HEART RATE: 65 BPM | WEIGHT: 270 LBS | SYSTOLIC BLOOD PRESSURE: 110 MMHG | BODY MASS INDEX: 36.62 KG/M2 | DIASTOLIC BLOOD PRESSURE: 70 MMHG

## 2023-03-13 VITALS
SYSTOLIC BLOOD PRESSURE: 122 MMHG | DIASTOLIC BLOOD PRESSURE: 60 MMHG | RESPIRATION RATE: 16 BRPM | OXYGEN SATURATION: 94 % | HEART RATE: 64 BPM | TEMPERATURE: 98.9 F

## 2023-03-13 PROCEDURE — 98967 PH1 ASSMT&MGMT NQHP 11-20: CPT | Mod: 95 | Performed by: FAMILY MEDICINE

## 2023-03-13 PROCEDURE — G0157 HHC PT ASSISTANT EA 15: HCPCS | Mod: CQ

## 2023-03-13 ASSESSMENT — ENCOUNTER SYMPTOMS
SUBJECTIVE PAIN PROGRESSION: RESOLVED
LOWEST PAIN SEVERITY IN PAST 24 HOURS: 2/10
PAIN SEVERITY GOAL: 2/10
HIGHEST PAIN SEVERITY IN PAST 24 HOURS: 2/10
PAIN LOCATION: GENERALIZED
PERSON REPORTING PAIN: PATIENT
PAIN: 1

## 2023-03-13 ASSESSMENT — FIBROSIS 4 INDEX: FIB4 SCORE: 1.62

## 2023-03-13 NOTE — Clinical Note
"FYI    Patient is experiencing some water issues and does not want HHA at this time. She would rather take my number and call me when they have running water again for the shower. Explained to pt Wife we have bath wipes we can put in the microwave but she said \"he is fine\" Ill text you. "

## 2023-03-13 NOTE — CASE COMMUNICATION
noted  ----- Message -----  From: Kianna Adiel, PTA  Sent: 3/12/2023   9:39 AM PDT  To: Rachel Bird R.N., Carmen Kent, Neeraj Loving, *       Falls Template         Date & Time of fall: 3/10/23 9:00pm           Cause of fall:  was walking down [address] turned to the R to go lock door and tripped over his feet            Location:  living room            Was the fall witnessed?  no                If yes, by who? n/a            Actions taken b y patient:  he had upright walker with him and used it to pull himself up            Any new injury?  no                 If yes, what kind?  N/A            Any recent medication changes?   no            Did patient have appropriate DME available? yes                 If no, please explain:  N/A            Actions Taken: Educated him on wide turns and slowing down on his turns as he has more diffucluty turning to R side

## 2023-03-13 NOTE — CASE COMMUNICATION
Farhat is cont to progress and has progressed to amb in the home with spc. He is reporting that he is doing HEP. He expresses that he is doing better than he has in a long time. He is going to order R knee brace from [employer]. Will cont with POC to increase his functional mob and ind to prevent further decline in function. S/B Irineo Sanderson PT

## 2023-03-13 NOTE — PROGRESS NOTES
Pharmacotherapy     This evaluation was conducted via the Telephone  The patient was home in the [facility]  The patient identified and confirmed and verbal consent was obtained for this telephonic/virtual visit    Time phone call started 1:51 PM   Time of phone call ended 2:06 PM    S/O: Patient presents today for a polypharmacy consult. Patient was referred by Farideh Tenorio. Primary purpose for visit today is he had a fall .      Vitals:    03/13/23 1355   BP: 110/70   Pulse: 65   Weight: 122 kg (270 lb)      Latest Reference Range & Units Most Recent   TSH 0.380 - 5.330 uIU/mL 2.220  6/17/22 12:41   Thyroxine -T4 4.0 - 12.0 ug/dL 6.3  5/8/14 08:04   Free T-4 0.93 - 1.70 ng/dL 1.24  6/16/21 11:23   T3,Free 2.00 - 4.40 pg/mL 2.66  6/17/22 12:41      Latest Reference Range & Units 06/17/22 12:41   Cholesterol,Tot 100 - 199 mg/dL 130   Triglycerides 0 - 149 mg/dL 71   HDL >=40 mg/dL 43   LDL <100 mg/dL 73     Immunization History   Administered Date(s) Administered    INFLUENZA TIV (IM) 08/25/2010, 09/06/2011, 10/06/2012, 09/03/2013    Influenza (IM) Preservative Free - HISTORICAL DATA 09/06/2011, 10/06/2012, 09/17/2020    Influenza Vaccine Adult HD 09/03/2013, 10/02/2015, 10/25/2016, 10/25/2016, 09/28/2017, 09/23/2018, 10/19/2019, 11/02/2021    Influenza Vaccine Pediatric Split - Historical Data 08/25/2010    MODERNA BIVALENT BOOSTER SARS-COV-2 VACCINE (6+) 11/23/2022    MODERNA SARS-COV-2 VACCINE (12+) 11/15/2021, 04/09/2022    PFIZER PURPLE CAP SARS-COV-2 VACCINATION (12+) 02/17/2021, 03/10/2021    Pneumococcal Conjugate Vaccine (Prevnar/PCV-13) 05/29/2015    Pneumococcal polysaccharide vaccine (PPSV-23) 07/05/2016    Tdap Vaccine 09/06/2011, 05/02/2013    Zoster Vaccine Live (ZVL) (Zostavax) - HISTORICAL DATA 12/01/2009    Zoster Vaccine Recombinant (RZV) (SHINGRIX) 06/10/2021, 11/02/2021         Current Outpatient Medications:     Acetaminophen 500 MG Cap, Take 500 mg by mouth 2 (two) times a day.  "Indications: pain, Disp: , Rfl:     carvedilol (COREG) 25 MG Tab, TAKE 1 TAB BY MOUTH TWICE DAILY, Disp: 2003 Tablet, Rfl: 3    aspirin 81 MG tablet, Take 81 mg by mouth at bedtime. Indications: \"heart\", Disp: , Rfl:     fluticasone (FLONASE) 50 MCG/ACT nasal spray, Administer 2 Sprays into affected nostril(S) 2 times a day., Disp: 48 mL, Rfl: 3    Cholecalciferol (D3) 2000 UNIT Tab, Take 1 Tablet by mouth every day. Indications: supplement, Disp: , Rfl:     ibuprofen (MOTRIN) 200 MG Tab, Take 400 mg by mouth every 6 hours as needed for Mild Pain. Indications: patient uses if tylenol not avaiable for pain, Disp: , Rfl:     docusate sodium (COLACE) 100 MG Cap, Take 200 mg by mouth 1 time a day as needed for Constipation. Indications: Constipation, Disp: , Rfl:     lidocaine (LIDODERM) 5 % Patch, Place 1 Patch on the skin every 24 hours., Disp: 10 Patch, Rfl: 0    lisinopril (PRINIVIL) 10 MG Tab, TAKE 1 TABLET BY MOUTH EVERY DAY, Disp: 100 Tablet, Rfl: 3    levothyroxine (SYNTHROID) 75 MCG Tab, TAKR 1 TAB BY MOUTH EVERY MORNING ON AN EMPTY STOMACH, Disp: 100 Tablet, Rfl: 3    omeprazole (PRILOSEC) 40 MG delayed-release capsule, TAKE 1 CAPSULE BY MOUTH EVERY DAY, Disp: 100 Capsule, Rfl: 3    pravastatin (PRAVACHOL) 40 MG tablet, Take 1 Tablet by mouth every day., Disp: 100 Tablet, Rfl: 3    Assessment/plan  1. Reviewed medication for indication, potential drug interactions, side effects and generic switches for cost savings.     2. Educated patient on proper storage of medication (cool dry place), to fill all prescription at one pharmacy.     3. Reviewed vaccination history seen above, and addressed with patient    4.  No notable drug interactions    5.  No obvious anticholinergic meds or beers list medications    6 Patient is not concerned about his medications.,  He attributes his most recent fall to neuropathy and his feet getting tangled up.  He denies having hypotension or dizziness at the time of his fall.    He " will follow-up with PCP.  We will discharge from clinic    Raymundo Gaffney, OcD

## 2023-03-14 ENCOUNTER — HOME CARE VISIT (OUTPATIENT)
Dept: HOME HEALTH SERVICES | Facility: HOME HEALTHCARE | Age: 83
End: 2023-03-14
Payer: MEDICARE

## 2023-03-16 ENCOUNTER — HOME CARE VISIT (OUTPATIENT)
Dept: HOME HEALTH SERVICES | Facility: HOME HEALTHCARE | Age: 83
End: 2023-03-16
Payer: MEDICARE

## 2023-03-16 NOTE — Clinical Note
FYI    Patient stated that it may be awhile before they have running water. Let Mrs. Stahl know I will reach back out to her next week for normal schedule. Appt refused 3/16/2023 for the 3/17/2023.

## 2023-03-17 ENCOUNTER — HOME CARE VISIT (OUTPATIENT)
Dept: HOME HEALTH SERVICES | Facility: HOME HEALTHCARE | Age: 83
End: 2023-03-17
Payer: MEDICARE

## 2023-03-17 NOTE — CASE COMMUNICATION
noted  ----- Message -----  From: TUSHAR MatthewsNSRI.  Sent: 3/16/2023   6:05 PM PDT  To: EDWARD Mann    Patient stated that it may be awhile before they have running water. Let Mrs. Stahl know I will reach back out to her next week for normal schedule. Appt refused 3/16/2023 for the 3/17/2023.

## 2023-03-20 ENCOUNTER — HOME CARE VISIT (OUTPATIENT)
Dept: HOME HEALTH SERVICES | Facility: HOME HEALTHCARE | Age: 83
End: 2023-03-20
Payer: MEDICARE

## 2023-03-20 PROCEDURE — G0151 HHCP-SERV OF PT,EA 15 MIN: HCPCS

## 2023-03-21 ENCOUNTER — HOME CARE VISIT (OUTPATIENT)
Dept: HOME HEALTH SERVICES | Facility: HOME HEALTHCARE | Age: 83
End: 2023-03-21
Payer: MEDICARE

## 2023-03-21 VITALS
HEART RATE: 66 BPM | RESPIRATION RATE: 16 BRPM | DIASTOLIC BLOOD PRESSURE: 70 MMHG | OXYGEN SATURATION: 92 % | SYSTOLIC BLOOD PRESSURE: 122 MMHG | TEMPERATURE: 98.2 F

## 2023-03-21 VITALS
DIASTOLIC BLOOD PRESSURE: 60 MMHG | SYSTOLIC BLOOD PRESSURE: 104 MMHG | TEMPERATURE: 97.1 F | OXYGEN SATURATION: 95 % | RESPIRATION RATE: 18 BRPM | HEART RATE: 61 BPM

## 2023-03-21 PROCEDURE — G0493 RN CARE EA 15 MIN HH/HOSPICE: HCPCS

## 2023-03-21 ASSESSMENT — ENCOUNTER SYMPTOMS
PAIN LOCATION - PAIN SEVERITY: 3/10
PAIN LOCATION: BACK
PERSON REPORTING PAIN: PATIENT
PAIN LOCATION - PAIN DURATION: ONGOING
PAIN LOCATION - RELIEVING FACTORS: CHANGE POSITION
PAIN LOCATION - EXACERBATING FACTORS: SITTING
BOWEL PATTERN NORMAL: 1
SUBJECTIVE PAIN PROGRESSION: UNCHANGED
PAIN LOCATION - PAIN QUALITY: ACHE
LAST BOWEL MOVEMENT: 66554
PAIN LOCATION - PAIN SEVERITY: 2/10
PAIN LOCATION - PAIN FREQUENCY: FREQUENT
PAIN LOCATION: TAILBONE
PAIN LOCATION - PAIN QUALITY: DULL ACHE
PAIN LOCATION - PAIN FREQUENCY: CONSTANT
PAIN: 1
STOOL FREQUENCY: DAILY
PERSON REPORTING PAIN: PATIENT
PAIN: 1

## 2023-03-21 ASSESSMENT — ACTIVITIES OF DAILY LIVING (ADL)
AMBULATION ASSISTANCE ON FLAT SURFACES: 1
AMBULATION_DISTANCE/DURATION_TOLERATED: FUNCTIONAL DISTANCES

## 2023-03-22 ENCOUNTER — HOME CARE VISIT (OUTPATIENT)
Dept: HOME HEALTH SERVICES | Facility: HOME HEALTHCARE | Age: 83
End: 2023-03-22
Payer: MEDICARE

## 2023-03-22 PROCEDURE — G0151 HHCP-SERV OF PT,EA 15 MIN: HCPCS

## 2023-03-22 ASSESSMENT — ENCOUNTER SYMPTOMS
PAIN LOCATION - PAIN SEVERITY: 5/10
PAIN LOCATION - PAIN FREQUENCY: CONSTANT
PAIN LOCATION - PAIN QUALITY: DULL ACHE
PAIN: 1
PERSON REPORTING PAIN: PATIENT
PAIN LOCATION: RIGHT BUTTOCK

## 2023-03-24 ENCOUNTER — HOME CARE VISIT (OUTPATIENT)
Dept: HOME HEALTH SERVICES | Facility: HOME HEALTHCARE | Age: 83
End: 2023-03-24
Payer: MEDICARE

## 2023-03-24 VITALS
HEART RATE: 62 BPM | TEMPERATURE: 97.5 F | OXYGEN SATURATION: 95 % | RESPIRATION RATE: 18 BRPM | DIASTOLIC BLOOD PRESSURE: 70 MMHG | SYSTOLIC BLOOD PRESSURE: 128 MMHG

## 2023-03-24 VITALS
DIASTOLIC BLOOD PRESSURE: 62 MMHG | TEMPERATURE: 97.9 F | OXYGEN SATURATION: 96 % | RESPIRATION RATE: 18 BRPM | HEART RATE: 60 BPM | SYSTOLIC BLOOD PRESSURE: 120 MMHG

## 2023-03-24 PROCEDURE — 665001 SOC-HOME HEALTH

## 2023-03-24 PROCEDURE — G0156 HHCP-SVS OF AIDE,EA 15 MIN: HCPCS

## 2023-03-24 ASSESSMENT — ENCOUNTER SYMPTOMS
PAIN SEVERITY GOAL: 0/10
MUSCLE WEAKNESS: 1
PAIN: 1
HIGHEST PAIN SEVERITY IN PAST 24 HOURS: 3/10
PERSON REPORTING PAIN: PATIENT

## 2023-03-27 ENCOUNTER — HOME CARE VISIT (OUTPATIENT)
Dept: HOME HEALTH SERVICES | Facility: HOME HEALTHCARE | Age: 83
End: 2023-03-27
Payer: MEDICARE

## 2023-03-27 VITALS
TEMPERATURE: 97.8 F | HEART RATE: 64 BPM | DIASTOLIC BLOOD PRESSURE: 60 MMHG | SYSTOLIC BLOOD PRESSURE: 110 MMHG | OXYGEN SATURATION: 97 %

## 2023-03-27 VITALS
SYSTOLIC BLOOD PRESSURE: 110 MMHG | TEMPERATURE: 97.8 F | OXYGEN SATURATION: 97 % | RESPIRATION RATE: 18 BRPM | DIASTOLIC BLOOD PRESSURE: 60 MMHG | HEART RATE: 64 BPM

## 2023-03-27 PROCEDURE — G0157 HHC PT ASSISTANT EA 15: HCPCS | Mod: CQ

## 2023-03-27 PROCEDURE — G0156 HHCP-SVS OF AIDE,EA 15 MIN: HCPCS

## 2023-03-27 ASSESSMENT — ENCOUNTER SYMPTOMS
PAIN SEVERITY GOAL: 0/10
PAIN LOCATION: BACK
LOWEST PAIN SEVERITY IN PAST 24 HOURS: 0/10
SUBJECTIVE PAIN PROGRESSION: GRADUALLY IMPROVING
PERSON REPORTING PAIN: PATIENT
PAIN: 1
PAIN: 1
HIGHEST PAIN SEVERITY IN PAST 24 HOURS: 1/10
PERSON REPORTING PAIN: PATIENT
HIGHEST PAIN SEVERITY IN PAST 24 HOURS: 1/10
PAIN SEVERITY GOAL: 0/10

## 2023-03-28 NOTE — CASE COMMUNICATION
Farhat has cont to progress and is reporting that he is IND with his HEP. He is cont to report that he is ready for OP. Wife is reporting that she is scared that he will fall and she doesn't want him driving. He reports that he wants to be able to resume his normal task. [employer] is signed and he is educated on DC plans. PT to DC on next visit S/B Rachel Estevez PT

## 2023-03-29 ENCOUNTER — HOME CARE VISIT (OUTPATIENT)
Dept: HOME HEALTH SERVICES | Facility: HOME HEALTHCARE | Age: 83
End: 2023-03-29
Payer: MEDICARE

## 2023-03-29 VITALS
OXYGEN SATURATION: 95 % | RESPIRATION RATE: 16 BRPM | SYSTOLIC BLOOD PRESSURE: 118 MMHG | DIASTOLIC BLOOD PRESSURE: 62 MMHG | HEART RATE: 62 BPM | TEMPERATURE: 97.6 F

## 2023-03-29 PROCEDURE — G0151 HHCP-SERV OF PT,EA 15 MIN: HCPCS

## 2023-03-29 ASSESSMENT — ENCOUNTER SYMPTOMS
DENIES PAIN: 1
PAIN: NO C/O PAIN TODAY

## 2023-03-29 ASSESSMENT — ACTIVITIES OF DAILY LIVING (ADL)
HOME_HEALTH_OASIS: 00
OASIS_M1830: 01

## 2023-03-31 ENCOUNTER — HOME CARE VISIT (OUTPATIENT)
Dept: HOME HEALTH SERVICES | Facility: HOME HEALTHCARE | Age: 83
End: 2023-03-31
Payer: MEDICARE

## 2023-03-31 NOTE — CASE COMMUNICATION
Quality Review Completed for DC OASIS by TUSHAR Guzman, RN on 3/30/2023:     Edits completed by TUSHAR Guzman RN:  1.  is yes, per directions tab one non injury fall on 3/10/23.  A is one, B and C is none

## 2023-03-31 NOTE — Clinical Note
I agree with changes.  ----- Message -----  From: Penny Guzman R.N.  Sent: 3/31/2023   8:54 AM PDT  To: Rachel Estevez PT      Quality Review Completed for DC OASIS by TUSHAR Guzman, RN on 3/30/2023:     Edits completed by TUSHAR Guzman, RN:  1.  is yes, per directions tab one non injury fall on 3/10/23.  A is one, B and C is none

## 2023-05-09 ENCOUNTER — OFFICE VISIT (OUTPATIENT)
Dept: MEDICAL GROUP | Facility: PHYSICIAN GROUP | Age: 83
End: 2023-05-09
Payer: MEDICARE

## 2023-05-09 VITALS
SYSTOLIC BLOOD PRESSURE: 110 MMHG | HEIGHT: 73 IN | HEART RATE: 63 BPM | RESPIRATION RATE: 16 BRPM | BODY MASS INDEX: 35.27 KG/M2 | WEIGHT: 266.1 LBS | TEMPERATURE: 97.4 F | DIASTOLIC BLOOD PRESSURE: 64 MMHG | OXYGEN SATURATION: 95 %

## 2023-05-09 DIAGNOSIS — Z23 NEED FOR VACCINATION: ICD-10-CM

## 2023-05-09 DIAGNOSIS — E03.8 OTHER SPECIFIED HYPOTHYROIDISM: ICD-10-CM

## 2023-05-09 DIAGNOSIS — M17.11 PRIMARY OSTEOARTHRITIS OF RIGHT KNEE: ICD-10-CM
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
## 2023-05-09 DIAGNOSIS — I70.0 ATHEROSCLEROSIS OF AORTA (HCC): ICD-10-CM

## 2023-05-09 DIAGNOSIS — I10 ESSENTIAL HYPERTENSION: Chronic | ICD-10-CM

## 2023-05-09 DIAGNOSIS — E78.5 DYSLIPIDEMIA: ICD-10-CM

## 2023-05-09 DIAGNOSIS — E55.9 VITAMIN D DEFICIENCY: ICD-10-CM

## 2023-05-09 DIAGNOSIS — G31.9 CEREBRAL ATROPHY (HCC): ICD-10-CM

## 2023-05-09 DIAGNOSIS — K21.9 GASTROESOPHAGEAL REFLUX DISEASE WITHOUT ESOPHAGITIS: ICD-10-CM

## 2023-05-09 DIAGNOSIS — E03.9 HYPOTHYROIDISM, UNSPECIFIED TYPE: Chronic | ICD-10-CM

## 2023-05-09 DIAGNOSIS — E66.01 SEVERE OBESITY (BMI 35.0-39.9) WITH COMORBIDITY (HCC): ICD-10-CM

## 2023-05-09 DIAGNOSIS — J84.9 INTERSTITIAL LUNG DISEASE (HCC): ICD-10-CM

## 2023-05-09 DIAGNOSIS — L30.4 INTERTRIGO: ICD-10-CM

## 2023-05-09 PROCEDURE — 99214 OFFICE O/P EST MOD 30 MIN: CPT | Mod: 25 | Performed by: INTERNAL MEDICINE

## 2023-05-09 PROCEDURE — 90715 TDAP VACCINE 7 YRS/> IM: CPT | Performed by: INTERNAL MEDICINE

## 2023-05-09 PROCEDURE — 90471 IMMUNIZATION ADMIN: CPT | Performed by: INTERNAL MEDICINE

## 2023-05-09 RX ORDER — LEVOTHYROXINE SODIUM 0.07 MG/1
TABLET ORAL
Qty: 100 TABLET | Refills: 3 | Status: SHIPPED | OUTPATIENT
Start: 2023-05-09 | End: 2023-06-13

## 2023-05-09 RX ORDER — CARVEDILOL 25 MG/1
25 TABLET ORAL 2 TIMES DAILY
Qty: 200 TABLET | Refills: 3 | Status: SHIPPED | OUTPATIENT
Start: 2023-05-09 | End: 2023-11-30 | Stop reason: SDUPTHER

## 2023-05-09 RX ORDER — CLOTRIMAZOLE AND BETAMETHASONE DIPROPIONATE 10; .64 MG/G; MG/G
CREAM TOPICAL
Qty: 45 G | Refills: 2 | Status: ON HOLD | OUTPATIENT
Start: 2023-05-09 | End: 2023-06-16

## 2023-05-09 RX ORDER — OMEPRAZOLE 40 MG/1
40 CAPSULE, DELAYED RELEASE ORAL
Qty: 100 CAPSULE | Refills: 3 | Status: SHIPPED | OUTPATIENT
Start: 2023-05-09 | End: 2023-11-30 | Stop reason: SDUPTHER

## 2023-05-09 RX ORDER — FLUTICASONE PROPIONATE 50 MCG
2 SPRAY, SUSPENSION (ML) NASAL 2 TIMES DAILY
Qty: 48 ML | Refills: 3 | Status: SHIPPED | OUTPATIENT
Start: 2023-05-09 | End: 2023-11-22

## 2023-05-09 RX ORDER — LISINOPRIL 10 MG/1
10 TABLET ORAL
Qty: 100 TABLET | Refills: 3 | Status: SHIPPED | OUTPATIENT
Start: 2023-05-09 | End: 2023-11-30 | Stop reason: SDUPTHER

## 2023-05-09 RX ORDER — PRAVASTATIN SODIUM 40 MG
40 TABLET ORAL DAILY
Qty: 100 TABLET | Refills: 3 | Status: SHIPPED | OUTPATIENT
Start: 2023-05-09 | End: 2023-11-30 | Stop reason: SDUPTHER

## 2023-05-09 ASSESSMENT — FIBROSIS 4 INDEX: FIB4 SCORE: 1.62

## 2023-05-09 ASSESSMENT — PATIENT HEALTH QUESTIONNAIRE - PHQ9: CLINICAL INTERPRETATION OF PHQ2 SCORE: 0

## 2023-05-09 NOTE — PROGRESS NOTES
CC: Medication refills, ground-level fall.    HPI:  Farhat presents with the following    1. Severe obesity (BMI 35.0-39.9) with comorbidity (HCC)  Chronic.  Stable. BMI 35.11, 266 pounds.  No recent weight changes/fluctuations.    2. Cerebral atrophy (HCC)  Chronic.  Stable.  Noted on brain MRI in May 2014.    3. Atherosclerosis of aorta (HCC)  Chronic.  Stable.  Noted on CT scanning of the chest in September 2021.    4. Interstitial lung disease (HCC)  Chronic.  Stable.  Followed by pulmonology.  Most recent CT scan of the thorax completed September 2021.  Pulmonary fibrotic changes seen.  PFTs completed January 2022.    5. Fall, sequela  Patient sustained a GLF in February.  No LOC.  Patient with a history of neuropathy and balance issues fell on his buttock.  X-ray/imaging negative for acute fractures.  Patient has ongoing home physical therapy.  Doing well.  Using a cane.    6. Intertrigo  Requesting Lotrimin refill.    7. Other specified hypothyroidism  TSH 2.22, June 2022.  Asymptomatic.  Taking Synthroid 75 mcg daily.  Needs refills.    8. Dyslipidemia  Due for pravastatin refills.    9. Primary osteoarthritis of right knee  Chronic.  Patient is followed by his orthopedist, Dr. Landon.  Patient has been using a brace for about 10 years which has been helpful for stability.      10. Need for vaccination  Due for Tdap.  Positive ROS per HPI.      Patient Active Problem List    Diagnosis Date Noted    Lab test positive for detection of COVID-19 virus 08/11/2022    Chronic pain of right knee 06/16/2022    Rash 06/16/2022    Cerebral atrophy (HCC) 03/25/2022    Atherosclerosis of aorta (HCC) 03/25/2022    Interstitial lung disease (HCC) 03/25/2022    Nonspecific abnormal cardiovascular function study 03/25/2022    Multiple pulmonary nodules 12/15/2021    Prediabetes 01/07/2021    Pleural effusion on left 01/07/2021    Osteoarthritis of knee 03/09/2017    Tubular adenoma of colon 07/06/2016    Blackwell's  "esophagus without dysplasia 07/06/2016    Idiopathic neuropathy 09/23/2015    Hypothyroid 04/28/2015    Severe obesity (BMI 35.0-39.9) with comorbidity (HCC) 08/22/2014    History of vertigo 05/13/2014    Obstructive sleep apnea 02/20/2014    Renal cyst 01/22/2014    Sensorineural hearing loss 11/26/2012    History of atrial fibrillation 05/22/2012    Hypertension 09/16/2009    Dyslipidemia 09/16/2009    Allergic rhinitis 09/16/2009       Current Outpatient Medications   Medication Sig Dispense Refill    clotrimazole-betamethasone (LOTRISONE) 1-0.05 % Cream Apply to affected area twice daily as needed 45 g 2    pravastatin (PRAVACHOL) 40 MG tablet Take 1 Tablet by mouth every day. Indications: High Amount of Fats in the Blood 100 Tablet 3    fluticasone (FLONASE) 50 MCG/ACT nasal spray Administer 2 Sprays into affected nostril(S) 2 times a day. Indications: allergies 48 mL 3    lisinopril (PRINIVIL) 10 MG Tab Take 1 Tablet by mouth every day. Indications: High Blood Pressure Disorder 100 Tablet 3    carvedilol (COREG) 25 MG Tab Take 1 Tablet by mouth 2 times a day. Indications: High Blood Pressure Disorder 200 Tablet 3    levothyroxine (SYNTHROID) 75 MCG Tab TAKR 1 TAB BY MOUTH EVERY MORNING ON AN EMPTY STOMACH  Indications: Underactive Thyroid 100 Tablet 3    omeprazole (PRILOSEC) 40 MG delayed-release capsule Take 1 Capsule by mouth every day. Indications: Heartburn 100 Capsule 3    Cholecalciferol (D3) 2000 UNIT Tab Take 1 Tablet by mouth every day. Indications: supplement      aspirin 81 MG tablet Take 81 mg by mouth at bedtime. Indications: \"heart\"       No current facility-administered medications for this visit.         Allergies as of 05/09/2023 - Reviewed 05/09/2023   Allergen Reaction Noted    Other environmental Runny Nose and Itching 06/01/2011        Social History     Socioeconomic History    Marital status:      Spouse name: Not on file    Number of children: Not on file    Years of education: " Not on file    Highest education level: Not on file   Occupational History    Not on file   Tobacco Use    Smoking status: Never    Smokeless tobacco: Never   Vaping Use    Vaping Use: Never used   Substance and Sexual Activity    Alcohol use: Yes     Alcohol/week: 1.2 - 1.8 oz     Types: 2 - 3 Standard drinks or equivalent per week     Comment: occ    Drug use: Never    Sexual activity: Not Currently   Other Topics Concern    Not on file   Social History Narrative    Not on file     Social Determinants of Health     Financial Resource Strain: Not on file   Food Insecurity: Not on file   Transportation Needs: Not on file   Physical Activity: Not on file   Stress: Not on file   Social Connections: Not on file   Intimate Partner Violence: Not on file   Housing Stability: Not on file       Family History   Problem Relation Age of Onset    Cancer Mother         cervical/breast    Cancer Sister         lung    Cancer Father         lung cancer    Cancer Other        Past Surgical History:   Procedure Laterality Date    HAMMERTOE CORRECTION  2/2/2015    Performed by RENAY ChoiP.MEmmanuelle at SURGERY [facility]    EXOSTOSIS EXCISION  6/3/2011    Performed by ABDIFATAH YOUNG at [facility]    LESION EXCISION ORTHO  6/3/2011    Performed by ABDIFATAH YOUNG at [facility]    TOE ARTHROPLASTY  6/3/2011    Performed by ABDIFATAH YOUNG at [facility]    LUMBAR LAMINECTOMY DISKECTOMY  2005    microscopic    TONSILLECTOMY  1945    adenoidectomy    EYE SURGERY Bilateral     cataracts       ROS: Positive ROS per HPI.  Denies any Headache,Chest pain,  Shortness of breath,  Abdominal pain, Changes of bowel or bladder, Lower ext edema, Fevers, Nights sweats, Weight Changes, Focal weakness or numbness.  All other systems are negative.    /64 (BP Location: Left arm, Patient Position: Sitting, BP Cuff Size: Adult)   Pulse 63   Temp 36.3 °C (97.4 °F) (Temporal)   Resp 16  "  Ht 1.854 m (6' 1\")   Wt 121 kg (266 lb 1.6 oz)   SpO2 95%   BMI 35.11 kg/m²      Constitutional: Alert, no distress, well-groomed.  Skin: Warm, dry, good turgor, no rashes in visible areas.  Eye: Equal, round and reactive, conjunctiva clear, lids normal.  ENMT: Lips without lesions, good dentition, moist mucous membranes.  Neck: Trachea midline, no masses, no thyromegaly.  Respiratory: Unlabored respiratory effort, no cough.  Abdomen: Soft, no gross masses.  MSK: Normal gait, moves all extremities.  Neuro: Grossly non-focal. No cranial nerve deficit. Strength and sensation intact.   Psych: Alert and oriented x3, normal affect and mood.    Assessment and Plan.   83 y.o. male presenting with the following.     1. Severe obesity (BMI 35.0-39.9) with comorbidity (HCC)  Chronic.  Stable.  Briefly counseled on diet and lifestyle modifications.    2. Cerebral atrophy (HCC)  Chronic.  Stable.  Continue to monitor.    3. Atherosclerosis of aorta (HCC)  Chronic.  Stable.  Treat risk factors.  Continue statin.    4. Interstitial lung disease (HCC)  Chronic.  Stable.  Follow-up with pulmonology.    5. Fall, sequela  Continue physical therapy    6. Intertrigo  Ultram refill    7. Other specified hypothyroidism    - TSH WITH REFLEX TO FT4; Future    8. Dyslipidemia    - Lipid Profile; Future  - pravastatin (PRAVACHOL) 40 MG tablet; Take 1 Tablet by mouth every day. Indications: High Amount of Fats in the Blood  Dispense: 100 Tablet; Refill: 3    9. Primary osteoarthritis of right knee  Follow-up with orthopedics as needed.  Knee brace.    10. Hypothyroidism, unspecified type    - levothyroxine (SYNTHROID) 75 MCG Tab; TAKR 1 TAB BY MOUTH EVERY MORNING ON AN EMPTY STOMACH  Indications: Underactive Thyroid  Dispense: 100 Tablet; Refill: 3    11. Vitamin D deficiency    - VITAMIN D,25 HYDROXY (DEFICIENCY); Future    12. Essential hypertension    - lisinopril (PRINIVIL) 10 MG Tab; Take 1 Tablet by mouth every day. Indications: " High Blood Pressure Disorder  Dispense: 100 Tablet; Refill: 3  - carvedilol (COREG) 25 MG Tab; Take 1 Tablet by mouth 2 times a day. Indications: High Blood Pressure Disorder  Dispense: 200 Tablet; Refill: 3    13. Gastroesophageal reflux disease without esophagitis    - omeprazole (PRILOSEC) 40 MG delayed-release capsule; Take 1 Capsule by mouth every day. Indications: Heartburn  Dispense: 100 Capsule; Refill: 3    14. Need for vaccination    - Tdap =>8yo IM      My total time spent caring for the patient on the day of the encounter was 34 minutes.   This does not include time spent on separately billable procedures/tests.

## 2023-06-13 ENCOUNTER — APPOINTMENT (OUTPATIENT)
Dept: RADIOLOGY | Facility: MEDICAL CENTER | Age: 83
DRG: 871 | End: 2023-06-13
Attending: EMERGENCY MEDICINE
Payer: MEDICARE

## 2023-06-13 ENCOUNTER — APPOINTMENT (OUTPATIENT)
Dept: CARDIOLOGY | Facility: MEDICAL CENTER | Age: 83
DRG: 871 | End: 2023-06-13
Payer: MEDICARE

## 2023-06-13 ENCOUNTER — HOSPITAL ENCOUNTER (INPATIENT)
Facility: MEDICAL CENTER | Age: 83
LOS: 3 days | DRG: 871 | End: 2023-06-16
Attending: EMERGENCY MEDICINE | Admitting: STUDENT IN AN ORGANIZED HEALTH CARE EDUCATION/TRAINING PROGRAM
Payer: MEDICARE
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
## 2023-06-13 DIAGNOSIS — A41.9 SEPSIS, DUE TO UNSPECIFIED ORGANISM, UNSPECIFIED WHETHER ACUTE ORGAN DYSFUNCTION PRESENT (HCC): ICD-10-CM

## 2023-06-13 DIAGNOSIS — J84.9 INTERSTITIAL LUNG DISEASE (HCC): ICD-10-CM

## 2023-06-13 DIAGNOSIS — R79.89 ELEVATED TROPONIN: ICD-10-CM
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
## 2023-06-13 DIAGNOSIS — L03.032 CELLULITIS OF TOE OF LEFT FOOT: ICD-10-CM

## 2023-06-13 DIAGNOSIS — S09.90XA CLOSED HEAD INJURY, INITIAL ENCOUNTER: ICD-10-CM

## 2023-06-13 PROBLEM — R94.31 QT PROLONGATION: Status: ACTIVE | Noted: 2023-06-13

## 2023-06-13 PROBLEM — R65.20 SEPSIS WITH ACUTE HYPOXIC RESPIRATORY FAILURE WITHOUT SEPTIC SHOCK (HCC): Status: ACTIVE | Noted: 2023-06-13

## 2023-06-13 PROBLEM — J96.01 SEPSIS WITH ACUTE HYPOXIC RESPIRATORY FAILURE WITHOUT SEPTIC SHOCK (HCC): Status: ACTIVE | Noted: 2023-06-13

## 2023-06-13 PROBLEM — I24.89 DEMAND ISCHEMIA (HCC): Status: ACTIVE | Noted: 2023-06-13

## 2023-06-13 PROBLEM — J96.01 ACUTE RESPIRATORY FAILURE WITH HYPOXIA (HCC): Status: ACTIVE | Noted: 2023-06-13

## 2023-06-13 PROBLEM — N18.9 CKD (CHRONIC KIDNEY DISEASE): Status: ACTIVE | Noted: 2023-06-13

## 2023-06-13 LAB
ANION GAP SERPL CALC-SCNC: 14 MMOL/L (ref 7–16)
APPEARANCE UR: CLEAR
APTT PPP: 28.5 SEC (ref 24.7–36)
BACTERIA #/AREA URNS HPF: NEGATIVE /HPF
BASOPHILS # BLD AUTO: 0.2 % (ref 0–1.8)
BASOPHILS # BLD: 0.02 K/UL (ref 0–0.12)
BILIRUB UR QL STRIP.AUTO: NEGATIVE
BUN SERPL-MCNC: 21 MG/DL (ref 8–22)
CALCIUM SERPL-MCNC: 8.8 MG/DL (ref 8.5–10.5)
CHLORIDE SERPL-SCNC: 101 MMOL/L (ref 96–112)
CO2 SERPL-SCNC: 20 MMOL/L (ref 20–33)
COLOR UR: YELLOW
CREAT SERPL-MCNC: 1.1 MG/DL (ref 0.5–1.4)
EKG IMPRESSION: NORMAL
EOSINOPHIL # BLD AUTO: 0.07 K/UL (ref 0–0.51)
EOSINOPHIL NFR BLD: 0.8 % (ref 0–6.9)
EPI CELLS #/AREA URNS HPF: NEGATIVE /HPF
ERYTHROCYTE [DISTWIDTH] IN BLOOD BY AUTOMATED COUNT: 48.7 FL (ref 35.9–50)
FLUAV RNA SPEC QL NAA+PROBE: NEGATIVE
FLUBV RNA SPEC QL NAA+PROBE: NEGATIVE
GFR SERPLBLD CREATININE-BSD FMLA CKD-EPI: 66 ML/MIN/1.73 M 2
GLUCOSE SERPL-MCNC: 106 MG/DL (ref 65–99)
GLUCOSE UR STRIP.AUTO-MCNC: NEGATIVE MG/DL
HCT VFR BLD AUTO: 44.3 % (ref 42–52)
HGB BLD-MCNC: 15 G/DL (ref 14–18)
HYALINE CASTS #/AREA URNS LPF: ABNORMAL /LPF
IMM GRANULOCYTES # BLD AUTO: 0.04 K/UL (ref 0–0.11)
IMM GRANULOCYTES NFR BLD AUTO: 0.4 % (ref 0–0.9)
INR PPP: 1.12 (ref 0.87–1.13)
INR PPP: 1.16 (ref 0.87–1.13)
KETONES UR STRIP.AUTO-MCNC: 15 MG/DL
LACTATE SERPL-SCNC: 1.2 MMOL/L (ref 0.5–2)
LACTATE SERPL-SCNC: 1.3 MMOL/L (ref 0.5–2)
LACTATE SERPL-SCNC: 1.7 MMOL/L (ref 0.5–2)
LEUKOCYTE ESTERASE UR QL STRIP.AUTO: NEGATIVE
LV EJECT FRACT  99904: 65
LV EJECT FRACT MOD 2C 99903: 66.35
LV EJECT FRACT MOD 4C 99902: 64.42
LV EJECT FRACT MOD BP 99901: 64.77
LYMPHOCYTES # BLD AUTO: 0.4 K/UL (ref 1–4.8)
LYMPHOCYTES NFR BLD: 4.5 % (ref 22–41)
MAGNESIUM SERPL-MCNC: 1.6 MG/DL (ref 1.5–2.5)
MCH RBC QN AUTO: 33.1 PG (ref 27–33)
MCHC RBC AUTO-ENTMCNC: 33.9 G/DL (ref 32.3–36.5)
MCV RBC AUTO: 97.8 FL (ref 81.4–97.8)
MICRO URNS: ABNORMAL
MONOCYTES # BLD AUTO: 0.44 K/UL (ref 0–0.85)
MONOCYTES NFR BLD AUTO: 4.9 % (ref 0–13.4)
NEUTROPHILS # BLD AUTO: 8 K/UL (ref 1.82–7.42)
NEUTROPHILS NFR BLD: 89.2 % (ref 44–72)
NITRITE UR QL STRIP.AUTO: NEGATIVE
NRBC # BLD AUTO: 0 K/UL
NRBC BLD-RTO: 0 /100 WBC (ref 0–0.2)
NT-PROBNP SERPL IA-MCNC: 463 PG/ML (ref 0–125)
PH UR STRIP.AUTO: 5.5 [PH] (ref 5–8)
PLATELET # BLD AUTO: 148 K/UL (ref 164–446)
PMV BLD AUTO: 9.3 FL (ref 9–12.9)
POTASSIUM SERPL-SCNC: 3.9 MMOL/L (ref 3.6–5.5)
PROCALCITONIN SERPL-MCNC: 0.67 NG/ML
PROT UR QL STRIP: NEGATIVE MG/DL
PROTHROMBIN TIME: 14.3 SEC (ref 12–14.6)
PROTHROMBIN TIME: 14.7 SEC (ref 12–14.6)
RBC # BLD AUTO: 4.53 M/UL (ref 4.7–6.1)
RBC # URNS HPF: ABNORMAL /HPF
RBC UR QL AUTO: ABNORMAL
RSV RNA SPEC QL NAA+PROBE: NEGATIVE
SARS-COV-2 RNA RESP QL NAA+PROBE: NOTDETECTED
SODIUM SERPL-SCNC: 135 MMOL/L (ref 135–145)
SP GR UR STRIP.AUTO: 1.01
SPECIMEN SOURCE: NORMAL
TROPONIN T SERPL-MCNC: 190 NG/L (ref 6–19)
TROPONIN T SERPL-MCNC: 297 NG/L (ref 6–19)
TROPONIN T SERPL-MCNC: 332 NG/L (ref 6–19)
UROBILINOGEN UR STRIP.AUTO-MCNC: 1 MG/DL
VIT B12 SERPL-MCNC: 240 PG/ML (ref 211–911)
WBC # BLD AUTO: 9 K/UL (ref 4.8–10.8)
WBC #/AREA URNS HPF: ABNORMAL /HPF

## 2023-06-13 PROCEDURE — 83605 ASSAY OF LACTIC ACID: CPT | Mod: 91

## 2023-06-13 PROCEDURE — 36415 COLL VENOUS BLD VENIPUNCTURE: CPT

## 2023-06-13 PROCEDURE — 93306 TTE W/DOPPLER COMPLETE: CPT

## 2023-06-13 PROCEDURE — 82607 VITAMIN B-12: CPT

## 2023-06-13 PROCEDURE — 85730 THROMBOPLASTIN TIME PARTIAL: CPT

## 2023-06-13 PROCEDURE — 700101 HCHG RX REV CODE 250: Performed by: EMERGENCY MEDICINE

## 2023-06-13 PROCEDURE — 700111 HCHG RX REV CODE 636 W/ 250 OVERRIDE (IP): Performed by: STUDENT IN AN ORGANIZED HEALTH CARE EDUCATION/TRAINING PROGRAM

## 2023-06-13 PROCEDURE — 73030 X-RAY EXAM OF SHOULDER: CPT | Mod: LT

## 2023-06-13 PROCEDURE — 96375 TX/PRO/DX INJ NEW DRUG ADDON: CPT

## 2023-06-13 PROCEDURE — 0241U HCHG SARS-COV-2 COVID-19 NFCT DS RESP RNA 4 TRGT MIC: CPT

## 2023-06-13 PROCEDURE — 99291 CRITICAL CARE FIRST HOUR: CPT | Performed by: STUDENT IN AN ORGANIZED HEALTH CARE EDUCATION/TRAINING PROGRAM

## 2023-06-13 PROCEDURE — A9270 NON-COVERED ITEM OR SERVICE: HCPCS | Performed by: STUDENT IN AN ORGANIZED HEALTH CARE EDUCATION/TRAINING PROGRAM

## 2023-06-13 PROCEDURE — 700102 HCHG RX REV CODE 250 W/ 637 OVERRIDE(OP): Performed by: EMERGENCY MEDICINE

## 2023-06-13 PROCEDURE — 87899 AGENT NOS ASSAY W/OPTIC: CPT

## 2023-06-13 PROCEDURE — 72125 CT NECK SPINE W/O DYE: CPT

## 2023-06-13 PROCEDURE — 85610 PROTHROMBIN TIME: CPT

## 2023-06-13 PROCEDURE — 83735 ASSAY OF MAGNESIUM: CPT

## 2023-06-13 PROCEDURE — 81001 URINALYSIS AUTO W/SCOPE: CPT

## 2023-06-13 PROCEDURE — 94760 N-INVAS EAR/PLS OXIMETRY 1: CPT

## 2023-06-13 PROCEDURE — 72170 X-RAY EXAM OF PELVIS: CPT

## 2023-06-13 PROCEDURE — 99285 EMERGENCY DEPT VISIT HI MDM: CPT

## 2023-06-13 PROCEDURE — 96365 THER/PROPH/DIAG IV INF INIT: CPT

## 2023-06-13 PROCEDURE — C9803 HOPD COVID-19 SPEC COLLECT: HCPCS | Performed by: EMERGENCY MEDICINE

## 2023-06-13 PROCEDURE — 700111 HCHG RX REV CODE 636 W/ 250 OVERRIDE (IP): Performed by: EMERGENCY MEDICINE

## 2023-06-13 PROCEDURE — 93306 TTE W/DOPPLER COMPLETE: CPT | Mod: 26 | Performed by: INTERNAL MEDICINE

## 2023-06-13 PROCEDURE — 93005 ELECTROCARDIOGRAM TRACING: CPT | Performed by: EMERGENCY MEDICINE

## 2023-06-13 PROCEDURE — 80048 BASIC METABOLIC PNL TOTAL CA: CPT

## 2023-06-13 PROCEDURE — 71045 X-RAY EXAM CHEST 1 VIEW: CPT

## 2023-06-13 PROCEDURE — 85025 COMPLETE CBC W/AUTO DIFF WBC: CPT

## 2023-06-13 PROCEDURE — 94669 MECHANICAL CHEST WALL OSCILL: CPT

## 2023-06-13 PROCEDURE — 700105 HCHG RX REV CODE 258: Performed by: EMERGENCY MEDICINE

## 2023-06-13 PROCEDURE — 70450 CT HEAD/BRAIN W/O DYE: CPT

## 2023-06-13 PROCEDURE — 87449 NOS EACH ORGANISM AG IA: CPT

## 2023-06-13 PROCEDURE — 84484 ASSAY OF TROPONIN QUANT: CPT

## 2023-06-13 PROCEDURE — 770020 HCHG ROOM/CARE - TELE (206)

## 2023-06-13 PROCEDURE — A9270 NON-COVERED ITEM OR SERVICE: HCPCS | Performed by: EMERGENCY MEDICINE

## 2023-06-13 PROCEDURE — 700102 HCHG RX REV CODE 250 W/ 637 OVERRIDE(OP): Performed by: STUDENT IN AN ORGANIZED HEALTH CARE EDUCATION/TRAINING PROGRAM

## 2023-06-13 PROCEDURE — 87040 BLOOD CULTURE FOR BACTERIA: CPT

## 2023-06-13 PROCEDURE — 84145 PROCALCITONIN (PCT): CPT

## 2023-06-13 PROCEDURE — 83880 ASSAY OF NATRIURETIC PEPTIDE: CPT

## 2023-06-13 RX ORDER — POLYETHYLENE GLYCOL 3350 17 G/17G
1 POWDER, FOR SOLUTION ORAL
Status: DISCONTINUED | OUTPATIENT
Start: 2023-06-13 | End: 2023-06-15

## 2023-06-13 RX ORDER — ASPIRIN 81 MG/1
81 TABLET ORAL DAILY
Status: DISCONTINUED | OUTPATIENT
Start: 2023-06-13 | End: 2023-06-13

## 2023-06-13 RX ORDER — ASPIRIN 81 MG/1
81 TABLET ORAL DAILY
Status: DISCONTINUED | OUTPATIENT
Start: 2023-06-14 | End: 2023-06-16 | Stop reason: HOSPADM

## 2023-06-13 RX ORDER — OMEPRAZOLE 20 MG/1
40 CAPSULE, DELAYED RELEASE ORAL DAILY
Status: DISCONTINUED | OUTPATIENT
Start: 2023-06-13 | End: 2023-06-16 | Stop reason: HOSPADM

## 2023-06-13 RX ORDER — LEVOTHYROXINE SODIUM 0.07 MG/1
75 TABLET ORAL
Status: DISCONTINUED | OUTPATIENT
Start: 2023-06-13 | End: 2023-06-16 | Stop reason: HOSPADM

## 2023-06-13 RX ORDER — ASPIRIN 81 MG/1
81 TABLET ORAL EVERY EVENING
Status: DISCONTINUED | OUTPATIENT
Start: 2023-06-13 | End: 2023-06-13

## 2023-06-13 RX ORDER — DOXYCYCLINE 100 MG/1
100 TABLET ORAL EVERY 12 HOURS
Status: DISCONTINUED | OUTPATIENT
Start: 2023-06-13 | End: 2023-06-16

## 2023-06-13 RX ORDER — ACETAMINOPHEN 325 MG/1
650 TABLET ORAL EVERY 6 HOURS PRN
Status: DISCONTINUED | OUTPATIENT
Start: 2023-06-13 | End: 2023-06-16 | Stop reason: HOSPADM

## 2023-06-13 RX ORDER — ASPIRIN 81 MG/1
324 TABLET, CHEWABLE ORAL ONCE
Status: COMPLETED | OUTPATIENT
Start: 2023-06-13 | End: 2023-06-13

## 2023-06-13 RX ORDER — LEVOTHYROXINE SODIUM 0.07 MG/1
75 TABLET ORAL
COMMUNITY
End: 2023-11-30 | Stop reason: SDUPTHER

## 2023-06-13 RX ORDER — CEFTRIAXONE 2 G/1
2000 INJECTION, POWDER, FOR SOLUTION INTRAMUSCULAR; INTRAVENOUS ONCE
Status: COMPLETED | OUTPATIENT
Start: 2023-06-13 | End: 2023-06-13

## 2023-06-13 RX ORDER — ENOXAPARIN SODIUM 100 MG/ML
40 INJECTION SUBCUTANEOUS DAILY
Status: DISCONTINUED | OUTPATIENT
Start: 2023-06-13 | End: 2023-06-16 | Stop reason: HOSPADM

## 2023-06-13 RX ORDER — PRAVASTATIN SODIUM 20 MG
40 TABLET ORAL EVERY EVENING
Status: DISCONTINUED | OUTPATIENT
Start: 2023-06-13 | End: 2023-06-16 | Stop reason: HOSPADM

## 2023-06-13 RX ORDER — AMOXICILLIN 250 MG
2 CAPSULE ORAL 2 TIMES DAILY
Status: DISCONTINUED | OUTPATIENT
Start: 2023-06-13 | End: 2023-06-15

## 2023-06-13 RX ORDER — ACETAMINOPHEN 325 MG/1
650 TABLET ORAL ONCE
Status: COMPLETED | OUTPATIENT
Start: 2023-06-13 | End: 2023-06-13

## 2023-06-13 RX ORDER — AZITHROMYCIN 500 MG/5ML
500 INJECTION, POWDER, LYOPHILIZED, FOR SOLUTION INTRAVENOUS ONCE
Status: DISCONTINUED | OUTPATIENT
Start: 2023-06-13 | End: 2023-06-13

## 2023-06-13 RX ORDER — BISACODYL 10 MG
10 SUPPOSITORY, RECTAL RECTAL
Status: DISCONTINUED | OUTPATIENT
Start: 2023-06-13 | End: 2023-06-15

## 2023-06-13 RX ADMIN — CEFTRIAXONE SODIUM 2000 MG: 2 INJECTION, POWDER, FOR SOLUTION INTRAMUSCULAR; INTRAVENOUS at 05:16

## 2023-06-13 RX ADMIN — ACETAMINOPHEN 650 MG: 325 TABLET, FILM COATED ORAL at 05:09

## 2023-06-13 RX ADMIN — DOXYCYCLINE 100 MG: 100 INJECTION, POWDER, LYOPHILIZED, FOR SOLUTION INTRAVENOUS at 06:07

## 2023-06-13 RX ADMIN — PRAVASTATIN SODIUM 40 MG: 20 TABLET ORAL at 17:49

## 2023-06-13 RX ADMIN — DOXYCYCLINE 100 MG: 100 TABLET, FILM COATED ORAL at 17:49

## 2023-06-13 RX ADMIN — ENOXAPARIN SODIUM 40 MG: 100 INJECTION SUBCUTANEOUS at 17:49

## 2023-06-13 RX ADMIN — ASPIRIN 81 MG 324 MG: 81 TABLET ORAL at 05:10

## 2023-06-13 RX ADMIN — ACETAMINOPHEN 650 MG: 325 TABLET, FILM COATED ORAL at 23:28

## 2023-06-13 ASSESSMENT — ENCOUNTER SYMPTOMS
FALLS: 1
LOSS OF CONSCIOUSNESS: 1
ABDOMINAL PAIN: 0
FEVER: 1
SHORTNESS OF BREATH: 1
CHILLS: 1
COUGH: 1
DIARRHEA: 0
VOMITING: 0
NAUSEA: 0

## 2023-06-13 ASSESSMENT — PATIENT HEALTH QUESTIONNAIRE - PHQ9
SUM OF ALL RESPONSES TO PHQ9 QUESTIONS 1 AND 2: 0
2. FEELING DOWN, DEPRESSED, IRRITABLE, OR HOPELESS: NOT AT ALL
2. FEELING DOWN, DEPRESSED, IRRITABLE, OR HOPELESS: NOT AT ALL
SUM OF ALL RESPONSES TO PHQ9 QUESTIONS 1 AND 2: 0
1. LITTLE INTEREST OR PLEASURE IN DOING THINGS: NOT AT ALL
1. LITTLE INTEREST OR PLEASURE IN DOING THINGS: NOT AT ALL

## 2023-06-13 ASSESSMENT — COGNITIVE AND FUNCTIONAL STATUS - GENERAL
CLIMB 3 TO 5 STEPS WITH RAILING: A LOT
STANDING UP FROM CHAIR USING ARMS: A LOT
DRESSING REGULAR LOWER BODY CLOTHING: A LITTLE
WALKING IN HOSPITAL ROOM: A LOT
TOILETING: A LITTLE
MOVING TO AND FROM BED TO CHAIR: A LOT
TURNING FROM BACK TO SIDE WHILE IN FLAT BAD: A LITTLE
DAILY ACTIVITIY SCORE: 19
SUGGESTED CMS G CODE MODIFIER DAILY ACTIVITY: CK
MOBILITY SCORE: 13
MOVING FROM LYING ON BACK TO SITTING ON SIDE OF FLAT BED: A LOT
HELP NEEDED FOR BATHING: A LOT
SUGGESTED CMS G CODE MODIFIER MOBILITY: CL
DRESSING REGULAR UPPER BODY CLOTHING: A LITTLE

## 2023-06-13 ASSESSMENT — FIBROSIS 4 INDEX
FIB4 SCORE: 2.11
FIB4 SCORE: 1.62

## 2023-06-13 ASSESSMENT — PAIN DESCRIPTION - PAIN TYPE
TYPE: ACUTE PAIN
TYPE: ACUTE PAIN

## 2023-06-13 ASSESSMENT — COPD QUESTIONNAIRES
DO YOU EVER COUGH UP ANY MUCUS OR PHLEGM?: NO/ONLY WITH OCCASIONAL COLDS OR INFECTIONS
DURING THE PAST 4 WEEKS HOW MUCH DID YOU FEEL SHORT OF BREATH: NONE/LITTLE OF THE TIME
COPD SCREENING SCORE: 2
HAVE YOU SMOKED AT LEAST 100 CIGARETTES IN YOUR ENTIRE LIFE: NO/DON'T KNOW

## 2023-06-13 NOTE — DISCHARGE PLANNING
Received Choice form @: 1144  Agency/Facility Name: [facility]  Referral sent per Choice form @: Not sent as there is no choice nor F2F.    Received Choice form @: 1145  Agency/Facility Name: [facility]  Referral sent per Choice form @: Not sent as there is no order nor F2F.

## 2023-06-13 NOTE — ED NOTES
Received bedside report; assumed care of Pt.    6 L/min via [place] oxygen.  No SI precautions.  High fall risk precautions.    Introduced self to Pt; informed him that I am part of his care team.  Rounded on Pt. Updated Pt on plan of care. Pt verbalized understanding.  No acute distress at this time.  Will continue to monitor.

## 2023-06-13 NOTE — PROGRESS NOTES
4 Eyes Skin Assessment Completed by ZEHRA Rodriguez and ZEHRA Larose.    Head Bruising, Scratch, Swelling, Redness, and Edema  Ears WDL  Nose WDL  Mouth WDL  Neck WDL  Breast/Chest WDL  Shoulder Blades WDL  Spine WDL  (R) Arm/Elbow/Hand Bruising and Swelling  (L) Arm/Elbow/Hand Scab  Abdomen WDL  Groin Redness, Blanching  Scrotum/Coccyx/Buttocks Redness and Blanching  (R) Leg WDL  (L) Leg WDL  (R) Heel/Foot/Toe WDL  (L) Heel/Foot/Toe Discoloration and Scar          Devices In Places Tele Box      Interventions In Place Waffle Overlay    Possible Skin Injury Yes    Pictures Uploaded Into Epic Yes  Wound Consult Placed Yes  RN Wound Prevention Protocol Ordered Yes

## 2023-06-13 NOTE — H&P
Hospital Medicine History & Physical Note    Date of Service  6/13/2023    Primary Care Physician  Stefanie Zavaleta M.D.    Code Status  DNAR/DNI    Chief Complaint  Chief Complaint   Patient presents with    GLF    ALOC       History of Presenting Illness  Farhat Stahl is a 83 y.o. male who presented 6/13/2023 with fall.  PMH of interstitial lung disease, HTN, dyslipidemia, hypothyroidism, GERD.  Patient says he falls frequently due to bilateral peripheral neuropathy and had another fall today.  Following fall EMS was called but he did not come to the ED initially.  His wife then called EMS later on because patient was altered to her, he was found to be hypoxic in the 80s.  Patient this time at baseline mentation, says he has been having subjective fever/chills, generalized weakness for the past few days now.  Denies any shortness of breath, cough, abdominal pain, bowel or urinary symptoms.    In the ED febrile to 101.5, desaturating requiring 6 L O2 on my evaluation to maintain saturation in the low 90s.  Labs show troponin to 97, , procalcitonin elevated.    I discussed the plan of care with patient and EDP .    Review of Systems  Review of Systems   Constitutional:  Positive for chills, fever and malaise/fatigue.   Respiratory:  Positive for cough and shortness of breath.    Cardiovascular:  Negative for chest pain.   Gastrointestinal:  Negative for abdominal pain, diarrhea, nausea and vomiting.   Genitourinary:  Negative for dysuria and urgency.   Musculoskeletal:  Positive for falls.   Neurological:  Positive for loss of consciousness.       Past Medical History   has a past medical history of Arrhythmia, Arthritis, Bronchitis (2004), Chronic pain of right knee (6/16/2022), Fibula fracture (1981), High cholesterol, Hyperlipidemia, Hypertension, Lab test positive for detection of COVID-19 virus (8/11/2022), MEDICAL HOME, Multiple pulmonary nodules (12/15/2021), Onychogryposis of toenail  "(1/21/2021), Onychomycosis (1/21/2021), Pain, Pneumonia (2004), Rash (6/16/2022), and Sleep apnea.    Surgical History   has a past surgical history that includes tonsillectomy (1945); exostosis excision (6/3/2011); lesion excision ortho (6/3/2011); toe arthroplasty (6/3/2011); lumbar laminectomy diskectomy (2005); hammertoe correction (2/2/2015); and eye surgery (Bilateral).     Family History  family history includes Cancer in his father, mother, sister, and another family member.   Family history reviewed with patient. There is no family history that is pertinent to the chief complaint.     Social History   reports that he has never smoked. He has never used smokeless tobacco. He reports current alcohol use of about 1.2 - 1.8 oz of alcohol per week. He reports that he does not use drugs.    Allergies  Allergies   Allergen Reactions    Other Environmental Runny Nose and Itching     Pollens       Medications  Prior to Admission Medications   Prescriptions Last Dose Informant Patient Reported? Taking?   Cholecalciferol (D3) 2000 UNIT Tab   Yes No   Sig: Take 1 Tablet by mouth every day. Indications: supplement   aspirin 81 MG tablet  Patient Yes No   Sig: Take 81 mg by mouth at bedtime. Indications: \"heart\"   carvedilol (COREG) 25 MG Tab   No No   Sig: Take 1 Tablet by mouth 2 times a day. Indications: High Blood Pressure Disorder   clotrimazole-betamethasone (LOTRISONE) 1-0.05 % Cream   No No   Sig: Apply to affected area twice daily as needed   fluticasone (FLONASE) 50 MCG/ACT nasal spray   No No   Sig: Administer 2 Sprays into affected nostril(S) 2 times a day. Indications: allergies   levothyroxine (SYNTHROID) 75 MCG Tab   No No   Sig: TAKR 1 TAB BY MOUTH EVERY MORNING ON AN EMPTY STOMACH  Indications: Underactive Thyroid   lisinopril (PRINIVIL) 10 MG Tab   No No   Sig: Take 1 Tablet by mouth every day. Indications: High Blood Pressure Disorder   omeprazole (PRILOSEC) 40 MG delayed-release capsule   No No   Sig: " Take 1 Capsule by mouth every day. Indications: Heartburn   pravastatin (PRAVACHOL) 40 MG tablet   No No   Sig: Take 1 Tablet by mouth every day. Indications: High Amount of Fats in the Blood      Facility-Administered Medications: None       Physical Exam  Temp:  [38.6 °C (101.5 °F)] 38.6 °C (101.5 °F)  Pulse:  [76-97] 76  Resp:  [16-20] 18  BP: (104-129)/(55-68) 104/57  SpO2:  [92 %-96 %] 95 %  Blood Pressure : 104/57   Temperature: (!) 38.6 °C (101.5 °F)   Pulse: 76   Respiration: 18   Pulse Oximetry: 95 %       Physical Exam  Constitutional:       General: He is in acute distress.      Appearance: He is obese.   HENT:      Head: Normocephalic and atraumatic.      Mouth/Throat:      Mouth: Mucous membranes are moist.      Pharynx: Oropharynx is clear. No oropharyngeal exudate or posterior oropharyngeal erythema.   Eyes:      General: No scleral icterus.  Cardiovascular:      Rate and Rhythm: Normal rate and regular rhythm.      Pulses: Normal pulses.      Heart sounds: Normal heart sounds. No murmur heard.  Pulmonary:      Effort: Respiratory distress present.      Breath sounds: Rales present. No wheezing.   Abdominal:      Palpations: Abdomen is soft.      Tenderness: There is no abdominal tenderness.   Musculoskeletal:         General: No swelling or tenderness. Normal range of motion.      Cervical back: Normal range of motion.   Skin:     General: Skin is warm and dry.   Neurological:      General: No focal deficit present.      Mental Status: He is alert and oriented to person, place, and time. Mental status is at baseline.   Psychiatric:         Mood and Affect: Mood normal.         Laboratory:  Recent Labs     06/13/23  0350   WBC 9.0   RBC 4.53*   HEMOGLOBIN 15.0   HEMATOCRIT 44.3   MCV 97.8   MCH 33.1*   MCHC 33.9   RDW 48.7   PLATELETCT 148*   MPV 9.3     Recent Labs     06/13/23  0350   SODIUM 135   POTASSIUM 3.9   CHLORIDE 101   CO2 20   GLUCOSE 106*   BUN 21   CREATININE 1.10   CALCIUM 8.8      Recent Labs     06/13/23  0350   GLUCOSE 106*     Recent Labs     06/13/23  0350   APTT 28.5   INR 1.16*     Recent Labs     06/13/23  0350   NTPROBNP 463*         Recent Labs     06/13/23  0350   TROPONINT 297*       Imaging:  DX-CHEST-PORTABLE (1 VIEW)   Final Result         1.  Pulmonary edema and/or infiltrates.   2.  Small left pleural effusion   3.  Cardiomegaly      DX-SHOULDER 2+ LEFT   Final Result         1.  No acute traumatic bony injury.   2.  Pulmonary edema and/or infiltrates         DX-PELVIS-1 OR 2 VIEWS   Final Result         1.  No acute traumatic bony injury.      CT-CSPINE WITHOUT PLUS RECONS   Final Result         1.  Multilevel degenerative changes of the cervical spine limit diagnostic sensitivity of this examination, otherwise no acute traumatic bony injury of the cervical spine is apparent.   2.  Atherosclerosis      CT-HEAD W/O   Final Result         1.  No acute intracranial abnormality is identified, there are nonspecific white matter changes, commonly associated with small vessel ischemic disease.  Associated mild cerebral atrophy is noted.   2.  Bilateral maxillary sinusitis changes   3.  Atherosclerosis.             X-Ray:  I have personally reviewed the images and compared with prior images. and My impression is: Edema and/or infiltrates  EKG:  I have personally reviewed the images and compared with prior images. and My impression is: NSR, poor R wave progression, nonspecific ST-T wave changes similar to prior    Assessment/Plan:  Justification for Admission Status  I anticipate this patient will require at least two midnights for appropriate medical management, necessitating inpatient admission because severe sepsis with acute hypoxemic respiratory failure    * Sepsis with acute hypoxic respiratory failure without septic shock (HCC)- (present on admission)  Assessment & Plan  This is Severe Sepsis Present on admission  SIRS criteria identified on my evaluation include: Fever,  with temperature greater than 100.9 deg F and Tachycardia, with heart rate greater than 90 BPM  Clinical indicators of end organ dysfunction include Acute respiratory failure as evidenced by a new need for invasive or non-invasive mechanical ventilation (Ventilator or BiPap)   Source is pneumonia  Sepsis protocol initiated  Crystalloid Fluid Administration not admitted per sepsis protocol due to appearance of effusion on imaging and significant respiratory failure with hypoxia   IV antibiotics as appropriate for source of sepsis.  IV ceftriaxone and doxycycline.  Azithromycin ordered due to prolonged QTc  Reassessment: I have reassessed the patient's hemodynamic status blood pressure in the low 100s systolic  Blood culture, sputum cultures ordered    Acute respiratory failure with hypoxia (HCC)- (present on admission)  Assessment & Plan  Patient desaturated requiring 6 L O2 on my evaluation, no respiratory distress and Rales bilaterally  Currently has sepsis secondary to presumed pneumonia with elevated procalcitonin.  Started on IV antibiotics  Has history of interstitial lung disease as well as MARCI  Continue close hemodynamic and respiratory status monitoring on telemetry  Wean off oxygen as tolerated    Pneumonia of both lungs due to infectious organism  Assessment & Plan  Patient has opacification on CXR concerning for edema versus infiltrate  He does have a history of interstitial lung disease  Has been complaining of subjective fever/chills and is febrile on presentation with elevated procalcitonin  We will treat as presumed pneumonia, see sepsis above    Interstitial lung disease (HCC)- (present on admission)  Assessment & Plan  History of interstitial lung disease and pleural effusions requiring thoracentesis in the past.  Follows with pulmonology  Etiology unclear  Patient currently with acute hypoxemic respiratory failure and pneumonia, see above    QT prolongation- (present on admission)  Assessment &  Plan  Prolonged to 520s on EKG.  Avoid QTc prolonging medications.  Monitor on telemetry  BMP ordered daily continue monitoring electrolyte levels, keep potassium more than 4 magnesium more than 2    Blackwell's esophagus without dysplasia- (present on admission)  Assessment & Plan  Continue PPI    Acquired hypothyroidism- (present on admission)  Assessment & Plan  Continue levothyroxine    Severe obesity (BMI 35.0-39.9) with comorbidity (HCC)- (present on admission)  Assessment & Plan  BMI 35.09, counsele on weight loss and diet    Dyslipidemia- (present on admission)  Assessment & Plan  Continue statin    Primary hypertension- (present on admission)  Assessment & Plan  Patient coming in with sepsis with endorgan damage.  Blood pressure systolic in the low 100s  Hold lisinopril and carvedilol      Patient is critically ill.   The patient continues to have: Severe sepsis with endorgan damage  The vital organ system that is affected is the: Respiratory  If untreated there is a high chance of deterioration into septic shock, respiratory distress, cardiopulmonary arrest, And eventually death.   The critical care that I am providing today is: As above.  Extensive chart review including notes, labs, imaging.  Patient has severe hypoxemic respiratory failure and is in respiratory distress on exam, and admitted to telemetry unit and monitoring his oxygen status closely.  This is secondary to sepsis, he does have a history of interstitial lung disease and may have pleural effusions on exam as he does have a history of this as well.  However due to sepsis and soft blood pressure I will hold off on IV diuresis.  Start him on IV antibiotics, cand following closely for response to treatment and any signs of medication side effects.  I ordered respiratory therapy consult and discussing with respiratory  The critical that has been undertaken is medically complex.   There has been no overlap in critical care time.   Critical Care  Time not including procedures: 57 minutes      VTE prophylaxis: enoxaparin ppx

## 2023-06-13 NOTE — ED PROVIDER NOTES
ED Provider Note    CHIEF COMPLAINT  Chief Complaint   Patient presents with    GLF    ALOC       EXTERNAL RECORDS REVIEWED  Inpatient Notes   and Outpatient Notes      HPI/ROS  LIMITATION TO HISTORY   Select: : None  OUTSIDE HISTORIAN(S):  EMS      Farhat Stahl is a 83 y.o. male who presents to the emergency department as a code TBI.  Patient reportedly had fallen several hours prior to arrival.  EMS was initially called and patient decided not to go with transport however EMS was called back by family members as patient was having progressive altered mental status and less responsive.  Patient was alert and oriented to person place not time upon their arrival stated that he was initially hypoxic was placed on nasal cannula and had improvement of respirations.  Patient reports that he thinks he stumbled and fell striking his head earlier in the day.  He denies any chest pain or palpitations at this time he has minimal left-sided headache and minimal neck pain denies any chest pain patient reports he had some chills yesterday but no cough no abdominal pain no problems urination or bowel movements no other acute symptom changes or concerns.        PAST MEDICAL HISTORY   has a past medical history of Arrhythmia, Arthritis, Bronchitis (2004), Chronic pain of right knee (6/16/2022), Fibula fracture (1981), High cholesterol, Hyperlipidemia, Hypertension, Lab test positive for detection of COVID-19 virus (8/11/2022), MEDICAL HOME, Multiple pulmonary nodules (12/15/2021), Onychogryposis of toenail (1/21/2021), Onychomycosis (1/21/2021), Pain, Pneumonia (2004), Rash (6/16/2022), and Sleep apnea.    SURGICAL HISTORY   has a past surgical history that includes tonsillectomy (1945); exostosis excision (6/3/2011); lesion excision ortho (6/3/2011); toe arthroplasty (6/3/2011); lumbar laminectomy diskectomy (2005); hammertoe correction (2/2/2015); and eye surgery (Bilateral).    FAMILY HISTORY  Family History   Problem  "Relation Age of Onset    Cancer Mother         cervical/breast    Cancer Sister         lung    Cancer Father         lung cancer    Cancer Other        SOCIAL HISTORY  Social History     Tobacco Use    Smoking status: Never    Smokeless tobacco: Never   Vaping Use    Vaping Use: Never used   Substance and Sexual Activity    Alcohol use: Yes     Alcohol/week: 1.2 - 1.8 oz     Types: 2 - 3 Standard drinks or equivalent per week     Comment: occ    Drug use: Never    Sexual activity: Not Currently       CURRENT MEDICATIONS  Home Medications    **Home medications have not yet been reviewed for this encounter**         ALLERGIES  Allergies   Allergen Reactions    Other Environmental Runny Nose and Itching     Pollens       PHYSICAL EXAM  VITAL SIGNS: /59   Pulse 93   Temp (!) 38.6 °C (101.5 °F) (Oral)   Resp 18   Ht 1.854 m (6' 1\")   Wt 121 kg (266 lb)   SpO2 94%   BMI 35.09 kg/m²    Pulse ox interpretation: I interpret this pulse ox as normal.  Constitutional: Alert and oriented x 3, moderate distress  HEENT: Rajen ecchymosis over the left forehead normocephalic, pupils are equal round reactive to light extraocular movements are intact minimal lateral subconjunctival hemorrhage in the left eye no hyphema. The nares is clear, external ears are normal, mouth shows moist mucous membranes normal dentition for age  Neck: Supple, no JVD no tracheal deviation  Cardiovascular: Tachycardic no murmur rub or gallop 2+ pulses peripherally x4  Thorax & Lungs: No respiratory distress, no wheezes rales or rhonchi, No chest tenderness.   GI: Soft nontender nondistended positive bowel sounds, no peritoneal signs  Skin: Warm dry no acute rash or lesion  Musculoskeletal: Moving all extremities with full range and 5 of 5 strength no acute  deformity  Neurologic: Cranial nerves III through XII are grossly intact no sensory deficit no cerebellar dysfunction   Psychiatric: Appropriate affect for situation at this " time          DIAGNOSTIC STUDIES / PROCEDURES  Results for orders placed or performed during the hospital encounter of 06/13/23   CBC WITH DIFFERENTIAL   Result Value Ref Range    WBC 9.0 4.8 - 10.8 K/uL    RBC 4.53 (L) 4.70 - 6.10 M/uL    Hemoglobin 15.0 14.0 - 18.0 g/dL    Hematocrit 44.3 42.0 - 52.0 %    MCV 97.8 81.4 - 97.8 fL    MCH 33.1 (H) 27.0 - 33.0 pg    MCHC 33.9 32.3 - 36.5 g/dL    RDW 48.7 35.9 - 50.0 fL    Platelet Count 148 (L) 164 - 446 K/uL    MPV 9.3 9.0 - 12.9 fL    Neutrophils-Polys 89.20 (H) 44.00 - 72.00 %    Lymphocytes 4.50 (L) 22.00 - 41.00 %    Monocytes 4.90 0.00 - 13.40 %    Eosinophils 0.80 0.00 - 6.90 %    Basophils 0.20 0.00 - 1.80 %    Immature Granulocytes 0.40 0.00 - 0.90 %    Nucleated RBC 0.00 0.00 - 0.20 /100 WBC    Neutrophils (Absolute) 8.00 (H) 1.82 - 7.42 K/uL    Lymphs (Absolute) 0.40 (L) 1.00 - 4.80 K/uL    Monos (Absolute) 0.44 0.00 - 0.85 K/uL    Eos (Absolute) 0.07 0.00 - 0.51 K/uL    Baso (Absolute) 0.02 0.00 - 0.12 K/uL    Immature Granulocytes (abs) 0.04 0.00 - 0.11 K/uL    NRBC (Absolute) 0.00 K/uL   BASIC METABOLIC PANEL   Result Value Ref Range    Sodium 135 135 - 145 mmol/L    Potassium 3.9 3.6 - 5.5 mmol/L    Chloride 101 96 - 112 mmol/L    Co2 20 20 - 33 mmol/L    Glucose 106 (H) 65 - 99 mg/dL    Bun 21 8 - 22 mg/dL    Creatinine 1.10 0.50 - 1.40 mg/dL    Calcium 8.8 8.5 - 10.5 mg/dL    Anion Gap 14.0 7.0 - 16.0   PROTHROMBIN TIME   Result Value Ref Range    PT 14.7 (H) 12.0 - 14.6 sec    INR 1.16 (H) 0.87 - 1.13   APTT   Result Value Ref Range    APTT 28.5 24.7 - 36.0 sec   URINALYSIS    Specimen: Urine   Result Value Ref Range    Micro Urine Req Microscopic    proBrain Natriuretic Peptide, NT   Result Value Ref Range    NT-proBNP 463 (H) 0 - 125 pg/mL   TROPONIN   Result Value Ref Range    Troponin T 297 (H) 6 - 19 ng/L   CoV-2, FLU A/B, and RSV by PCR (2-4 Hours CEPHEID) : Collect NP swab in VTM    Specimen: Nasal; Respirate   Result Value Ref Range     SARS-CoV-2 Source NP Swab    PROCALCITONIN   Result Value Ref Range    Procalcitonin 0.67 (H) <0.25 ng/mL   ESTIMATED GFR   Result Value Ref Range    GFR (CKD-EPI) 66 >60 mL/min/1.73 m 2   EKG (NOW)   Result Value Ref Range    Report       [facility]    Test Date:  2023  Pt Name:    SALO RICHARDS                 Department: ER  MRN:        [id number]                      Room:        20  Gender:     Male                         Technician: [id number]  :        [id number]                   Requested By:CHASIDY DUBOIS  Order #:    [id number]                    Reading MD: CHASIDY DUBOIS MD    Measurements  Intervals                                Axis  Rate:       91                           P:          8  TX:         195                          QRS:        -7  QRSD:       87                           T:          148  QT:         423  QTc:        521    Interpretive Statements  Sinus rhythm  Inferior infarct, old  Anterior infarct, old  Lateral leads are also involved  Prolonged QT interval  Baseline wander in lead(s) V2,V5  Compared to ECG 2023 12:50:07  Prolonged QT interval now present  Atrial premature complex(es) no longer present  Myocardial infarct finding st ill present  Electronically Signed On 2023 4:50:12 PDT by CHASIDY DUBOIS MD           RADIOLOGY  DX-CHEST-PORTABLE (1 VIEW)   Final Result         1.  Pulmonary edema and/or infiltrates.   2.  Small left pleural effusion   3.  Cardiomegaly      DX-SHOULDER 2+ LEFT   Final Result         1.  No acute traumatic bony injury.   2.  Pulmonary edema and/or infiltrates         DX-PELVIS-1 OR 2 VIEWS   Final Result         1.  No acute traumatic bony injury.      CT-CSPINE WITHOUT PLUS RECONS   Final Result         1.  Multilevel degenerative changes of the cervical spine limit diagnostic sensitivity of this examination, otherwise no acute traumatic bony injury of the cervical spine is apparent.    2.  Atherosclerosis      CT-HEAD W/O   Final Result         1.  No acute intracranial abnormality is identified, there are nonspecific white matter changes, commonly associated with small vessel ischemic disease.  Associated mild cerebral atrophy is noted.   2.  Bilateral maxillary sinusitis changes   3.  Atherosclerosis.               COURSE & MEDICAL DECISION MAKING    ED Observation Status? No; Patient does not meet criteria for ED Observation.     INITIAL ASSESSMENT, COURSE AND PLAN  Care Narrative: 83-year-old male closed head injury after what sounds like ground-level fall sounds mechanical.  Patient somewhat altered at arrival though head CT and CT C-spine were obtained patient also had EKG chest x-ray and laboratory evaluation.  Upon check-in patient was found to be febrile to 101.5.  Borderline tachycardic at 95.  Sepsis protocol initiated.  Patient has normal white count however 90% neutrophils his lactic acid is . Chest x-ray is concerning for some degree of pulmonary edema versus infiltrates.  Procalcitonin and COVID swab pending.  His BNP is normal his troponin is elevated at 290.  Patient is given 325 mg of aspirin is also given Tylenol for his fever patient has no chest pain at all at this time and his EKG is nonischemic.  Probable demand ischemia.  At this point patient was given IV Rocephin and azithromycin fluid bolus is contraindicated with concern for possible volume overload and pulmonary edema and him having blood pressure  Patient discussed with hospitalist   will be admitted for ongoing evaluation and treatment admitted in guarded condition.      CRITICAL CARE  The very real possibilty of a deterioration of this patient's condition required the highest level of my preparedness for sudden, emergent intervention.  I provided critical care services, which included medication orders, frequent reevaluations of the patient's condition and response to treatment, ordering and reviewing test  results, and discussing the case with various consultants.  The critical care time associated with the care of the patient was 45 minutes. Review chart for interventions. This time is exclusive of any other billable procedures.         FINAL DIAGNOSIS  1.  Ground-level fall  2.  Closed head injury  3.  Pneumonia  4.  Sepsis, simple  5.  Elevated troponin  6.  Critical care 45 minutes

## 2023-06-13 NOTE — ED TRIAGE NOTES
Chief Complaint   Patient presents with    GLF    ALOC     Pt BIB EMS from home for above complaint. Pt had unwitnessed MGLF at 2300 last night, +head strike, unknown LOC, unknown thinners. Following fall, pt was evaluated at home by TM and denied transport to ED, per EMS pt was ambulatory and A/Ox4. EMS was recalled at 0230 this AM by pt's wife, on second encounter pt was hypoxic at 84% on RA and A/Ox1.     Pt evaluated at charge desk as TBI by Dr. Alba. Pt transported to CT. Report to Alberto SHAHID.

## 2023-06-13 NOTE — PROGRESS NOTES
"Hospital Medicine Daily Progress Note    Date of Service  6/13/2023    I have seen/examined the patient and reviewed the case.  I have also reviewed the patient's history, ROS, physical exam, laboratory and radiology findings. I agree with the general assessments, plans, and discussions as outlined in the H&P.     Interval Problem Update  6/13/2023: On my exam, O2 requirement has improved to 3L. +rales, mild. He says he feels \"much better\" this morning. No SHOB, tachypnea or increased work of breathing. Denies chest pain or pressure, no jaw/arm/back pain, no orthopnea/PND. No longer tachycardic. Trace bilateral lower extremity pitting edema. Has been getting nocturnal chills at home, denies productive cough / purulent sputum. States he fell due to tripping over objects 2/2 neuropathy in feet, gets physical therapy at home.    Left 2nd toe tip callused and black. He states this is chronic since hammer toe surgery. Does not appear infected, no surrounding erythema/edema/drainage.    - IV abx  - Avoid QTc prolonging agents  - Follow blood/sputum cultures, urine antigens  - Follow echo and troponin. Repeat EKG for chest pain.  - Telemetry  - Wean O2 as tolerated  - IS, pulmonary hygeine  - Continue physical therapy  - Wound care consult for left 2nd toe    I have discussed this patient's plan of care and discharge plan at IDT rounds today with Case Management, Nursing, Nursing leadership, and other members of the IDT team.    Consultants/Specialty  none    Code Status  DNAR/DNI    Disposition  The patient is not medically cleared for discharge to home or a post-acute facility.  Anticipate discharge to: home with close outpatient follow-up    I have placed the appropriate orders for post-discharge needs.    Physical Exam  Temp:  [36.8 °C (98.3 °F)-38.6 °C (101.5 °F)] 36.8 °C (98.3 °F)  Pulse:  [65-97] 66  Resp:  [15-20] 15  BP: (102-129)/(54-68) 102/54  SpO2:  [92 %-96 %] 95 %    Fluids  No intake or output data in the " 24 hours ending 06/13/23 0855    Laboratory  Recent Labs     06/13/23  0350   WBC 9.0   RBC 4.53*   HEMOGLOBIN 15.0   HEMATOCRIT 44.3   MCV 97.8   MCH 33.1*   MCHC 33.9   RDW 48.7   PLATELETCT 148*   MPV 9.3     Recent Labs     06/13/23  0350   SODIUM 135   POTASSIUM 3.9   CHLORIDE 101   CO2 20   GLUCOSE 106*   BUN 21   CREATININE 1.10   CALCIUM 8.8     Recent Labs     06/13/23  0350   APTT 28.5   INR 1.16*               Imaging  DX-CHEST-PORTABLE (1 VIEW)   Final Result         1.  Pulmonary edema and/or infiltrates.   2.  Small left pleural effusion   3.  Cardiomegaly      DX-SHOULDER 2+ LEFT   Final Result         1.  No acute traumatic bony injury.   2.  Pulmonary edema and/or infiltrates         DX-PELVIS-1 OR 2 VIEWS   Final Result         1.  No acute traumatic bony injury.      CT-CSPINE WITHOUT PLUS RECONS   Final Result         1.  Multilevel degenerative changes of the cervical spine limit diagnostic sensitivity of this examination, otherwise no acute traumatic bony injury of the cervical spine is apparent.   2.  Atherosclerosis      CT-HEAD W/O   Final Result         1.  No acute intracranial abnormality is identified, there are nonspecific white matter changes, commonly associated with small vessel ischemic disease.  Associated mild cerebral atrophy is noted.   2.  Bilateral maxillary sinusitis changes   3.  Atherosclerosis.         EC-ECHOCARDIOGRAM COMPLETE W/O CONT    (Results Pending)        Assessment/Plan  * Sepsis with acute hypoxic respiratory failure without septic shock (HCC)- (present on admission)  Assessment & Plan  This is Severe Sepsis Present on admission  SIRS criteria identified on my evaluation include: Fever, with temperature greater than 100.9 deg F and Tachycardia, with heart rate greater than 90 BPM  Source is pneumonia  Sepsis protocol initiated  Crystalloid Fluid Administration not admitted per sepsis protocol due to appearance of effusion on imaging and significant respiratory  failure with hypoxia   IV antibiotics as appropriate for source of sepsis.    Reassessment: I have reassessed the patient's hemodynamic status - blood pressure in the low 100s systolic, now on 6L O2    -Blood, sputum cultures pending  -IV ceftriaxone and doxycycline. Avoiding azithromycin, has prolonged QT  -Judicious fluid resuscitation as indicated by hemodynamics    Acute respiratory failure with hypoxia (HCC)- (present on admission)  Assessment & Plan  2/2 presumed pneumonia  Required 6L O2 on admission.   +rales, no respiratory distress  Has history of interstitial lung disease as well as MARCI    -Antibiotics as above  -Continue home nocturnal CPAP  -Close hemodynamic and respiratory status monitoring on telemetry  -Wean off oxygen as tolerated  -Incentive spirometry, pulm hygeine, up as tolerated      Pneumonia of both lungs due to infectious organism- (present on admission)  Assessment & Plan  Hypoxic on presentation with diffuse hazy opacities on CXR. C/O fever/chills, has elevated procalcitonin  Hx of ILD  -Will treat as presumed pneumonia  -Ceftriaxone and doxycycline    CKD (chronic kidney disease)  Assessment & Plan  Reported history of CKD stage 3 on chart review  GFR today 66  - Monitor renal function  - Avoid nephrotoxins    Ground-level fall  Assessment & Plan  Initially presented for ground level fall   Pt states he stumbled, leading to fall. He did strike head, CT head negative.  Per his PCP notes he does have chronic falls 2/2 neuropathy, balance issues and is doing physical therapy.   Uses a cane and knee brace    - Continue PT/OT while inpatient  - Fall precautions  - Telemetry    Demand ischemia (HCC)- (present on admission)  Assessment & Plan  Elevated troponin to 297->332 on presentation.  Denies any chest discomfort.    EKG reviewed and appears similar to prior, not concerning for acute ischemia  His PCP documentation does reported history of CKD 3, however GFR today is 66    - Monitor on  cardiac telemetry  - Echocardiogram  - Will consider cardiology consult if he develops chest pain, worsening symptoms, or other indication for more intensive cardiac workup    Interstitial lung disease (HCC)- (present on admission)  Assessment & Plan  History of interstitial lung disease and mild chronic LLL effusion requiring thoracentesis in the past.  Follows with pulmonology Dr. Syed  Etiology unclear. Has previously declined biopsy  Jan 2022 PFT: Reduced FVC 2.75L or 69% pred, TLC of 4.11L or 56% pred and DLCO of 80% pred and 146% when corrected for [place].    Patient currently with acute hypoxemic respiratory failure and pneumonia, see above  -Continue home medications  -Monitor respiratory status      Primary hypertension- (present on admission)  Assessment & Plan  Patient coming in with sepsis. -110  -Hold lisinopril and carvedilol for now pending hemodynamic stabilization, restart as needed.    QT prolongation- (present on admission)  Assessment & Plan  Prolonged to 520s on EKG.  Avoid QTc prolonging medications.   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 In review of yesterday's note (6/12/2023), there are no changes except as documented above.

## 2023-06-13 NOTE — DISCHARGE PLANNING
"[facility] TCN chart review completed. Collaborated with ED  Dot and Rain prior to meeting with the pt. The most current review of medical record, knowledge of pt's PLOF and social support, LACE+ score of 70, 6 clicks scores of ( not entered) mobility were considered.      Pt seen at bedside. Pt now in S145. Pt is awake, very pleasant, O2 via NC in use.Pt is oriented to self, place ( [place]), Current Month ( June), Current Date ( 13th) and Current Year (2023). ntroduced TCN program. Provided education regarding post acute levels of care. Pt verbalizes understanding.    Pt endorses living with Spouse in [place] \" half way between [employer] and [employer]\". They live in a 1 [address] house with approximately 3 steps to enter.Pt states he uses cane or rollator to ambulate. When pt is asked about his recent mobility function. Pt replied with \" it's ok\". Pt complaints of neuropathy on his feet.Pt states he still drives vehicle.  When asked if he will be able to tolerate 3 hours of therapies in an IRF level of care. Pt responded with \" i'm not sure\".  Pt. Granted this TCN permission to call his Wife and discuss health plan benefits and available post-acute services.  TCN placed a non-emergency courtesy outreach telephone call to Patient's Spouse Sammi Stahl. VERIFIED HIPPA. Sammi  ( Spouse) verified patient's assessment answers .   Sammi verbalized that the pt do in fact live with her in a single isidro house in [place]  area. Spouse describes patient using a rollator walker and a single point cane DME for mobility at baseline. Sammi states they don't drive and that  transportation is an issue. Pt Spouse does not report any acute  issues with food and housing. TCN introduced TCN program and provided education regarding health plan benefits and available post-acute services.   Pt's Spouse Sammi verbalized that Patient's recent mobility function this past 2 weeks have been \" bettter\" until he tripped " "and fell at home. OT and PT evals are currently pending. TCn discussed  possible  post -acute level services options . However, Pt's Spouse verbalized She is also \" not sure\" if mbr can tolerate 3 hours of thrapy in an IRF. Furthermore, Sammi is not amenable to SNF level of therapies at this time. Sammi Spouse is  only amenable to organized home health care if indicated by Provider and/or Therapist.  Informed patient of his Spouse's input. Patient cosigned choice forms using his initials.   Choice proactively obtained for DME and HH then scanned to [facility]  . TCN will proactively  send referral to [facility].TCN will continue to follow and collaborate with discharge planning team as additional post acute needs arise. Thank you.     Completed today:  OT recommendations pending 6/13/2023  PT recommendations pending 6/13/2023  Choice obtained: HH, O2/AD DME  6/13/2023*( pt and spouse declined to make IRF or SNF choice today 6/132023 )  [facility] referral proactively sent 6/13/2023   [facility] referral 6/13/2023      ADDENDUM 1:48 PM  --Pt verified physical Address:  [id number] [place] [address]    " How Severe Are Your Lesions?: moderate Is This A New Presentation, Or A Follow-Up?: Skin Lesion

## 2023-06-14 ENCOUNTER — HOME HEALTH ADMISSION (OUTPATIENT)
Dept: HOME HEALTH SERVICES | Facility: HOME HEALTHCARE | Age: 83
End: 2023-06-14
Payer: MEDICARE

## 2023-06-14 ENCOUNTER — APPOINTMENT (OUTPATIENT)
Dept: RADIOLOGY | Facility: MEDICAL CENTER | Age: 83
DRG: 871 | End: 2023-06-14
Attending: STUDENT IN AN ORGANIZED HEALTH CARE EDUCATION/TRAINING PROGRAM
Payer: MEDICARE

## 2023-06-14 PROBLEM — R06.02 SHORTNESS OF BREATH: Status: ACTIVE | Noted: 2021-01-06

## 2023-06-14 LAB
ANION GAP SERPL CALC-SCNC: 13 MMOL/L (ref 7–16)
BASOPHILS # BLD AUTO: 0.4 % (ref 0–1.8)
BASOPHILS # BLD: 0.03 K/UL (ref 0–0.12)
BUN SERPL-MCNC: 22 MG/DL (ref 8–22)
CALCIUM SERPL-MCNC: 8.4 MG/DL (ref 8.5–10.5)
CHLORIDE SERPL-SCNC: 105 MMOL/L (ref 96–112)
CO2 SERPL-SCNC: 19 MMOL/L (ref 20–33)
CREAT SERPL-MCNC: 1.19 MG/DL (ref 0.5–1.4)
EOSINOPHIL # BLD AUTO: 0.04 K/UL (ref 0–0.51)
EOSINOPHIL NFR BLD: 0.5 % (ref 0–6.9)
ERYTHROCYTE [DISTWIDTH] IN BLOOD BY AUTOMATED COUNT: 50.9 FL (ref 35.9–50)
GFR SERPLBLD CREATININE-BSD FMLA CKD-EPI: 60 ML/MIN/1.73 M 2
GLUCOSE SERPL-MCNC: 118 MG/DL (ref 65–99)
HCT VFR BLD AUTO: 41.2 % (ref 42–52)
HGB BLD-MCNC: 13.8 G/DL (ref 14–18)
IMM GRANULOCYTES # BLD AUTO: 0.03 K/UL (ref 0–0.11)
IMM GRANULOCYTES NFR BLD AUTO: 0.4 % (ref 0–0.9)
LYMPHOCYTES # BLD AUTO: 0.74 K/UL (ref 1–4.8)
LYMPHOCYTES NFR BLD: 10 % (ref 22–41)
MCH RBC QN AUTO: 33.4 PG (ref 27–33)
MCHC RBC AUTO-ENTMCNC: 33.5 G/DL (ref 32.3–36.5)
MCV RBC AUTO: 99.8 FL (ref 81.4–97.8)
MONOCYTES # BLD AUTO: 0.71 K/UL (ref 0–0.85)
MONOCYTES NFR BLD AUTO: 9.6 % (ref 0–13.4)
NEUTROPHILS # BLD AUTO: 5.86 K/UL (ref 1.82–7.42)
NEUTROPHILS NFR BLD: 79.1 % (ref 44–72)
NRBC # BLD AUTO: 0 K/UL
NRBC BLD-RTO: 0 /100 WBC (ref 0–0.2)
NT-PROBNP SERPL IA-MCNC: 1987 PG/ML (ref 0–125)
PLATELET # BLD AUTO: 137 K/UL (ref 164–446)
PMV BLD AUTO: 9.5 FL (ref 9–12.9)
POTASSIUM SERPL-SCNC: 4.1 MMOL/L (ref 3.6–5.5)
RBC # BLD AUTO: 4.13 M/UL (ref 4.7–6.1)
S PNEUM AG UR QL: NEGATIVE
SODIUM SERPL-SCNC: 137 MMOL/L (ref 135–145)
WBC # BLD AUTO: 7.4 K/UL (ref 4.8–10.8)

## 2023-06-14 PROCEDURE — 700117 HCHG RX CONTRAST REV CODE 255: Performed by: STUDENT IN AN ORGANIZED HEALTH CARE EDUCATION/TRAINING PROGRAM

## 2023-06-14 PROCEDURE — 97602 WOUND(S) CARE NON-SELECTIVE: CPT

## 2023-06-14 PROCEDURE — 700102 HCHG RX REV CODE 250 W/ 637 OVERRIDE(OP): Performed by: STUDENT IN AN ORGANIZED HEALTH CARE EDUCATION/TRAINING PROGRAM

## 2023-06-14 PROCEDURE — 97162 PT EVAL MOD COMPLEX 30 MIN: CPT

## 2023-06-14 PROCEDURE — 36415 COLL VENOUS BLD VENIPUNCTURE: CPT

## 2023-06-14 PROCEDURE — 700111 HCHG RX REV CODE 636 W/ 250 OVERRIDE (IP): Performed by: STUDENT IN AN ORGANIZED HEALTH CARE EDUCATION/TRAINING PROGRAM

## 2023-06-14 PROCEDURE — A9270 NON-COVERED ITEM OR SERVICE: HCPCS

## 2023-06-14 PROCEDURE — 94660 CPAP INITIATION&MGMT: CPT

## 2023-06-14 PROCEDURE — 700102 HCHG RX REV CODE 250 W/ 637 OVERRIDE(OP)

## 2023-06-14 PROCEDURE — 99233 SBSQ HOSP IP/OBS HIGH 50: CPT | Mod: GC | Performed by: HOSPITALIST

## 2023-06-14 PROCEDURE — 80048 BASIC METABOLIC PNL TOTAL CA: CPT

## 2023-06-14 PROCEDURE — 71275 CT ANGIOGRAPHY CHEST: CPT

## 2023-06-14 PROCEDURE — A9270 NON-COVERED ITEM OR SERVICE: HCPCS | Performed by: STUDENT IN AN ORGANIZED HEALTH CARE EDUCATION/TRAINING PROGRAM

## 2023-06-14 PROCEDURE — 770020 HCHG ROOM/CARE - TELE (206)

## 2023-06-14 PROCEDURE — 83880 ASSAY OF NATRIURETIC PEPTIDE: CPT

## 2023-06-14 PROCEDURE — 97166 OT EVAL MOD COMPLEX 45 MIN: CPT

## 2023-06-14 PROCEDURE — 85025 COMPLETE CBC W/AUTO DIFF WBC: CPT

## 2023-06-14 PROCEDURE — 94669 MECHANICAL CHEST WALL OSCILL: CPT

## 2023-06-14 PROCEDURE — 94760 N-INVAS EAR/PLS OXIMETRY 1: CPT

## 2023-06-14 RX ORDER — ECHINACEA PURPUREA EXTRACT 125 MG
2 TABLET ORAL
Status: DISCONTINUED | OUTPATIENT
Start: 2023-06-14 | End: 2023-06-16 | Stop reason: HOSPADM

## 2023-06-14 RX ORDER — ASPIRIN 81 MG/1
TABLET, CHEWABLE ORAL
Status: DISCONTINUED
Start: 2023-06-14 | End: 2023-06-14

## 2023-06-14 RX ORDER — ASPIRIN 81 MG/1
TABLET ORAL
Status: COMPLETED
Start: 2023-06-14 | End: 2023-06-14

## 2023-06-14 RX ADMIN — OMEPRAZOLE 40 MG: 20 CAPSULE, DELAYED RELEASE ORAL at 04:43

## 2023-06-14 RX ADMIN — ENOXAPARIN SODIUM 40 MG: 100 INJECTION SUBCUTANEOUS at 17:18

## 2023-06-14 RX ADMIN — PRAVASTATIN SODIUM 40 MG: 20 TABLET ORAL at 17:19

## 2023-06-14 RX ADMIN — DOXYCYCLINE 100 MG: 100 TABLET, FILM COATED ORAL at 04:43

## 2023-06-14 RX ADMIN — LEVOTHYROXINE SODIUM 75 MCG: 75 TABLET ORAL at 04:43

## 2023-06-14 RX ADMIN — IOHEXOL 70 ML: 350 INJECTION, SOLUTION INTRAVENOUS at 17:00

## 2023-06-14 RX ADMIN — ACETAMINOPHEN 650 MG: 325 TABLET, FILM COATED ORAL at 19:38

## 2023-06-14 RX ADMIN — ASPIRIN 81 MG: 81 TABLET, COATED ORAL at 04:54

## 2023-06-14 RX ADMIN — SENNOSIDES AND DOCUSATE SODIUM 2 TABLET: 50; 8.6 TABLET ORAL at 04:43

## 2023-06-14 RX ADMIN — ASPIRIN 81 MG: 81 TABLET, COATED ORAL at 09:12

## 2023-06-14 RX ADMIN — DOXYCYCLINE 100 MG: 100 TABLET, FILM COATED ORAL at 17:19

## 2023-06-14 RX ADMIN — CEFTRIAXONE SODIUM 2000 MG: 10 INJECTION, POWDER, FOR SOLUTION INTRAVENOUS at 04:49

## 2023-06-14 RX ADMIN — SENNOSIDES AND DOCUSATE SODIUM 2 TABLET: 50; 8.6 TABLET ORAL at 17:18

## 2023-06-14 ASSESSMENT — ENCOUNTER SYMPTOMS
NAUSEA: 0
CHILLS: 1
SPUTUM PRODUCTION: 0
BLURRED VISION: 0
BACK PAIN: 1
HEARTBURN: 0
DIZZINESS: 0
VOMITING: 0
WHEEZING: 0
ABDOMINAL PAIN: 0
SENSORY CHANGE: 1
PALPITATIONS: 0
COUGH: 0
DOUBLE VISION: 0
FALLS: 1
DEPRESSION: 0
FEVER: 1

## 2023-06-14 ASSESSMENT — ACTIVITIES OF DAILY LIVING (ADL): TOILETING: INDEPENDENT

## 2023-06-14 ASSESSMENT — PATIENT HEALTH QUESTIONNAIRE - PHQ9
SUM OF ALL RESPONSES TO PHQ9 QUESTIONS 1 AND 2: 0
1. LITTLE INTEREST OR PLEASURE IN DOING THINGS: NOT AT ALL
1. LITTLE INTEREST OR PLEASURE IN DOING THINGS: NOT AT ALL
2. FEELING DOWN, DEPRESSED, IRRITABLE, OR HOPELESS: NOT AT ALL
2. FEELING DOWN, DEPRESSED, IRRITABLE, OR HOPELESS: NOT AT ALL
SUM OF ALL RESPONSES TO PHQ9 QUESTIONS 1 AND 2: 0

## 2023-06-14 ASSESSMENT — COGNITIVE AND FUNCTIONAL STATUS - GENERAL
WALKING IN HOSPITAL ROOM: A LITTLE
PERSONAL GROOMING: A LITTLE
MOVING TO AND FROM BED TO CHAIR: A LITTLE
SUGGESTED CMS G CODE MODIFIER DAILY ACTIVITY: CK
STANDING UP FROM CHAIR USING ARMS: A LITTLE
DRESSING REGULAR UPPER BODY CLOTHING: A LITTLE
TOILETING: A LOT
CLIMB 3 TO 5 STEPS WITH RAILING: A LOT
SUGGESTED CMS G CODE MODIFIER MOBILITY: CK
TURNING FROM BACK TO SIDE WHILE IN FLAT BAD: A LITTLE
MOBILITY SCORE: 17
DAILY ACTIVITIY SCORE: 18
DRESSING REGULAR LOWER BODY CLOTHING: A LITTLE
MOVING FROM LYING ON BACK TO SITTING ON SIDE OF FLAT BED: A LITTLE
HELP NEEDED FOR BATHING: A LITTLE

## 2023-06-14 ASSESSMENT — FIBROSIS 4 INDEX: FIB4 SCORE: 2.27

## 2023-06-14 ASSESSMENT — GAIT ASSESSMENTS
DEVIATION: BRADYKINETIC;DECREASED HEEL STRIKE;DECREASED TOE OFF
ASSISTIVE DEVICE: FRONT WHEEL WALKER
DISTANCE (FEET): 80
GAIT LEVEL OF ASSIST: CONTACT GUARD ASSIST

## 2023-06-14 ASSESSMENT — PAIN DESCRIPTION - PAIN TYPE
TYPE: ACUTE PAIN
TYPE: ACUTE PAIN

## 2023-06-14 NOTE — WOUND TEAM
[facility]  Inpatient Services  Initial Wound and Skin Care Evaluation    Admission Date: 6/13/2023     Last order of IP CONSULT TO WOUND CARE was found on 6/13/2023 from Hospital Encounter on 6/13/2023     HPI, PMH, SH: Reviewed    Past Surgical History:   Procedure Laterality Date    HAMMERTOE CORRECTION  2/2/2015    Performed by Abdifatah Orta D.P.M. at SURGERY [facility] ORS    EXOSTOSIS EXCISION  6/3/2011    Performed by ABDIFATAH ORTA at SURGERY [facility] ORS    LESION EXCISION ORTHO  6/3/2011    Performed by ABDIFATAH ORTA at SURGERY [facility] OR[facility]    TOE ARTHROPLASTY  6/3/2011    Performed by ABDIFATAH ORTA at SURGERY [facility]    LUMBAR LAMINECTOMY DISKECTOMY  2005    microscopic    TONSILLECTOMY  1945    adenoidectomy    EYE SURGERY Bilateral     cataracts     Social History     Tobacco Use    Smoking status: Never    Smokeless tobacco: Never   Vaping Use    Vaping Use: Never used   Substance Use Topics    Alcohol use: Yes     Alcohol/week: 1.2 - 1.8 oz     Types: 2 - 3 Standard drinks or equivalent per week     Comment: occ     Chief Complaint   Patient presents with    GLF    ALOC     Diagnosis: Sepsis (HCC) [A41.9]    Unit where seen by Wound Team: S145/00     WOUND CONSULT/FOLLOW UP RELATED TO:  L. 2nd toe     WOUND HISTORY:  Patient admitted for change in LOC and multiple falls, patient has peripheral neuropathy found to have severe sepsis with acute respiratory failure, and demand ischemia.  Patient is unsure of how long his toe has been black for, but it is not painful.      WOUND ASSESSMENT/LDA  Wound 06/14/23 Arterial Ulcer Toe, 2nd Left paint with betadine (Active)   Wound Image   06/14/23 1600   Site Assessment Black 06/14/23 1600   Periwound Assessment Callused;Cool 06/14/23 1600   Margins Defined edges;Attached edges 06/14/23 1600   Closure Open to air 06/14/23 1600   Drainage Amount None 06/14/23 1600   Treatments Cleansed;Site care 06/14/23  1600   Wound Cleansing Normal Saline Irrigation 06/14/23 1600   Periwound Protectant Not Applicable 06/14/23 1600   Dressing Cleansing/Solutions 3% Betadine 06/14/23 1600   Dressing Options Open to Air 06/14/23 1600   Dressing Status Open to Air 06/14/23 1600   Dressing Change/Treatment Frequency Every Shift, and As Needed 06/14/23 1600   NEXT Dressing Change/Treatment Date 06/14/23 06/14/23 1600   NEXT Weekly Photo (Inpatient Only) 06/21/23 06/14/23 1600   Non-staged Wound Description Not applicable 06/14/23 1600   Shape irregular 06/14/23 1600   Wound Odor None 06/14/23 1600   Pulses N/A 06/14/23 1600   Exposed Structures AIME 06/14/23 1600   WOUND NURSE ONLY - Time Spent with Patient (mins) 45 06/14/23 1600   Number of days: 0        Vascular:    VEENA:   No results found.    Lab Values:    Lab Results   Component Value Date/Time    WBC 7.4 06/14/2023 02:38 AM    RBC 4.13 (L) 06/14/2023 02:38 AM    HEMOGLOBIN 13.8 (L) 06/14/2023 02:38 AM    HEMATOCRIT 41.2 (L) 06/14/2023 02:38 AM    SEDRATEWES 0 07/12/2011 09:17 AM    HBA1C 5.6 06/17/2022 12:41 PM        Culture Results show:  No results found for this or any previous visit (from the past 720 hour(s)).    Pain Level/Medicated:  denied       INTERVENTIONS BY WOUND TEAM:  Chart and images reviewed. Discussed with bedside RN. All areas of concern (based on picture review, LDA review and discussion with bedside RN) have been thoroughly assessed. Documentation of areas based on significant findings. This RN in to assess patient. Performed standard wound care which includes appropriate positioning, dressing removal and non-selective debridement. Pictures and measurements obtained weekly if/when required.  Preparation for Dressing removal: Dressing soaked with n/a  Non-selectively/Non-Excisionally Debrided with:  normal saline and gauze.  Sharp debridement/Excisional Debridement: n/a  Jackelin wound: Cleansed with normal saline, Prepped with betadine  Primary Dressing:  BHUMIKA  Secondary (Outer) Dressing: BHUMIKA    Advanced Wound Care Discharge Planning  Number of Clinicians necessary to complete wound care: 1  Is patient requiring IV pain medications for dressing changes: no  Length of time for dressing change 25 min. (This does not include chart review, pre-medication time, set up, clean up or time spent charting.)    Interdisciplinary consultation: Patient, Bedside RN , ,     EVALUATION / RATIONALE FOR TREATMENT:  Most Recent Date:  6/14/23: Patient's toe is most likely ischemic, but it is dry and intact with no signs of infection, continue to keep it clean and dry.  No advance wound care needed.       Goals: Steady decrease in wound area and depth weekly.    WOUND TEAM PLAN OF CARE ([X] for frequency of wound follow up,):   Nursing to follow dressing orders written for wound care. Contact wound team if area fails to progress, deteriorates or with any questions/concerns if something comes up before next scheduled follow up (See below as to whether wound is following and frequency of wound follow up)  Dressing changes by wound team:                   Follow up 3 times weekly:                NPWT change 3 times weekly:     Follow up 1-2 times weekly:      Follow up Bi-Monthly:           Follow up Monthly (High Risk):                        Follow up as needed:     Other (explain): Wound team is signing off    NURSING PLAN OF CARE ORDERS (X):  Dressing changes: See Dressing Care orders: x  Skin care: See Skin Care orders: x  RN Prevention Protocol: x  Rectal tube care: See Rectal Tube Care orders:   Other orders:    RSKIN:   CURRENTLY IN PLACE (X), APPLIED THIS VISIT (A), ORDERED (O):   Q shift [place]:  X  Q shift pressure point assessments:  X    Surface/Positioning x  Standard Mattress/Trauma Bed x         Low Airloss          ICU Low Airloss   Bariatric AI     Waffle cushion        Waffle Overlay          Reposition q 2 hours      TAPs Turning system     Z Ha Pillow      Offloading/Redistribution o  Sacral Offloading Dressing (Silicone dressing)     Heel Offloading Dressing (Silicone dressing)     o    Heel float boots (Prevalon boot)             Float Heels off Bed with Pillows           Respiratory room air  Silicone O2 tubing         Gray Foam Ear protectors     Cannula fixation Device (Tender )          High flow offloading Clip    Elastic head band offloading device      Anchorfast                                                         Trach with Optifoam split foam             Containment/Moisture Prevention AIME  Rectal tube or BMS    Purwick/Condom Cath        Fall Catheter    Barrier wipes           Barrier paste       Antifungal tx      Interdry        Mobilization assistance of 1 with Fww  Up to chair        Ambulate      PT/OT      Nutrition PO  Dietician        Diabetes Education      PO     TF     TPN     NPO   # days     Other        Anticipated discharge plans: TBD no advance wound care needed  LTACH:        SNF/Rehab:                  Home Health Care:           Outpatient Wound Center:            Self/Family Care:        Other:                  Vac Discharge Needs:   Vac Discharge plan is purely a recommendation from wound team and not a requirement for discharge unless otherwise stated by physician.  Not Applicable Pt not on a wound vac:   x    Regular Vac while inpatient, alternative dressing at DC:        Regular Vac in use and continued at DC:            Reg. Vac w/ Skin Sub/Biologic in use. Will need to be changed 2x wkly:      Veraflo Vac while inpatient, ok to transition to Regular Vac on Discharge (Bedside RN to Clamp small instillation tubing at time of DC):           Veraflo Vac while inpatient, would benefit from remaining on Veraflo Vac upon discharge:

## 2023-06-14 NOTE — CARE PLAN
The patient is Stable - Low risk of patient condition declining or worsening    Shift Goals  Clinical Goals: IV abx, Follow cardiac workup, PT, Wound Consult  Patient Goals: Rest  Family Goals: AIME    Progress made toward(s) clinical / shift goals:     Patient is not progressing towards the following goals:      Problem: Pain - Standard  Goal: Alleviation of pain or a reduction in pain to the patient’s comfort goal  Outcome: Progressing  Flowsheets (Taken 6/13/2023 1900)  Pain Rating Scale (NPRS): 0  Note: Monitor pain level. PRN medication as needed. Pillow for reposition/support.     Problem: Fall Risk  Goal: Patient will remain free from falls  Outcome: Progressing  Note: Bed/Strip alarm on. Rounded frequently. Side-rails up. Assistive device as needed. Standby assists.

## 2023-06-14 NOTE — THERAPY
"Occupational Therapy   Initial Evaluation     Patient Name: Farhat Stahl  Age:  83 y.o., Sex:  male  Medical Record #: [id number]  Today's Date: 6/14/2023     Precautions  Precautions: Fall Risk  Comments: hx of B peripheral neuropathy    Assessment  Patient is 83 y.o. male admitted after GLF, found to have sepsis 2/2 B PNA, acute respiratory failure, and demand ischemia. PMHx of interstitial lung disease and Blackwell's esophagus w/o dysphagia. Reports lives with wife who is \"disabled\", and is able to assist minimally. Reports his dtr comes over 1x/wk to assist with IADLs. Reports home is ADA accessible. Currently limited by decreased functional mobility, activity tolerance, strength, balance, and pain which are currently affecting pt's ability to complete ADLs/IADLs at baseline. Will continue to follow.     Plan    Occupational Therapy Initial Treatment Plan   Treatment Interventions: Self Care / Activities of Daily Living, Adaptive Equipment, Neuro Re-Education / Balance, Therapeutic Exercises, Therapeutic Activity  Treatment Frequency: 4 Times per Week    DC Equipment Recommendations: Unable to determine at this time  Discharge Recommendations: Recommend home health for continued occupational therapy services (as long as family can assist PRN)      Objective     06/14/23 1111   Prior Living Situation   Prior Services Intermittent Physical Support for ADL Per Family  (has had HH before)   Housing / Facility 1 Story House   Steps Into Home 2   Bathroom Set up Walk In Shower;Grab Bars;Shower Chair   Equipment Owned 4-Wheel Walker;Tub / Shower Seat;Grab Bar(s) By Toilet;Grab Bar(s) In Tub / Shower;Single Point Cane   Lives with - Patient's Self Care Capacity Spouse   Comments Reports lives with wife who is \"disabled\", and is able to assist minimally. Reports his dtr comes over 1x/wk to assist with IADLs. Reports home is ADA accessible   Prior Level of ADL Function   Self Feeding Independent   Grooming / Hygiene " Independent   Bathing Independent   Dressing Independent   Toileting Independent   Prior Level of IADL Function   Medication Management Independent   Laundry Independent   Kitchen Mobility Independent   Finances Independent   Home Management Requires Assist   Shopping Requires Assist   Prior Level Of Mobility Independent With Device in Home   History of Falls   History of Falls Yes   Date of Last Fall   (reason for admit; reports his toes/feet get caught on things d/t neuropathy and moving too quickly)   Precautions   Precautions Fall Risk   Comments BLE neuropathy   Vitals   O2 Delivery Device None - Room Air   Pain 0 - 10 Group   Therapist Pain Assessment Post Activity Pain Same as Prior to Activity;During Activity;Nurse Notified   Cognition    Cognition / Consciousness WDL   Level of Consciousness Alert   Comments pleasant and cooperative; motivated.   Passive ROM Upper Body   Passive ROM Upper Body WDL   Active ROM Upper Body   Active ROM Upper Body  WDL   Strength Upper Body   Upper Body Strength  X   Gross Strength Generalized Weakness, Equal Bilaterally.    Balance Assessment   Sitting Balance (Static) Good   Sitting Balance (Dynamic) Fair +   Standing Balance (Static) Fair   Standing Balance (Dynamic) Fair -   Weight Shift Sitting Good   Weight Shift Standing Fair   Comments w/FWW   Bed Mobility    Supine to Sit   (found EOB)   Sit to Supine Standby Assist   Scooting Supervised  (at EOB)   Comments HOB flat   ADL Assessment   Eating Supervision   Grooming   (declined need)   Lower Body Dressing Contact Guard Assist   Toileting   (declined need)   Functional Mobility   Sit to Stand Contact Guard Assist  (unable to stand until bed height elevated)   Bed, Chair, Wheelchair Transfer Contact Guard Assist   Toilet Transfers Moderate Assist  (with heavy use of GB; reports his toilet is taller)   Transfer Method Stand Step   Mobility w/FWW; EOB>hallway>toilet>BTB   Comments req v/cs for picking up feet as fatigued    Edema / Skin Assessment   Edema / Skin  X   Comments ecchymosis on face   Activity Tolerance   Sitting Edge of Bed found EOB   Comments limited by fatigue   Patient / Family Goals   Patient / Family Goal #1 to go home   Short Term Goals   Short Term Goal # 1 toilet txf with SPV (w/RTS)   Short Term Goal # 2 toileting with SPV   Short Term Goal # 3 standing g/h with SPV>2 min   Education Group   Education Provided Role of Occupational Therapist   Role of Occupational Therapist Patient Response Patient;Acceptance;Explanation;Verbal Demonstration;Reinforcement Needed   Occupational Therapy Initial Treatment Plan    Treatment Interventions Self Care / Activities of Daily Living;Adaptive Equipment;Neuro Re-Education / Balance;Therapeutic Exercises;Therapeutic Activity   Treatment Frequency 4 Times per Week   Problem List   Problem List Decreased Active Daily Living Skills;Decreased Homemaking Skills;Decreased Upper Extremity Strength Right;Decreased Upper Extremity Strength Left;Decreased Functional Mobility;Decreased Activity Tolerance;Impaired Postural Control / Balance

## 2023-06-14 NOTE — CARE PLAN
IS 1650  Problem: Hyperinflation  Goal: Prevent or improve atelectasis  Description: Target End Date:  3 to 4 days    1. Instruct incentive spirometry usage  2.  Perform hyperinflation therapy as indicated  Outcome: Progressing

## 2023-06-14 NOTE — PROGRESS NOTES
[facility] DAILY PROGRESS NOTE    Date of Service: 6/14/2023    Primary Team: [facility] Purple Team   Attending: Avril Turner M.D.   Senior Resident: Dr. Shah  Intern: Chetna Champagne M.D.  Contact:  [phone]    Patient ID:  Name: Farhat Stahl  YOB: 1940  Code Status: DNAR/DNI    Chief Complaint(s):  GLF and ALOC    Hospital Course:  No notes on file    Interval Update (6/14/2023): Patient with fever overnight, otherwise with resolved hypoxia.  Further work-up of presenting symptoms with CT PE and diagnostic/therapeutic thoracentesis pending.   PT/OT evaluations recommending home health at discharge, orders placed.    Consultants/Specialty:  N/a      A representative from my team or myself have discussed this patient's plan of care and discharge plan at IDT rounds today with Case Management, Nursing, Nursing leadership, and other members of the IDT team.    Disposition:  Patient is not medically cleared for discharge.   Anticipate discharge to to home with organized home healthcare and close outpatient follow-up.  I have placed the appropriate orders for post-discharge needs.    Review of Systems:    Review of Systems   Constitutional:  Positive for chills and fever.   Eyes:  Negative for blurred vision and double vision.   Respiratory:  Negative for cough, sputum production and wheezing.    Cardiovascular:  Negative for chest pain and palpitations.   Gastrointestinal:  Negative for abdominal pain, heartburn, nausea and vomiting.   Genitourinary:  Negative for dysuria and urgency.   Musculoskeletal:  Positive for back pain and falls.   Neurological:  Positive for sensory change. Negative for dizziness.   Psychiatric/Behavioral:  Negative for depression.        PHYSICAL EXAM:  Vitals:    06/14/23 0430 06/14/23 0713 06/14/23 0900 06/14/23 1150   BP: 115/60 125/62  120/57   Pulse: 66 77  72   Resp: 18 18  16   Temp: 37 °C (98.6 °F) 37.1 °C (98.7 °F)  37.5 °C (99.5 °F)    TempSrc: Temporal Temporal  Temporal   SpO2: 93% 94%  92%   Weight:   120 kg (264 lb 8.8 oz)    Height:       Body mass index is 34.91 kg/m².  Oxygen Therapy: Pulse Oximetry: 92 %, O2 (LPM): 0, O2 Delivery Device: None - Room Air    Intake/Output Summary (Last 24 hours) at 6/14/2023 0741  Last data filed at 6/14/2023 0600  Gross per 24 hour   Intake --   Output 1850 ml   Net -1850 ml       Physical Exam  Vitals and nursing note reviewed.   Constitutional:       General: He is not in acute distress.     Appearance: He is obese.   HENT:      Head: Normocephalic and atraumatic.      Nose: Nose normal.      Mouth/Throat:      Mouth: Mucous membranes are moist.      Pharynx: Oropharynx is clear. No oropharyngeal exudate or posterior oropharyngeal erythema.   Eyes:      General: No scleral icterus.  Cardiovascular:      Rate and Rhythm: Normal rate and regular rhythm.      Pulses: Normal pulses.      Heart sounds: Normal heart sounds. No murmur heard.  Pulmonary:      Effort: Pulmonary effort is normal. No respiratory distress.      Breath sounds: No wheezing or rales.   Abdominal:      General: Distension: Due to body habitus.      Palpations: Abdomen is soft.      Tenderness: There is no abdominal tenderness.   Musculoskeletal:         General: No swelling or tenderness. Normal range of motion.      Cervical back: Normal range of motion.   Skin:     General: Skin is warm and dry.   Neurological:      General: No focal deficit present.      Mental Status: He is alert and oriented to person, place, and time. Mental status is at baseline.   Psychiatric:         Mood and Affect: Mood normal.         Behavior: Behavior normal.         Laboratory Review:  Recent Labs     06/13/23  0350 06/14/23  0238   WBC 9.0 7.4   RBC 4.53* 4.13*   HEMOGLOBIN 15.0 13.8*   HEMATOCRIT 44.3 41.2*   MCV 97.8 99.8*   MCH 33.1* 33.4*   MCHC 33.9 33.5   RDW 48.7 50.9*   PLATELETCT 148* 137*   MPV 9.3 9.5     Recent Labs     06/13/23  0350  06/14/23  0238   SODIUM 135 137   POTASSIUM 3.9 4.1   CHLORIDE 101 105   CO2 20 19*   BUN 21 22   CREATININE 1.10 1.19   MAGNESIUM 1.6  --    CALCIUM 8.8 8.4*     Recent Labs     06/13/23  0350 06/14/23  0238   GLUCOSE 106* 118*     Recent Labs     06/13/23  0350 06/13/23  1718   APTT 28.5  --    INR 1.16* 1.12           Imaging/Procedures Review:    EC-ECHOCARDIOGRAM COMPLETE W/O CONT   Final Result      DX-CHEST-PORTABLE (1 VIEW)   Final Result         1.  Pulmonary edema and/or infiltrates.   2.  Small left pleural effusion   3.  Cardiomegaly      DX-SHOULDER 2+ LEFT   Final Result         1.  No acute traumatic bony injury.   2.  Pulmonary edema and/or infiltrates         DX-PELVIS-1 OR 2 VIEWS   Final Result         1.  No acute traumatic bony injury.      CT-CSPINE WITHOUT PLUS RECONS   Final Result         1.  Multilevel degenerative changes of the cervical spine limit diagnostic sensitivity of this examination, otherwise no acute traumatic bony injury of the cervical spine is apparent.   2.  Atherosclerosis      CT-HEAD W/O   Final Result         1.  No acute intracranial abnormality is identified, there are nonspecific white matter changes, commonly associated with small vessel ischemic disease.  Associated mild cerebral atrophy is noted.   2.  Bilateral maxillary sinusitis changes   3.  Atherosclerosis.         CT-CTA CHEST PULMONARY ARTERY W/ RECONS    (Results Pending)   US-THORACENTESIS PUNCTURE LEFT    (Results Pending)       ASSESSMENT & PLAN via Problem Representation:  * Shortness of breath- (present on admission)  Assessment & Plan  Hypoxic on presentation with diffuse hazy opacities and left pleural effusion on CXR.  Patient reports fever/chills, has elevated procalcitonin, but denies cough or sputum changes/production.  Negative COVID and flu.  Hypoxia resolved within 24 hours.  Hx of ILD.  - Continue ceftriaxone and doxycycline for now to cover for potential underlying pneumonia  - CT PE  pending  - Diagnostic and therapeutic thoracentesis of left pleural effusion pending    Demand ischemia (HCC)- (present on admission)  Assessment & Plan  Elevated troponin to 297->332 on presentation.  Denies any chest discomfort.    EKG reviewed and appears similar to prior, not concerning for acute ischemia  His PCP documentation does reported history of CKD 3, however GFR today is 66  Echo (6/13/23) normal.  - Monitor on telemetry  - Will consider cardiology consult if he develops chest pain, worsening symptoms, or other indication for more intensive cardiac workup    Acute respiratory failure with hypoxia (HCC)- (present on admission)  Assessment & Plan  Resolved  Initially thought to be due to presumed pneumonia, possibly due to recurrent pleural effusion.  Required 6 L O2 at admission but otherwise without respiratory distress.  Patient has history of idiopathic ILD.  Successfully weaned off oxygen by 6/14/2023.  -Telemetry  - Incentive spirometry, pulm hygeine, up as tolerated    Sepsis with acute hypoxic respiratory failure without septic shock (HCC)- (present on admission)  Assessment & Plan  Resolved  This is Severe Sepsis Present on admission  SIRS criteria identified on my evaluation include: Fever, with temperature greater than 100.9 deg F and Tachycardia, with heart rate greater than 90 BPM  Source is pneumonia  Sepsis protocol initiated  Crystalloid Fluid Administration not admitted per sepsis protocol due to appearance of effusion on imaging and significant respiratory failure with hypoxia   IV antibiotics as appropriate for source of sepsis.    Reassessment: I have reassessed the patient's hemodynamic status - blood pressure in the low 100s systolic, now on 6L O2    - Continue ceftriaxone and doxycycline for now; follow-up blood and sputum cultures    Interstitial lung disease (HCC)- (present on admission)  Assessment & Plan  History of interstitial lung disease and mild chronic LLL effusion  requiring thoracentesis in the past.  Follows with pulmonology Dr. Syed. Etiology unclear. Has previously declined biopsy  Jan 2022 PFT: Reduced FVC 2.75L or 69% pred, TLC of 4.11L or 56% pred and DLCO of 80% pred and 146% when corrected for [facility].  -Continue home medications  -Monitor respiratory status    CKD (chronic kidney disease)- (present on admission)  Assessment & Plan  Reported history of CKD stage 3 on chart review  GFR today 66  - Monitor renal function  - Avoid nephrotoxins    Ground-level fall  Assessment & Plan  Initially presented for ground level fall . Pt states he stumbled, leading to fall. He did strike head, CT head negative. Per PCP notes, patient has chronic falls 2/2 neuropathy, balance issues and is doing physical therapy. Uses a cane and knee brace.  - PT/OT while inpatient  - Fall precautions  - HH at discharge    QT prolongation- (present on admission)  Assessment & Plan  Prolonged to 520s on EKG.  Avoid QTc prolonging medications.   - Telemetry  - Monitor electrolytes, replace to goal K >4, mag >2    Blackwell's esophagus without dysplasia- (present on admission)  Assessment & Plan  - Continue PPI    Acquired hypothyroidism- (present on admission)  Assessment & Plan  - Continue levothyroxine    Severe obesity (BMI 35.0-39.9) with comorbidity (HCC)- (present on admission)  Assessment & Plan  BMI 35.09, counsele on weight loss and diet    Dyslipidemia- (present on admission)  Assessment & Plan  - Continue statin    Primary hypertension- (present on admission)  Assessment & Plan  Initial -110  - Hold lisinopril and carvedilol for now pending hemodynamic stabilization, restart as needed.        VTE prophylaxis: enoxaparin ppx  Fall/Tubes/Lines/Drains: piv  Nutrition/Diet Orders:   Orders Placed This Encounter   Procedures    Diet Order Diet: Regular     Standing Status:   Standing     Number of Occurrences:   1     Order Specific Question:   Diet:     Answer:   Regular [1]       Thank  you very much for allowing me to participate in this patient's care. I have performed a physical exam and reviewed and updated ROS and Plan today (6/14/2023). In review of yesterday's note (6/13/2023), there are no changes except as documented above. All plans of care were discussed with the attending physician. Please contact me with any questions.    Chetna Champagne M.D.  Internal Medicine Resident

## 2023-06-14 NOTE — FACE TO FACE
Face to Face Supporting Documentation - Home Health    The encounter with this patient was in whole or in part the primary reason for home health admission.    Date of encounter:   Patient:                    MRN:                       YOB: 2024  Farhat Stahl  [id number]  1940     Home health to see patient for:  Physical Therapy evaluation and treatment and Occupational therapy evaluation and treatment    Skilled need for:  Exacerbation of Chronic Disease State causing generalized weakness    Skilled nursing interventions to include:  Comment: pt/ot    Homebound status evidenced by:  Needs the assistance of another person in order to leave the home or Have a condition such that leaving his or her home is medically contraindicated. Leaving home requires a considerable and taxing effort. There is a normal inability to leave the home.    Community Physician to provide follow up care: Stefanie Zavaleta M.D.     Optional Interventions? No      I certify the face to face encounter for this home health care referral meets the CMS requirements and the encounter/clinical assessment with the patient was, in whole, or in part, for the medical condition(s) listed above, which is the primary reason for home health care. Based on my clinical findings: the service(s) are medically necessary, support the need for home health care, and the homebound criteria are met.  I certify that this patient has had a face to face encounter by myself.  Chetna Champagne M.D. - NPI: [id number]

## 2023-06-14 NOTE — THERAPY
"Physical Therapy   Initial Evaluation     Patient Name: Farhat Stahl  Age:  83 y.o., Sex:  male  Medical Record #: [id number]  Today's Date: 6/14/2023     Precautions  Precautions: (P) Fall Risk  Comments: (P) hx of B peripheral neuropathy    Assessment  Patient is 83 y.o. male admitted following GLF, found to be septic with acute hypoxic respiratory failure.  PMH of B peripheral neuropathy, interstitial lung disease, HTN, dyslipidemia, hypothyroidism, GERD.     Pt reports experiencing repeated falls due to moving too quickly and \"getting feet tangled\" due to peripheral neuropathy. Today, pt able to complete functional transfers, ambulate 80 ft and return BTB with SBA. Utilized FWW vs SPC for improved stability and balance, pt acknowledged need to consistently use 4WW upon DC home for safety. Pt feels he is near PLOF baseline, but aware would benefit from balance re-education, strengthening and safety strategies to reduce future fall risk. Pt will benefit from acute PT interventions to address impairments noted below. Recommend close HH therapy follow up.     Plan    Physical Therapy Initial Treatment Plan   Treatment Plan : Bed Mobility, Equipment, Gait Training, Self Care / Home Evaluation, Stair Training, Therapeutic Activities, Therapeutic Exercise  Treatment Frequency: 4 Times per Week  Duration: Until Therapy Goals Met    DC Equipment Recommendations: Recommend pt USE 4WW vs SPC upon DC home  Discharge Recommendations: Recommend home health for continued physical therapy services       06/14/23 1136   Precautions   Precautions Fall Risk   Comments hx of B peripheral neuropathy   Vitals   O2 Delivery Device None - Room Air   Pain 0 - 10 Group   Therapist Pain Assessment During Activity;Nurse Notified  (no pain reported)   Prior Living Situation   Prior Services Intermittent Physical Support for ADL Per Family   Housing / Facility 1 Story House   Steps Into Home 2   Steps In Home 0   Rail None   Equipment " "Owned 4-Wheel Walker;Single Point Cane   Lives with - Patient's Self Care Capacity Spouse   Comments pt reports spouse is also \"disabled,\" uses 4WW at baseline, but able to assist intermittently. Have support from DIL 1x/wk and grocery delivery. Reports home is ADA accessible   Prior Level of Functional Mobility   Bed Mobility Independent   Transfer Status Independent   Ambulation Independent   Ambulation Distance limited community   Assistive Devices Used Single Point Cane   Stairs Independent   Cognition    Cognition / Consciousness WDL   Level of Consciousness Alert   Comments receptive and motivated to work with therapy. reports most of his falls are due to moving too quickly and getting LEs tangled   Active ROM Lower Body    Active ROM Lower Body  WDL   Strength Lower Body   Lower Body Strength  X   Gross Strength Generalized Weakness, Equal Bilaterally   Comments grossly assessed B LE at 4-/5 with exception of ankle DF/PF observed at 3/5   Sensation Lower Body   Lower Extremity Sensation   X   Comments baseline impaired B feet due to neuropathy   Balance Assessment   Sitting Balance (Static) Good   Sitting Balance (Dynamic) Good   Standing Balance (Static) Fair   Standing Balance (Dynamic) Fair -   Weight Shift Sitting Good   Weight Shift Standing Fair   Comments w/ FWW   Bed Mobility    Supine to Sit   (seated EOB upon PT arrival)   Sit to Supine Standby Assist   Scooting Supervised   Comments HOB flat returning BTB   Gait Analysis   Gait Level Of Assist Contact Guard Assist   Assistive Device Front Wheel Walker   Distance (Feet) 80   # of Times Distance was Traveled 1   Deviation Bradykinetic;Decreased Heel Strike;Decreased Toe Off  (poor foot clearance through swing and R DF limited > L)   # of Stairs Climbed 0   Weight Bearing Status no restrictions   Functional Mobility   Sit to Stand Standby Assist  (from elevated bed height, inc difficulty from standard bed height req)   Bed, Chair, Wheelchair Transfer " Standby Assist   Transfer Method Stand Step   How much difficulty does the patient currently have...   Turning over in bed (including adjusting bedclothes, sheets and blankets)? 3   Sitting down on and standing up from a chair with arms (e.g., wheelchair, bedside commode, etc.) 3   Moving from lying on back to sitting on the side of the bed? 3   How much help from another person does the patient currently need...   Moving to and from a bed to a chair (including a wheelchair)? 3   Need to walk in a hospital room? 3   Climbing 3-5 steps with a railing? 2   6 clicks Mobility Score 17   Activity Tolerance   Sitting Edge of Bed EOB prior to PT session   Standing 8-10 min   Patient / Family Goals    Patient / Family Goal #1 to return home   Short Term Goals    Short Term Goal # 1 pt will perform supine to/from sit without bed features and SPV in 6 visits   Short Term Goal # 2 pt will perform sit <> stand and all functional transfers with FWW and SPV to improve mobility independence in 6 visits   Short Term Goal # 3 pt will ambulate > 100 ft with FWW and SPV to access home environment in 6 visits   Short Term Goal # 4 pt will negotiate 2 steps with FWW and SPV to enter/exit home in 6 visits   Education Group   Education Provided Role of Physical Therapist   Role of Physical Therapist Patient Response Patient;Acceptance;Explanation;Verbal Demonstration   Physical Therapy Initial Treatment Plan    Treatment Plan  Bed Mobility;Equipment;Gait Training;Self Care / Home Evaluation;Stair Training;Therapeutic Activities;Therapeutic Exercise   Treatment Frequency 4 Times per Week   Duration Until Therapy Goals Met   Problem List    Problems Impaired Bed Mobility;Impaired Ambulation;Impaired Transfers;Impaired Balance;Decreased Activity Tolerance   Anticipated Discharge Equipment and Recommendations   DC Equipment Recommendations   (recommend pt USE 4WW vs SPC upon DC home)   Discharge Recommendations Recommend home health for  continued physical therapy services   Interdisciplinary Plan of Care Collaboration   IDT Collaboration with  Nursing;Occupational Therapist   Patient Position at End of Therapy Seated;Bed Alarm On;Call Light within Reach;Tray Table within Reach   Collaboration Comments aware of PT eval and recs   Session Information   Date / Session Number  6/14-1 (1/4, 6/20)

## 2023-06-14 NOTE — DISCHARGE PLANNING
ATTN: Case Management  RE: Referral for Home Health    As of 6.14.23, we have accepted the Home Health referral for the patient listed above.    A [facility] clinician will be out to see the patient within 48 hours. If you have any questions or concerns regarding the patient’s transition to Home Health, please do not hesitate to contact us at [phone].      We look forward to collaborating with you,  [facility] Health Team

## 2023-06-14 NOTE — DISCHARGE PLANNING
Case Management Discharge Planning    Admission Date: 6/13/2023  GMLOS: 5  ALOS: 1    6-Clicks ADL Score: 18  6-Clicks Mobility Score: 17  PT and/or OT Eval ordered: Yes  Post-acute Referrals Ordered: Yes  Post-acute Choice Obtained: Yes  Has referral(s) been sent to post-acute provider:  Yes      Anticipated Discharge Dispo: Discharge Disposition: Discharged to home/self care (01)  Discharge Address: face sheet  Discharge Contact Phone Number: face sheet son    DME Needed: No    Action(s) Taken: OTHER    Patient seen by PT/OT. Recommendation is home with home health services. Patient choice is [facility]. Referral not sent as we need an order and face to face.    LSW reached out to resident Dr. Champagne requesting face to face and home health order.     Escalations Completed: None    Medically Clear: No    Next Steps: LSW to follow up with patient and medical team regarding discharge needs and barriers.     Barriers to Discharge: Medical clearance

## 2023-06-14 NOTE — CARE PLAN
The patient is Stable - Low risk of patient condition declining or worsening    Shift Goals  Clinical Goals: Cardiac wake up, Wound consult, IV antibiotic  Patient Goals: Rest  Family Goals: AIME    Progress made toward(s) clinical / shift goals:    Problem: Respiratory  Goal: Patient will achieve/maintain optimum respiratory ventilation and gas exchange  Outcome: Progressing   Patient will maintain O2 sat above 90 % on RA or will supplement as needed. Patient is currently on RA and tolerating well. There are no complains of SOB reported or noted at this time.   Problem: Self Care  Goal: Patient will have the ability to perform ADLs independently or with assistance (bathe, groom, dress, toilet and feed)  Outcome: Progressing   Patient will use urinal at bedside with RN/ CNA assistance to increase urine flow in standing position. Education provided and patient verbalized understanding.  Patient has CT-chest scheduled at 4 pm this afternoon and has been educated on contrast iodine use during procedure.

## 2023-06-14 NOTE — DISCHARGE PLANNING
Received Choice form @: 1144 06/13/23  Agency/Facility Name: [facility]  Referral sent per Choice form @: 1435 06/14/23

## 2023-06-14 NOTE — DISCHARGE PLANNING
"HTH/SCP TCN chart review completed. Collaborated with FRANCISCA Zuniga. Current discharge considerations are home with DME / home health if needed . Per Kardex, patient ambulating 1 x 10 feet with HHA and FWW and is currently on room air. Per TCN note on 6/13, \"However, Pt's Spouse verbalized She is also \" not sure\" if mbr can tolerate 3 hours of thrapy in an IRF. Furthermore, Sammi is not amenable to SNF level of therapies at this time. Sammi Spouse is  only amenable to organized home health care if indicated by provider and/or therapist.\"  No further TCN needs.    TCN will continue to follow and collaborate with discharge planning team as additional post acute needs arise. Thank you.    Completed:  Awaiting PT/OT recommendations   Choice obtained: HH, DME (O2/AD if needed)  Refused SNF/IRF choice (see above)  [facility] referral proactively sent 6/13/2023   [facility] referral 6/13/2023  "

## 2023-06-15 ENCOUNTER — APPOINTMENT (OUTPATIENT)
Dept: RADIOLOGY | Facility: MEDICAL CENTER | Age: 83
DRG: 871 | End: 2023-06-15
Attending: HOSPITALIST
Payer: MEDICARE

## 2023-06-15 ENCOUNTER — APPOINTMENT (OUTPATIENT)
Dept: RADIOLOGY | Facility: MEDICAL CENTER | Age: 83
DRG: 871 | End: 2023-06-15
Payer: MEDICARE

## 2023-06-15 PROBLEM — K59.00 CONSTIPATION: Status: ACTIVE | Noted: 2023-06-15

## 2023-06-15 PROBLEM — L03.032 CELLULITIS OF TOE OF LEFT FOOT: Status: ACTIVE | Noted: 2023-06-15

## 2023-06-15 LAB
ANION GAP SERPL CALC-SCNC: 13 MMOL/L (ref 7–16)
BASOPHILS # BLD AUTO: 0.5 % (ref 0–1.8)
BASOPHILS # BLD: 0.03 K/UL (ref 0–0.12)
BUN SERPL-MCNC: 16 MG/DL (ref 8–22)
CALCIUM SERPL-MCNC: 8.3 MG/DL (ref 8.5–10.5)
CHLORIDE SERPL-SCNC: 101 MMOL/L (ref 96–112)
CO2 SERPL-SCNC: 21 MMOL/L (ref 20–33)
CREAT SERPL-MCNC: 1.11 MG/DL (ref 0.5–1.4)
EOSINOPHIL # BLD AUTO: 0.18 K/UL (ref 0–0.51)
EOSINOPHIL NFR BLD: 2.8 % (ref 0–6.9)
ERYTHROCYTE [DISTWIDTH] IN BLOOD BY AUTOMATED COUNT: 50.2 FL (ref 35.9–50)
GFR SERPLBLD CREATININE-BSD FMLA CKD-EPI: 65 ML/MIN/1.73 M 2
GLUCOSE SERPL-MCNC: 111 MG/DL (ref 65–99)
HCT VFR BLD AUTO: 40.3 % (ref 42–52)
HGB BLD-MCNC: 13.7 G/DL (ref 14–18)
IMM GRANULOCYTES # BLD AUTO: 0.02 K/UL (ref 0–0.11)
IMM GRANULOCYTES NFR BLD AUTO: 0.3 % (ref 0–0.9)
L PNEUMO AG UR QL IA: NEGATIVE
LYMPHOCYTES # BLD AUTO: 1.32 K/UL (ref 1–4.8)
LYMPHOCYTES NFR BLD: 20.7 % (ref 22–41)
MAGNESIUM SERPL-MCNC: 1.7 MG/DL (ref 1.5–2.5)
MCH RBC QN AUTO: 33.2 PG (ref 27–33)
MCHC RBC AUTO-ENTMCNC: 34 G/DL (ref 32.3–36.5)
MCV RBC AUTO: 97.6 FL (ref 81.4–97.8)
MONOCYTES # BLD AUTO: 1.08 K/UL (ref 0–0.85)
MONOCYTES NFR BLD AUTO: 16.9 % (ref 0–13.4)
NEUTROPHILS # BLD AUTO: 3.76 K/UL (ref 1.82–7.42)
NEUTROPHILS NFR BLD: 58.8 % (ref 44–72)
NRBC # BLD AUTO: 0 K/UL
NRBC BLD-RTO: 0 /100 WBC (ref 0–0.2)
PHOSPHATE SERPL-MCNC: 3.3 MG/DL (ref 2.5–4.5)
PLATELET # BLD AUTO: 141 K/UL (ref 164–446)
PMV BLD AUTO: 9.3 FL (ref 9–12.9)
POTASSIUM SERPL-SCNC: 3.8 MMOL/L (ref 3.6–5.5)
PROCALCITONIN SERPL-MCNC: 2.04 NG/ML
RBC # BLD AUTO: 4.13 M/UL (ref 4.7–6.1)
SODIUM SERPL-SCNC: 135 MMOL/L (ref 135–145)
WBC # BLD AUTO: 6.4 K/UL (ref 4.8–10.8)

## 2023-06-15 PROCEDURE — 700102 HCHG RX REV CODE 250 W/ 637 OVERRIDE(OP)

## 2023-06-15 PROCEDURE — 93922 UPR/L XTREMITY ART 2 LEVELS: CPT | Mod: 26 | Performed by: INTERNAL MEDICINE

## 2023-06-15 PROCEDURE — A9270 NON-COVERED ITEM OR SERVICE: HCPCS

## 2023-06-15 PROCEDURE — 84145 PROCALCITONIN (PCT): CPT

## 2023-06-15 PROCEDURE — 84100 ASSAY OF PHOSPHORUS: CPT

## 2023-06-15 PROCEDURE — 94660 CPAP INITIATION&MGMT: CPT

## 2023-06-15 PROCEDURE — A9270 NON-COVERED ITEM OR SERVICE: HCPCS | Performed by: STUDENT IN AN ORGANIZED HEALTH CARE EDUCATION/TRAINING PROGRAM

## 2023-06-15 PROCEDURE — 93922 UPR/L XTREMITY ART 2 LEVELS: CPT

## 2023-06-15 PROCEDURE — 80048 BASIC METABOLIC PNL TOTAL CA: CPT

## 2023-06-15 PROCEDURE — 700111 HCHG RX REV CODE 636 W/ 250 OVERRIDE (IP): Performed by: STUDENT IN AN ORGANIZED HEALTH CARE EDUCATION/TRAINING PROGRAM

## 2023-06-15 PROCEDURE — 85025 COMPLETE CBC W/AUTO DIFF WBC: CPT

## 2023-06-15 PROCEDURE — 73630 X-RAY EXAM OF FOOT: CPT | Mod: LT

## 2023-06-15 PROCEDURE — 83735 ASSAY OF MAGNESIUM: CPT

## 2023-06-15 PROCEDURE — 770020 HCHG ROOM/CARE - TELE (206)

## 2023-06-15 PROCEDURE — 700102 HCHG RX REV CODE 250 W/ 637 OVERRIDE(OP): Performed by: STUDENT IN AN ORGANIZED HEALTH CARE EDUCATION/TRAINING PROGRAM

## 2023-06-15 PROCEDURE — 99233 SBSQ HOSP IP/OBS HIGH 50: CPT | Mod: GC | Performed by: HOSPITALIST

## 2023-06-15 PROCEDURE — 94669 MECHANICAL CHEST WALL OSCILL: CPT

## 2023-06-15 RX ORDER — POLYETHYLENE GLYCOL 3350 17 G/17G
1 POWDER, FOR SOLUTION ORAL DAILY
Status: DISCONTINUED | OUTPATIENT
Start: 2023-06-15 | End: 2023-06-16 | Stop reason: HOSPADM

## 2023-06-15 RX ORDER — CARVEDILOL 25 MG/1
25 TABLET ORAL 2 TIMES DAILY
Status: DISCONTINUED | OUTPATIENT
Start: 2023-06-15 | End: 2023-06-16 | Stop reason: HOSPADM

## 2023-06-15 RX ORDER — BISACODYL 10 MG
10 SUPPOSITORY, RECTAL RECTAL
Status: DISCONTINUED | OUTPATIENT
Start: 2023-06-15 | End: 2023-06-16 | Stop reason: HOSPADM

## 2023-06-15 RX ORDER — AMOXICILLIN 250 MG
2 CAPSULE ORAL 2 TIMES DAILY
Status: DISCONTINUED | OUTPATIENT
Start: 2023-06-15 | End: 2023-06-16 | Stop reason: HOSPADM

## 2023-06-15 RX ORDER — LISINOPRIL 10 MG/1
10 TABLET ORAL
Status: DISCONTINUED | OUTPATIENT
Start: 2023-06-15 | End: 2023-06-16 | Stop reason: HOSPADM

## 2023-06-15 RX ORDER — LEVOTHYROXINE SODIUM 0.07 MG/1
75 TABLET ORAL
Status: DISCONTINUED | OUTPATIENT
Start: 2023-06-16 | End: 2023-06-15

## 2023-06-15 RX ORDER — FLUTICASONE PROPIONATE 50 MCG
2 SPRAY, SUSPENSION (ML) NASAL 2 TIMES DAILY
Status: DISCONTINUED | OUTPATIENT
Start: 2023-06-15 | End: 2023-06-15

## 2023-06-15 RX ADMIN — MINERAL OIL, AND WHITE PETROLATUM 1 APPLICATION: 425; 573 OINTMENT OPHTHALMIC at 05:23

## 2023-06-15 RX ADMIN — DOXYCYCLINE 100 MG: 100 TABLET, FILM COATED ORAL at 05:24

## 2023-06-15 RX ADMIN — DOXYCYCLINE 100 MG: 100 TABLET, FILM COATED ORAL at 18:37

## 2023-06-15 RX ADMIN — POLYETHYLENE GLYCOL 3350 1 PACKET: 17 POWDER, FOR SOLUTION ORAL at 08:27

## 2023-06-15 RX ADMIN — CEFTRIAXONE SODIUM 2000 MG: 10 INJECTION, POWDER, FOR SOLUTION INTRAVENOUS at 05:24

## 2023-06-15 RX ADMIN — OMEPRAZOLE 40 MG: 20 CAPSULE, DELAYED RELEASE ORAL at 05:24

## 2023-06-15 RX ADMIN — MAGNESIUM HYDROXIDE 30 ML: 1200 LIQUID ORAL at 08:27

## 2023-06-15 RX ADMIN — SENNOSIDES AND DOCUSATE SODIUM 2 TABLET: 50; 8.6 TABLET ORAL at 05:24

## 2023-06-15 RX ADMIN — LEVOTHYROXINE SODIUM 75 MCG: 75 TABLET ORAL at 05:24

## 2023-06-15 RX ADMIN — SENNOSIDES AND DOCUSATE SODIUM 2 TABLET: 50; 8.6 TABLET ORAL at 18:37

## 2023-06-15 RX ADMIN — ENOXAPARIN SODIUM 40 MG: 100 INJECTION SUBCUTANEOUS at 18:36

## 2023-06-15 RX ADMIN — LISINOPRIL 10 MG: 10 TABLET ORAL at 18:37

## 2023-06-15 RX ADMIN — ASPIRIN 81 MG: 81 TABLET, COATED ORAL at 05:24

## 2023-06-15 RX ADMIN — PRAVASTATIN SODIUM 40 MG: 20 TABLET ORAL at 18:37

## 2023-06-15 RX ADMIN — CARVEDILOL 25 MG: 25 TABLET, FILM COATED ORAL at 18:37

## 2023-06-15 ASSESSMENT — ENCOUNTER SYMPTOMS
WHEEZING: 0
DOUBLE VISION: 0
NAUSEA: 0
CHILLS: 0
BACK PAIN: 1
BLURRED VISION: 0
PALPITATIONS: 0
SPUTUM PRODUCTION: 0
DIZZINESS: 0
FEVER: 0
COUGH: 0
VOMITING: 0
DEPRESSION: 0
HEARTBURN: 0
FALLS: 1
SENSORY CHANGE: 1
ABDOMINAL PAIN: 0

## 2023-06-15 ASSESSMENT — PATIENT HEALTH QUESTIONNAIRE - PHQ9
2. FEELING DOWN, DEPRESSED, IRRITABLE, OR HOPELESS: NOT AT ALL
2. FEELING DOWN, DEPRESSED, IRRITABLE, OR HOPELESS: NOT AT ALL
1. LITTLE INTEREST OR PLEASURE IN DOING THINGS: NOT AT ALL
1. LITTLE INTEREST OR PLEASURE IN DOING THINGS: NOT AT ALL
SUM OF ALL RESPONSES TO PHQ9 QUESTIONS 1 AND 2: 0
SUM OF ALL RESPONSES TO PHQ9 QUESTIONS 1 AND 2: 0

## 2023-06-15 ASSESSMENT — PAIN DESCRIPTION - PAIN TYPE
TYPE: ACUTE PAIN;CHRONIC PAIN
TYPE: ACUTE PAIN

## 2023-06-15 NOTE — ASSESSMENT & PLAN NOTE
- Continue PPI  
- Continue levothyroxine  
- Continue statin  
As seen on imaging.  - bowel protocol  
BMI 35.09, counsele on weight loss and diet  
Chronic wound of left 2nd toe, appearing acutely infected.  - antibiotics: continue ceftriaxone and doxy for now, transition to augmentin at discharge (7d total course)  - xray pending to r/o OM  - VEENA pending  - wound care team to debride if VEENA normal  - will consider consult if imaging / VEENA is abnormal  
History of interstitial lung disease and mild chronic LLL effusion requiring thoracentesis in the past.  Follows with pulmonology Dr. Syed. Etiology unclear. Has previously declined biopsy  Jan 2022 PFT: Reduced FVC 2.75L or 69% pred, TLC of 4.11L or 56% pred and DLCO of 80% pred and 146% when corrected for [facility].  CT PE this admission relatively unchanged compared to prior imaging in 8/2022  -Continue home medications  -Monitor respiratory status  
Hypoxic on presentation with diffuse hazy opacities and left pleural effusion on CXR.  Patient reports fever/chills, has elevated procalcitonin, but denies cough or sputum changes/production.  Negative COVID and flu.  Hypoxia resolved within 24 hours.  Hx of ILD.  - Continue ceftriaxone and doxycycline for now to cover for potential underlying pneumonia  
Initial -110  - Hold lisinopril and carvedilol for now pending hemodynamic stabilization, restart as needed.  
Initially presented for ground level fall . Pt states he stumbled, leading to fall. He did strike head, CT head negative. Per PCP notes, patient has chronic falls 2/2 neuropathy, balance issues and is doing physical therapy. Uses a cane and knee brace.  - PT/OT while inpatient  - Fall precautions  - HH at discharge  
Prolonged to 520s on EKG.  Avoid QTc prolonging medications.   - Telemetry  - Monitor electrolytes, replace to goal K >4, mag >2  
Reported history of CKD stage 3 on chart review  GFR today 66  - Monitor renal function  - Avoid nephrotoxins  
Resolved  Initially thought to be due to presumed pneumonia, possibly due to recurrent pleural effusion.  Required 6 L O2 at admission but otherwise without respiratory distress.  Patient has history of idiopathic ILD.  Successfully weaned off oxygen by 6/14/2023.  - Incentive spirometry, pulm hygeine, up as tolerated  
Resolved  This is Severe Sepsis Present on admission  SIRS criteria identified on my evaluation include: Fever, with temperature greater than 100.9 deg F and Tachycardia, with heart rate greater than 90 BPM  Source is pneumonia  Sepsis protocol initiated  Crystalloid Fluid Administration not admitted per sepsis protocol due to appearance of effusion on imaging and significant respiratory failure with hypoxia   IV antibiotics as appropriate for source of sepsis.    Reassessment: I have reassessed the patient's hemodynamic status - blood pressure in the low 100s systolic, now on 6L O2  
Sleep study 2015.  Compliant with CPAP at home.  -Continue nocturnal CPAP  
Stable  Elevated troponin to 297->332 on presentation.  Denies any chest discomfort.    EKG reviewed and appears similar to prior, not concerning for acute ischemia  His PCP documentation does reported history of CKD 3, however GFR today is 66  Echo (6/13/23) normal.  
84

## 2023-06-15 NOTE — PROGRESS NOTES
Telemetry Report:    Rhythm:     SB - SR  Heart Rate:   57 - 71   Ectopy:     Rare PVC's    MO:        0.20   QRS:     0.11   QT:    0.42      Per Telestrip from Monitor Room

## 2023-06-15 NOTE — PROGRESS NOTES
Received report and assumed care of patient. Patient alert and oriented x 4, on RA. Assessment completed, medicated per MAR. Patient updated regarding plan of care, all questions answered. Bed in lock and lowest position. Call light and belongings are within reach. Educated on room and call light, patient verbalized understanding. Patient expressed no further needs at this time.

## 2023-06-15 NOTE — CARE PLAN
The patient is Stable - Low risk of patient condition declining or worsening    Shift Goals  Clinical Goals: Wound consult, IV antibiotic  Patient Goals: sleep  Family Goals: AIME    Progress made toward(s) clinical / shift goals:    Problem: Pain - Standard  Goal: Alleviation of pain or a reduction in pain to the patient’s comfort goal  Outcome: Progressing  Note: Pain will be at a tolerable level as evidence by, adequate sleep, pt states pain level is tolerable, and VS within parameters.     Problem: Fall Risk  Goal: Patient will remain free from falls  Outcome: Progressing  Note: Patient remained free from falls. All fall precautions in place. Patient educated the need to call when needed assistance, patient verbalized understanding. Call light and personal belongings within reach.

## 2023-06-15 NOTE — DISCHARGE PLANNING
HTH/SCP TCN chart review completed. Current discharge considerations are home with home health and [facility]. [facility] has accepted this patient. TCN will continue to follow and collaborate with discharge planning team as additional post acute needs arise. Thank you.    Completed:  PT and OT recommending home health  Choice obtained: HH, DME (O2/AD if needed)  Refused SNF/IRF choice (see above)  [facility] referral proactively sent 6/13/2023   [facility] referral 6/13/2023

## 2023-06-15 NOTE — PROGRESS NOTES
Left 2nd toe wound care completed. Toe seems to be infected with a pocket of pus noted to Inner side of toe. Wound care team to review.

## 2023-06-15 NOTE — CARE PLAN
The patient is Stable - Low risk of patient condition declining or worsening    Shift Goals  Clinical Goals: Wound consult, Antibiotics  Patient Goals: Rest  Family Goals: AIME    Progress made toward(s) clinical / shift goals:    Problem: Respiratory  Goal: Patient will achieve/maintain optimum respiratory ventilation and gas exchange  Outcome: Progressing   Patient remains on RA and denies SOB during this shift. Incentive spirometer at bedside and patient has been educated on how to use incentive spirometer. Patient demonstrates to nurse accurately.   Problem: Discharge Planning - Pneumonia  Goal: Patient will verbalize understanding of lifestyle changes and therapeutic needs post discharge  Outcome: Progressing   Patient will continue on oral antibiotics post discharge per provider, education provided on use of deep breathing exercises and patient verbalized understanding.

## 2023-06-15 NOTE — PROGRESS NOTES
Patient report received from night shift. There are no new events reported last night. Patient continues with left 2nd toe wound infection with need for wound care and Provider to reassess. RN notified Provider and WC pending response and treatment plan recommendations.

## 2023-06-15 NOTE — CARE PLAN
Problem: Hyperinflation  Goal: Prevent or improve atelectasis  Description: Target End Date:  3 to 4 days    1. Instruct incentive spirometry usage  2.  Perform hyperinflation therapy as indicated  Outcome: Progressing   PEP BID  1500

## 2023-06-15 NOTE — THERAPY
Physical Therapy Contact Note    Patient Name: Farhat Stahl  Age:  83 y.o., Sex:  male  Medical Record #: [id number]  Today's Date: 6/15/2023    Attempted to see pt for PT session. Pt noted that earlier in the day his 2nd toe began to bleed when walking to the bathroom. Pt is waiting for wound care to re-assess. Will hold pending further assessment and follow up as appropriate.    Naomi Calabrese, PT, DPT

## 2023-06-15 NOTE — WOUND TEAM
Wound team received call regarding debridement of patient left 2nd toe. Patient with peripheral neuropathy as well as abnormal VEENA results, meaning wound RN unable to perform bedside sharp wound debridement due to scope of practice and protocol.   Patient may need surgical intervention if debridement is indicated.

## 2023-06-15 NOTE — DISCHARGE PLANNING
Case Management Discharge Planning    Admission Date: 6/13/2023  GMLOS: 5  ALOS: 2    6-Clicks ADL Score: 18  6-Clicks Mobility Score: 17  PT and/or OT Eval ordered: Yes  Post-acute Referrals Ordered: Yes  Post-acute Choice Obtained: Yes  Has referral(s) been sent to post-acute provider:  Yes      Anticipated Discharge Dispo: Discharge Disposition: D/T to home under A care in anticipation of covered skilled care (06)  Discharge Address: face sheet  Discharge Contact Phone Number: face sheet son    DME Needed: No    Action(s) Taken: OTHER    Patient discussed during IDT round. Patient may be medically clear later today. Patient will transition to oral antibiotics. Patient accepted for services with [facility]. LSW notes no other needs at this time.     Escalations Completed: None    Medically Clear: Possibly later this afternoon    Next Steps: LSW to follow up with patient and medical team regarding discharge needs and barriers.     Barriers to Discharge: Medical clearance

## 2023-06-15 NOTE — PROGRESS NOTES
[facility] DAILY PROGRESS NOTE    Date of Service: 6/15/2023    Primary Team: [facility]   Attending: Avril Turner M.D.   Senior Resident: Dr. Shah  Intern: Chetna Champagne M.D.  Contact:  [phone]    Patient ID:  Name: Farhat Stahl  YOB: 1940  Code Status: DNAR/DNI    Chief Complaint(s):  GLF and ALOC    Hospital Course:  No notes on file    Interval Update (6/15/2023): CTPE yesterday with findings relatively unchanged compared to prior imaging in 8/2022. No respiratory concerns or complaints this morning. Patient has a chronic wound on left 2nd toe that appears to be acutely infected. Possible etiology of fever from yesterday night. Continue antibiotics, xray foot, VEENA, wound care +/- ortho consult depending on imaging findings.    Consultants/Specialty:  N/a      A representative from my team or myself have discussed this patient's plan of care and discharge plan at IDT rounds today with Case Management, Nursing, Nursing leadership, and other members of the IDT team.    Disposition:  Patient is not medically cleared for discharge.   Anticipate discharge to to home with organized home healthcare and close outpatient follow-up.  I have placed the appropriate orders for post-discharge needs.    Review of Systems:    Review of Systems   Constitutional:  Negative for chills and fever.   Eyes:  Negative for blurred vision and double vision.   Respiratory:  Negative for cough, sputum production and wheezing.    Cardiovascular:  Negative for chest pain and palpitations.   Gastrointestinal:  Negative for abdominal pain, heartburn, nausea and vomiting.   Genitourinary:  Negative for dysuria and urgency.   Musculoskeletal:  Positive for back pain and falls.   Neurological:  Positive for sensory change. Negative for dizziness.   Psychiatric/Behavioral:  Negative for depression.        PHYSICAL EXAM:  Vitals:    06/14/23 2000 06/15/23 0000 06/15/23 0400 06/15/23 0727   BP:  135/66 133/68 124/73 (!) 142/78   Pulse: 71 62 64 63   Resp: 18 20 20 20   Temp: 37 °C (98.6 °F) 36.6 °C (97.9 °F) 36.7 °C (98.1 °F) 36.2 °C (97.2 °F)   TempSrc: Temporal Temporal Temporal Temporal   SpO2: 96% 96% 95% 92%   Weight:       Height:       Body mass index is 34.91 kg/m².  Oxygen Therapy: Pulse Oximetry: 92 %, O2 (LPM): 0, O2 Delivery Device: None - Room Air    Intake/Output Summary (Last 24 hours) at 6/14/2023 0741  Last data filed at 6/14/2023 0600  Gross per 24 hour   Intake --   Output 1850 ml   Net -1850 ml       Physical Exam  Vitals and nursing note reviewed.   Constitutional:       General: He is not in acute distress.     Appearance: He is obese.   HENT:      Head: Normocephalic and atraumatic.      Nose: Nose normal.      Mouth/Throat:      Mouth: Mucous membranes are moist.      Pharynx: Oropharynx is clear. No oropharyngeal exudate or posterior oropharyngeal erythema.   Eyes:      General: No scleral icterus.  Cardiovascular:      Rate and Rhythm: Normal rate and regular rhythm.      Pulses: Normal pulses.      Heart sounds: Normal heart sounds. No murmur heard.  Pulmonary:      Effort: Pulmonary effort is normal. No respiratory distress.      Breath sounds: No wheezing or rales.   Abdominal:      General: Distension: Due to body habitus.      Palpations: Abdomen is soft.      Tenderness: There is no abdominal tenderness.   Musculoskeletal:         General: No swelling or tenderness. Normal range of motion.      Cervical back: Normal range of motion.   Skin:     General: Skin is warm and dry.   Neurological:      General: No focal deficit present.      Mental Status: He is alert and oriented to person, place, and time. Mental status is at baseline.   Psychiatric:         Mood and Affect: Mood normal.         Behavior: Behavior normal.         Laboratory Review:  Recent Labs     06/13/23  0350 06/14/23  0238 06/15/23  0053   WBC 9.0 7.4 6.4   RBC 4.53* 4.13* 4.13*   HEMOGLOBIN 15.0 13.8* 13.7*    HEMATOCRIT 44.3 41.2* 40.3*   MCV 97.8 99.8* 97.6   MCH 33.1* 33.4* 33.2*   MCHC 33.9 33.5 34.0   RDW 48.7 50.9* 50.2*   PLATELETCT 148* 137* 141*   MPV 9.3 9.5 9.3     Recent Labs     06/13/23  0350 06/14/23  0238 06/15/23  0053   SODIUM 135 137 135   POTASSIUM 3.9 4.1 3.8   CHLORIDE 101 105 101   CO2 20 19* 21   BUN 21 22 16   CREATININE 1.10 1.19 1.11   MAGNESIUM 1.6  --  1.7   PHOSPHORUS  --   --  3.3   CALCIUM 8.8 8.4* 8.3*     Recent Labs     06/13/23  0350 06/14/23  0238 06/15/23  0053   GLUCOSE 106* 118* 111*     Recent Labs     06/13/23  0350 06/13/23  1718   APTT 28.5  --    INR 1.16* 1.12           Imaging/Procedures Review:    CT-CTA CHEST PULMONARY ARTERY W/ RECONS   Final Result         1. No CT evidence of pulmonary embolism.   2.  Mild to moderate left pleural effusion with mild pleural thickening, similar to prior exam in 2022.   3. Airspace opacity in the left lower lobe is also similar to prior      EC-ECHOCARDIOGRAM COMPLETE W/O CONT   Final Result      DX-CHEST-PORTABLE (1 VIEW)   Final Result         1.  Pulmonary edema and/or infiltrates.   2.  Small left pleural effusion   3.  Cardiomegaly      DX-SHOULDER 2+ LEFT   Final Result         1.  No acute traumatic bony injury.   2.  Pulmonary edema and/or infiltrates         DX-PELVIS-1 OR 2 VIEWS   Final Result         1.  No acute traumatic bony injury.      CT-CSPINE WITHOUT PLUS RECONS   Final Result         1.  Multilevel degenerative changes of the cervical spine limit diagnostic sensitivity of this examination, otherwise no acute traumatic bony injury of the cervical spine is apparent.   2.  Atherosclerosis      CT-HEAD W/O   Final Result         1.  No acute intracranial abnormality is identified, there are nonspecific white matter changes, commonly associated with small vessel ischemic disease.  Associated mild cerebral atrophy is noted.   2.  Bilateral maxillary sinusitis changes   3.  Atherosclerosis.         DX-FOOT-COMPLETE 3+ LEFT     (Results Pending)   US-VEENA SINGLE LEVEL BILAT    (Results Pending)       ASSESSMENT & PLAN via Problem Representation:  * Cellulitis of toe of left foot  Assessment & Plan  Chronic wound of left 2nd toe, appearing acutely infected.  - antibiotics: continue ceftriaxone and doxy for now, transition to augmentin at discharge (7d total course)  - xray pending to r/o OM  - VEENA pending  - wound care team to debride if VEENA normal  - will consider consult if imaging / VEENA is abnormal    Shortness of breath- (present on admission)  Assessment & Plan  Hypoxic on presentation with diffuse hazy opacities and left pleural effusion on CXR.  Patient reports fever/chills, has elevated procalcitonin, but denies cough or sputum changes/production.  Negative COVID and flu.  Hypoxia resolved within 24 hours.  Hx of ILD.  - Continue ceftriaxone and doxycycline for now to cover for potential underlying pneumonia    Constipation  Assessment & Plan  As seen on imaging.  - bowel protocol    Interstitial lung disease (HCC)- (present on admission)  Assessment & Plan  History of interstitial lung disease and mild chronic LLL effusion requiring thoracentesis in the past.  Follows with pulmonology Dr. Syed. Etiology unclear. Has previously declined biopsy  Jan 2022 PFT: Reduced FVC 2.75L or 69% pred, TLC of 4.11L or 56% pred and DLCO of 80% pred and 146% when corrected for VA.  CT PE this admission relatively unchanged compared to prior imaging in 8/2022  -Continue home medications  -Monitor respiratory status    Demand ischemia (HCC)- (present on admission)  Assessment & Plan  Stable  Elevated troponin to 297->332 on presentation.  Denies any chest discomfort.    EKG reviewed and appears similar to prior, not concerning for acute ischemia  His PCP documentation does reported history of CKD 3, however GFR today is 66  Echo (6/13/23) normal.    Acute respiratory failure with hypoxia (HCC)- (present on admission)  Assessment &  Plan  Resolved  Initially thought to be due to presumed pneumonia, possibly due to recurrent pleural effusion.  Required 6 L O2 at admission but otherwise without respiratory distress.  Patient has history of idiopathic ILD.  Successfully weaned off oxygen by 6/14/2023.  - Incentive spirometry, pulm hygeine, up as tolerated    Sepsis with acute hypoxic respiratory failure without septic shock (HCC)- (present on admission)  Assessment & Plan  Resolved  This is Severe Sepsis Present on admission  SIRS criteria identified on my evaluation include: Fever, with temperature greater than 100.9 deg F and Tachycardia, with heart rate greater than 90 BPM  Source is pneumonia  Sepsis protocol initiated  Crystalloid Fluid Administration not admitted per sepsis protocol due to appearance of effusion on imaging and significant respiratory failure with hypoxia   IV antibiotics as appropriate for source of sepsis.    Reassessment: I have reassessed the patient's hemodynamic status - blood pressure in the low 100s systolic, now on 6L O2    Obstructive sleep apnea- (present on admission)  Assessment & Plan  Sleep study 2015.  Compliant with CPAP at home.  -Continue nocturnal CPAP    CKD (chronic kidney disease)- (present on admission)  Assessment & Plan  Reported history of CKD stage 3 on chart review  GFR today 66  - Monitor renal function  - Avoid nephrotoxins    Ground-level fall- (present on admission)  Assessment & Plan  Initially presented for ground level fall . Pt states he stumbled, leading to fall. He did strike head, CT head negative. Per PCP notes, patient has chronic falls 2/2 neuropathy, balance issues and is doing physical therapy. Uses a cane and knee brace.  - PT/OT while inpatient  - Fall precautions  - HH at discharge    QT prolongation- (present on admission)  Assessment & Plan  Prolonged to 520s on EKG.  Avoid QTc prolonging medications.   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        VTE prophylaxis: enoxaparin ppx  Fall/Tubes/Lines/Drains: piv  Nutrition/Diet Orders:   Orders Placed This Encounter   Procedures    Diet Order Diet: Regular     Standing Status:   Standing     Number of Occurrences:   1     Order Specific Question:   Diet:     Answer:   Regular [1]       Thank you very much for allowing me to participate in this patient's care. I have performed a physical exam and reviewed and updated ROS and Plan today (6/15/2023). In review of yesterday's note (6/14/2023), there are no changes except as documented above. All plans of care were discussed with the attending physician. Please contact me with any questions.    Chetna Champagne M.D.  Internal Medicine Resident

## 2023-06-16 ENCOUNTER — PHARMACY VISIT (OUTPATIENT)
Dept: PHARMACY | Facility: MEDICAL CENTER | Age: 83
End: 2023-06-16
Payer: MEDICARE

## 2023-06-16 VITALS
RESPIRATION RATE: 18 BRPM | OXYGEN SATURATION: 94 % | HEART RATE: 66 BPM | HEIGHT: 73 IN | TEMPERATURE: 97 F | DIASTOLIC BLOOD PRESSURE: 70 MMHG | WEIGHT: 264.55 LBS | SYSTOLIC BLOOD PRESSURE: 128 MMHG | BODY MASS INDEX: 35.06 KG/M2

## 2023-06-16 PROBLEM — A41.9 SEPSIS WITH ACUTE HYPOXIC RESPIRATORY FAILURE WITHOUT SEPTIC SHOCK (HCC): Status: RESOLVED | Noted: 2023-06-13 | Resolved: 2023-06-16

## 2023-06-16 PROBLEM — J96.01 SEPSIS WITH ACUTE HYPOXIC RESPIRATORY FAILURE WITHOUT SEPTIC SHOCK (HCC): Status: RESOLVED | Noted: 2023-06-13 | Resolved: 2023-06-16

## 2023-06-16 PROBLEM — R06.02 SHORTNESS OF BREATH: Status: RESOLVED | Noted: 2021-01-06 | Resolved: 2023-06-16

## 2023-06-16 PROBLEM — I24.89 DEMAND ISCHEMIA (HCC): Status: RESOLVED | Noted: 2023-06-13 | Resolved: 2023-06-16

## 2023-06-16 PROBLEM — J96.01 ACUTE RESPIRATORY FAILURE WITH HYPOXIA (HCC): Status: RESOLVED | Noted: 2023-06-13 | Resolved: 2023-06-16

## 2023-06-16 PROBLEM — R65.20 SEPSIS WITH ACUTE HYPOXIC RESPIRATORY FAILURE WITHOUT SEPTIC SHOCK (HCC): Status: RESOLVED | Noted: 2023-06-13 | Resolved: 2023-06-16

## 2023-06-16 PROBLEM — N18.9 CKD (CHRONIC KIDNEY DISEASE): Status: RESOLVED | Noted: 2023-06-13 | Resolved: 2023-06-16
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
## 2023-06-16 PROBLEM — K59.00 CONSTIPATION: Status: RESOLVED | Noted: 2023-06-15 | Resolved: 2023-06-16

## 2023-06-16 LAB
ANION GAP SERPL CALC-SCNC: 10 MMOL/L (ref 7–16)
BUN SERPL-MCNC: 16 MG/DL (ref 8–22)
CALCIUM SERPL-MCNC: 8.8 MG/DL (ref 8.5–10.5)
CHLORIDE SERPL-SCNC: 102 MMOL/L (ref 96–112)
CO2 SERPL-SCNC: 25 MMOL/L (ref 20–33)
CREAT SERPL-MCNC: 1.08 MG/DL (ref 0.5–1.4)
GFR SERPLBLD CREATININE-BSD FMLA CKD-EPI: 68 ML/MIN/1.73 M 2
GLUCOSE SERPL-MCNC: 107 MG/DL (ref 65–99)
MAGNESIUM SERPL-MCNC: 1.9 MG/DL (ref 1.5–2.5)
POTASSIUM SERPL-SCNC: 4.5 MMOL/L (ref 3.6–5.5)
SODIUM SERPL-SCNC: 137 MMOL/L (ref 135–145)

## 2023-06-16 PROCEDURE — 94669 MECHANICAL CHEST WALL OSCILL: CPT

## 2023-06-16 PROCEDURE — 83735 ASSAY OF MAGNESIUM: CPT

## 2023-06-16 PROCEDURE — A9270 NON-COVERED ITEM OR SERVICE: HCPCS | Performed by: STUDENT IN AN ORGANIZED HEALTH CARE EDUCATION/TRAINING PROGRAM

## 2023-06-16 PROCEDURE — RXMED WILLOW AMBULATORY MEDICATION CHARGE: Performed by: STUDENT IN AN ORGANIZED HEALTH CARE EDUCATION/TRAINING PROGRAM

## 2023-06-16 PROCEDURE — 94760 N-INVAS EAR/PLS OXIMETRY 1: CPT

## 2023-06-16 PROCEDURE — 700102 HCHG RX REV CODE 250 W/ 637 OVERRIDE(OP)

## 2023-06-16 PROCEDURE — 99239 HOSP IP/OBS DSCHRG MGMT >30: CPT | Mod: GC | Performed by: HOSPITALIST

## 2023-06-16 PROCEDURE — 700111 HCHG RX REV CODE 636 W/ 250 OVERRIDE (IP): Performed by: STUDENT IN AN ORGANIZED HEALTH CARE EDUCATION/TRAINING PROGRAM

## 2023-06-16 PROCEDURE — A9270 NON-COVERED ITEM OR SERVICE: HCPCS

## 2023-06-16 PROCEDURE — 700102 HCHG RX REV CODE 250 W/ 637 OVERRIDE(OP): Performed by: STUDENT IN AN ORGANIZED HEALTH CARE EDUCATION/TRAINING PROGRAM

## 2023-06-16 PROCEDURE — 80048 BASIC METABOLIC PNL TOTAL CA: CPT

## 2023-06-16 PROCEDURE — 97535 SELF CARE MNGMENT TRAINING: CPT | Mod: CO

## 2023-06-16 RX ORDER — AMOXICILLIN AND CLAVULANATE POTASSIUM 875; 125 MG/1; MG/1
1 TABLET, FILM COATED ORAL EVERY 12 HOURS
Qty: 14 TABLET | Refills: 0 | Status: SHIPPED | OUTPATIENT
Start: 2023-06-16 | End: 2023-06-16 | Stop reason: SDUPTHER

## 2023-06-16 RX ORDER — AMOXICILLIN AND CLAVULANATE POTASSIUM 875; 125 MG/1; MG/1
1 TABLET, FILM COATED ORAL EVERY 12 HOURS
Qty: 6 TABLET | Refills: 0 | Status: ACTIVE | OUTPATIENT
Start: 2023-06-16 | End: 2023-06-19

## 2023-06-16 RX ORDER — AMOXICILLIN AND CLAVULANATE POTASSIUM 875; 125 MG/1; MG/1
1 TABLET, FILM COATED ORAL EVERY 12 HOURS
Status: DISCONTINUED | OUTPATIENT
Start: 2023-06-16 | End: 2023-06-16 | Stop reason: HOSPADM

## 2023-06-16 RX ADMIN — AMOXICILLIN AND CLAVULANATE POTASSIUM 1 TABLET: 875; 125 TABLET, FILM COATED ORAL at 08:32

## 2023-06-16 RX ADMIN — SENNOSIDES AND DOCUSATE SODIUM 2 TABLET: 50; 8.6 TABLET ORAL at 04:25

## 2023-06-16 RX ADMIN — ASPIRIN 81 MG: 81 TABLET, COATED ORAL at 04:24

## 2023-06-16 RX ADMIN — MINERAL OIL, AND WHITE PETROLATUM 1 APPLICATION: 425; 573 OINTMENT OPHTHALMIC at 04:38

## 2023-06-16 RX ADMIN — LEVOTHYROXINE SODIUM 75 MCG: 75 TABLET ORAL at 04:24

## 2023-06-16 RX ADMIN — POLYETHYLENE GLYCOL 3350 1 PACKET: 17 POWDER, FOR SOLUTION ORAL at 04:24

## 2023-06-16 RX ADMIN — CEFTRIAXONE SODIUM 2000 MG: 10 INJECTION, POWDER, FOR SOLUTION INTRAVENOUS at 04:34

## 2023-06-16 RX ADMIN — CARVEDILOL 25 MG: 25 TABLET, FILM COATED ORAL at 04:24

## 2023-06-16 RX ADMIN — MAGNESIUM HYDROXIDE 30 ML: 1200 LIQUID ORAL at 04:24

## 2023-06-16 RX ADMIN — OMEPRAZOLE 40 MG: 20 CAPSULE, DELAYED RELEASE ORAL at 04:25

## 2023-06-16 RX ADMIN — DOXYCYCLINE 100 MG: 100 TABLET, FILM COATED ORAL at 04:24

## 2023-06-16 ASSESSMENT — COGNITIVE AND FUNCTIONAL STATUS - GENERAL
SUGGESTED CMS G CODE MODIFIER DAILY ACTIVITY: CJ
HELP NEEDED FOR BATHING: A LITTLE
DRESSING REGULAR LOWER BODY CLOTHING: A LITTLE
DAILY ACTIVITIY SCORE: 21
TOILETING: A LITTLE

## 2023-06-16 ASSESSMENT — PAIN DESCRIPTION - PAIN TYPE: TYPE: ACUTE PAIN

## 2023-06-16 NOTE — PROGRESS NOTES
D/c instructions given, educated on worsening s/s. Pt understands and questions answered. D/c home with son

## 2023-06-16 NOTE — DISCHARGE INSTRUCTIONS
Please keep close monitoring of your Left 2nd toe for worsening symptoms. Continue taking your oral antibiotics as prescribed. Alert your MD for worsening symptoms.

## 2023-06-16 NOTE — CARE PLAN
The patient is Stable - Low risk of patient condition declining or worsening    Problem: Fall Risk  Goal: Patient will remain free from falls  Outcome: Progressing  Note: Patient is using call light, and waiting for staff assistance before ambulating.     Shift Goals  Clinical Goals: Patient safety/ Sleep Hygeine  Patient Goals: Rest  Family Goals: AIME    Progress made toward(s) clinical / shift goals: Patient will be able to have regular bowel movements.

## 2023-06-16 NOTE — PROGRESS NOTES
Telemetry Report: Pm Report 06/15/2023    Rhythm:   Sinus Rhythm    Heart Rate: 60-71      Ectopy:  PVC        MS: 19          QRS: 10        QT: 40                    Per Tel[employer] from Monitor Room

## 2023-06-16 NOTE — DISCHARGE SUMMARY
[facility] Discharge Summary    Attending: Dr. Avril Turner  Senior Resident: Dr. Shah  Intern:  Dr. Champagne  Contact Number: [phone]    CHIEF COMPLAINT ON ADMISSION  Chief Complaint   Patient presents with    GLF    ALOC       Reason for Admission  Acute respiratory failure with hypoxia  Left 2nd toe cellulitis    Admission Date  6/13/2023    CODE STATUS  DNAR/DNI    HPI & HOSPITAL COURSE  This is a 83 y.o. male here with past medical history of known interstitial lung disease and recurrent exudative left pleural effusions followed by outpatient pulmonology, obstructive sleep apnea on CPAP, hypertension, dyslipidemia, hypothyroidism, Blackwell's esophagus, bilateral peripheral neuropathy who originally presented after a ground-level fall and he was initially found to be having acute respiratory failure with hypoxia and concerns of possible underlying bacterial pneumonia in the setting of an elevated procalcitonin and febrile illness requiring up to 6 L of oxygen.  His respiratory failure improved back to room air with treatment of ceftriaxone and doxycycline.  Hospital course was further complicated by spiking any fever and on further evaluation was noted to have necrosis at the distal portion of his left second toe with associated edema and erythema concerning for cellulitis.  He was then started on a course of Augmentin to complete outpatient.  Educated patient to monitor the status of his toe and if having increasing edema worsening erythema to return to the hospital for further evaluation as this may warrant future debridement.      Therefore, he is discharged in good and stable condition to home with close outpatient follow-up.    The patient met 2-midnight criteria for an inpatient stay at the time of discharge.    Discharge Date  6/16/2023    Physical Exam on Day of Discharge  Physical Exam  Vitals and nursing note reviewed.   Constitutional:       General: He is not in acute distress.      Appearance: He is obese.   HENT:      Head: Normocephalic and atraumatic.      Nose: Nose normal.      Mouth/Throat:      Mouth: Mucous membranes are moist.      Pharynx: Oropharynx is clear. No oropharyngeal exudate or posterior oropharyngeal erythema.   Eyes:      General: No scleral icterus.  Cardiovascular:      Rate and Rhythm: Normal rate and regular rhythm.      Pulses: Normal pulses.      Heart sounds: Normal heart sounds. No murmur heard.  Pulmonary:      Effort: Pulmonary effort is normal. No respiratory distress.      Breath sounds: No wheezing or rales.   Abdominal:      General: Distension: Due to body habitus.      Palpations: Abdomen is soft.      Tenderness: There is no abdominal tenderness.   Musculoskeletal:         General: No swelling or tenderness. Normal range of motion.      Cervical back: Normal range of motion.   Skin:     General: Skin is warm and dry.      Comments: Distal necrosis of left 2nd toe with associated surrounding erythema and edema, improved from 6/15/2023   Neurological:      General: No focal deficit present.      Mental Status: He is alert and oriented to person, place, and time. Mental status is at baseline.   Psychiatric:         Mood and Affect: Mood normal.         Behavior: Behavior normal.         FOLLOW UP ITEMS POST DISCHARGE  -Follow up with PCP    DISCHARGE DIAGNOSES  Principal Problem:    Cellulitis of toe of left foot (POA: Unknown)  Active Problems:    Primary hypertension (POA: Yes)      Overview: 6/07 echocardiogram borderline LVH      1/09 VEENA left 1.3, right 1.3      9/10 urine mac 2      3/11 u/s vascular lower ext negative      5/11 [facility] note       5/12 [facility] note on coreg 25 bid for paroxysmal atrial fibrillation and       vasotec 10 mg qday      6/12 on coreg 25 bid      id, off vasotec      7/2/12 [facility] note      7/13 cardiology note      1/21/14 ultrasound carotid negative; on coreg 25 mg bid, asa      5/9/14 echo normal ejection fraction 65%, mild LVH       5/16/14 decrease coreg to 12.5 mg bid, cont asa      8/21/14 cardiology note follow up one year      3/21/16 urine mac <8 on coreg 12.5 mg      3/10/17 urine mac 3 on coreg 12.5 mg       5/2/17 cardiology note one episode atrial fibrillation 2012 will continue       on aspirin, encourage weight loss, follow-up one year      9/27/18 on coreg 25 mg bid add lisinopril 10 mg      4/24/19 bun 16,creat 1.4,urine mac 33      6/13/19 bun 17,creat 1.35,GFR 51 on lisinopril 10 mg                                   Dyslipidemia (POA: Yes)      Overview: 4/09 chol 189,trig 87,hdl 45,ldl 127      9/09 chol 206,trig 90,hdl 52,ldl 136 off lipitor/zocor      12/09 chol 183,trig 84,hdl 42,ldl 124      2/10 [facility] note chol 153,ldl 124 started by cards on pravastatin 40 since       ?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      9/10 chol 135,trig 52,hdl 46,ldl 79 on pravachol      9/11 chol 145,trig 64,hdl 52,ldl 80 on pravachol      5/12 chol 146,trig 98,hdl 46,ldl 80 on pravachol 40 mg      10/12 chol 133,trig 65,hdl 46,ldl 74 on pravachol 40 mg      7/19/13 chol 121,trig 65,hdl 48,ldl 60 on pravachol 40 mg      9/3/13 chol 131,trig 75,hdl 49,ldl 67 on pravachol 40 mg      4/11/14 chol 150,trig 42,hdl 62,ldl 80 on pravachol 40 mg      8/22/14 chol 134,trig 73,hdl 54,ldl 65 on pravachol 40 mg      4/28/15 chol 114,trig 59,hdl 51,ldl 51 on pravachol 40 mg      3/21/16 chol 124,trig 57,hdl 47,ldl 66 on pravachol 40 mg      3/10/17 chol 151,trig 89,hdl 53,ldl 80 on pravachol 40 mg      4/10/18 chol 158,trig 134,hdl 56,ldl 75 on pravachol 40 mg      4/24/19 chol 118,trig 83,hdl 51,ldl 50 on pravachol 40 mg      2/24/20 chol 132,trig 96,hdl 46,ldl 67 on pravachol 40 mg                            Obstructive sleep apnea (POA: Yes)      Overview: 2/18/14 overnight pulse oximetry room air time less than 88% saturation       182 minutes, low saturation 67%, basal saturation 87%, apnea index 42       events per hour      4/11/14 pt would like to  repeat tests, initial test done while he had       bronchitis      6/18/14 nocturnal oximetry <89% for 231 minutes, low 63%, AHI per hour       55;will refer to [facility]      2/11/15 [facility] note, recommend cpap titration      3/21/15 [facility] sleep study 191 minutes,AHI 57,hypopnea index 42,minimum       saturation 81%,improved on cpap 11 titration study      4/30/15 [facility] sleep note mask fit auto CPAP 10-15, followup one month      12/14/15 [facility] sleep note AHI 57 on autopap 10-16, 100% compliance AHI       decreased to 0.6      3/2/17 sleep note       6/14/18 sleep note continue cpap 10-16 follow up one year      6/5/19 sleep note on cpap 10-16 compliance 100%                Severe obesity (BMI 35.0-39.9) with comorbidity (HCC) (POA: Yes)      Overview: 8/22/14 BMI 37.8      3/17/16 BMI 36.7 declines nutrition assessment      7/5/16 BMI 34.6      3/9/17 BMI 37.3 declines nutrition consultation      9/27/18 BMI 38.1 referral weight management program        2/13/20 BMI 35.9 declines weight management referral    Acquired hypothyroidism (POA: Yes)      Overview: 5/19/12 tsh 7.45      4/28/15 tsh 7.49 start synthroid 50 mcg repeat tsh 6 weeks      8/28/15 tsh 3.2 on synthroid 50 mcg      3/21/16 hypothyroid 6.1 on synthroid 50 mcg, increase to synthroid 75 mcg       repeat tsh 6 weeks      5/6/16 tsh 2.4 on synthroid 75 mcg      3/10/17 tsh 3.9 on synthroid 75 mcg      4/10/18 tsh 3.6 on synthroid 75 mcg      6/13/19 tsh 3.2 on synthroid 75 mcg      2/24/20 tsh 2.9 on synthroid 75 mcg          Pompa's esophagus without dysplasia (POA: Yes)      Overview: 6/23/16 [facility] note follow up EGD showing erosions of acute gastric ulcer       without hemorrhage or perforation,chronic inactive gastritis with reactive       epithelial changes, intestinal metaplasia, dysplasia, malignancy;       consistent with pompa's esophagus and reflux esophagitis, negative for       dysplasia or h.pylori, repeat EGD 3 months exam gastric ulcer, started on  "      omeprazole 40 mg, follow-up EGD in september 11/2/17 EGD per [facility] erosions and antral biopsies performed, hiatal hernia,       irregularly gastroesophageal junction, biopsies performed      6/13/19 b12 662,vit d 37 on prilosec      10/16/20 [facility] note continue surveillance EGD every 3 years, continue       pantoprazole, yearly vitamin d,b12,magnesium, folic acid    Interstitial lung disease (HCC) (POA: Yes)    QT prolongation (POA: Yes)  Resolved Problems:    Shortness of breath (POA: Yes)    Sepsis with acute hypoxic respiratory failure without septic shock (HCC) (POA: Yes)    Acute respiratory failure with hypoxia (HCC) (POA: Yes)    Demand ischemia (HCC) (POA: Yes)    Ground-level fall (POA: Yes)    CKD (chronic kidney disease) (POA: Yes)    Constipation (POA: Unknown)      FOLLOW UP  Future Appointments   Date Time Provider Department Center   11/7/2023 10:50 AM [facility]     Stefanie Zavaleta M.D.  [address]  [address]  [facility] [address]  [phone]    Schedule an appointment as soon as possible for a visit in 1 week(s)      [facility], Emergency Dept  [address] [facility] [address] [phone]  Follow up  If symptoms worsen      MEDICATIONS ON DISCHARGE     Medication List        START taking these medications        Instructions   amoxicillin-clavulanate 875-125 MG Tabs  Commonly known as: AUGMENTIN   Take 1 Tablet by mouth every 12 hours for 3 days.  Dose: 1 Tablet            CONTINUE taking these medications        Instructions   aspirin 81 MG tablet   Take 81 mg by mouth at bedtime. Indications: \"heart\"  Dose: 81 mg     carvedilol 25 MG Tabs  Commonly known as: COREG   Take 1 Tablet by mouth 2 times a day. Indications: High Blood Pressure Disorder  Dose: 25 mg     D3 2000 UNIT Tabs  Generic drug: Cholecalciferol   Take 1 Tablet by mouth every day. Indications: supplement  Dose: 1 Tablet     fluticasone 50 MCG/ACT nasal spray  Commonly " known as: FLONASE   Administer 2 Sprays into affected nostril(S) 2 times a day. Indications: allergies  Dose: 2 Spray     levothyroxine 75 MCG Tabs  Commonly known as: SYNTHROID   Take 75 mcg by mouth every morning on an empty stomach.  Dose: 75 mcg     lisinopril 10 MG Tabs  Commonly known as: PRINIVIL   Take 1 Tablet by mouth every day. Indications: High Blood Pressure Disorder  Dose: 10 mg     omeprazole 40 MG delayed-release capsule  Commonly known as: PRILOSEC   Take 1 Capsule by mouth every day. Indications: Heartburn  Dose: 40 mg     pravastatin 40 MG tablet  Commonly known as: PRAVACHOL   Take 1 Tablet by mouth every day. Indications: High Amount of Fats in the Blood  Dose: 40 mg            STOP taking these medications      clotrimazole-betamethasone 1-0.05 % Crea  Commonly known as: LOTRISONE              Allergies  Allergies   Allergen Reactions    Other Environmental Runny Nose and Itching     Pollens       DIET  Orders Placed This Encounter   Procedures    Diet Order Diet: Regular     Standing Status:   Standing     Number of Occurrences:   1     Order Specific Question:   Diet:     Answer:   Regular [1]       ACTIVITY  As tolerated.  Weight bearing as tolerated    CONSULTATIONS  -N/A    PROCEDURES  -N/A    LABORATORY  Lab Results   Component Value Date    SODIUM 137 06/16/2023    POTASSIUM 4.5 06/16/2023    CHLORIDE 102 06/16/2023    CO2 25 06/16/2023    GLUCOSE 107 (H) 06/16/2023    BUN 16 06/16/2023    CREATININE 1.08 06/16/2023        Lab Results   Component Value Date    WBC 6.4 06/15/2023    HEMOGLOBIN 13.7 (L) 06/15/2023    HEMATOCRIT 40.3 (L) 06/15/2023    PLATELETCT 141 (L) 06/15/2023        Total time of the discharge process exceeds 46 minutes.

## 2023-06-16 NOTE — DOCUMENTATION QUERY
"                                                                         [facility]                                                                       Query Response Note      PATIENT:               SALO RICHARDS  ACCT #:                  [id number]  MRN:                     [id number]  :                      1940  ADMIT DATE:       2023 3:00 AM  DISCH DATE:          RESPONDING  PROVIDER #:        [id number]           QUERY TEXT:    The diagnosis of sepsis has been documented in the medical record. (SIRS 2/4 on date of admission only).   Please clarify the status of sepsis by providing SIRS criteria and sepsis-related organ dysfunction.    Sepsis - real or suspected infection plus 2 or more SIRS criteria + organ dysfunction related to sepsis    Temp <96.8 or >101  HR >90  RR >20  WBC <4,000 or > 12,000  >10% bandemia    The patient's Clinical Indicators include:  82yo with dx pneumonia, Interstitial lung disease, CKD3a, acute respiratory failure with hypoxia, sepsis     H&P \"Sepsis with acute hypoxic respiratory failure without septic shock (HCC)- (present on admission)\"    Epic Nurse VS @0318 Temperature 101.5, HR 97  Epic Nurse VS @2322 Temperature 101.5  Epic Nurse VS  HR 90-97     -06/15 WBC wnl   Blood cultures x 2 negative to date    Risk factors: advanced age, PNA, acute respiratory failure with hypoxia, acute ischemic heart disease, morbidity, CKD3  Treatment: IVF, medications, antibiotics, monitoring, labwork, supplemental oxygen    Contact me with questions.     Thank you,  Leann Cortez, FNP, CDI  [email or web address]  Options provided:   -- Sepsis due to PNA exists   -- Sepsis does not exist - amended documentation in the medical record and updated problem list   -- Other explanation, Please specify      Query created by: Leann Cortez on 6/15/2023 11:15 AM    RESPONSE TEXT:    Other explanation- sepsis due to abscess of L second digit          Electronically " signed by:  CR SAAB MD 6/16/2023 12:18 PM

## 2023-06-16 NOTE — CARE PLAN
The patient is Stable - Low risk of patient condition declining or worsening    Shift Goals  Clinical Goals: monitor labs, monitor L foot, wound care  Patient Goals: rest  Family Goals: AIME    Progress made toward(s) clinical / shift goals:    Problem: Knowledge Deficit - Standard  Goal: Patient and family/care givers will demonstrate understanding of plan of care, disease process/condition, diagnostic tests and medications  Outcome: Progressing  Note: Pt updated on POC. Awaiting ortho/LPS to evaluate L 2nd toe cellulitis. Switched to PO abx. PT/OT recommending HH. Potential DC today.      Problem: Pain - Standard  Goal: Alleviation of pain or a reduction in pain to the patient’s comfort goal  Outcome: Progressing  Note: Pt endorsing mild pain with ambulation on L toe.      Problem: Fall Risk  Goal: Patient will remain free from falls  Outcome: Progressing  Note: Pt free from falls this shift. Fall precautions in place.      Problem: Skin Integrity  Goal: Skin integrity is maintained or improved  Outcome: Progressing  Note: L 2nd toe cleansed w/ wound cleanser and painted with betadine. Plan for LPS/ortho to evaluate for possible debridement.

## 2023-06-20 ENCOUNTER — HOME CARE VISIT (OUTPATIENT)
Dept: HOME HEALTH SERVICES | Facility: HOME HEALTHCARE | Age: 83
End: 2023-06-20
Payer: MEDICARE

## 2023-06-20 PROCEDURE — 665001 SOC-HOME HEALTH

## 2023-06-20 PROCEDURE — G0493 RN CARE EA 15 MIN HH/HOSPICE: HCPCS

## 2023-06-21 ENCOUNTER — HOME CARE VISIT (OUTPATIENT)
Dept: HOME HEALTH SERVICES | Facility: HOME HEALTHCARE | Age: 83
End: 2023-06-21
Payer: MEDICARE

## 2023-06-21 VITALS
HEART RATE: 61 BPM | RESPIRATION RATE: 18 BRPM | SYSTOLIC BLOOD PRESSURE: 122 MMHG | OXYGEN SATURATION: 94 % | DIASTOLIC BLOOD PRESSURE: 60 MMHG | TEMPERATURE: 97.6 F

## 2023-06-21 VITALS
SYSTOLIC BLOOD PRESSURE: 132 MMHG | TEMPERATURE: 97.7 F | DIASTOLIC BLOOD PRESSURE: 76 MMHG | HEART RATE: 65 BPM | RESPIRATION RATE: 18 BRPM | OXYGEN SATURATION: 96 %

## 2023-06-21 PROCEDURE — G0151 HHCP-SERV OF PT,EA 15 MIN: HCPCS

## 2023-06-21 SDOH — ECONOMIC STABILITY: HOUSING INSECURITY: HOME SAFETY: PT HAS DIFFICULTY GOING DOWN STEPS

## 2023-06-21 ASSESSMENT — BALANCE ASSESSMENTS
ARISING SCORE: 1
NUDGED SCORE: 1
SITTING BALANCE: 1 - STEADY, SAFE
IMMEDIATE STANDING BALANCE FIRST 5 SECONDS: 1 - STEADY BUT USES WALKER OR OTHER SUPPORT
SITTING DOWN: 1 - USES ARMS OR NOT SMOOTH MOTION
EYES CLOSED AT MAXIMUM POSITION NUDGED: 0 - UNSTEADY
TURNING 360 DEGREES STEPS: 1 - CONTINUOUS STEPS
ARISES: 1 - ABLE, USES ARMS TO HELP
BALANCE SCORE: 9
STANDING BALANCE: 1 - STEADY BUT WIDE STANCE AND USES CANE OR OTHER SUPPORT
NUDGED: 1 - STAGGERS, GRABS, CATCHES SELF
ATTEMPTS TO ARISE: 2 - ABLE TO RISE, ONE ATTEMPT

## 2023-06-21 ASSESSMENT — ENCOUNTER SYMPTOMS
CONSTIPATION: 1
HIGHEST PAIN SEVERITY IN PAST 24 HOURS: 0/10
PERSON REPORTING PAIN: PATIENT
NAUSEA: DENIES
DENIES PAIN: 1
DIZZINESS: 1
DENIES PAIN: 1
LOWER EXTREMITY EDEMA: 1
PAIN SEVERITY GOAL: 0/10
DYSPNEA ON EXERTION: 1
VOMITING: DENIES
COUGH CHARACTERISTICS: PRODUCTIVE
MUSCLE WEAKNESS: 1
HYPERTENSION: 1
LOWEST PAIN SEVERITY IN PAST 24 HOURS: 0/10
LAST BOWEL MOVEMENT: 66644
PERSON REPORTING PAIN: PATIENT
FATIGUES EASILY: 1
COUGH: 1

## 2023-06-21 ASSESSMENT — GAIT ASSESSMENTS
INITIATION OF GAIT IMMEDIATELY AFTER GO: 1 - NO HESITANCY
STEP SYMMETRY: 1 - RIGHT AND LEFT STEP LENGTH APPEAR EQUAL
GAIT SCORE: 8
TRUNK SCORE: 0
TRUNK: 0 - MARKED SWAY OR USES WALKING AID
PATH SCORE: 1
STEP CONTINUITY: 1 - STEPS APPEAR CONTINUOUS
BALANCE AND GAIT SCORE: 17
WALKING STANCE: 0 - HEELS APART
PATH: 1 - MILD/MODERATE DEVIATION OR USES WALKING AID

## 2023-06-21 ASSESSMENT — ACTIVITIES OF DAILY LIVING (ADL)
CURRENT_FUNCTION: STAND BY ASSIST
AMBULATION ASSISTANCE ON FLAT SURFACES: 1
BATHING_COMMENTS: SEE OT NOTES
AMBULATION ASSISTANCE: STAND BY ASSIST
BATHING ASSESSED: 1
AMBULATION ASSISTANCE: 1
GROOMING_COMMENTS: SEE OT NOTES
AMBULATION ASSISTANCE: MINIMUM ASSIST
AMBULATION ASSISTANCE: 1
PHYSICAL_TRANSFERS_DEVICES: USE OF B UES TO ASSIST
BATHING_CURRENT_FUNCTION: MINIMUM ASSIST
FEEDING_COMMENTS: SEE OT NOTES
PHYSICAL TRANSFERS ASSESSED: 1

## 2023-06-22 ENCOUNTER — DOCUMENTATION (OUTPATIENT)
Dept: MEDICAL GROUP | Facility: PHYSICIAN GROUP | Age: 83
End: 2023-06-22
Payer: MEDICARE

## 2023-06-22 NOTE — PROGRESS NOTES
"Medication chart review for [facility] services    Received referral from [facility].   Medications reviewed  compared with discharge summary if available.  Discharge summary date, if applicable:   6/16    Current medication list per [facility]     Medication list one, patient is currently taking    Current Outpatient Medications:     levothyroxine, 75 mcg, Oral, AM ES    pravastatin, 40 mg, Oral, DAILY    fluticasone, 2 Spray, Nasal, BID    lisinopril, 10 mg, Oral, QDAY    carvedilol, 25 mg, Oral, BID    omeprazole, 40 mg, Oral, QDAY    D3, 1 Tablet, Oral, DAILY    aspirin, 81 mg, Oral, QHS      Medication list two, drugs that the patient has been prescribed or recommended to take by their healthcare provider on discharge summary         START taking these medications         Instructions   amoxicillin-clavulanate 875-125 MG Tabs  Commonly known as: AUGMENTIN    Take 1 Tablet by mouth every 12 hours for 3 days.  Dose: 1 Tablet                CONTINUE taking these medications         Instructions   aspirin 81 MG tablet    Take 81 mg by mouth at bedtime. Indications: \"heart\"  Dose: 81 mg      carvedilol 25 MG Tabs  Commonly known as: COREG    Take 1 Tablet by mouth 2 times a day. Indications: High Blood Pressure Disorder  Dose: 25 mg      D3 2000 UNIT Tabs  Generic drug: Cholecalciferol    Take 1 Tablet by mouth every day. Indications: supplement  Dose: 1 Tablet      fluticasone 50 MCG/ACT nasal spray  Commonly known as: FLONASE    Administer 2 Sprays into affected nostril(S) 2 times a day. Indications: allergies  Dose: 2 Spray      levothyroxine 75 MCG Tabs  Commonly known as: SYNTHROID    Take 75 mcg by mouth every morning on an empty stomach.  Dose: 75 mcg      lisinopril 10 MG Tabs  Commonly known as: PRINIVIL    Take 1 Tablet by mouth every day. Indications: High Blood Pressure Disorder  Dose: 10 mg      omeprazole 40 MG delayed-release capsule  Commonly known as: PRILOSEC    Take 1 Capsule by mouth " every day. Indications: Heartburn  Dose: 40 mg      pravastatin 40 MG tablet  Commonly known as: PRAVACHOL    Take 1 Tablet by mouth every day. Indications: High Amount of Fats in the Blood  Dose: 40 mg                STOP taking these medications       clotrimazole-betamethasone 1-0.05 % Crea  Commonly known as: LOTRISONE                 Allergies   Allergen Reactions    Other Environmental Runny Nose and Itching     Pollens       Labs     Lab Results   Component Value Date/Time    SODIUM 137 06/16/2023 05:38 AM    POTASSIUM 4.5 06/16/2023 05:38 AM    CHLORIDE 102 06/16/2023 05:38 AM    CO2 25 06/16/2023 05:38 AM    GLUCOSE 107 (H) 06/16/2023 05:38 AM    BUN 16 06/16/2023 05:38 AM    CREATININE 1.08 06/16/2023 05:38 AM     Lab Results   Component Value Date/Time    ALKPHOSPHAT 76 02/18/2023 12:53 PM    ASTSGOT 17 02/18/2023 12:53 PM    ALTSGPT 21 02/18/2023 12:53 PM    TBILIRUBIN 0.4 02/18/2023 12:53 PM    INR 1.12 06/13/2023 05:18 PM    ALBUMIN 3.3 02/18/2023 12:53 PM    ALBUMIN 4.03 03/10/2017 10:49 AM        Assessment for clinically significant drug interactions, drug omissions/additions, duplicative therapies.            CC   Stefanie Zavaleta M.D.  [address]  [place] [address]  Fax: [phone]    [facility]  Phone [phone] fax [phone]    This note was created using voice recognition software (Dragon). The accuracy of the dictation is limited by the abilities of the software. I have reviewed the note prior to signing, however some errors in grammar and context are still possible. If you have any questions related to this note please do not hesitate to contact our office.

## 2023-06-22 NOTE — CASE COMMUNICATION
PRIMARY DIAGNOSIS: pneumonia, cellulitis of toe of left foot, interstitial lung disease, htn, wound care  SKILLED NEED: fall prevention, medication management, health assessment, wound care    NURSING FREQUENCY: 2w3,1w3, 3prn  ZIPE CODE: 17666  DISCIPLINES ORDERED: PT SN OT  INSURANCE: htn scp  CERTIFICATION PERIOD: 6/20-8/18  SPECIAL CONSIDERATION: none  Case communication to HH attending provider and P amb anti    Opened patient to  [facility] medication reconciliation process done. Medication list left in home care folder. See MAR for drug allergy interactions. There are no  major drug to drug interactions.  The best contact phone number is [phone] and pt/wife manages the medications.

## 2023-06-22 NOTE — CASE COMMUNICATION
noted  ----- Message -----  From: Margret Nixon, PT  Sent: 6/21/2023   4:16 PM PDT  To: Rachel Bird R.N.; Dominique James, OT; *      PT james completed, requesting auth for 1w1, 2w3 effective 6/21/2023.

## 2023-06-23 ENCOUNTER — HOME CARE VISIT (OUTPATIENT)
Dept: HOME HEALTH SERVICES | Facility: HOME HEALTHCARE | Age: 83
End: 2023-06-23
Payer: MEDICARE

## 2023-06-23 ENCOUNTER — HOSPITAL ENCOUNTER (OUTPATIENT)
Facility: MEDICAL CENTER | Age: 83
End: 2023-06-23
Attending: INTERNAL MEDICINE
Payer: MEDICARE

## 2023-06-23 VITALS
TEMPERATURE: 97.7 F | HEART RATE: 66 BPM | DIASTOLIC BLOOD PRESSURE: 52 MMHG | RESPIRATION RATE: 18 BRPM | SYSTOLIC BLOOD PRESSURE: 120 MMHG | OXYGEN SATURATION: 93 %

## 2023-06-23 DIAGNOSIS — E55.9 VITAMIN D DEFICIENCY: ICD-10-CM

## 2023-06-23 DIAGNOSIS — E03.8 OTHER SPECIFIED HYPOTHYROIDISM: ICD-10-CM

## 2023-06-23 DIAGNOSIS — E78.5 DYSLIPIDEMIA: ICD-10-CM

## 2023-06-23 LAB
25(OH)D3 SERPL-MCNC: 43 NG/ML (ref 30–100)
CHOLEST SERPL-MCNC: 113 MG/DL (ref 100–199)
FASTING STATUS PATIENT QL REPORTED: NORMAL
HDLC SERPL-MCNC: 37 MG/DL
LDLC SERPL CALC-MCNC: 58 MG/DL
TRIGL SERPL-MCNC: 88 MG/DL (ref 0–149)
TSH SERPL DL<=0.005 MIU/L-ACNC: 3.45 UIU/ML (ref 0.38–5.33)

## 2023-06-23 PROCEDURE — G0493 RN CARE EA 15 MIN HH/HOSPICE: HCPCS

## 2023-06-23 PROCEDURE — 82306 VITAMIN D 25 HYDROXY: CPT

## 2023-06-23 PROCEDURE — 84443 ASSAY THYROID STIM HORMONE: CPT

## 2023-06-23 PROCEDURE — 80061 LIPID PANEL: CPT

## 2023-06-23 ASSESSMENT — ENCOUNTER SYMPTOMS
LAST BOWEL MOVEMENT: 66647
POOR JUDGMENT: 1
DENIES PAIN: 1
MUSCLE WEAKNESS: 1
BOWEL PATTERN NORMAL: 1
SKIN LESIONS: 1
STOOL FREQUENCY: DAILY

## 2023-06-23 ASSESSMENT — ACTIVITIES OF DAILY LIVING (ADL)
CURRENT_FUNCTION: STAND BY ASSIST
AMBULATION ASSISTANCE: STAND BY ASSIST

## 2023-06-23 NOTE — CASE COMMUNICATION
noted  ----- Message -----  From: Afua Ribera R.N.  Sent: 6/21/2023   7:14 PM PDT  To: Rachel Bird R.N.; Ivanna Casas R.N.; *        PRIMARY DIAGNOSIS: pneumonia, cellulitis of toe of left foot, interstitial lung disease, htn, wound care  SKILLED NEED: fall prevention, medication management, health assessment, wound care    NURSING FREQUENCY: 2w3,1w3, 3prn  ZIPE CODE: 66372  DISCIPLINES ORDERED: PT SN OT  INSURANCE: htn  scp  CERTIFICATION PERIOD: 6/20-8/18  SPECIAL CONSIDERATION: none  Case communication to HH attending provider and P amb anti    Opened patient to [facility] medication reconciliation process done. Medication list left in home care folder. See MAR for drug allergy interactions. There are no  major drug to drug interactions.  The best contact phone number is [phone] and pt/wife manages the medications.

## 2023-06-26 ENCOUNTER — HOME CARE VISIT (OUTPATIENT)
Dept: HOME HEALTH SERVICES | Facility: HOME HEALTHCARE | Age: 83
End: 2023-06-26
Payer: MEDICARE

## 2023-06-26 VITALS
SYSTOLIC BLOOD PRESSURE: 123 MMHG | OXYGEN SATURATION: 92 % | DIASTOLIC BLOOD PRESSURE: 60 MMHG | HEART RATE: 66 BPM | TEMPERATURE: 97.5 F | RESPIRATION RATE: 16 BRPM

## 2023-06-26 PROCEDURE — G0151 HHCP-SERV OF PT,EA 15 MIN: HCPCS

## 2023-06-26 ASSESSMENT — ACTIVITIES OF DAILY LIVING (ADL): TRANSPORTATION COMMENTS: REQUIRES ASSIST OF ANOTHER PERSON AND WALKER OUT OF HOME ON UNEVEN SURFACES

## 2023-06-26 ASSESSMENT — ENCOUNTER SYMPTOMS
PERSON REPORTING PAIN: PATIENT
DENIES PAIN: 1

## 2023-06-27 ENCOUNTER — HOME CARE VISIT (OUTPATIENT)
Dept: HOME HEALTH SERVICES | Facility: HOME HEALTHCARE | Age: 83
End: 2023-06-27
Payer: MEDICARE

## 2023-06-27 VITALS
SYSTOLIC BLOOD PRESSURE: 110 MMHG | TEMPERATURE: 97.6 F | HEART RATE: 63 BPM | RESPIRATION RATE: 18 BRPM | OXYGEN SATURATION: 94 % | DIASTOLIC BLOOD PRESSURE: 52 MMHG

## 2023-06-27 PROCEDURE — G0493 RN CARE EA 15 MIN HH/HOSPICE: HCPCS

## 2023-06-27 ASSESSMENT — ENCOUNTER SYMPTOMS
BOWEL PATTERN NORMAL: 1
LAST BOWEL MOVEMENT: 66652
DENIES PAIN: 1
STOOL FREQUENCY: DAILY
MUSCLE WEAKNESS: 1

## 2023-06-28 ENCOUNTER — HOME CARE VISIT (OUTPATIENT)
Dept: HOME HEALTH SERVICES | Facility: HOME HEALTHCARE | Age: 83
End: 2023-06-28
Payer: MEDICARE

## 2023-06-28 VITALS
HEART RATE: 62 BPM | RESPIRATION RATE: 18 BRPM | SYSTOLIC BLOOD PRESSURE: 120 MMHG | OXYGEN SATURATION: 94 % | TEMPERATURE: 97.8 F | DIASTOLIC BLOOD PRESSURE: 60 MMHG

## 2023-06-28 PROCEDURE — G0152 HHCP-SERV OF OT,EA 15 MIN: HCPCS

## 2023-06-28 PROCEDURE — G0151 HHCP-SERV OF PT,EA 15 MIN: HCPCS

## 2023-06-29 ENCOUNTER — HOME CARE VISIT (OUTPATIENT)
Dept: HOME HEALTH SERVICES | Facility: HOME HEALTHCARE | Age: 83
End: 2023-06-29
Payer: MEDICARE

## 2023-06-29 VITALS
SYSTOLIC BLOOD PRESSURE: 120 MMHG | OXYGEN SATURATION: 94 % | RESPIRATION RATE: 18 BRPM | DIASTOLIC BLOOD PRESSURE: 60 MMHG | HEART RATE: 62 BPM | TEMPERATURE: 97.8 F

## 2023-06-29 ASSESSMENT — ACTIVITIES OF DAILY LIVING (ADL)
GROOMING ASSESSED: 1
BATHING_WITHIN_DEFINED_LIMITS: 1
DRESSING_LB_CURRENT_FUNCTION: INDEPENDENT
CURRENT_FUNCTION: INDEPENDENT
FEEDING_WITHIN_DEFINED_LIMITS: 1
LIGHT HOUSEKEEPING: DEPENDENT
HOUSEKEEPING ASSESSED: 1
LAUNDRY ASSESSED: 1
AMBULATION ASSISTANCE: INDEPENDENT
TRANSPORTATION ASSESSED: 1
FEEDING: INDEPENDENT
TOILETING: INDEPENDENT
PREPARING MEALS: INDEPENDENT
LAUNDRY: INDEPENDENT
TOILETING: 1
TRANSPORTATION: DEPENDENT
OASIS_M1830: 03
AMBULATION ASSISTANCE: 1
GROOMING_WITHIN_DEFINED_LIMITS: 1
PHYSICAL TRANSFERS ASSESSED: 1
BATHING ASSESSED: 1
DRESSING_UB_CURRENT_FUNCTION: INDEPENDENT
GROOMING_CURRENT_FUNCTION: INDEPENDENT
BATHING_CURRENT_FUNCTION: INDEPENDENT
FEEDING ASSESSED: 1

## 2023-06-29 ASSESSMENT — ENCOUNTER SYMPTOMS
SEVERE DYSPNEA: 1
PERSON REPORTING PAIN: PATIENT
DENIES PAIN: 1
DENIES PAIN: 1
DEPRESSED MOOD: 1
PERSON REPORTING PAIN: PATIENT
POOR JUDGMENT: 1

## 2023-06-29 NOTE — CASE COMMUNICATION
Quality Review for [facility] by RENAY Jones RN on  June 29, 2023     Edits completed by RENAY Jones RN:  1.  and  diagnosis coding updated per chart review.  2. Completed homebound status per chart review  3.  is 6/19/23 per LSOC order  4.  is 3 per narrative  5. Per narrative that patient needs min to moderate assistance and a walker for safety, the following changes were made: , , , ,   are 2;  and  are 3  6. Safety measures checked ambulate only with assistance  7.  is 8 per care plan therapy sets.

## 2023-06-30 ENCOUNTER — HOME CARE VISIT (OUTPATIENT)
Dept: HOME HEALTH SERVICES | Facility: HOME HEALTHCARE | Age: 83
End: 2023-06-30
Payer: MEDICARE

## 2023-06-30 VITALS
RESPIRATION RATE: 18 BRPM | HEART RATE: 66 BPM | TEMPERATURE: 97.5 F | DIASTOLIC BLOOD PRESSURE: 60 MMHG | OXYGEN SATURATION: 94 % | SYSTOLIC BLOOD PRESSURE: 112 MMHG

## 2023-06-30 PROCEDURE — G0180 MD CERTIFICATION HHA PATIENT: HCPCS | Performed by: INTERNAL MEDICINE

## 2023-06-30 PROCEDURE — G0299 HHS/HOSPICE OF RN EA 15 MIN: HCPCS

## 2023-06-30 ASSESSMENT — ENCOUNTER SYMPTOMS
LIMITED RANGE OF MOTION: 1
VOMITING: DENIES
MUSCLE WEAKNESS: 1
NAUSEA: DENIES
PERSON REPORTING PAIN: PATIENT
LAST BOWEL MOVEMENT: 66655
DRY SKIN: 1
LOWER EXTREMITY EDEMA: 1
BOWEL PATTERN NORMAL: 1
RESPIRATORY SYMPTOMS COMMENTS: DENIES
DENIES PAIN: 1
STOOL FREQUENCY: LESS THAN DAILY
FATIGUES EASILY: 1

## 2023-06-30 NOTE — Clinical Note
pt sitted in recliner , watching tv when sn came, wife present. alert and oriented x4, states memory improving but gets forgetful at times as reported. . denies pain. reports no change in medications, during review..instructed  the hypertension management, taking medications as prescribed, diet, simple exercise like walking, monitoring b/p ,.uncontrolled hypertension can affect brain, heart, blood vessels.kidneys. . wound care done painted left second toe with betadine. Pt/Cg response to the services provided: voiced understanding, denies chest pains,new or increased breathing problems or falls

## 2023-06-30 NOTE — CASE COMMUNICATION
noted  ----- Message -----  From: Annabella Hurt OT  Sent: 6/29/2023   9:05 AM PDT  To: Rachel Bird R.N.; Ivanna Casas R.N.; *      OT completed initial evaluation 6/28/23.  Will see client 1W2 for fall risk reduction and UE HEP.

## 2023-07-03 ENCOUNTER — HOME CARE VISIT (OUTPATIENT)
Dept: HOME HEALTH SERVICES | Facility: HOME HEALTHCARE | Age: 83
End: 2023-07-03
Payer: MEDICARE

## 2023-07-03 VITALS
OXYGEN SATURATION: 92 % | DIASTOLIC BLOOD PRESSURE: 60 MMHG | RESPIRATION RATE: 18 BRPM | SYSTOLIC BLOOD PRESSURE: 100 MMHG | TEMPERATURE: 97.5 F | HEART RATE: 61 BPM

## 2023-07-03 PROCEDURE — G0152 HHCP-SERV OF OT,EA 15 MIN: HCPCS

## 2023-07-03 PROCEDURE — G0151 HHCP-SERV OF PT,EA 15 MIN: HCPCS

## 2023-07-03 ASSESSMENT — ENCOUNTER SYMPTOMS
PERSON REPORTING PAIN: PATIENT
DENIES PAIN: 1

## 2023-07-03 ASSESSMENT — ACTIVITIES OF DAILY LIVING (ADL)
DRESSING_UB_CURRENT_FUNCTION: INDEPENDENT
TOILETING: INDEPENDENT
CURRENT_FUNCTION: INDEPENDENT
BATHING ASSESSED: 1
FEEDING ASSESSED: 1
FEEDING: INDEPENDENT
PHYSICAL TRANSFERS ASSESSED: 1
BATHING_CURRENT_FUNCTION: INDEPENDENT
AMBULATION ASSISTANCE: INDEPENDENT
GROOMING ASSESSED: 1
TOILETING: 1
GROOMING_CURRENT_FUNCTION: INDEPENDENT
AMBULATION ASSISTANCE: 1
DRESSING_LB_CURRENT_FUNCTION: INDEPENDENT
PREPARING MEALS: INDEPENDENT

## 2023-07-03 NOTE — CASE COMMUNICATION
noted  ----- Message -----  From: Kateryna Reilly R.N.  Sent: 7/2/2023   1:01 PM PDT  To: Rachel Bird R.N.; Ivanna Casas R.N.; *       pt sitted in recliner , watching tv when sn came, wife present. alert and oriented x4, states memory improving but gets forgetful at times as reported. . denies pain. reports no change in medications, during review..instructed  the hypertension management, taking medications as prescribed, diet, si mple exercise like walking, monitoring b/p ,.uncontrolled hypertension can affect brain, heart, blood vessels.kidneys. . wound care done painted left second toe with betadine. Pt/Cg response to the services provided: voiced understanding, denies chest pains,new or increased breathing problems or falls

## 2023-07-03 NOTE — CASE COMMUNICATION
OT DISCHAGE SUMMARY:  The patient was seen for 2 home health occupational  therapy sessions.  Goals met.  The patient was discharged to self care  .  STATUS AT DISCHARGE:  Ambulation and Mobility: Independent  Patient Equipment: walker for ambulation.  Transferring: Independent  Bathing: Independent  Dressing: Independent  Special equipment used: shower chair, grab bars  Medication management: Able to take independently  Frequency of pa in interfering with activity or movement: (drop downs)  - No pain  When is the patient dyspneic or noticeably Short of Breath: (drop downs)  - When walking more than 20 feet/stairs

## 2023-07-04 ENCOUNTER — HOME CARE VISIT (OUTPATIENT)
Dept: HOME HEALTH SERVICES | Facility: HOME HEALTHCARE | Age: 83
End: 2023-07-04
Payer: MEDICARE

## 2023-07-04 VITALS
RESPIRATION RATE: 16 BRPM | DIASTOLIC BLOOD PRESSURE: 60 MMHG | TEMPERATURE: 97.5 F | OXYGEN SATURATION: 92 % | HEART RATE: 61 BPM | SYSTOLIC BLOOD PRESSURE: 100 MMHG

## 2023-07-04 VITALS
DIASTOLIC BLOOD PRESSURE: 60 MMHG | RESPIRATION RATE: 18 BRPM | OXYGEN SATURATION: 94 % | TEMPERATURE: 97.4 F | SYSTOLIC BLOOD PRESSURE: 104 MMHG | HEART RATE: 62 BPM

## 2023-07-04 PROCEDURE — G0495 RN CARE TRAIN/EDU IN HH: HCPCS

## 2023-07-04 ASSESSMENT — ENCOUNTER SYMPTOMS
DENIES PAIN: 1
DENIES PAIN: 1
PERSON REPORTING PAIN: PATIENT

## 2023-07-04 NOTE — CASE COMMUNICATION
noted  ----- Message -----  From: Annabella Hurt OT  Sent: 7/3/2023   3:28 PM PDT  To: Rachel Bird R.N.; Ivanna Casas R.N.; *      OT DISCHAGE SUMMARY:  The patient was seen for 2 home health occupational  therapy sessions.  Goals met.  The patient was discharged to self care  .  STATUS AT DISCHARGE:  Ambulation and Mobility: Independent  Patient Equipment: walker for ambulation.  Transferring: Independent  Bathing: Independent   Dressing: Independent  Special equipment used: shower chair, grab bars  Medication management: Able to take independently  Frequency of pain interfering with activity or movement: (drop downs)  - No pain  When is the patient dyspneic or noticeably Short of Breath: (drop downs)  - When walking more than 20 feet/stairs

## 2023-07-04 NOTE — Clinical Note
Therapist was out sick, patient requested to cancel visit this week and will wait until therapist returns from vacation.

## 2023-07-05 NOTE — CASE COMMUNICATION
i agree with this  ----- Message -----  From: Chastity Jones R.N.  Sent: 6/29/2023   1:38 PM PDT  To: Afua Ribera R.N.         Quality Review for [facility] by RENAY Jones RN on  June 29, 2023     Edits completed by RENAY Jones RN:  1.  and  diagnosis coding updated per chart review.  2. Completed homebound status per chart review  3.  is 6/19/23 per LSOC order  4.  is 3 per narrative  5. Per na rrative that patient needs min to moderate assistance and a walker for safety, the following changes were made: , , , ,  are 2;  and  are 3  6. Safety measures checked ambulate only with assistance  7.  is 8 per care plan therapy sets.

## 2023-07-06 ENCOUNTER — HOME CARE VISIT (OUTPATIENT)
Dept: HOME HEALTH SERVICES | Facility: HOME HEALTHCARE | Age: 83
End: 2023-07-06
Payer: MEDICARE

## 2023-07-06 VITALS
DIASTOLIC BLOOD PRESSURE: 60 MMHG | OXYGEN SATURATION: 95 % | SYSTOLIC BLOOD PRESSURE: 112 MMHG | RESPIRATION RATE: 18 BRPM | TEMPERATURE: 98.1 F | HEART RATE: 67 BPM

## 2023-07-06 PROCEDURE — G0493 RN CARE EA 15 MIN HH/HOSPICE: HCPCS

## 2023-07-06 ASSESSMENT — ENCOUNTER SYMPTOMS
STOOL FREQUENCY: DAILY
BOWEL PATTERN NORMAL: 1
PERSON REPORTING PAIN: PATIENT
LAST BOWEL MOVEMENT: 66661
DENIES PAIN: 1

## 2023-07-06 ASSESSMENT — ACTIVITIES OF DAILY LIVING (ADL)
BATHING_WITHIN_DEFINED_LIMITS: 1
AMBULATION ASSISTANCE: ONE PERSON
FEEDING_WITHIN_DEFINED_LIMITS: 1
AMBULATION ASSISTANCE: 1
AMBULATION ASSISTANCE: STAND BY ASSIST
GROOMING_WITHIN_DEFINED_LIMITS: 1

## 2023-07-07 ASSESSMENT — ACTIVITIES OF DAILY LIVING (ADL)
CURRENT_FUNCTION: STAND BY ASSIST
AMBULATION ASSISTANCE: STAND BY ASSIST

## 2023-07-07 ASSESSMENT — ENCOUNTER SYMPTOMS
LAST BOWEL MOVEMENT: 66659
BOWEL PATTERN NORMAL: 1
MUSCLE WEAKNESS: 1
STOOL FREQUENCY: DAILY

## 2023-07-10 ENCOUNTER — HOME CARE VISIT (OUTPATIENT)
Dept: HOME HEALTH SERVICES | Facility: HOME HEALTHCARE | Age: 83
End: 2023-07-10
Payer: MEDICARE

## 2023-07-12 ENCOUNTER — HOME CARE VISIT (OUTPATIENT)
Dept: HOME HEALTH SERVICES | Facility: HOME HEALTHCARE | Age: 83
End: 2023-07-12
Payer: MEDICARE

## 2023-07-12 PROCEDURE — G0151 HHCP-SERV OF PT,EA 15 MIN: HCPCS

## 2023-07-12 ASSESSMENT — ENCOUNTER SYMPTOMS
PERSON REPORTING PAIN: PATIENT
DENIES PAIN: 1

## 2023-07-12 ASSESSMENT — FIBROSIS 4 INDEX: FIB4 SCORE: 2.21

## 2023-07-12 ASSESSMENT — PATIENT HEALTH QUESTIONNAIRE - PHQ9: CLINICAL INTERPRETATION OF PHQ2 SCORE: 0

## 2023-07-12 ASSESSMENT — ACTIVITIES OF DAILY LIVING (ADL)
HOME_HEALTH_OASIS: 00
OASIS_M1830: 01

## 2023-07-13 VITALS
OXYGEN SATURATION: 96 % | TEMPERATURE: 97.9 F | RESPIRATION RATE: 16 BRPM | SYSTOLIC BLOOD PRESSURE: 120 MMHG | HEART RATE: 69 BPM | HEIGHT: 72 IN | DIASTOLIC BLOOD PRESSURE: 68 MMHG | BODY MASS INDEX: 35.76 KG/M2 | WEIGHT: 264 LBS

## 2023-07-14 NOTE — CASE COMMUNICATION
noted  ----- Message -----  From: Irineo Sanderson, PT  Sent: 7/13/2023   8:46 AM PDT  To: Rachel Bird R.N.      Therapist was out sick, patient requested to cancel visit this week and will wait until therapist returns from vacation.

## 2023-07-14 NOTE — CASE COMMUNICATION
noted  ----- Message -----  From: Irineo Sanderson, PT  Sent: 7/13/2023   8:46 AM PDT  To: Rachel Bird R.N.      Patient requested visit on Wednesday and agreed to be discharged on that day from agency.

## 2023-07-14 NOTE — CASE COMMUNICATION
noted  ----- Message -----  From: Irineo Sanderson, PT  Sent: 7/13/2023   8:46 AM PDT  To: Racehl Bird R.N.; Ivanna Casas R.N.; *      Patient discharged from home health agency effective 07/12/2023 with all goals met.

## 2023-11-07 ENCOUNTER — OFFICE VISIT (OUTPATIENT)
Dept: MEDICAL GROUP | Facility: PHYSICIAN GROUP | Age: 83
End: 2023-11-07
Payer: MEDICARE

## 2023-11-07 VITALS
DIASTOLIC BLOOD PRESSURE: 70 MMHG | SYSTOLIC BLOOD PRESSURE: 122 MMHG | HEART RATE: 60 BPM | TEMPERATURE: 98.3 F | HEIGHT: 72 IN | WEIGHT: 266.7 LBS | BODY MASS INDEX: 36.12 KG/M2 | OXYGEN SATURATION: 94 %

## 2023-11-07 DIAGNOSIS — I10 PRIMARY HYPERTENSION: ICD-10-CM

## 2023-11-07 DIAGNOSIS — Z91.81 AT RISK FOR FALLS: ICD-10-CM

## 2023-11-07 DIAGNOSIS — R94.31 ABNORMAL EKG: ICD-10-CM

## 2023-11-07 DIAGNOSIS — Z86.79 HISTORY OF ATRIAL FIBRILLATION: ICD-10-CM

## 2023-11-07 DIAGNOSIS — R94.31 QT PROLONGATION: ICD-10-CM

## 2023-11-07 DIAGNOSIS — J84.9 INTERSTITIAL LUNG DISEASE (HCC): ICD-10-CM

## 2023-11-07 DIAGNOSIS — R00.1 SINUS BRADYCARDIA: ICD-10-CM

## 2023-11-07 DIAGNOSIS — R73.03 PREDIABETES: ICD-10-CM

## 2023-11-07 DIAGNOSIS — E78.5 DYSLIPIDEMIA: ICD-10-CM

## 2023-11-07 DIAGNOSIS — R39.15 URINARY URGENCY: ICD-10-CM

## 2023-11-07 DIAGNOSIS — J90 PLEURAL EFFUSION ON LEFT: ICD-10-CM

## 2023-11-07 PROBLEM — U07.1 LAB TEST POSITIVE FOR DETECTION OF COVID-19 VIRUS: Status: RESOLVED | Noted: 2022-08-11 | Resolved: 2023-11-07

## 2023-11-07 LAB
APPEARANCE UR: CLEAR
BILIRUB UR STRIP-MCNC: NORMAL MG/DL
COLOR UR AUTO: YELLOW
GLUCOSE UR STRIP.AUTO-MCNC: NORMAL MG/DL
HBA1C MFR BLD: 5.4 % (ref ?–5.8)
KETONES UR STRIP.AUTO-MCNC: NORMAL MG/DL
LEUKOCYTE ESTERASE UR QL STRIP.AUTO: NORMAL
NITRITE UR QL STRIP.AUTO: NORMAL
PH UR STRIP.AUTO: 6 [PH] (ref 5–8)
POCT INT CON NEG: NEGATIVE
POCT INT CON POS: POSITIVE
PROT UR QL STRIP: NORMAL MG/DL
RBC UR QL AUTO: NORMAL
SP GR UR STRIP.AUTO: 1.01
UROBILINOGEN UR STRIP-MCNC: 1 MG/DL

## 2023-11-07 PROCEDURE — 93000 ELECTROCARDIOGRAM COMPLETE: CPT | Performed by: STUDENT IN AN ORGANIZED HEALTH CARE EDUCATION/TRAINING PROGRAM

## 2023-11-07 PROCEDURE — G2212 PROLONG OUTPT/OFFICE VIS: HCPCS | Performed by: STUDENT IN AN ORGANIZED HEALTH CARE EDUCATION/TRAINING PROGRAM

## 2023-11-07 PROCEDURE — 3074F SYST BP LT 130 MM HG: CPT | Performed by: STUDENT IN AN ORGANIZED HEALTH CARE EDUCATION/TRAINING PROGRAM

## 2023-11-07 PROCEDURE — 3078F DIAST BP <80 MM HG: CPT | Performed by: STUDENT IN AN ORGANIZED HEALTH CARE EDUCATION/TRAINING PROGRAM

## 2023-11-07 PROCEDURE — 83036 HEMOGLOBIN GLYCOSYLATED A1C: CPT | Performed by: STUDENT IN AN ORGANIZED HEALTH CARE EDUCATION/TRAINING PROGRAM

## 2023-11-07 PROCEDURE — 99215 OFFICE O/P EST HI 40 MIN: CPT | Performed by: STUDENT IN AN ORGANIZED HEALTH CARE EDUCATION/TRAINING PROGRAM

## 2023-11-07 PROCEDURE — 81002 URINALYSIS NONAUTO W/O SCOPE: CPT | Performed by: STUDENT IN AN ORGANIZED HEALTH CARE EDUCATION/TRAINING PROGRAM

## 2023-11-07 ASSESSMENT — ENCOUNTER SYMPTOMS
MYALGIAS: 0
LOSS OF CONSCIOUSNESS: 0
POLYDIPSIA: 0
EYE DISCHARGE: 0
SPUTUM PRODUCTION: 0
FEVER: 0
DIAPHORESIS: 0
SPEECH CHANGE: 0
CHILLS: 0
ABDOMINAL PAIN: 0
FALLS: 1
FOCAL WEAKNESS: 0
DOUBLE VISION: 0
SHORTNESS OF BREATH: 0
SEIZURES: 0
BRUISES/BLEEDS EASILY: 0
WEIGHT LOSS: 0
NAUSEA: 0
PALPITATIONS: 0
EYE PAIN: 0
HEMOPTYSIS: 0
TINGLING: 0
FLANK PAIN: 0
DIARRHEA: 0
HEADACHES: 0
SENSORY CHANGE: 0
BLURRED VISION: 0
HEARTBURN: 0
DEPRESSION: 0
VOMITING: 0
CLAUDICATION: 0
COUGH: 0
PND: 0
WEAKNESS: 0
BLOOD IN STOOL: 0
ORTHOPNEA: 0
CONSTIPATION: 0
HALLUCINATIONS: 0
WHEEZING: 0
DIZZINESS: 0
NECK PAIN: 0

## 2023-11-07 ASSESSMENT — LIFESTYLE VARIABLES: SUBSTANCE_ABUSE: 0

## 2023-11-07 ASSESSMENT — FIBROSIS 4 INDEX: FIB4 SCORE: 2.21

## 2023-11-07 NOTE — PROGRESS NOTES
CC:  Diagnoses of Abnormal EKG, Sinus bradycardia, QT prolongation, Primary hypertension, Prediabetes, At risk for falls, History of atrial fibrillation, Dyslipidemia, Urinary urgency, Interstitial lung disease (HCC), and Pleural effusion on left were pertinent to this visit.    HISTORY OF THE PRESENT ILLNESS: Patient is a 83 y.o. male. This pleasant patient is here today for follow up visit. 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 He is also getting preoperative visit with me today.  Patient has a lot of cavities and needs to have all teeth extracted.  Denies symptoms of chest pain, palpitation, shortness of breath ,dizziness, loss of consciousness.  Comparing previous ekgs in June 2023 and February 2023, I development of deep q waves in inferior leads. But today, ekg in clinic is stable.    Had falls about 2 weeks ago. Used to be once every week, and he got better. Did not lose consciousness. Feels weak all the time. Has been frequently falling. No chest pain , palpitation during these episodes.    Use CPAP everyday for sleep apnea.    Urinary urgency has been going on for 3 years. No dysuria. Increased urgency and frequency throughout the day.  No Difficulty with starting or maintaining urine stream. No fever or chills. No hematuria    Patient previously was seeing cardiology with last visit in 2014, patient was on baby aspirin and was not on anticoagulation for A-fib.  Given Elvis Vasc of 4, 4.8% stroke risk per year.  If patient decides to be on anticoagulation after discussion with cardiology, I think it appears reasonable to discontinue baby aspirin if patient goes on anticoagulation.    Problem   Abnormal Ekg   At Risk for Falls   History of Atrial Fibrillation    History of paroxysmal afib  5/12 hospital admission atrial fibrillation, converted with intravenous cardizem  5/19/12 echo normal LV function  5/19/12 persantine thallium normal EF 62%,  subtle decrease uptake lateral wall  5/12 CHADS2 score 1   5/22/12 [facility] discontinue coumadin, change to aspirin; coreg 25 mg bid  7/1/13 cardiology note no changes, NSR cont coreg 25 bid and asa  1/19/14 EKG normal sinus rhythm on admission for compression fracture  1/21/14 ultrasound carotids negative  1/20/14 MRI brain without; no acute infarct or hemorrhage, moderate atrophy, mild chronic microvascular ischemic changes; on coreg 25 mg bid and asa  3/4/14 NSR on exam  5/9/14 echo normal ejection fraction 65%, mild LVH  5/16/14 decrease coreg to 12.5 mg bid secondary to heart rate and continue aspirin  8/21/14 cardiology note follow up one year  5/2/17 cardiology note one episode atrial fibrillation 2012 will continue on aspirin, encourage weight loss, follow-up one year       Lab Test Positive for Detection of Covid-19 Virus (Resolved)       Health Maintenance: Completed      ROS:   Review of Systems   Constitutional:  Negative for chills, diaphoresis, fever and weight loss.   HENT:  Positive for hearing loss. Negative for ear discharge and ear pain.    Eyes:  Negative for blurred vision, double vision, pain and discharge.   Respiratory:  Negative for cough, hemoptysis, sputum production, shortness of breath and wheezing.    Cardiovascular:  Negative for chest pain, palpitations, orthopnea, claudication, leg swelling and PND.   Gastrointestinal:  Negative for abdominal pain, blood in stool, constipation, diarrhea, heartburn, melena, nausea and vomiting.   Genitourinary:  Positive for frequency and urgency. Negative for dysuria, flank pain and hematuria.   Musculoskeletal:  Positive for falls. Negative for myalgias and neck pain.   Skin:  Negative for itching and rash.   Neurological:  Negative for dizziness, tingling, sensory change, speech change, focal weakness, seizures, loss of consciousness, weakness and headaches.   Endo/Heme/Allergies:  Negative for polydipsia. Does not bruise/bleed easily.    Psychiatric/Behavioral:  Negative for depression, hallucinations, substance abuse and suicidal ideas.        /70 (BP Location: Right arm, Patient Position: Sitting)   Pulse 60   Temp 36.8 °C (98.3 °F) (Temporal)   Ht 1.829 m (6')   Wt 121 kg (266 lb 11.2 oz)   SpO2 94%   BMI 36.17 kg/m² Body mass index is 36.17 kg/m².    Physical Exam  Constitutional:       General: He is not in acute distress.     Appearance: Normal appearance. He is obese.      Comments: Walks with walker. Gets up from sitting and walks slowly.   HENT:      Head: Normocephalic.      Right Ear: External ear normal.      Left Ear: External ear normal.      Nose: No rhinorrhea.      Mouth/Throat:      Pharynx: No oropharyngeal exudate.      Comments: Has poor dentition  Eyes:      General:         Right eye: No discharge.         Left eye: No discharge.      Extraocular Movements: Extraocular movements intact.   Cardiovascular:      Rate and Rhythm: Regular rhythm. Bradycardia present.      Heart sounds: No murmur heard.  Pulmonary:      Effort: Pulmonary effort is normal. No respiratory distress.      Breath sounds: No wheezing or rales.   Abdominal:      Palpations: Abdomen is soft.      Tenderness: There is no abdominal tenderness. There is no guarding.   Musculoskeletal:         General: No swelling or tenderness. Normal range of motion.      Cervical back: Normal range of motion and neck supple. No tenderness.      Right lower leg: No edema.      Left lower leg: No edema.   Skin:     Capillary Refill: Capillary refill takes less than 2 seconds.   Neurological:      General: No focal deficit present.      Mental Status: He is alert and oriented to person, place, and time.      Sensory: No sensory deficit.      Motor: No weakness.   Psychiatric:         Mood and Affect: Mood normal.         Behavior: Behavior normal.         Thought Content: Thought content normal.         Judgment: Judgment normal.             Note: I have reviewed  all pertinent labs and diagnostic tests associated with this visit with specific comments listed under the assessment and plan below     Patient is a 83 y.o. male. This pleasant patient is here today for follow up visit. 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 He is also getting preoperative visit with me today.  Patient has a lot of cavities and needs to have all teeth extracted.  Denies symptoms of chest pain, palpitation, shortness of breath ,dizziness, loss of consciousness.  Comparing previous ekgs in June 2023 and February 2023, I development of deep q waves in inferior leads. Patient is already on    Had falls about 2 weeks ago. Used to be once every week, and he got better. Did not lose consciousness. Feels weak all the time. Has been frequently falling. No chest pain , palpitation during these episodes.    Use CPAP everyday for sleep apnea.    Urinary urgency has been going on for 3 years. No dysuria. Increased urgency and frequency throughout the day.  No Difficulty with starting or maintaining urine stream. No fever or chills. No hematuria        Assessment and Plan  83 y.o. male with the following -    1. Abnormal EKG  Chronic, has history of qwaves and no recent stress test or CT coronary calcium though not sure if these will  since LDL already lower than 50 and already on betablocker, aspirin and statin. Suspect patient my have had silent MI in the past and has underlying CAD.  Rate: 55  Rhythm:sinus  Axis: normal  AK interval: 237  QRS complex: 91  QT interval: 435  QTC interval: 416  ST segment: No ST elevation or depression  T waves: No T wave inversion.  Impression: 1st degree av block, sinus rhythm, without acute findings compared to previous ekg, does have deep q waves in lead 3, which was noted on previous ekg  Plan:   Follow up with cardiology  - REFERRAL TO CARDIOLOGY  - EKG - Clinic Performed      2. Sinus  bradycardia  Chronic, 1st degree av block, stable, asymptomatic.   Continue monitoring    3. QT prolongation  Chronic, resolved. Qtc 521 previously and now qtc 416.  - REFERRAL TO CARDIOLOGY  - EKG - Clinic Performed    4. Primary hypertension  Chronic, stable on carvedilol 25 mg bid, lisinopril 10 mg daily and continue these  - REFERRAL TO CARDIOLOGY    5. Prediabetes  Chronic, stable, a1c 5.4  - POCT  A1C    6. At risk for falls  Chronic, decompensated, states that he is getting better and does exercises at home, but he gets up and moves slowly so he will benefit from physical therapy  - Referral to Physical Therapy    7. History of atrial fibrillation  Chronic, stable, Patient previously was seeing cardiology with last visit in 2014, patient was on baby aspirin and was not on anticoagulation for A-fib.  Given Elvis Vasc of 4, 4.8%, stroke risk per year.  If patient decides to be on anticoagulation after discussion with cardiology, I think it appears reasonable to discontinue baby aspirin if patient goes on anticoagulation. Also, he needs to get tooth extraction soon, so I am holding off on anticoagulation for briefly.  - REFERRAL TO CARDIOLOGY    8. Dyslipidemia  Chronic, stable on pravstatin 40 mg daily , continue this  - REFERRAL TO CARDIOLOGY    9. Urinary urgency  Chronic, decompensated, getting worse, UA in clinic negative, concern for overactive bladder but getting US to rule out anatomic abnormalities/hydronephrosis, and investigating risk factors for urinary retention  - POCT Urinalysis  - US-RENAL; Future    10. Interstitial lung disease (HCC)  Chronic, decompensated, was recently admitted in June for pleural effusion, CT chest shows Mild to moderate left pleural effusion with mild pleural thickening, similar to prior exam in 2022.  - Referral to pulmonology; appreciate recs    11. Pleural effusion on left  Chronic, decompensated, As mentioned above  - Referral to pulmonology; appreciate recs      I spent  a total of 74 minutes with record review, exam, communication with the patient, communication with other providers, and documentation of this encounter.    Return if symptoms worsen or fail to improve.  Follow up with  as scheduled in 3 weeks.  Will do preoperative evaluation with me tomorrow    Please note that this dictation was created using voice recognition software. I have made every reasonable attempt to correct obvious errors, but I expect that there are errors of grammar and possibly content that I did not discover before finalizing the note.

## 2023-11-08 ENCOUNTER — OFFICE VISIT (OUTPATIENT)
Dept: MEDICAL GROUP | Facility: PHYSICIAN GROUP | Age: 83
End: 2023-11-08
Payer: MEDICARE

## 2023-11-08 VITALS
TEMPERATURE: 98.6 F | BODY MASS INDEX: 36.12 KG/M2 | WEIGHT: 266.7 LBS | OXYGEN SATURATION: 93 % | SYSTOLIC BLOOD PRESSURE: 124 MMHG | HEART RATE: 58 BPM | HEIGHT: 72 IN | DIASTOLIC BLOOD PRESSURE: 84 MMHG

## 2023-11-08 DIAGNOSIS — Z01.818 PREOPERATIVE EVALUATION TO RULE OUT SURGICAL CONTRAINDICATION: ICD-10-CM

## 2023-11-08 PROCEDURE — 3074F SYST BP LT 130 MM HG: CPT | Performed by: STUDENT IN AN ORGANIZED HEALTH CARE EDUCATION/TRAINING PROGRAM

## 2023-11-08 PROCEDURE — 3079F DIAST BP 80-89 MM HG: CPT | Performed by: STUDENT IN AN ORGANIZED HEALTH CARE EDUCATION/TRAINING PROGRAM

## 2023-11-08 PROCEDURE — 99213 OFFICE O/P EST LOW 20 MIN: CPT | Performed by: STUDENT IN AN ORGANIZED HEALTH CARE EDUCATION/TRAINING PROGRAM

## 2023-11-08 ASSESSMENT — FIBROSIS 4 INDEX: FIB4 SCORE: 2.21

## 2023-11-08 NOTE — PROGRESS NOTES
REASON FOR VISIT: Pre-Op Consultation  Consultation Requested by: patient's dentist  Procedure date and type: not determined  Inherent cardiac risk of procedure: low, tooth extraction    History of condition for which surgery is planned:tooth cavities    Denies chest pain, palpitation, shortness of breath, focal weakness, dizziness/lightheadedness, facial droop, paresthesia, vision change   Never had problem with anesthesia or surgery in the past.    Current chronic conditions:   HTN, afib, interstitial lung disease, hypothyrodism, hyperlipidemia    Past medical history:  has a past medical history of Arrhythmia, Arthritis, Bronchitis (2004), Chronic pain of right knee (6/16/2022), Fibula fracture (1981), High cholesterol, Hyperlipidemia, Hypertension, Lab test positive for detection of COVID-19 virus (8/11/2022), [facility], Multiple pulmonary nodules (12/15/2021), Onychogryposis of toenail (1/21/2021), Onychomycosis (1/21/2021), Pain, Pneumonia (2004), Rash (6/16/2022), and Sleep apnea.. Negative for: CAD, SBE, CVA, TIA, DVT, PE, bleeding requiring transfusion, intubation.    Surgical and anesthetic history:  has a past surgical history that includes tonsillectomy (1945); exostosis excision (6/3/2011); lesion excision ortho (6/3/2011); toe arthroplasty (6/3/2011); lumbar laminectomy diskectomy (2005); hammertoe correction (2/2/2015); and eye surgery (Bilateral). Prior surgery without complication, bleeding, reaction to anesthetic, prolonged recovery    Habits:   Social History     Tobacco Use    Smoking status: Never    Smokeless tobacco: Never   Vaping Use    Vaping Use: Never used   Substance Use Topics    Alcohol use: Yes     Alcohol/week: 1.2 - 1.8 oz     Types: 2 - 3 Standard drinks or equivalent per week     Comment: occ    Drug use: Never     No smoking, alcohol, drugs    Allergies: Other environmental allergies, No known allergy to Anesthetic, or Latex.       Current medicines:   Current Outpatient  "Medications   Medication Sig Dispense Refill    psyllium (METAMUCIL) 58.6 % powder Take 1 Packet by mouth 1 time a day as needed (constipation).      ketotifen (ZADITOR) 0.025 % ophthalmic solution Administer 1 Drop into both eyes every 12 hours as needed (eye allergies). Indications: Allergic Conjunctivitis      levothyroxine (SYNTHROID) 75 MCG Tab Take 75 mcg by mouth every morning on an empty stomach. Indications: Underactive Thyroid      pravastatin (PRAVACHOL) 40 MG tablet Take 1 Tablet by mouth every day. Indications: High Amount of Fats in the Blood 100 Tablet 3    fluticasone (FLONASE) 50 MCG/ACT nasal spray Administer 2 Sprays into affected nostril(S) 2 times a day. Indications: allergies 48 mL 3    lisinopril (PRINIVIL) 10 MG Tab Take 1 Tablet by mouth every day. Indications: High Blood Pressure Disorder 100 Tablet 3    carvedilol (COREG) 25 MG Tab Take 1 Tablet by mouth 2 times a day. Indications: High Blood Pressure Disorder 200 Tablet 3    omeprazole (PRILOSEC) 40 MG delayed-release capsule Take 1 Capsule by mouth every day. Indications: Heartburn 100 Capsule 3    Cholecalciferol (D3) 2000 UNIT Tab Take 1 Tablet by mouth every day. Indications: supplement      aspirin 81 MG tablet Take 81 mg by mouth at bedtime. Indications: \"heart\"       No current facility-administered medications for this visit.       Anticoagulant: on baby aspirin  , not on nsaids  Herbals: Black Cohash, Echinacea, Garlic, Ginger, Ginkgo Biloba, Ginseng, Kava, Horace’s Wort,  Saw Palmetto, Valerian               Paredes Activity Status Index: 28.7 points  METS:  6.27, moderate functional capacity    ASA Class:   2 with mild diseases without substantive functional limitations.      ROS: negative for: CP, SOB, GARCIA, Orthopnea, wheezing, leg edema, polydipsia, polyuria, fevers, chills, sweats, cough, cold, congestion, abd pain, reflux, black or bloody stools, weight loss/gain.    PHYSICAL EXAMINATION:  VITAL SIGNS: /84 (BP Location: " Right arm, Patient Position: Sitting)   Pulse (!) 58   Temp 37 °C (98.6 °F) (Temporal)   Ht 1.829 m (6')   Wt 121 kg (266 lb 11.2 oz)   SpO2 93%  Body mass index is 36.17 kg/m².    General: No acute distress. Well nourished.  HEENT: EOM grossly intact, no scleral icterus, no pharyngeal erythema. Has poor dentition  Neck:  No JVD, no bruits, trachea midline  CVS: RRR. Normal S1, S2. No M/R/G. Minimal pitting edema in lower extremities.  2+ radial pulses, 2+ PT pulses  Resp: CTAB. No wheezing or crackles/rhonchi. Normal respiratory effort.  Abdomen: Soft, NT, no renetta hepatomegaly.  MSK/Ext: No clubbing or cyanosis.  Skin: Warm and dry, no rashes.  Neurological: CN III-XII grossly intact. No focal deficits.   Psych: A&O x 3, appropriate affect, good judgement   Labs: See attached/per surgeon    Risk of complications using [employer] Surgical Risk Calculator:  Below average risk    IMPRESSION:  The encounter diagnosis was Preoperative evaluation to rule out surgical contraindication.  Planned surgery:tooth extraction   High risk medical conditions: negative for cardiac(past history of afib Bleeding, Poor healing, Thrombosis, Debility (walks well with walker). Positive for history of interstitial lung disease. Has stable sinus bradycardia. EKG obtained yesterday 11/7/23 was stable. No endocarditis history and does not have artifical heart valves.      PLAN:  *Patient is medically stable and optimized to proceed with tooth extraction.   Chronic medical conditions: Stable and controlled. Continue current medicines.   Avoid drugs that potentiate bleeding as advised by surgeon  Discontinue all herbal supplements 2 weeks prior to surgery.  Need for SBE prophylaxis: no  This patient is considered class 1 risk (0 points, 3.9% 30 day risk of death MI, or cardiac arrest),  for cardiopulmonary complications for this planned surgery by the revised cardiac index.

## 2023-11-15 ENCOUNTER — APPOINTMENT (OUTPATIENT)
Dept: PHYSICAL THERAPY | Facility: MEDICAL CENTER | Age: 83
End: 2023-11-15
Attending: STUDENT IN AN ORGANIZED HEALTH CARE EDUCATION/TRAINING PROGRAM
Payer: MEDICARE

## 2023-11-15 ENCOUNTER — TELEPHONE (OUTPATIENT)
Dept: HEALTH INFORMATION MANAGEMENT | Facility: OTHER | Age: 83
End: 2023-11-15
Payer: MEDICARE

## 2023-11-17 ENCOUNTER — APPOINTMENT (OUTPATIENT)
Dept: PHYSICAL THERAPY | Facility: MEDICAL CENTER | Age: 83
End: 2023-11-17
Attending: STUDENT IN AN ORGANIZED HEALTH CARE EDUCATION/TRAINING PROGRAM
Payer: MEDICARE

## 2023-11-22 ENCOUNTER — APPOINTMENT (OUTPATIENT)
Dept: PHYSICAL THERAPY | Facility: MEDICAL CENTER | Age: 83
End: 2023-11-22
Attending: STUDENT IN AN ORGANIZED HEALTH CARE EDUCATION/TRAINING PROGRAM
Payer: MEDICARE

## 2023-11-22 RX ORDER — FLUTICASONE PROPIONATE 50 MCG
SPRAY, SUSPENSION (ML) NASAL
Qty: 48 ML | Refills: 3 | Status: SHIPPED | OUTPATIENT
Start: 2023-11-22

## 2023-11-29 ENCOUNTER — APPOINTMENT (OUTPATIENT)
Dept: PHYSICAL THERAPY | Facility: MEDICAL CENTER | Age: 83
End: 2023-11-29
Attending: STUDENT IN AN ORGANIZED HEALTH CARE EDUCATION/TRAINING PROGRAM
Payer: MEDICARE

## 2023-11-30 ENCOUNTER — OFFICE VISIT (OUTPATIENT)
Dept: MEDICAL GROUP | Facility: PHYSICIAN GROUP | Age: 83
End: 2023-11-30
Payer: MEDICARE

## 2023-11-30 VITALS
HEART RATE: 62 BPM | DIASTOLIC BLOOD PRESSURE: 60 MMHG | BODY MASS INDEX: 35.89 KG/M2 | SYSTOLIC BLOOD PRESSURE: 124 MMHG | OXYGEN SATURATION: 98 % | TEMPERATURE: 98.6 F | HEIGHT: 72 IN | WEIGHT: 265 LBS

## 2023-11-30 DIAGNOSIS — Z79.899 MEDICATION MANAGEMENT: ICD-10-CM

## 2023-11-30 DIAGNOSIS — I10 ESSENTIAL HYPERTENSION: Chronic | ICD-10-CM

## 2023-11-30 DIAGNOSIS — E78.5 DYSLIPIDEMIA: ICD-10-CM

## 2023-11-30 DIAGNOSIS — K21.9 GASTROESOPHAGEAL REFLUX DISEASE WITHOUT ESOPHAGITIS: ICD-10-CM

## 2023-11-30 DIAGNOSIS — Z23 NEED FOR VACCINATION: ICD-10-CM

## 2023-11-30 DIAGNOSIS — D69.6 THROMBOCYTOPENIA (HCC): ICD-10-CM

## 2023-11-30 DIAGNOSIS — E03.9 ACQUIRED HYPOTHYROIDISM: ICD-10-CM

## 2023-11-30 DIAGNOSIS — J84.9 INTERSTITIAL LUNG DISEASE (HCC): ICD-10-CM

## 2023-11-30 DIAGNOSIS — R94.31 ABNORMAL EKG: ICD-10-CM

## 2023-11-30 DIAGNOSIS — I10 PRIMARY HYPERTENSION: ICD-10-CM

## 2023-11-30 PROCEDURE — 90662 IIV NO PRSV INCREASED AG IM: CPT | Performed by: INTERNAL MEDICINE

## 2023-11-30 PROCEDURE — 99214 OFFICE O/P EST MOD 30 MIN: CPT | Mod: 25 | Performed by: INTERNAL MEDICINE

## 2023-11-30 PROCEDURE — 3078F DIAST BP <80 MM HG: CPT | Performed by: INTERNAL MEDICINE

## 2023-11-30 PROCEDURE — G0008 ADMIN INFLUENZA VIRUS VAC: HCPCS | Performed by: INTERNAL MEDICINE

## 2023-11-30 PROCEDURE — 3074F SYST BP LT 130 MM HG: CPT | Performed by: INTERNAL MEDICINE

## 2023-11-30 RX ORDER — CARVEDILOL 25 MG/1
25 TABLET ORAL 2 TIMES DAILY
Qty: 200 TABLET | Refills: 3 | Status: SHIPPED | OUTPATIENT
Start: 2023-11-30

## 2023-11-30 RX ORDER — LEVOTHYROXINE SODIUM 0.07 MG/1
75 TABLET ORAL
Qty: 100 TABLET | Refills: 2 | Status: SHIPPED | OUTPATIENT
Start: 2023-11-30

## 2023-11-30 RX ORDER — LISINOPRIL 10 MG/1
10 TABLET ORAL
Qty: 100 TABLET | Refills: 3 | Status: SHIPPED | OUTPATIENT
Start: 2023-11-30

## 2023-11-30 RX ORDER — PRAVASTATIN SODIUM 40 MG
40 TABLET ORAL DAILY
Qty: 100 TABLET | Refills: 3 | Status: SHIPPED | OUTPATIENT
Start: 2023-11-30

## 2023-11-30 RX ORDER — KETOTIFEN FUMARATE 0.25 MG/ML
1 SOLUTION/ DROPS OPHTHALMIC EVERY 12 HOURS PRN
Qty: 10 ML | Refills: 2 | Status: SHIPPED | OUTPATIENT
Start: 2023-11-30

## 2023-11-30 RX ORDER — OMEPRAZOLE 40 MG/1
40 CAPSULE, DELAYED RELEASE ORAL
Qty: 100 CAPSULE | Refills: 3 | Status: SHIPPED | OUTPATIENT
Start: 2023-11-30

## 2023-11-30 ASSESSMENT — FIBROSIS 4 INDEX: FIB4 SCORE: 2.21

## 2023-12-01 ENCOUNTER — APPOINTMENT (OUTPATIENT)
Dept: PHYSICAL THERAPY | Facility: MEDICAL CENTER | Age: 83
End: 2023-12-01
Attending: STUDENT IN AN ORGANIZED HEALTH CARE EDUCATION/TRAINING PROGRAM
Payer: MEDICARE

## 2023-12-01 NOTE — PROGRESS NOTES
CC: Medication refills, referral    HPI:  Farhat presents with the following    1. Need for vaccination  Due for flu vaccine    2. Thrombocytopenia (HCC)  Chronic.  Most recent platelet level 141.  Stable.    3. Abnormal EKG  Patient was noted to have an abnormal EKG on a preoperative evaluation for teeth extraction.  Prolonged QT interval noted, first-degree AV block.  A referral first was placed to cardiology however patient has not been notified of approval.    4. Dyslipidemia  Requesting refill on his statin.    5. Interstitial lung disease (HCC)  Chronic.  Stable.  Followed by pulmonology.  Most recent CT scan of the thorax completed September 2021.  Pulmonary fibrotic changes seen.  PFTs completed January 2022.  Patient seen yearly.    6. Primary hypertension  Requesting refill on blood pressure medications.    7. Essential hypertension  Patient currently takes Coreg 25 mg tablets twice daily, Prinivil 10 mg tablets daily.  Blood pressure well controlled.    8. Gastroesophageal reflux disease without esophagitis  Requesting refill on Prilosec 40 mg daily.    9. Medication management  All medications reviewed with patient.  Requesting refill to [facility] pharmacy.    10. Acquired hypothyroidism  Patient currently taking Synthroid 75 mcg's daily.  Requesting refill.    Patient's lab work will be due in June 2024.      Patient Active Problem List    Diagnosis Date Noted    Thrombocytopenia (HCC) 11/30/2023    Abnormal EKG 11/07/2023    At risk for falls 11/07/2023    Cellulitis of toe of left foot 06/15/2023    QT prolongation 06/13/2023    Chronic pain of right knee 06/16/2022    Rash 06/16/2022    Cerebral atrophy (HCC) 03/25/2022    Atherosclerosis of aorta (HCC) 03/25/2022    Interstitial lung disease (HCC) 03/25/2022    Nonspecific abnormal cardiovascular function study 03/25/2022    Multiple pulmonary nodules 12/15/2021    Prediabetes 01/07/2021    Pleural effusion on left 01/07/2021    Osteoarthritis of knee  "03/09/2017    Tubular adenoma of colon 07/06/2016    Blackwell's esophagus without dysplasia 07/06/2016    Idiopathic neuropathy 09/23/2015    Acquired hypothyroidism 04/28/2015    Severe obesity (BMI 35.0-39.9) with comorbidity (HCC) 08/22/2014    History of vertigo 05/13/2014    Obstructive sleep apnea 02/20/2014    Renal cyst 01/22/2014    Sensorineural hearing loss 11/26/2012    History of atrial fibrillation 05/22/2012    Primary hypertension 09/16/2009    Dyslipidemia 09/16/2009    Allergic rhinitis 09/16/2009       Current Outpatient Medications   Medication Sig Dispense Refill    carvedilol (COREG) 25 MG Tab Take 1 Tablet by mouth 2 times a day. Indications: High Blood Pressure Disorder 200 Tablet 3    ketotifen (ZADITOR) 0.025 % ophthalmic solution Administer 1 Drop into both eyes every 12 hours as needed (eye allergies). Indications: Allergic Conjunctivitis 10 mL 2    levothyroxine (SYNTHROID) 75 MCG Tab Take 1 Tablet by mouth every morning on an empty stomach. Indications: Underactive Thyroid 100 Tablet 2    lisinopril (PRINIVIL) 10 MG Tab Take 1 Tablet by mouth every day. Indications: High Blood Pressure Disorder 100 Tablet 3    omeprazole (PRILOSEC) 40 MG delayed-release capsule Take 1 Capsule by mouth every day. Indications: Heartburn 100 Capsule 3    pravastatin (PRAVACHOL) 40 MG tablet Take 1 Tablet by mouth every day. Indications: High Amount of Fats in the Blood 100 Tablet 3    fluticasone (FLONASE) 50 MCG/ACT nasal spray SHAKE AND INSTILL 2 SPRAYS INTO EACH NOSTRIL TWICE DAILY 48 mL 3    aspirin 81 MG tablet Take 81 mg by mouth at bedtime. Indications: \"heart\"      psyllium (METAMUCIL) 58.6 % powder Take 1 Packet by mouth 1 time a day as needed (constipation).      Cholecalciferol (D3) 2000 UNIT Tab Take 1 Tablet by mouth every day. Indications: supplement (Patient not taking: Reported on 11/30/2023)       No current facility-administered medications for this visit.         Allergies as of " 11/30/2023 - Reviewed 11/30/2023   Allergen Reaction Noted    Other environmental Runny Nose and Itching 06/01/2011        Social History     Socioeconomic History    Marital status:      Spouse name: Not on file    Number of children: Not on file    Years of education: Not on file    Highest education level: Not on file   Occupational History    Not on file   Tobacco Use    Smoking status: Never    Smokeless tobacco: Never   Vaping Use    Vaping Use: Never used   Substance and Sexual Activity    Alcohol use: Yes     Alcohol/week: 1.2 - 1.8 oz     Types: 2 - 3 Standard drinks or equivalent per week     Comment: occ    Drug use: Never    Sexual activity: Not Currently   Other Topics Concern    Not on file   Social History Narrative    Not on file     Social Determinants of Health     Financial Resource Strain: Not on file   Food Insecurity: No Food Insecurity (1/27/2020)    Hunger Vital Sign     Worried About Running Out of Food in the Last Year: Never true     Ran Out of Food in the Last Year: Never true   Transportation Needs: No Transportation Needs (1/27/2020)    PRAPARE - Transportation     Lack of Transportation (Medical): No     Lack of Transportation (Non-Medical): No   Physical Activity: Not on file   Stress: Not on file   Social Connections: Feeling Socially Integrated (7/12/2023)    OASIS : Social Isolation     Frequency of experiencing loneliness or isolation: Never   Intimate Partner Violence: Not on file   Housing Stability: Not on file       Family History   Problem Relation Age of Onset    Cancer Mother         cervical/breast    Cancer Sister         lung    Cancer Father         lung cancer    Cancer Other        Past Surgical History:   Procedure Laterality Date    HAMMERTOE CORRECTION  2/2/2015    Performed by RENAY ChoiPCHAYO at SUR[facility]ERY [facility] ORS    EXOSTOSIS EXCISION  6/3/2011    Performed by ABDIFATAH YOUNG at [facility] ORS    LESION EXCISION ORTHO   6/3/2011    Performed by ABDIFATAH YOUNG at SURGERY [facility]    TOE ARTHROPLASTY  6/3/2011    Performed by ABDIFATAH YOUNG at SURGERY [facility]    LUMBAR LAMINECTOMY DISKECTOMY  2005    microscopic    TONSILLECTOMY  1945    adenoidectomy    EYE SURGERY Bilateral     cataracts       ROS:  Denies any Headache,Chest pain,  Shortness of breath,  Abdominal pain, Changes of bowel or bladder, Lower ext edema, Fevers, Nights sweats, Weight Changes, Focal weakness or numbness.  All other systems are negative.    /60 (BP Location: Left arm, Patient Position: Sitting, BP Cuff Size: Adult)   Pulse 62   Temp 37 °C (98.6 °F) (Temporal)   Ht 1.829 m (6')   Wt 120 kg (265 lb)   SpO2 98%   BMI 35.94 kg/m²      Constitutional: Alert, no distress, well-groomed.  Skin: Warm, dry, good turgor, no rashes in visible areas.  Eye: Equal, round and reactive, conjunctiva clear, lids normal.  ENMT: Lips without lesions, good dentition, moist mucous membranes.  Neck: Trachea midline, no masses, no thyromegaly.  Respiratory: Unlabored respiratory effort, no cough.  Abdomen: Soft, no gross masses.  MSK: Normal gait, moves all extremities.  Neuro: Grossly non-focal. No cranial nerve deficit. Strength and sensation intact.   Psych: Alert and oriented x3, normal affect and mood.      Assessment and Plan.   83 y.o. male presenting with the following.     1. Need for vaccination    - Influenza Vaccine, High Dose (65+ Only)    2. Thrombocytopenia (HCC)  Chronic.  Stable.  Continue to monitor CBC with differential.    3. Abnormal EKG  Contact information for cardiac referral given to patient.    4. Dyslipidemia    - pravastatin (PRAVACHOL) 40 MG tablet; Take 1 Tablet by mouth every day. Indications: High Amount of Fats in the Blood  Dispense: 100 Tablet; Refill: 3    5. Interstitial lung disease (HCC)  Chronic.  Stable.  Keep follow-up appointment with pulmonology.    6. Primary hypertension  Stable on current  antihypertensives.  Medications refilled.    7. Essential hypertension    - carvedilol (COREG) 25 MG Tab; Take 1 Tablet by mouth 2 times a day. Indications: High Blood Pressure Disorder  Dispense: 200 Tablet; Refill: 3  - lisinopril (PRINIVIL) 10 MG Tab; Take 1 Tablet by mouth every day. Indications: High Blood Pressure Disorder  Dispense: 100 Tablet; Refill: 3    8. Gastroesophageal reflux disease without esophagitis    - omeprazole (PRILOSEC) 40 MG delayed-release capsule; Take 1 Capsule by mouth every day. Indications: Heartburn  Dispense: 100 Capsule; Refill: 3    9. Medication management  All medications reviewed with patient.    10. Acquired hypothyroidism  Continue levothyroxine 75 mcg's daily.  TSH within good range.  Asymptomatic.    My total time spent caring for the patient on the day of the encounter was 30 minutes.   This does not include time spent on separately billable procedures/tests.

## 2023-12-04 ENCOUNTER — OFFICE VISIT (OUTPATIENT)
Dept: CARDIOLOGY | Facility: MEDICAL CENTER | Age: 83
End: 2023-12-04
Attending: STUDENT IN AN ORGANIZED HEALTH CARE EDUCATION/TRAINING PROGRAM
Payer: MEDICARE

## 2023-12-04 ENCOUNTER — TELEPHONE (OUTPATIENT)
Dept: HEALTH INFORMATION MANAGEMENT | Facility: OTHER | Age: 83
End: 2023-12-04
Payer: MEDICARE

## 2023-12-04 VITALS
BODY MASS INDEX: 35.65 KG/M2 | DIASTOLIC BLOOD PRESSURE: 64 MMHG | HEART RATE: 67 BPM | HEIGHT: 72 IN | SYSTOLIC BLOOD PRESSURE: 128 MMHG | WEIGHT: 263.2 LBS | RESPIRATION RATE: 14 BRPM | OXYGEN SATURATION: 94 %

## 2023-12-04 DIAGNOSIS — E78.5 DYSLIPIDEMIA: ICD-10-CM

## 2023-12-04 DIAGNOSIS — Z86.79 HISTORY OF ATRIAL FIBRILLATION: ICD-10-CM

## 2023-12-04 DIAGNOSIS — R94.31 ABNORMAL EKG: ICD-10-CM

## 2023-12-04 DIAGNOSIS — I10 PRIMARY HYPERTENSION: ICD-10-CM

## 2023-12-04 LAB — EKG IMPRESSION: NORMAL

## 2023-12-04 PROCEDURE — 93005 ELECTROCARDIOGRAM TRACING: CPT | Performed by: INTERNAL MEDICINE

## 2023-12-04 PROCEDURE — 93010 ELECTROCARDIOGRAM REPORT: CPT | Performed by: INTERNAL MEDICINE

## 2023-12-04 PROCEDURE — 3078F DIAST BP <80 MM HG: CPT | Performed by: INTERNAL MEDICINE

## 2023-12-04 PROCEDURE — 3074F SYST BP LT 130 MM HG: CPT | Performed by: INTERNAL MEDICINE

## 2023-12-04 PROCEDURE — 99204 OFFICE O/P NEW MOD 45 MIN: CPT | Performed by: INTERNAL MEDICINE

## 2023-12-04 PROCEDURE — 99212 OFFICE O/P EST SF 10 MIN: CPT | Performed by: INTERNAL MEDICINE

## 2023-12-04 ASSESSMENT — ENCOUNTER SYMPTOMS
COUGH: 0
DIZZINESS: 1
HEADACHES: 0
DEPRESSION: 0
PALPITATIONS: 0
WEIGHT LOSS: 0
SHORTNESS OF BREATH: 0
RESPIRATORY NEGATIVE: 1
FALLS: 1
MYALGIAS: 0
DOUBLE VISION: 0
ABDOMINAL PAIN: 0
VOMITING: 0
BRUISES/BLEEDS EASILY: 1
EYES NEGATIVE: 1
CHILLS: 0
FEVER: 0
BLURRED VISION: 0
CLAUDICATION: 0
NERVOUS/ANXIOUS: 0
BACK PAIN: 1
CONSTITUTIONAL NEGATIVE: 1
NAUSEA: 0
PSYCHIATRIC NEGATIVE: 1
WEAKNESS: 0
FOCAL WEAKNESS: 0
GASTROINTESTINAL NEGATIVE: 1

## 2023-12-04 ASSESSMENT — FIBROSIS 4 INDEX: FIB4 SCORE: 2.21

## 2023-12-04 NOTE — PROGRESS NOTES
Chief Complaint   Patient presents with    New Patient     NP DX:DX: QT prolongation, Abnormal EKG, Primary hypertension, History of atrial fibrillation, & Dyslipidemia       Subjective     Farhat Liam Stahl is a 83 y.o. male who presents today as a consult from Chucho Cortés and Stefanie Robison for abnormal EKG.     Thank you for allowing me to evaluate Mr. Stahl, who as you know is a 83 year old male with atrial fibrillation, hypertension and hyperlipidemia, interstitial lung disease, lifelong nonsmoker, no family history of coronary artery disease. He is clinically doing well. He denies chest pain, shortness of breath, palpitations, nausea/vomiting or diaphoresis. He recently underwent an EKG for preoperative cardiovascular examination  for oral surgery which was abnormal as described below. In addition atrial fibrillation episode was noted. He is retired  from [employer] and was involved in [employer]. His wife suffered stroke in 2018 and is suffering with aggressive personality.     Past Medical History:   Diagnosis Date    Arrhythmia     Atrial fribrillation    Arthritis     Atrial fibrillation (HCC)     Bronchitis 2004    Chronic pain of right knee 06/16/2022    Fibula fracture 1981    right    High cholesterol     Hyperlipidemia     Hypertension     Lab test positive for detection of COVID-19 virus 08/11/2022    [facility]ICAL HOME     Multiple pulmonary nodules 12/15/2021    Onychogryposis of toenail 01/21/2021    Onychomycosis 01/21/2021    Pain     low back    Pneumonia 2004    Rash 06/16/2022    Sleep apnea     Thrombocytopenia (HCC) 11/30/2023     Past Surgical History:   Procedure Laterality Date    HAMMERTOE CORRECTION  2/2/2015    Performed by JOSÉ MIGUEL Choi.P.MEmmanuelle at [facility]    EXOSTOSIS EXCISION  6/3/2011    Performed by ABDIFATAH YOUNG at [facility]    LESION EXCISION ORTHO  6/3/2011    Performed by ABDIFATAH YOUNG at [facility]     TOE ARTHROPLASTY  6/3/2011    Performed by ABDIFATAH YOUNG at SURGERY [facility] ORS    LUMBAR LAMINECTOMY DISKECTOMY  2005    microscopic    TONSILLECTOMY  1945    adenoidectomy    EYE SURGERY Bilateral     cataracts     Family History   Problem Relation Age of Onset    Cancer Mother         cervical/breast    Cancer Sister         lung    Cancer Father         lung cancer    Cancer Other      Social History     Socioeconomic History    Marital status:      Spouse name: Not on file    Number of children: Not on file    Years of education: Not on file    Highest education level: Not on file   Occupational History    Not on file   Tobacco Use    Smoking status: Never    Smokeless tobacco: Never   Vaping Use    Vaping Use: Never used   Substance and Sexual Activity    Alcohol use: Yes     Alcohol/week: 1.2 - 1.8 oz     Types: 2 - 3 Standard drinks or equivalent per week     Comment: occ    Drug use: Never    Sexual activity: Not Currently   Other Topics Concern    Not on file   Social History Narrative    Not on file     Social Determinants of Health     Financial Resource Strain: Not on file   Food Insecurity: No Food Insecurity (1/27/2020)    Hunger Vital Sign     Worried About Running Out of Food in the Last Year: Never true     Ran Out of Food in the Last Year: Never true   Transportation Needs: No Transportation Needs (1/27/2020)    PRAPARE - Transportation     Lack of Transportation (Medical): No     Lack of Transportation (Non-Medical): No   Physical Activity: Not on file   Stress: Not on file   Social Connections: Feeling Socially Integrated (7/12/2023)    OASIS : Social Isolation     Frequency of experiencing loneliness or isolation: Never   Intimate Partner Violence: Not on file   Housing Stability: Not on file     Allergies   Allergen Reactions    Other Environmental Runny Nose and Itching     [place]     (Medications reviewed.)  Outpatient Encounter Medications as of 12/4/2023  "  Medication Sig Dispense Refill    carvedilol (COREG) 25 MG Tab Take 1 Tablet by mouth 2 times a day. Indications: High Blood Pressure Disorder 200 Tablet 3    ketotifen (ZADITOR) 0.025 % ophthalmic solution Administer 1 Drop into both eyes every 12 hours as needed (eye allergies). Indications: Allergic Conjunctivitis 10 mL 2    levothyroxine (SYNTHROID) 75 MCG Tab Take 1 Tablet by mouth every morning on an empty stomach. Indications: Underactive Thyroid 100 Tablet 2    lisinopril (PRINIVIL) 10 MG Tab Take 1 Tablet by mouth every day. Indications: High Blood Pressure Disorder 100 Tablet 3    omeprazole (PRILOSEC) 40 MG delayed-release capsule Take 1 Capsule by mouth every day. Indications: Heartburn 100 Capsule 3    pravastatin (PRAVACHOL) 40 MG tablet Take 1 Tablet by mouth every day. Indications: High Amount of Fats in the Blood 100 Tablet 3    fluticasone (FLONASE) 50 MCG/ACT nasal spray SHAKE AND INSTILL 2 SPRAYS INTO EACH NOSTRIL TWICE DAILY 48 mL 3    Cholecalciferol (D3) 2000 UNIT Tab Take 1 Tablet by mouth every day. Indications: supplement      aspirin 81 MG tablet Take 81 mg by mouth at bedtime. Indications: \"heart\"      [DISCONTINUED] psyllium (METAMUCIL) 58.6 % powder Take 1 Packet by mouth 1 time a day as needed (constipation).       No facility-administered encounter medications on file as of 12/4/2023.     Review of Systems   Constitutional: Negative.  Negative for chills, fever, malaise/fatigue and weight loss.   HENT: Negative.  Negative for hearing loss.    Eyes: Negative.  Negative for blurred vision and double vision.   Respiratory: Negative.  Negative for cough and shortness of breath.    Cardiovascular:  Positive for leg swelling. Negative for chest pain, palpitations and claudication.   Gastrointestinal: Negative.  Negative for abdominal pain, nausea and vomiting.   Genitourinary: Negative.  Negative for dysuria and urgency.   Musculoskeletal:  Positive for back pain and falls. Negative for " joint pain and myalgias.   Skin:  Positive for itching. Negative for rash.   Neurological:  Positive for dizziness. Negative for focal weakness, weakness and headaches.   Endo/Heme/Allergies:  Positive for environmental allergies. Bruises/bleeds easily.   Psychiatric/Behavioral: Negative.  Negative for depression. The patient is not nervous/anxious.               Objective     /64 (BP Location: Left arm, Patient Position: Sitting, BP Cuff Size: Adult)   Pulse 67   Resp 14   Ht 1.829 m (6')   Wt 119 kg (263 lb 3.2 oz)   SpO2 94%   BMI 35.70 kg/m²     Physical Exam  Constitutional:       Appearance: Normal appearance. He is well-developed and normal weight.   HENT:      Head: Normocephalic and atraumatic.   Neck:      Vascular: No JVD.   Cardiovascular:      Rate and Rhythm: Normal rate and regular rhythm.      Heart sounds: Normal heart sounds.   Pulmonary:      Effort: Pulmonary effort is normal.      Breath sounds: Normal breath sounds.   Abdominal:      General: Bowel sounds are normal.      Palpations: Abdomen is soft.      Comments: No hepatosplenomegaly.   Musculoskeletal:         General: Normal range of motion.   Lymphadenopathy:      Cervical: No cervical adenopathy.   Skin:     General: Skin is warm and dry.   Neurological:      Mental Status: He is alert and oriented to person, place, and time.            CARDIAC STUDIES/PROCEDURES:    ANKLE BRACHIAL INDEX (06/15/23)   No prior exams.   The ankle pressures are not accurately measured due to calcification and    noncompressibility of the tibial vessels.   (study result reviewed)    CAROTID ULTRASOUND (01/20/14)  There is a mild amount of soft plaque in both carotid arterial systems with normal velocities. There is estimated less than 50% stenosis.   (study result reviewed)     ECHOCARDIOGRAM CONCLUSIONS (06/13/23)  Normal left ventricular systolic function.  The left ventricular ejection fraction is visually estimated to be 60-65%.  Normal  diastolic function.  The right ventricle is upper normal in size and systolic function.  Estimated right ventricular systolic pressure is 18 mmHg.  The left atrium is normal in size.  Normal left ventricular systolic function.  No significant valvular abnormalities.   Normal inferior vena cava size and inspiratory collapse.  (study result reviewed)     EKG was ordered for abnormal EKG, performed on (12/04/23) was reviewed: EKG, personally interpreted shows sinus rhythm with anterior Q waves.   EKG performed on (11/07/23) was reviewed: EKG personally interpreted shows sinus bradycardia with anterior Q waves with QTc of 416 ms.     Laboratory results of (06/23/23) were reviewed. Cholesterol profile of 113/88/37/58 mg/dL noted.     MYOCARDIAL PERFUSION STUDY CONCLUSIONS (05/19/12)  1. Lexiscan tolerated well.   2. Myocardial perfusion images with some subtle ischemia of the lateral wall.       3. Ejection fraction and wall motion as above.   4. Raw data as noted above.   (study result reviewed)     Assessment & Plan     1. Abnormal EKG        2. History of atrial fibrillation        3. Primary hypertension        4. Dyslipidemia            Medical Decision Making: Today's Assessment/Status/Plan:        Abnormal EKG: The EKG is abnormal as described above. We will repeat a myocardial perfusion imaging study.   Atrial fibrillation (05/2010): Sinus rhythm is maintained without evidence of atrial fibrillation episodes. We will start anticoagulation for stroke prevention according to the CHADS score of 3. Risks and benefits of anticoagulation including risk of bleeding was discussed with the patient who agrees with plan.   Hypertension: Blood pressure is well controlled. We will continue with carvedilol, lisinopril.  Hyperlipidemia: He is doing well on statin therapy without myalgia symptoms.    We will follow up in 3 months.    Thank you for this consult.

## 2023-12-05 PROCEDURE — RXMED WILLOW AMBULATORY MEDICATION CHARGE: Performed by: INTERNAL MEDICINE

## 2023-12-06 ENCOUNTER — APPOINTMENT (OUTPATIENT)
Dept: PHYSICAL THERAPY | Facility: MEDICAL CENTER | Age: 83
End: 2023-12-06
Attending: STUDENT IN AN ORGANIZED HEALTH CARE EDUCATION/TRAINING PROGRAM
Payer: MEDICARE

## 2023-12-06 ENCOUNTER — PHARMACY VISIT (OUTPATIENT)
Dept: PHARMACY | Facility: MEDICAL CENTER | Age: 83
End: 2023-12-06
Payer: COMMERCIAL

## 2023-12-06 ENCOUNTER — OFFICE VISIT (OUTPATIENT)
Dept: SLEEP MEDICINE | Facility: MEDICAL CENTER | Age: 83
End: 2023-12-06
Attending: STUDENT IN AN ORGANIZED HEALTH CARE EDUCATION/TRAINING PROGRAM
Payer: MEDICARE

## 2023-12-06 VITALS
SYSTOLIC BLOOD PRESSURE: 130 MMHG | WEIGHT: 260 LBS | HEIGHT: 72 IN | RESPIRATION RATE: 16 BRPM | OXYGEN SATURATION: 92 % | HEART RATE: 74 BPM | BODY MASS INDEX: 35.21 KG/M2 | DIASTOLIC BLOOD PRESSURE: 70 MMHG

## 2023-12-06 DIAGNOSIS — G47.33 OSA ON CPAP: ICD-10-CM

## 2023-12-06 DIAGNOSIS — I10 PRIMARY HYPERTENSION: ICD-10-CM

## 2023-12-06 DIAGNOSIS — J84.9 INTERSTITIAL LUNG DISEASE (HCC): ICD-10-CM

## 2023-12-06 DIAGNOSIS — Z01.818 PREOP EXAMINATION: ICD-10-CM

## 2023-12-06 DIAGNOSIS — Z78.9 NONSMOKER: ICD-10-CM

## 2023-12-06 DIAGNOSIS — J90 PLEURAL EFFUSION ON LEFT: ICD-10-CM

## 2023-12-06 PROCEDURE — 3078F DIAST BP <80 MM HG: CPT | Performed by: NURSE PRACTITIONER

## 2023-12-06 PROCEDURE — 99215 OFFICE O/P EST HI 40 MIN: CPT | Performed by: NURSE PRACTITIONER

## 2023-12-06 PROCEDURE — 99212 OFFICE O/P EST SF 10 MIN: CPT | Performed by: STUDENT IN AN ORGANIZED HEALTH CARE EDUCATION/TRAINING PROGRAM

## 2023-12-06 PROCEDURE — 3075F SYST BP GE 130 - 139MM HG: CPT | Performed by: NURSE PRACTITIONER

## 2023-12-06 ASSESSMENT — FIBROSIS 4 INDEX: FIB4 SCORE: 2.21

## 2023-12-06 NOTE — PROGRESS NOTES
Chief Complaint   Patient presents with    Follow-Up     recurrent L pleural effusion / ILD / Apnea  // Last Seen 2/9/2022     Results     CT 8/12/2022 ; CT-CTA CHEST PULMONARY ARTERY W/ RECONS 6/14/2023        HPI:  Farhat Stahl is a 83 y.o. year old male here today for follow-up on recurrent pleural effusion and MARCI.  Last OV 6/16/22 with Dr. Mancini for sleep  Last OV 2/9/22 with Dr. Syed for pulm     MMRC stGstrstastdstest:st st1st Exacerbations this year: 0    PFT 1/26/2022 proportional reduction in forced volumes and no significant obstruction or bronchodilator response.  Severe restrictive defect on lung volumes with TLC 4.11 L or 56%.  Some component secondary to BMI.  Borderline mild DLCO impairment.  CT 6/14/2023 no PE, mild to moderate left pleural effusion with mild pleural thickening similar to 2022 exam.  Airspace opacity in the left lower lobe similar to prior.  Reviewed with patient.    ER visti 6/2023 for acute respiratory failure with hypoxia and necrosis of toe. Treated with abx's and hypoxia resolved; suspicion of pneumonia.     He notes today no symptoms.  No URI since last office visit.  He is pending tooth extraction by his dentist and is seeking clearance from cardiology and reviewed it would also be beneficial to have this from us since he is undergoing anesthesia.  Recommend updating PFT and chest x-ray now.    Auto CPAP 10 to 16 cm; ResMed device obtained thousand 21.  Compliance report Compliance report 11/6/2023 through 12/5/2023 [place] 100% compliance, average nightly use 8 hours 53 minutes, mean pressure 14.4 cm, minimal to moderate mask leak with a reduced AHI of 0.7/h.Reviewed with patient.  He tolerates mask and pressure well.  Current using nasal pillow.  He denies morning headaches or shortness of breath.  No significant dry mouth.  He currently uses a nasal pillow.  He goes to bed between 10 PM and midnight and sleeps until 8/10 AM.  He to use the restroom 1 time at night.    PULM &  SLEEP HX:  Never smoker  Patient establish care January 2021 for left pleural effusion.  On chest x-ray large left-sided pleural effusion with some loculation noted.  Admitted for thoracentesis which showed lymphocytic predominant exudative effusion.  92% lymphocytes.  LDH and total protein elevated consistent exudate.  Cytology was negative.  No growth of bacterial fungal or AFB cultures.  Repeat chest x-ray showed recurrence of loculated left pleural effusion but not quite as severe.  Some bilateral reticular changes noted on CT chest noting early ILD changes.  Repeat thoracentesis noted lymphocytic predominant exudative effusion cytology negative for malignancy.  Lymphocytes 88%, mild eosinophilia on last thoracentesis at 12% but not initial thoracentesis.  Connective tissue disease screening was unremarkable specifically with FAVIO negative, RA factor negative, CCP negative and QFT be negative.  Repeat CT chest March 2021 with high-resolution noted peripheral reticular thickening bilateral upper and lower lobes with mild bronchitis but no clear honeycombing and only small amount of loculated fluid on the left slightly less predominant.  CT chest September 2021 noted no significant changes in ILD findings with persistent left lower lobe effusion and associated atelectasis.  PFT 1/26/2022 notes proportional reduction in FVC and FEV1 with no significant bronchodilator response.  No obstruction noted.  Severe restrictive ventilatory defect with TLC 4.11 L or 56%.  Borderline mild diffusion impairment.    HRCT 3/12/2021:  1.  There is nonspecific diffuse subpleural interstitial opacity most consistent with UIP.  2.  There has been interval decrease in the loculated left pleural effusion with minimal residual pleural fluid remaining.  3.  There is left lower lobe rounded atelectasis with additional areas of peripheral atelectasis in the lingula and left upper lobe.  4.  There is evidence of previous granulomatous  inflammatory process with calcified granulomata.  5.  There is no change in 2 small less than 5 mm peripheral right middle lobe lung nodules.    PSG split-night 3/21/2015 noted severe sleep apnea with overall AHI of 57.0/h, supine AHI 77.9/h, O2 joshua 81%, less than 89% saturation for 78.5% of sleep time.  During treatment portion titrated on CPAP 5 to 11 cm.  Significant improvement on CPAP 11 cm with reduced AHI and REM sleep achieved.    ROS: As per HPI and otherwise negative if not stated.    Past Medical History:   Diagnosis Date    Arrhythmia     Atrial fribrillation    Arthritis     Atrial fibrillation (HCC)     Bronchitis 2004    Chronic pain of right knee 06/16/2022    Fibula fracture 1981    right    High cholesterol     Hyperlipidemia     Hypertension     Lab test positive for detection of COVID-19 virus 08/11/2022    MEDICAL HOME     Multiple pulmonary nodules 12/15/2021    Onychogryposis of toenail 01/21/2021    Onychomycosis 01/21/2021    Pain     low back    Pneumonia 2004    Rash 06/16/2022    Sleep apnea     Thrombocytopenia (HCC) 11/30/2023       Past Surgical History:   Procedure Laterality Date    HAMMERTOE CORRECTION  2/2/2015    Performed by Newton Orta, JOSÉ MIGUEL.P.MEmmanuelle at SURGERY [facility] ORS    EXOSTOSIS EXCISION  6/3/2011    Performed by NEWTON ORTA at [facility] ORS    LESION EXCISION ORTHO  6/3/2011    Performed by NEWTON ORTA at [facility] ORS    TOE ARTHROPLASTY  6/3/2011    Performed by NEWTON ORTA at [facility] ORS    LUMBAR LAMINECTOMY DISKECTOMY  2005    microscopic    TONSILLECTOMY  1945    adenoidectomy    EYE SURGERY Bilateral     cataracts       Family History   Problem Relation Age of Onset    Cancer Mother         cervical/breast    Hypertension Father     Cancer Father         lung cancer    Cancer Sister         lung    Cancer Other     Heart Disease Neg Hx     Heart Attack Neg Hx        Social History     Socioeconomic  History    Marital status:      Spouse name: Not on file    Number of children: Not on file    Years of education: Not on file    Highest education level: Not on file   Occupational History    Not on file   Tobacco Use    Smoking status: Never    Smokeless tobacco: Never   Vaping Use    Vaping Use: Never used   Substance and Sexual Activity    Alcohol use: Yes     Alcohol/week: 1.2 - 1.8 oz     Types: 2 - 3 Standard drinks or equivalent per week     Comment: occ    Drug use: Never    Sexual activity: Not Currently   Other Topics Concern    Not on file   Social History Narrative    Not on file     Social Determinants of Health     Financial Resource Strain: Not on file   Food Insecurity: No Food Insecurity (1/27/2020)    Hunger Vital Sign     Worried About Running Out of Food in the Last Year: Never true     Ran Out of Food in the Last Year: Never true   Transportation Needs: No Transportation Needs (1/27/2020)    PRAPARE - Transportation     Lack of Transportation (Medical): No     Lack of Transportation (Non-Medical): No   Physical Activity: Not on file   Stress: Not on file   Social Connections: Feeling Socially Integrated (7/12/2023)    OASIS : Social Isolation     Frequency of experiencing loneliness or isolation: Never   Intimate Partner Violence: Not on file   Housing Stability: Not on file       Allergies as of 12/06/2023 - Reviewed 12/06/2023   Allergen Reaction Noted    Other environmental Runny Nose and Itching 06/01/2011        Vitals:  /70 (BP Location: Left arm, Patient Position: Sitting, BP Cuff Size: Adult)   Pulse 74   Resp 16   Ht 1.829 m (6')   Wt 118 kg (260 lb)   SpO2 92%     Current medications as of today   Current Outpatient Medications   Medication Sig Dispense Refill    apixaban (ELIQUIS) 5mg Tab Take 1 Tablet by mouth 2 times a day. 180 Tablet 3    carvedilol (COREG) 25 MG Tab Take 1 Tablet by mouth 2 times a day. Indications: High Blood Pressure Disorder 200 Tablet 3  "   ketotifen (ZADITOR) 0.025 % ophthalmic solution Administer 1 Drop into both eyes every 12 hours as needed (eye allergies). Indications: Allergic Conjunctivitis 10 mL 2    levothyroxine (SYNTHROID) 75 MCG Tab Take 1 Tablet by mouth every morning on an empty stomach. Indications: Underactive Thyroid 100 Tablet 2    lisinopril (PRINIVIL) 10 MG Tab Take 1 Tablet by mouth every day. Indications: High Blood Pressure Disorder 100 Tablet 3    omeprazole (PRILOSEC) 40 MG delayed-release capsule Take 1 Capsule by mouth every day. Indications: Heartburn 100 Capsule 3    pravastatin (PRAVACHOL) 40 MG tablet Take 1 Tablet by mouth every day. Indications: High Amount of Fats in the Blood 100 Tablet 3    fluticasone (FLONASE) 50 MCG/ACT nasal spray SHAKE AND INSTILL 2 SPRAYS INTO EACH NOSTRIL TWICE DAILY 48 mL 3    Cholecalciferol (D3) 2000 UNIT Tab Take 1 Tablet by mouth every day. Indications: supplement      aspirin 81 MG tablet Take 81 mg by mouth at bedtime. Indications: \"heart\"       No current facility-administered medications for this visit.         Physical Exam:   Gen:           Alert and oriented, No apparent distress. Mood and affect appropriate, normal interaction with examiner.  Eyes:          PERRL, EOM intact, sclere white, conjunctive moist.  Ears:          Not examined.   Hearing:     Grossly intact.  Nose:          Normal, no lesions or deformities.  Dentition:    Not examined.  Oropharynx:   Not examined.  Mallampati Classification: Not examined.  Neck:        Supple, trachea midline, no masses.  Respiratory Effort: No intercostal retractions or use of accessory muscles.   Lung Auscultation:     Diminished LLL, RML and RLL fine crackles and clear bilateral upper lobes.  CV:            Regular rate and rhythm. No murmurs, rubs or gallops.  Abd:           Not examined.   Lymphadenopathy: Not examined.   Gait and Station: Walker for neuropathy  Digits and Nails: No clubbing, cyanosis, petechiae, or nodes. "   Cranial Nerves: II-XII grossly intact.  Skin:        No rashes, lesions or ulcers noted.               Ext:           BLE mild edema pitting at sock line      Assessment:  1. Interstitial lung disease (HCC)  PULMONARY FUNCTION TESTS -Test requested: Complete Pulmonary Function Test; Include MIPS/MEPS? No    DX-CHEST-2 VIEWS      2. Pleural effusion on left  PULMONARY FUNCTION TESTS -Test requested: Complete Pulmonary Function Test; Include MIPS/MEPS? No    DX-CHEST-2 VIEWS      3. MARCI on CPAP  DME Mask and Supplies    Overnight Oximetry      4. Preop examination  DX-CHEST-2 VIEWS      5. BMI 35.0-35.9,adult  HEIGHT AND WEIGHT      6. Primary hypertension        7. Nonsmoker                 Immunizations:    Flu:11/30/23  Pneumovax 23:2016  Prevnar 13:2015  PCV 20: not due  COVID-19: 11/23/22    Plan:  ILD is mild and clinically stable.  Recommend updating PFT and chest x-ray now for preop exam for teeth extraction requiring anesthesia.  Recommend ongoing PFT for monitoring of ILD.  No further imaging CT imaging required at this time.  Left pleural effusion is clinically stable based on recent CT scan June 2023.  Patient is asymptomatic today on exam.  Will continue to monitor closely.  MARCI is clinically stable.  Continue auto CPAP 10 to 16 cm. Recommend CNOX to screen for hypoxia at night.  DME mask/supplies  CNOX on pap now; may send results via [employer] with recommendations such as empirical pressure adjustment vs supplemental O2 use.  Chest x-ray and PFT for preop exam for teeth extraction.  Will post results via [employer] and may provide a letter to his surgeon for clearance.   PFT and CXR now  Encouraged weight loss or healthy diet and regular activity.  Follow-up with primary care for ongoing manage hypertension.  Follow-up in 2 months to review all testing, sooner if needed.    Please note that this dictation was created using voice recognition software. I have made every reasonable attempt to correct obvious  errors, but it is possible there are errors of grammar and possibly content that I did not discover before finalizing the note.

## 2023-12-06 NOTE — PATIENT INSTRUCTIONS
Complete pulmonary function test and chest xray now for clearance for dental surgery    Complete overnight oxygen test using CPAP to make sure you are getting enough oxygen at night

## 2023-12-07 ENCOUNTER — TELEPHONE (OUTPATIENT)
Dept: HEALTH INFORMATION MANAGEMENT | Facility: OTHER | Age: 83
End: 2023-12-07
Payer: MEDICARE

## 2023-12-08 ENCOUNTER — HOSPITAL ENCOUNTER (OUTPATIENT)
Dept: RADIOLOGY | Facility: MEDICAL CENTER | Age: 83
End: 2023-12-08
Attending: INTERNAL MEDICINE
Payer: MEDICARE

## 2023-12-08 DIAGNOSIS — Z86.79 HISTORY OF ATRIAL FIBRILLATION: ICD-10-CM

## 2023-12-08 DIAGNOSIS — I10 PRIMARY HYPERTENSION: ICD-10-CM

## 2023-12-08 DIAGNOSIS — R94.31 ABNORMAL EKG: ICD-10-CM

## 2023-12-08 PROCEDURE — 78452 HT MUSCLE IMAGE SPECT MULT: CPT

## 2023-12-08 PROCEDURE — 78452 HT MUSCLE IMAGE SPECT MULT: CPT | Mod: 26 | Performed by: INTERNAL MEDICINE

## 2023-12-08 PROCEDURE — 700111 HCHG RX REV CODE 636 W/ 250 OVERRIDE (IP)

## 2023-12-08 PROCEDURE — 93018 CV STRESS TEST I&R ONLY: CPT | Performed by: INTERNAL MEDICINE

## 2023-12-08 RX ORDER — REGADENOSON 0.08 MG/ML
INJECTION, SOLUTION INTRAVENOUS
Status: COMPLETED
Start: 2023-12-08 | End: 2023-12-08

## 2023-12-08 RX ORDER — REGADENOSON 0.08 MG/ML
0.4 INJECTION, SOLUTION INTRAVENOUS ONCE
Status: COMPLETED | OUTPATIENT
Start: 2023-12-08 | End: 2023-12-08

## 2023-12-08 RX ORDER — AMINOPHYLLINE 25 MG/ML
100 INJECTION, SOLUTION INTRAVENOUS
Status: DISCONTINUED | OUTPATIENT
Start: 2023-12-08 | End: 2023-12-09 | Stop reason: HOSPADM

## 2023-12-08 RX ADMIN — REGADENOSON 0.4 MG: 0.08 INJECTION, SOLUTION INTRAVENOUS at 15:00

## 2023-12-08 NOTE — TELEPHONE ENCOUNTER
Phone Number Called: [phone]     Call outcome: Spoke to patient regarding message below.    Message: Called to inform patient to please start taking Eliquis as prescribed.

## 2023-12-08 NOTE — TELEPHONE ENCOUNTER
Noted below:    Chio Schroeder, Med Ass't  You4 hours ago (11:36 AM)       Please advise. Thank you.     Maria Teresa Schroeder, Med Ass't; Elida Eid, Med Ass't; Viki Mae, Med Ass't; Jose De Jesus Rushing, Med Ass't4 hours ago (11:30 AM)     MD  Hello! This patient just received a prescription for Eliquis and he was wondering if he should start taking it today, or after his stress test scheduled for tomorrow, if you could let me know and I can call the patient back or call the patient back at [phone]. Thank you!

## 2023-12-12 ENCOUNTER — HOSPITAL ENCOUNTER (OUTPATIENT)
Dept: RADIOLOGY | Facility: MEDICAL CENTER | Age: 83
End: 2023-12-12
Attending: NURSE PRACTITIONER
Payer: MEDICARE

## 2023-12-12 DIAGNOSIS — J84.9 INTERSTITIAL LUNG DISEASE (HCC): ICD-10-CM

## 2023-12-12 DIAGNOSIS — Z01.818 PREOP EXAMINATION: ICD-10-CM

## 2023-12-12 DIAGNOSIS — J90 PLEURAL EFFUSION ON LEFT: ICD-10-CM

## 2023-12-12 PROCEDURE — 71046 X-RAY EXAM CHEST 2 VIEWS: CPT

## 2023-12-13 ENCOUNTER — APPOINTMENT (OUTPATIENT)
Dept: PHYSICAL THERAPY | Facility: MEDICAL CENTER | Age: 83
End: 2023-12-13
Attending: STUDENT IN AN ORGANIZED HEALTH CARE EDUCATION/TRAINING PROGRAM
Payer: MEDICARE

## 2023-12-14 ENCOUNTER — TELEPHONE (OUTPATIENT)
Dept: CARDIOLOGY | Facility: MEDICAL CENTER | Age: 83
End: 2023-12-14
Payer: MEDICARE

## 2023-12-14 NOTE — LETTER
Dear Dentistry,       Thank you for your interest in coordinating the cardiovascular care of our mutual patient Farhat Stahl 1940.  We have reviewed their [facility] records; and to the best of our understanding our patient has not had stenting, ablation, cardiothoracic surgery or hospitalization for cardiovascular reasons in the past 6 months.  Farhat Stahl has seen us within the past 18 months and are having a low-risk procedure or surgery. They are considered to have nonmodifiable risk and may proceed with the proposed procedure or surgery.  The patient MAY NOT hold their antiplatelet or anticoagulation.  If they have an artificial heart valve or meet other American Heart Association/American Dental Association guidelines, please prescribe antibiotics accordingly.  If you have specific concerns for continuing antiplatelets/anticoagulation in the periprocedural period, please reach out to us at [phone].       Thank you,       [facility]

## 2023-12-14 NOTE — TELEPHONE ENCOUNTER
BIBIANA     Caller: Farhat Stahl     Office Name, phone number, fax number: [facility] in [address]    Fax clearance to [phone]    Procedure Name: Dental Extraction     Procedure Scheduled Date: No scheduled date as of yet     Callback Number: [phone]      Thank You   Kanchan CONNOR

## 2023-12-15 ENCOUNTER — TELEPHONE (OUTPATIENT)
Dept: CARDIOLOGY | Facility: MEDICAL CENTER | Age: 83
End: 2023-12-15
Payer: MEDICARE

## 2023-12-15 NOTE — TELEPHONE ENCOUNTER
----- Message from Maurilio Melo M.D. sent at 12/8/2023  4:32 PM PST -----  Please send surgical clearance to [facility]. The may proceed with oral surgery from cardiac standpoint with moderate risk on beta blockade therapy.    Thanks.  CHRISTELLE

## 2023-12-15 NOTE — TELEPHONE ENCOUNTER
Upon chart review, pt is active on [employer]. Last login 12/15/2023.    StumbleUpon message sent requesting surgeon contact, awaiting pt response.

## 2023-12-15 NOTE — TELEPHONE ENCOUNTER
Last OV: 12/04/2023  Proposed Surgery: Dental extraction  Surgery Date: TBD  Requesting Office Name: [facility]   Fax Number: [phone]   Preference of Location (default is surgery center unless specified by Cardiologist or LILIBETH)  Prior Clearance Addressed: No      Anticoags/Antiplatelets: Aspirin and Apixaban   Outstanding Cardiac Imaging : No  Stent, Cardiac Devices, or Catheterization: No  Ablation, TAVR/Valve (including open heart), Cardioversion: No  Recent Cardiac Hospitalization: No            When: N/A  History (cardiac history):   Past Medical History:   Diagnosis Date    Arrhythmia     Atrial fribrillation    Arthritis     Atrial fibrillation (HCC)     Bronchitis 2004    Chronic pain of right knee 06/16/2022    Fibula fracture 1981    right    High cholesterol     Hyperlipidemia     Hypertension     Lab test positive for detection of COVID-19 virus 08/11/2022    MEDICAL HOME     Multiple pulmonary nodules 12/15/2021    Onychogryposis of toenail 01/21/2021    Onychomycosis 01/21/2021    Pain     low back    Pneumonia 2004    Rash 06/16/2022    Sleep apnea     Thrombocytopenia (HCC) 11/30/2023             Surgical Clearance Letter Sent: YES   **[facility]

## 2023-12-19 PROCEDURE — RXMED WILLOW AMBULATORY MEDICATION CHARGE: Performed by: INTERNAL MEDICINE

## 2023-12-20 ENCOUNTER — APPOINTMENT (OUTPATIENT)
Dept: PHYSICAL THERAPY | Facility: MEDICAL CENTER | Age: 83
End: 2023-12-20
Attending: STUDENT IN AN ORGANIZED HEALTH CARE EDUCATION/TRAINING PROGRAM
Payer: MEDICARE

## 2023-12-20 ENCOUNTER — TELEPHONE (OUTPATIENT)
Dept: PHARMACY | Facility: MEDICAL CENTER | Age: 83
End: 2023-12-20
Payer: MEDICARE

## 2023-12-20 NOTE — TELEPHONE ENCOUNTER
Contact:             Phone number: [phone]     Name of person spoken with and relationship to patient: SALO RICHARDS  Medication:   Patient’s Adherence:             How patient is doing on medication: well     How many missed doses and reason: 0     Any new medications: no     Any new conditions: no     Any new allergies: no     Any new side effects: no      Any new diagnoses: no      How many doses remainin     Did patient want to speak with pharmacist: no   Delivery:             Delivery date and method:      Needs by Date:      Signature required: no     Any additional details for : SAVAGE Kingsley Appointment Date: SAVAGE   Shipping Address:  [address] [id number]   Medication(name, strength and dose): ELIQUIS 60/30DS $   Copay: $47   Payment Method: NEW [facility]   Supplies:   Additional Information:

## 2023-12-22 ENCOUNTER — PHARMACY VISIT (OUTPATIENT)
Dept: PHARMACY | Facility: MEDICAL CENTER | Age: 83
End: 2023-12-22
Payer: COMMERCIAL

## 2024-01-03 ENCOUNTER — TELEPHONE (OUTPATIENT)
Dept: HEALTH INFORMATION MANAGEMENT | Facility: OTHER | Age: 84
End: 2024-01-03
Payer: MEDICARE

## 2024-01-04 ENCOUNTER — TELEPHONE (OUTPATIENT)
Dept: CARDIOLOGY | Facility: MEDICAL CENTER | Age: 84
End: 2024-01-04
Payer: MEDICARE

## 2024-01-04 NOTE — TELEPHONE ENCOUNTER
BIBIANA        Caller: Farhat Stahl      Topic/issue: Patient was calling about the forms from [facility] that he dropped off before [place] on the 12/17 or on 12/18 and if they had been filled out and faxed back to their office for the upcoming dental appointment he has with them. He stated there's been no date set      Callback Number: [phone]      Thank you    -Elias SMITH

## 2024-01-05 ENCOUNTER — TELEPHONE (OUTPATIENT)
Dept: CARDIOLOGY | Facility: MEDICAL CENTER | Age: 84
End: 2024-01-05
Payer: MEDICARE

## 2024-01-05 NOTE — TELEPHONE ENCOUNTER
Expand All Collapse All  Last OV: 12/04/2023  Proposed Surgery: Oral Surgery with Oral/Local Anesthesia   Surgery Date: TBD  Requesting Office Name: [facility]   Fax Number: [phone]  Preference of Location (default is surgery center unless specified by Cardiologist or LILIBETH)  Prior Clearance Addressed: Yes      May proceed with oral surgery from cardiac standpoint with moderate risk on beta blockade therapy.     History (cardiac history):   Past Medical History        Past Medical History:   Diagnosis Date    Arrhythmia       Atrial fribrillation    Arthritis      Atrial fibrillation (HCC)      Bronchitis 2004    Chronic pain of right knee 06/16/2022    Fibula fracture 1981     right    High cholesterol      Hyperlipidemia      Hypertension      Lab test positive for detection of COVID-19 virus 08/11/2022    MEDICAL HOME      Multiple pulmonary nodules 12/15/2021    Onychogryposis of toenail 01/21/2021    Onychomycosis 01/21/2021    Pain       low back    Pneumonia 2004    Rash 06/16/2022    Sleep apnea      Thrombocytopenia (HCC) 11/30/2023                     Surgical Clearance Letter Sent: YES   **Scan clearance request letter into [facility].**

## 2024-01-05 NOTE — TELEPHONE ENCOUNTER
To Viki Barroso Caroline, and Kallie have we received any clearance request from [facility] that was dropped off by pt last month?  Please see message below and advise, thank you!    =====================    Upon chart review, clearance request not uploaded to media tab.  No further documentation noted since 12/15/2023.

## 2024-01-05 NOTE — TELEPHONE ENCOUNTER
Last OV: 12/04/2023  Proposed Surgery: Oral Surgery with Oral/Local Anesthesia   Surgery Date: TBD  Requesting Office Name: [facility]   Fax Number: [phone]  Preference of Location (default is surgery center unless specified by Cardiologist or [facility]IL[facility])  Prior Clearance Addressed: Yes     May proceed with oral surgery from cardiac standpoint with moderate risk on beta blockade therapy.     History (cardiac history):   Past Medical History:   Diagnosis Date    Arrhythmia     Atrial fribrillation    Arthritis     Atrial fibrillation (HCC)     Bronchitis 2004    Chronic pain of right knee 06/16/2022    Fibula fracture 1981    right    High cholesterol     Hyperlipidemia     Hypertension     Lab test positive for detection of COVID-19 virus 08/11/2022    MEDICAL HOME     Multiple pulmonary nodules 12/15/2021    Onychogryposis of toenail 01/21/2021    Onychomycosis 01/21/2021    Pain     low back    Pneumonia 2004    Rash 06/16/2022    Sleep apnea     Thrombocytopenia (HCC) 11/30/2023             Surgical Clearance Letter Sent: YES   **Scan clearance request letter into [facility].**

## 2024-01-05 NOTE — LETTER
PROCEDURE/SURGERY CLEARANCE FORM      Encounter Date: 1/5/2024    Patient: Farhat Stahl  YOB: 1940    CARDIOLOGIST:  Maurilio Melo M.D.    REFERRING DOCTOR:  No ref. provider found    The above patient is cleared to have the following procedure/surgery: Oral Surgery with Oral/Local Anesthesia                                             Additional comments: May proceed with oral surgery from cardiac standpoint with moderate risk on beta blockade therapy.        Electronically signed         MD Signature   Maurilio Melo M.D.

## 2024-01-16 ENCOUNTER — TELEPHONE (OUTPATIENT)
Dept: CARDIOLOGY | Facility: MEDICAL CENTER | Age: 84
End: 2024-01-16
Payer: MEDICARE

## 2024-01-16 NOTE — TELEPHONE ENCOUNTER
Phone Number Called: [phone]    Call outcome: Spoke to patient regarding message below.    Message: Called to discuss clearance. Advised I would email a copy to patient per his request.  Pt told to call back if copy not received.    Letter Xeroxed to patient at 12:37pm

## 2024-01-16 NOTE — TELEPHONE ENCOUNTER
BIBIANA      Caller: Farhat Stahl     Topic/issue: Patient states that he needs a medical clearance for the dental procedure he is having.  He states that he was cleared for the procedure by you guys however he never received the form. Please advise how he can obtain a copy     Callback Number: [phone]    Thank You   Kanchan CONNOR

## 2024-01-18 ENCOUNTER — TELEPHONE (OUTPATIENT)
Dept: PHARMACY | Facility: MEDICAL CENTER | Age: 84
End: 2024-01-18
Payer: MEDICARE

## 2024-01-18 PROCEDURE — RXMED WILLOW AMBULATORY MEDICATION CHARGE: Performed by: INTERNAL MEDICINE

## 2024-01-18 NOTE — TELEPHONE ENCOUNTER
Contact: [phone]   Farhat Stahl        Phone number: [phone]    Name of person spoken with and relationship to patient: Farhat, self   Patient’s Adherence:            How patient is doing on medication:  well    How many missed doses and reason: 0    Any new medications: no    Any new conditions: no    Any new allergies: no    Any new side effects: no     Any new diagnoses: no     How many doses remaining:  approx 14 (7 days)    Did patient want to speak with pharmacist: no  Delivery:            Delivery date and method:  ship 1/19 USPS (informed approx. 3-5 business days)      Needs by Date:  1/24    Signature required:  no    Any additional details for :  chaitanya Kingsley Appointment Date:  na  Shipping Address: [id number] [address] [facility] [address], [address]  Medication(name, strength and dose): Eliquis 5mg Tabs bid  Copay: $47  Payment Method: ccof  Supplies:  na  Additional info:  ALVARADO Dougherty. He stated doing well on the medication. No further questions/concerns at this time

## 2024-01-19 NOTE — TELEPHONE ENCOUNTER
BIBIANA Freeder: Farhat Stahl    Topic/issue: Pt called in regards to needing a copy of the clearance sent over to him on the request of his dental office wanting him to bring in his own copy of the clearance on the day of his procedure. Please advise.     Callback Number: [phone]    Thank you,    Davy REAL

## 2024-01-19 NOTE — TELEPHONE ENCOUNTER
Phone Number Called: [phone]     Call outcome: Spoke to patient regarding message below.    Message: Called to see if pt received email, states he still does not have it. Unable to check while on phone. Offered to mail letter, pt appreciative. Provided address below.    [id number] [place] [address]    Letter mailed

## 2024-01-22 ENCOUNTER — PHARMACY VISIT (OUTPATIENT)
Dept: PHARMACY | Facility: MEDICAL CENTER | Age: 84
End: 2024-01-22
Payer: COMMERCIAL

## 2024-02-14 PROCEDURE — RXMED WILLOW AMBULATORY MEDICATION CHARGE: Performed by: INTERNAL MEDICINE

## 2024-02-15 ENCOUNTER — TELEPHONE (OUTPATIENT)
Dept: PHARMACY | Facility: MEDICAL CENTER | Age: 84
End: 2024-02-15
Payer: MEDICARE

## 2024-02-15 NOTE — TELEPHONE ENCOUNTER
Contact:           Phone number: [phone]   Name of person spoken with and relationship to patient: SALO RICHARDS   Medication:  Patient’s Adherence:           How patient is doing on medication: well   How many missed doses and reason: 0   Any new medications: no   Any new conditions: no   Any new allergies: no   Any new side effects: no    Any new diagnoses: no    How many doses remaining: 10DS   Did patient want to speak with pharmacist: no  Delivery:           Delivery date and method: USPS SHIP 02/15   Needs by Date: 02/24   Signature required: no   Any additional details for : SAVAGE Kingsley Appointment Date: SAVAGE  Shipping Address: [id number] [address]  Medication(name, strength and dose): ELIQUIS 60/30DS $47  Copay: $47  Payment Method: [facility]  Supplies:   Additional Information:

## 2024-02-16 ENCOUNTER — PHARMACY VISIT (OUTPATIENT)
Dept: PHARMACY | Facility: MEDICAL CENTER | Age: 84
End: 2024-02-16
Payer: COMMERCIAL

## 2024-03-12 ENCOUNTER — OFFICE VISIT (OUTPATIENT)
Dept: CARDIOLOGY | Facility: MEDICAL CENTER | Age: 84
End: 2024-03-12
Attending: INTERNAL MEDICINE
Payer: MEDICARE

## 2024-03-12 VITALS
BODY MASS INDEX: 33.46 KG/M2 | RESPIRATION RATE: 16 BRPM | HEART RATE: 63 BPM | SYSTOLIC BLOOD PRESSURE: 100 MMHG | OXYGEN SATURATION: 94 % | HEIGHT: 72 IN | DIASTOLIC BLOOD PRESSURE: 66 MMHG | WEIGHT: 247 LBS

## 2024-03-12 DIAGNOSIS — E78.5 DYSLIPIDEMIA: ICD-10-CM

## 2024-03-12 DIAGNOSIS — I10 PRIMARY HYPERTENSION: ICD-10-CM

## 2024-03-12 DIAGNOSIS — Z79.01 CHRONIC ANTICOAGULATION: ICD-10-CM

## 2024-03-12 DIAGNOSIS — Z86.79 HISTORY OF ATRIAL FIBRILLATION: ICD-10-CM

## 2024-03-12 PROCEDURE — 3078F DIAST BP <80 MM HG: CPT | Performed by: INTERNAL MEDICINE

## 2024-03-12 PROCEDURE — 99214 OFFICE O/P EST MOD 30 MIN: CPT | Performed by: INTERNAL MEDICINE

## 2024-03-12 PROCEDURE — 99212 OFFICE O/P EST SF 10 MIN: CPT | Performed by: INTERNAL MEDICINE

## 2024-03-12 PROCEDURE — 3074F SYST BP LT 130 MM HG: CPT | Performed by: INTERNAL MEDICINE

## 2024-03-12 ASSESSMENT — ENCOUNTER SYMPTOMS
GASTROINTESTINAL NEGATIVE: 1
CLAUDICATION: 0
NEUROLOGICAL NEGATIVE: 1
WEAKNESS: 0
BLURRED VISION: 0
CHILLS: 0
DIZZINESS: 0
FOCAL WEAKNESS: 0
CONSTITUTIONAL NEGATIVE: 1
DEPRESSION: 0
PSYCHIATRIC NEGATIVE: 1
WEIGHT LOSS: 0
VOMITING: 0
MYALGIAS: 0
FEVER: 0
CARDIOVASCULAR NEGATIVE: 1
RESPIRATORY NEGATIVE: 1
COUGH: 0
SHORTNESS OF BREATH: 0
PALPITATIONS: 0
HEADACHES: 0
EYES NEGATIVE: 1
NERVOUS/ANXIOUS: 0
NAUSEA: 0
MUSCULOSKELETAL NEGATIVE: 1
BRUISES/BLEEDS EASILY: 0
ABDOMINAL PAIN: 0
DOUBLE VISION: 0

## 2024-03-12 ASSESSMENT — FIBROSIS 4 INDEX: FIB4 SCORE: 2.24

## 2024-03-12 NOTE — PROGRESS NOTES
Chief Complaint   Patient presents with    Hypertension     F/V Dx: Primary hypertension    Dyslipidemia    Atrial Fibrillation     F/V Dx: History of atrial fibrillation       Subjective     Farhat Stahl is a 84 y.o. male who presents today for follow up of abnormal EKG, atrial fibrillation, medication change evaluation.    Since the patient's last visit on 12/04/23, he has been doing well clinically. He denies chest pain, shortness of breath, palpitations, nausea/vomiting or diaphoresis. On the last visit he was started on Eliquis and is tolerating this medication well without side effects. He underwent unremarkable cardiac studies as described. He moves around with a cane or a walker. He is getting a rowing machine.     Past Medical History:   Diagnosis Date    Arrhythmia     Atrial fribrillation    Arthritis     Atrial fibrillation (HCC)     Bronchitis 2004    Chronic pain of right knee 06/16/2022    Fibula fracture 1981    right    High cholesterol     Hyperlipidemia     Hypertension     Lab test positive for detection of COVID-19 virus 08/11/2022    MEDICAL HOME     Multiple pulmonary nodules 12/15/2021    Onychogryposis of toenail 01/21/2021    Onychomycosis 01/21/2021    Pain     low back    Pneumonia 2004    Rash 06/16/2022    Sleep apnea     Thrombocytopenia (HCC) 11/30/2023     Past Surgical History:   Procedure Laterality Date    HAMMERTOE CORRECTION  2/2/2015    Performed by Abdifatah Young, D.P.M. at SURGERY [facility] ORS    EXOSTOSIS EXCISION  6/3/2011    Performed by ABDIFATAH YOUNG at [facility] ORS    LESION EXCISION ORTHO  6/3/2011    Performed by ABDIFATAH YOUNG at [facility]    TOE ARTHROPLASTY  6/3/2011    Performed by ABDIFATAH YOUNG at [facility]    LUMBAR LAMINECTOMY DISKECTOMY  2005    microscopic    TONSILLECTOMY  1945    adenoidectomy    EYE SURGERY Bilateral     cataracts     Family History   Problem Relation Age of Onset     Cancer Mother         cervical/breast    Hypertension Father     Cancer Father         lung cancer    Cancer Sister         lung    Cancer Other     Heart Disease Neg Hx     Heart Attack Neg Hx      Social History     Socioeconomic History    Marital status:      Spouse name: Not on file    Number of children: Not on file    Years of education: Not on file    Highest education level: Not on file   Occupational History    Not on file   Tobacco Use    Smoking status: Never    Smokeless tobacco: Never   Vaping Use    Vaping Use: Never used   Substance and Sexual Activity    Alcohol use: Yes     Alcohol/week: 1.2 - 1.8 oz     Types: 2 - 3 Standard drinks or equivalent per week     Comment: occ    Drug use: Never    Sexual activity: Not Currently   Other Topics Concern    Not on file   Social History Narrative    Not on file     Social Determinants of Health     Financial Resource Strain: Not on file   Food Insecurity: No Food Insecurity (1/27/2020)    Hunger Vital Sign     Worried About Running Out of Food in the Last Year: Never true     Ran Out of Food in the Last Year: Never true   Transportation Needs: No Transportation Needs (1/27/2020)    PRAPARE - Transportation     Lack of Transportation (Medical): No     Lack of Transportation (Non-Medical): No   Physical Activity: Not on file   Stress: Not on file   Social Connections: Feeling Socially Integrated (7/12/2023)    OASIS : Social Isolation     Frequency of experiencing loneliness or isolation: Never   Intimate Partner Violence: Not on file   Housing Stability: Not on file     Allergies   Allergen Reactions    Other Environmental Runny Nose and Itching     Pollens     (Medications reviewed.)  Outpatient Encounter Medications as of 3/12/2024   Medication Sig Dispense Refill    apixaban (ELIQUIS) 5mg Tab Take 1 Tablet by mouth 2 times a day. 180 Tablet 3    carvedilol (COREG) 25 MG Tab Take 1 Tablet by mouth 2 times a day. Indications: High Blood Pressure  "Disorder 200 Tablet 3    ketotifen (ZADITOR) 0.025 % ophthalmic solution Administer 1 Drop into both eyes every 12 hours as needed (eye allergies). Indications: Allergic Conjunctivitis 10 mL 2    levothyroxine (SYNTHROID) 75 MCG Tab Take 1 Tablet by mouth every morning on an empty stomach. Indications: Underactive Thyroid 100 Tablet 2    lisinopril (PRINIVIL) 10 MG Tab Take 1 Tablet by mouth every day. Indications: High Blood Pressure Disorder 100 Tablet 3    omeprazole (PRILOSEC) 40 MG delayed-release capsule Take 1 Capsule by mouth every day. Indications: Heartburn 100 Capsule 3    pravastatin (PRAVACHOL) 40 MG tablet Take 1 Tablet by mouth every day. Indications: High Amount of Fats in the Blood 100 Tablet 3    fluticasone (FLONASE) 50 MCG/ACT nasal spray SHAKE AND INSTILL 2 SPRAYS INTO EACH NOSTRIL TWICE DAILY 48 mL 3    Cholecalciferol (D3) 2000 UNIT Tab Take 1 Tablet by mouth every day. Indications: supplement      aspirin 81 MG tablet Take 81 mg by mouth at bedtime. Indications: \"heart\" (Patient not taking: Reported on 3/12/2024)       No facility-administered encounter medications on file as of 3/12/2024.     Review of Systems   Constitutional: Negative.  Negative for chills, fever, malaise/fatigue and weight loss.   HENT: Negative.  Negative for hearing loss.    Eyes: Negative.  Negative for blurred vision and double vision.   Respiratory: Negative.  Negative for cough and shortness of breath.    Cardiovascular: Negative.  Negative for chest pain, palpitations, claudication and leg swelling.   Gastrointestinal: Negative.  Negative for abdominal pain, nausea and vomiting.   Genitourinary: Negative.  Negative for dysuria and urgency.   Musculoskeletal: Negative.  Negative for joint pain and myalgias.   Skin: Negative.  Negative for itching and rash.   Neurological: Negative.  Negative for dizziness, focal weakness, weakness and headaches.   Endo/Heme/Allergies: Negative.  Does not bruise/bleed easily. "   Psychiatric/Behavioral: Negative.  Negative for depression. The patient is not nervous/anxious.               Objective     /66 (BP Location: Left arm, Patient Position: Sitting, BP Cuff Size: Adult)   Pulse 63   Resp 16   Ht 1.829 m (6')   Wt 112 kg (247 lb)   SpO2 94%   BMI 33.50 kg/m²     Physical Exam  Constitutional:       Appearance: Normal appearance. He is well-developed and normal weight.   HENT:      Head: Normocephalic and atraumatic.   Neck:      Vascular: No JVD.   Cardiovascular:      Rate and Rhythm: Normal rate and regular rhythm.      Heart sounds: Normal heart sounds.   Pulmonary:      Effort: Pulmonary effort is normal.      Breath sounds: Normal breath sounds.   Abdominal:      General: Bowel sounds are normal.      Palpations: Abdomen is soft.      Comments: No hepatosplenomegaly.   Musculoskeletal:         General: Normal range of motion.   Lymphadenopathy:      Cervical: No cervical adenopathy.   Skin:     General: Skin is warm and dry.   Neurological:      Mental Status: He is alert and oriented to person, place, and time.            CARDIAC STUDIES/PROCEDURES:     ANKLE BRACHIAL INDEX (06/15/23)  No prior exams.  The ankle pressures are not accurately measured due to calcification and noncompressibility of the tibial vessels.     CAROTID ULTRASOUND (01/20/14)  There is a mild amount of soft plaque in both carotid arterial systems with normal velocities.   There is estimated less than 50% stenosis.     CTA OF CHEST (06/14/23)  Thoracic aorta and great vessels:  No aneurysm.  Heart and pericardium:   No significant coronary artery calcification. No pericardial effusion.  (study result reviewed)     ECHOCARDIOGRAM CONCLUSIONS (06/13/23)  Normal left ventricular systolic function.  The left ventricular ejection fraction is visually estimated to be 60-65%.  Normal diastolic function.  The right ventricle is upper normal in size and systolic function.  Estimated right ventricular  systolic pressure is 18 mmHg.  The left atrium is normal in size.  Normal left ventricular systolic function.  No significant valvular abnormalities.   Normal inferior vena cava size and inspiratory collapse.    EKG performed on (12/04/23) EKG shows sinus rhythm with anterior Q waves.   EKG performed on (11/07/23) EKG shows sinus bradycardia with anterior Q waves with QTc of 416 ms.     Laboratory results of (06/23/23) Cholesterol profile of 113/88/37/58 mg/dL noted.      MYOCARDIAL PERFUSION STUDY CONCLUSIONS (12/08/23)  NUCLEAR IMAGING INTERPRETATION  Normal Lexiscan myocardial perfusion study.  No evidence of ischemia or infarct.  SDS 1.  LVEF 63%.  No ischemic changes with Regadenoson.  No arrhythmias.  No chest pain.  ECG INTERPRETATION  Negative stress ECG for ischemia.  (study result reviewed)    MYOCARDIAL PERFUSION STUDY CONCLUSIONS (05/19/12)  1. Lexiscan tolerated well.   2. Myocardial perfusion images with some subtle ischemia of the lateral wall.       3. Ejection fraction and wall motion as above.   4. Raw data as noted above.     Assessment & Plan     1. History of atrial fibrillation        2. Chronic anticoagulation        3. Primary hypertension        4. Dyslipidemia            Medical Decision Making: Today's Assessment/Status/Plan:        Atrial fibrillation (05/2010) on anticoagulation therapy (Eliquis): He is doing well on anticoagulation without palpitations.   Hypertension: Blood pressure is well controlled. We will continue with carvedilol, lisinopril.  Hyperlipidemia: He is doing well on statin therapy without myalgia symptoms.  Additional information: He is retired  from [employer] and was involved in [employer]. His wife suffered stroke in 2018 and is suffering with aggressive personality.      We will follow up the patient in one year.    CC Stefanie Robison

## 2024-03-28 ENCOUNTER — OFFICE VISIT (OUTPATIENT)
Dept: MEDICAL GROUP | Facility: PHYSICIAN GROUP | Age: 84
End: 2024-03-28
Payer: MEDICARE

## 2024-03-28 VITALS
DIASTOLIC BLOOD PRESSURE: 60 MMHG | HEIGHT: 72 IN | SYSTOLIC BLOOD PRESSURE: 122 MMHG | BODY MASS INDEX: 34.09 KG/M2 | WEIGHT: 251.7 LBS | RESPIRATION RATE: 16 BRPM | HEART RATE: 60 BPM | TEMPERATURE: 98.4 F | OXYGEN SATURATION: 97 %

## 2024-03-28 DIAGNOSIS — D69.6 THROMBOCYTOPENIA (HCC): ICD-10-CM

## 2024-03-28 DIAGNOSIS — G31.9 CEREBRAL ATROPHY (HCC): ICD-10-CM

## 2024-03-28 DIAGNOSIS — Z86.79 HISTORY OF ATRIAL FIBRILLATION: ICD-10-CM

## 2024-03-28 DIAGNOSIS — R42 DIZZINESS: ICD-10-CM

## 2024-03-28 DIAGNOSIS — G47.33 OSA ON CPAP: Chronic | ICD-10-CM

## 2024-03-28 DIAGNOSIS — J84.9 INTERSTITIAL LUNG DISEASE (HCC): Chronic | ICD-10-CM

## 2024-03-28 PROCEDURE — 3078F DIAST BP <80 MM HG: CPT | Performed by: INTERNAL MEDICINE

## 2024-03-28 PROCEDURE — 3074F SYST BP LT 130 MM HG: CPT | Performed by: INTERNAL MEDICINE

## 2024-03-28 PROCEDURE — 99214 OFFICE O/P EST MOD 30 MIN: CPT | Performed by: INTERNAL MEDICINE

## 2024-03-28 RX ORDER — MECLIZINE HCL 12.5 MG/1
12.5 TABLET ORAL 3 TIMES DAILY PRN
Qty: 30 TABLET | Refills: 1 | Status: SHIPPED | OUTPATIENT
Start: 2024-03-28

## 2024-03-28 ASSESSMENT — PATIENT HEALTH QUESTIONNAIRE - PHQ9: CLINICAL INTERPRETATION OF PHQ2 SCORE: 0

## 2024-03-28 ASSESSMENT — FIBROSIS 4 INDEX: FIB4 SCORE: 2.24

## 2024-03-29 NOTE — PROGRESS NOTES
CC:     HPI:  Farhat presents with the following    1. Dizziness  Patient presents with a 1 day history of acute dizziness/vertigo that lasted a day.  This was noted while patient was laying in bed and rolled over.  About 3 months ago, patient was started on Eliquis for history of atrial fibrillation through cardiology.  When he started Eliquis he had episodes of lightheadedness as a side effect which subsided after a few days.  Due to this recent episode of dizziness he discontinued Eliquis thinking this may be the issue.  He has a history of acute vertigo which occurred about 10 years ago.  Patient was sent to the hospital for workup.  All negative.  Patient was then sent to outpatient physical therapy for Epley maneuvers.  Patient has been stable since then.    2. History of atrial fibrillation  Patient with a remote history of atrial fibrillation was seen in the clinic in December for preop evaluation for tooth extraction.  On that visit, EKG results showed prolonged QT interval.  Cardiology referral was placed.  He was seen and evaluated by cardiology and was started on anticoagulation with Eliquis for stroke prevention with a CHADS2 score of 3.  Patient underwent repeat myocardial perfusion imaging study which was within normal range.  Normal EF 63% with no ischemic changes.  Patient discontinued Eliquis.    3. Thrombocytopenia (HCC)  Chronic.  Stable.  Most recent platelet count 141.  Hemoglobin level 13.7.  Labs completed in June 2023.  Asymptomatic.    4. Cerebral atrophy (HCC)  Chronic.  Stable.  Cerebral atrophy noted on brain MRI in 2014.    5. Interstitial lung disease (HCC)  Chronic.  Stable.  Followed by pulmonology.  Patient will need updated pulmonary function test.  No need for further CT surveillance.    6. MARCI on CPAP  Chronic.  Stable.  Patient will be scheduled for an overnight sleep study to renew his CPAP machine.  He will need fullface mask.      Patient Active Problem List    Diagnosis  Date Noted    Chronic anticoagulation 03/12/2024    Thrombocytopenia (HCC) 11/30/2023    Abnormal EKG 11/07/2023    At risk for falls 11/07/2023    Cellulitis of toe of left foot 06/15/2023    QT prolongation 06/13/2023    Chronic pain of right knee 06/16/2022    Rash 06/16/2022    Cerebral atrophy (HCC) 03/25/2022    Atherosclerosis of aorta (HCC) 03/25/2022    Interstitial lung disease (HCC) 03/25/2022    Nonspecific abnormal cardiovascular function study 03/25/2022    Multiple pulmonary nodules 12/15/2021    Prediabetes 01/07/2021    Pleural effusion on left 01/07/2021    Osteoarthritis of knee 03/09/2017    Tubular adenoma of colon 07/06/2016    Blackwell's esophagus without dysplasia 07/06/2016    Idiopathic neuropathy 09/23/2015    Acquired hypothyroidism 04/28/2015    Severe obesity (BMI 35.0-39.9) with comorbidity (HCC) 08/22/2014    History of vertigo 05/13/2014    MARCI on CPAP 02/20/2014    Renal cyst 01/22/2014    Sensorineural hearing loss 11/26/2012    History of atrial fibrillation 05/22/2012    Primary hypertension 09/16/2009    Dyslipidemia 09/16/2009    Allergic rhinitis 09/16/2009       Current Outpatient Medications   Medication Sig Dispense Refill    carvedilol (COREG) 25 MG Tab Take 1 Tablet by mouth 2 times a day. Indications: High Blood Pressure Disorder 200 Tablet 3    ketotifen (ZADITOR) 0.025 % ophthalmic solution Administer 1 Drop into both eyes every 12 hours as needed (eye allergies). Indications: Allergic Conjunctivitis 10 mL 2    levothyroxine (SYNTHROID) 75 MCG Tab Take 1 Tablet by mouth every morning on an empty stomach. Indications: Underactive Thyroid 100 Tablet 2    lisinopril (PRINIVIL) 10 MG Tab Take 1 Tablet by mouth every day. Indications: High Blood Pressure Disorder 100 Tablet 3    omeprazole (PRILOSEC) 40 MG delayed-release capsule Take 1 Capsule by mouth every day. Indications: Heartburn 100 Capsule 3    pravastatin (PRAVACHOL) 40 MG tablet Take 1 Tablet by mouth every day.  Indications: High Amount of Fats in the Blood 100 Tablet 3    fluticasone (FLONASE) 50 MCG/ACT nasal spray SHAKE AND INSTILL 2 SPRAYS INTO EACH NOSTRIL TWICE DAILY 48 mL 3    Cholecalciferol (D3) 2000 UNIT Tab Take 1 Tablet by mouth every day. Indications: supplement      apixaban (ELIQUIS) 5mg Tab Take 1 Tablet by mouth 2 times a day. (Patient not taking: Reported on 3/28/2024) 180 Tablet 3     No current facility-administered medications for this visit.         Allergies as of 03/28/2024 - Reviewed 03/28/2024   Allergen Reaction Noted    Other environmental Runny Nose and Itching 06/01/2011        Social History     Socioeconomic History    Marital status:      Spouse name: Not on file    Number of children: Not on file    Years of education: Not on file    Highest education level: Not on file   Occupational History    Not on file   Tobacco Use    Smoking status: Never    Smokeless tobacco: Never   Vaping Use    Vaping Use: Never used   Substance and Sexual Activity    Alcohol use: Yes     Alcohol/week: 1.2 - 1.8 oz     Types: 2 - 3 Standard drinks or equivalent per week     Comment: occ    Drug use: Never    Sexual activity: Not Currently   Other Topics Concern    Not on file   Social History Narrative    Not on file     Social Determinants of Health     Financial Resource Strain: Not on file   Food Insecurity: No Food Insecurity (1/27/2020)    Hunger Vital Sign     Worried About Running Out of Food in the Last Year: Never true     Ran Out of Food in the Last Year: Never true   Transportation Needs: No Transportation Needs (1/27/2020)    PRAPARE - Transportation     Lack of Transportation (Medical): No     Lack of Transportation (Non-Medical): No   Physical Activity: Not on file   Stress: Not on file   Social Connections: Feeling Socially Integrated (7/12/2023)    OASIS : Social Isolation     Frequency of experiencing loneliness or isolation: Never   Intimate Partner Violence: Not on file   Housing  Stability: Not on file       Family History   Problem Relation Age of Onset    Cancer Mother         cervical/breast    Hypertension Father     Cancer Father         lung cancer    Cancer Sister         lung    Cancer Other     Heart Disease Neg Hx     Heart Attack Neg Hx        Past Surgical History:   Procedure Laterality Date    HAMMERTOE CORRECTION  2/2/2015    Performed by RENAY ChoiPCHAYO at SURGERY [facility] ORS    EXOSTOSIS EXCISION  6/3/2011    Performed by ABDIFATAH YOUNG at [facility] ORS    LESION EXCISION ORTHO  6/3/2011    Performed by ABDIFATAH YOUNG at [facility] ORS    TOE ARTHROPLASTY  6/3/2011    Performed by ABDIFATAH YOUNG at [facility]    LUMBAR LAMINECTOMY DISKECTOMY  2005    microscopic    TONSILLECTOMY  1945    adenoidectomy    EYE SURGERY Bilateral     cataracts       ROS:  Denies any Headache,Chest pain,  Shortness of breath,  Abdominal pain, Changes of bowel or bladder, Lower ext edema, Fevers, Nights sweats, Weight Changes, Focal weakness or numbness.  All other systems are negative.    /60 (BP Location: Left arm, Patient Position: Sitting)   Pulse 60   Temp 36.9 °C (98.4 °F) (Temporal)   Resp 16   Ht 1.829 m (6')   Wt 114 kg (251 lb 11.2 oz)   SpO2 97%   BMI 34.14 kg/m²      Physical Exam:  Gen:         Alert and oriented, No apparent distress.  HEENT:   Perrla, TM clear,  Oralpharynx no erythema or exudates.  Neck:       No Jugular venous distension, Lymphadenopathy, Thyromegaly, Bruits.  Lungs:     Adequate air excursion, decreased breath sounds throughout.  Body habitus.  CV:          Regular rate and rhythm. No murmurs, rubs or gallops.  Abd:         Soft non tender, non distended. Normal active bowel sounds.             Ext:          No clubbing, cyanosis, edema.      Assessment and Plan.   84 y.o. male presenting with the following.     1. Dizziness  Possible recurrence of vertigo given patient's symptoms with  positional onset.  Complete resolution of symptoms the patient is at baseline today.  Recommended for him to resume Epley maneuvers if symptoms recur.  Prescription for meclizine sent to pharmacy.  Patient will also call cardiology to inform them that she discontinued Eliquis as patient is hesitant to restart.    2. History of atrial fibrillation  Patient will call cardiology for recommendations.    3. Thrombocytopenia (HCC)  Chronic.  Stable.  Due for follow-up CBC with differential.  Monitor hemoglobin level.    4. Cerebral atrophy (HCC)  Chronic.  Stable.  Continue to monitor.  Treat risk factors.    5. Interstitial lung disease (HCC)  Chronic.  Stable.  Follow-up with pulmonology.  Check pulmonary function test.    6. MARCI on CPAP  Patient will schedule overnight sleep study.    My total time spent caring for the patient on the day of the encounter was 32 minutes.   This does not include time spent on separately billable procedures/tests.

## 2024-07-09 ENCOUNTER — APPOINTMENT (OUTPATIENT)
Dept: MEDICAL GROUP | Facility: PHYSICIAN GROUP | Age: 84
End: 2024-07-09
Payer: MEDICARE

## 2024-07-12 ENCOUNTER — OFFICE VISIT (OUTPATIENT)
Dept: MEDICAL GROUP | Facility: PHYSICIAN GROUP | Age: 84
End: 2024-07-12
Payer: MEDICARE

## 2024-07-12 VITALS
WEIGHT: 245.8 LBS | RESPIRATION RATE: 18 BRPM | OXYGEN SATURATION: 94 % | HEIGHT: 72 IN | SYSTOLIC BLOOD PRESSURE: 120 MMHG | DIASTOLIC BLOOD PRESSURE: 58 MMHG | TEMPERATURE: 98.4 F | BODY MASS INDEX: 33.29 KG/M2 | HEART RATE: 68 BPM

## 2024-07-12 DIAGNOSIS — G47.33 OSA ON CPAP: Chronic | ICD-10-CM

## 2024-07-12 DIAGNOSIS — G60.9 IDIOPATHIC NEUROPATHY: ICD-10-CM

## 2024-07-12 DIAGNOSIS — J84.9 INTERSTITIAL LUNG DISEASE (HCC): Chronic | ICD-10-CM

## 2024-07-12 DIAGNOSIS — E78.5 DYSLIPIDEMIA: ICD-10-CM

## 2024-07-12 DIAGNOSIS — E55.9 VITAMIN D DEFICIENCY: ICD-10-CM

## 2024-07-12 DIAGNOSIS — I70.0 ATHEROSCLEROSIS OF AORTA (HCC): ICD-10-CM

## 2024-07-12 DIAGNOSIS — E03.9 ACQUIRED HYPOTHYROIDISM: ICD-10-CM

## 2024-07-12 DIAGNOSIS — I48.20 CHRONIC ATRIAL FIBRILLATION (HCC): ICD-10-CM

## 2024-07-12 PROBLEM — R21 RASH: Status: RESOLVED | Noted: 2022-06-16 | Resolved: 2024-07-12

## 2024-07-12 PROCEDURE — 3078F DIAST BP <80 MM HG: CPT | Performed by: INTERNAL MEDICINE

## 2024-07-12 PROCEDURE — 3074F SYST BP LT 130 MM HG: CPT | Performed by: INTERNAL MEDICINE

## 2024-07-12 PROCEDURE — 99214 OFFICE O/P EST MOD 30 MIN: CPT | Performed by: INTERNAL MEDICINE

## 2024-07-12 RX ORDER — LEVOTHYROXINE SODIUM 0.07 MG/1
75 TABLET ORAL
Qty: 100 TABLET | Refills: 3 | Status: SHIPPED | OUTPATIENT
Start: 2024-07-12

## 2024-07-12 ASSESSMENT — FIBROSIS 4 INDEX: FIB4 SCORE: 2.24

## 2024-07-19 ENCOUNTER — HOSPITAL ENCOUNTER (OUTPATIENT)
Dept: LAB | Facility: MEDICAL CENTER | Age: 84
End: 2024-07-19
Attending: INTERNAL MEDICINE
Payer: MEDICARE

## 2024-07-19 DIAGNOSIS — E78.5 DYSLIPIDEMIA: ICD-10-CM

## 2024-07-19 DIAGNOSIS — E03.9 ACQUIRED HYPOTHYROIDISM: ICD-10-CM

## 2024-07-19 DIAGNOSIS — E55.9 VITAMIN D DEFICIENCY: ICD-10-CM

## 2024-07-19 LAB
25(OH)D3 SERPL-MCNC: 53 NG/ML (ref 30–100)
ALBUMIN SERPL BCP-MCNC: 3.9 G/DL (ref 3.2–4.9)
ALBUMIN/GLOB SERPL: 1.3 G/DL
ALP SERPL-CCNC: 116 U/L (ref 30–99)
ALT SERPL-CCNC: 15 U/L (ref 2–50)
ANION GAP SERPL CALC-SCNC: 12 MMOL/L (ref 7–16)
AST SERPL-CCNC: 15 U/L (ref 12–45)
BASOPHILS # BLD AUTO: 0.6 % (ref 0–1.8)
BASOPHILS # BLD: 0.04 K/UL (ref 0–0.12)
BILIRUB SERPL-MCNC: 0.6 MG/DL (ref 0.1–1.5)
BUN SERPL-MCNC: 15 MG/DL (ref 8–22)
CALCIUM ALBUM COR SERPL-MCNC: 9.3 MG/DL (ref 8.5–10.5)
CALCIUM SERPL-MCNC: 9.2 MG/DL (ref 8.4–10.2)
CHLORIDE SERPL-SCNC: 101 MMOL/L (ref 96–112)
CHOLEST SERPL-MCNC: 116 MG/DL (ref 100–199)
CO2 SERPL-SCNC: 24 MMOL/L (ref 20–33)
CREAT SERPL-MCNC: 1.21 MG/DL (ref 0.5–1.4)
EOSINOPHIL # BLD AUTO: 0.31 K/UL (ref 0–0.51)
EOSINOPHIL NFR BLD: 4.8 % (ref 0–6.9)
ERYTHROCYTE [DISTWIDTH] IN BLOOD BY AUTOMATED COUNT: 45.1 FL (ref 35.9–50)
FASTING STATUS PATIENT QL REPORTED: NORMAL
GFR SERPLBLD CREATININE-BSD FMLA CKD-EPI: 59 ML/MIN/1.73 M 2
GLOBULIN SER CALC-MCNC: 3 G/DL (ref 1.9–3.5)
GLUCOSE SERPL-MCNC: 108 MG/DL (ref 65–99)
HCT VFR BLD AUTO: 42.8 % (ref 42–52)
HDLC SERPL-MCNC: 45 MG/DL
HGB BLD-MCNC: 14.4 G/DL (ref 14–18)
IMM GRANULOCYTES # BLD AUTO: 0.02 K/UL (ref 0–0.11)
IMM GRANULOCYTES NFR BLD AUTO: 0.3 % (ref 0–0.9)
LDLC SERPL CALC-MCNC: 54 MG/DL
LYMPHOCYTES # BLD AUTO: 1.46 K/UL (ref 1–4.8)
LYMPHOCYTES NFR BLD: 22.6 % (ref 22–41)
MCH RBC QN AUTO: 31.9 PG (ref 27–33)
MCHC RBC AUTO-ENTMCNC: 33.6 G/DL (ref 32.3–36.5)
MCV RBC AUTO: 94.9 FL (ref 81.4–97.8)
MONOCYTES # BLD AUTO: 0.61 K/UL (ref 0–0.85)
MONOCYTES NFR BLD AUTO: 9.4 % (ref 0–13.4)
NEUTROPHILS # BLD AUTO: 4.02 K/UL (ref 1.82–7.42)
NEUTROPHILS NFR BLD: 62.3 % (ref 44–72)
NRBC # BLD AUTO: 0 K/UL
NRBC BLD-RTO: 0 /100 WBC (ref 0–0.2)
PLATELET # BLD AUTO: 204 K/UL (ref 164–446)
PMV BLD AUTO: 9 FL (ref 9–12.9)
POTASSIUM SERPL-SCNC: 4.4 MMOL/L (ref 3.6–5.5)
PROT SERPL-MCNC: 6.9 G/DL (ref 6–8.2)
RBC # BLD AUTO: 4.51 M/UL (ref 4.7–6.1)
SODIUM SERPL-SCNC: 137 MMOL/L (ref 135–145)
TRIGL SERPL-MCNC: 85 MG/DL (ref 0–149)
TSH SERPL DL<=0.005 MIU/L-ACNC: 3.07 UIU/ML (ref 0.38–5.33)
WBC # BLD AUTO: 6.5 K/UL (ref 4.8–10.8)

## 2024-07-19 PROCEDURE — 80053 COMPREHEN METABOLIC PANEL: CPT

## 2024-07-19 PROCEDURE — 36415 COLL VENOUS BLD VENIPUNCTURE: CPT

## 2024-07-19 PROCEDURE — 80061 LIPID PANEL: CPT

## 2024-07-19 PROCEDURE — 84443 ASSAY THYROID STIM HORMONE: CPT

## 2024-07-19 PROCEDURE — 82306 VITAMIN D 25 HYDROXY: CPT

## 2024-07-19 PROCEDURE — 85025 COMPLETE CBC W/AUTO DIFF WBC: CPT

## 2024-08-01 ENCOUNTER — TELEPHONE (OUTPATIENT)
Dept: HEALTH INFORMATION MANAGEMENT | Facility: OTHER | Age: 84
End: 2024-08-01

## 2024-10-03 RX ORDER — FLUTICASONE PROPIONATE 50 MCG
2 SPRAY, SUSPENSION (ML) NASAL 2 TIMES DAILY
Qty: 48 ML | Refills: 3 | Status: SHIPPED | OUTPATIENT
Start: 2024-10-03

## 2024-10-03 RX ORDER — KETOTIFEN FUMARATE 0.35 MG/ML
1 SOLUTION/ DROPS OPHTHALMIC 2 TIMES DAILY
COMMUNITY
End: 2024-10-03

## 2024-10-03 RX ORDER — KETOTIFEN FUMARATE 0.35 MG/ML
SOLUTION/ DROPS OPHTHALMIC
Qty: 10 ML | Refills: 2 | Status: SHIPPED | OUTPATIENT
Start: 2024-10-03

## 2024-10-21 DIAGNOSIS — Z86.79 HISTORY OF ATRIAL FIBRILLATION: ICD-10-CM

## 2024-10-21 RX ORDER — APIXABAN 5 MG/1
5 TABLET, FILM COATED ORAL 2 TIMES DAILY
Qty: 200 TABLET | Refills: 1 | Status: SHIPPED | OUTPATIENT
Start: 2024-10-21

## 2024-10-26 PROCEDURE — RXMED WILLOW AMBULATORY MEDICATION CHARGE: Performed by: INTERNAL MEDICINE

## 2024-10-27 ENCOUNTER — PHARMACY VISIT (OUTPATIENT)
Dept: PHARMACY | Facility: MEDICAL CENTER | Age: 84
End: 2024-10-27
Payer: COMMERCIAL

## 2024-11-11 DIAGNOSIS — J30.1 SEASONAL ALLERGIC RHINITIS DUE TO POLLEN: ICD-10-CM

## 2024-11-11 RX ORDER — FLUTICASONE PROPIONATE 50 MCG
SPRAY, SUSPENSION (ML) NASAL
Qty: 96 ML | Refills: 0 | Status: SHIPPED | OUTPATIENT
Start: 2024-11-11

## 2024-11-27 ENCOUNTER — APPOINTMENT (OUTPATIENT)
Dept: MEDICAL GROUP | Facility: PHYSICIAN GROUP | Age: 84
End: 2024-11-27
Payer: MEDICARE

## 2024-11-27 VITALS
DIASTOLIC BLOOD PRESSURE: 60 MMHG | RESPIRATION RATE: 14 BRPM | HEIGHT: 72 IN | SYSTOLIC BLOOD PRESSURE: 120 MMHG | BODY MASS INDEX: 32.38 KG/M2 | TEMPERATURE: 97 F | WEIGHT: 239.1 LBS | HEART RATE: 60 BPM | OXYGEN SATURATION: 100 %

## 2024-11-27 DIAGNOSIS — I10 ESSENTIAL HYPERTENSION: Chronic | ICD-10-CM

## 2024-11-27 DIAGNOSIS — R63.4 UNINTENTIONAL WEIGHT LOSS: ICD-10-CM

## 2024-11-27 DIAGNOSIS — R29.6 FALLS: ICD-10-CM

## 2024-11-27 PROCEDURE — 3074F SYST BP LT 130 MM HG: CPT | Performed by: INTERNAL MEDICINE

## 2024-11-27 PROCEDURE — 99214 OFFICE O/P EST MOD 30 MIN: CPT | Performed by: INTERNAL MEDICINE

## 2024-11-27 PROCEDURE — 3078F DIAST BP <80 MM HG: CPT | Performed by: INTERNAL MEDICINE

## 2024-11-27 RX ORDER — LISINOPRIL 10 MG/1
10 TABLET ORAL
Qty: 100 TABLET | Refills: 0 | Status: SHIPPED | OUTPATIENT
Start: 2024-11-27

## 2024-11-27 ASSESSMENT — FIBROSIS 4 INDEX: FIB4 SCORE: 1.61

## 2024-11-27 NOTE — PROGRESS NOTES
"    CC: Multiple falls, buttock wound    HPI:  Farhat presents with the following    1. Falls  Patient presents with a complaint of buttock wound that he sustained after he fell about 2 months ago in his home.  He uses a walker or cane in the home.  Most of his falls are because he is not using any assistive device and \"doing things that he should not be doing\".  Patient lives with his wife who is also very debilitated.  They do not leave the home.  His daughter-in-law comes in once weekly to help clean the home and to prepare a meal.    2. Unintentional weight loss  Patient has noticed that he has been continuously losing weight.  Current weight is 239 pounds with a BMI of 32.43.  Looking back in his records, his weight in May 2023 was 266 pounds.  Patient states that his wife has become debilitated and not able to prepare meals and rely on microwave dinners for breakfast and dinner.  He tries to drink a milkshake daily.  His appetite is good.  Patient denies any cardiopulmonary, GI, other neurologic symptoms.  TSH within normal limits in July.  No other lab abnormalities.      Patient Active Problem List    Diagnosis Date Noted    Falls 11/27/2024    Unintentional weight loss 11/27/2024    Chronic atrial fibrillation (HCC) 07/12/2024    Chronic anticoagulation 03/12/2024    Thrombocytopenia (HCC) 11/30/2023    Abnormal EKG 11/07/2023    At risk for falls 11/07/2023    Cellulitis of toe of left foot 06/15/2023    QT prolongation 06/13/2023    Chronic pain of right knee 06/16/2022    Cerebral atrophy (HCC) 03/25/2022    Atherosclerosis of aorta (HCC) 03/25/2022    Interstitial lung disease (HCC) 03/25/2022    Nonspecific abnormal cardiovascular function study 03/25/2022    Multiple pulmonary nodules 12/15/2021    Prediabetes 01/07/2021    Pleural effusion on left 01/07/2021    Osteoarthritis of knee 03/09/2017    Tubular adenoma of colon 07/06/2016    Blackwell's esophagus without dysplasia 07/06/2016    Idiopathic " neuropathy 09/23/2015    Acquired hypothyroidism 04/28/2015    Severe obesity (BMI 35.0-39.9) with comorbidity (HCC) 08/22/2014    History of vertigo 05/13/2014    MARCI on CPAP 02/20/2014    Renal cyst 01/22/2014    Sensorineural hearing loss 11/26/2012    History of atrial fibrillation 05/22/2012    Primary hypertension 09/16/2009    Dyslipidemia 09/16/2009    Allergic rhinitis 09/16/2009       Current Outpatient Medications   Medication Sig Dispense Refill    lisinopril (PRINIVIL) 10 MG Tab Take 1 Tablet by mouth every day. Indications: High Blood Pressure 100 Tablet 0    fluticasone (FLONASE) 50 MCG/ACT nasal spray SPRAY 2 SPRAYS INTO AFFECTED NOSTRIL(S) TWICE A DAY 96 mL 0    influenza vaccine High-Dose (FLUZONE HIGH-DOSE) 0.5 ML Suspension Prefilled Syringe injection Inject  into the shoulder, thigh, or buttocks. 0.5 mL 0    COVID-19 mRNA Vac-Bell,Pfizer, (COMIRNATY) 30 MCG/0.3ML Suspension Prefilled Syringe injection Inject  into the shoulder, thigh, or buttocks. 0.3 mL 0    ELIQUIS 5 MG Tab TAKE 1 TABLET BY MOUTH TWICE A  Tablet 1    levothyroxine (SYNTHROID) 75 MCG Tab TAKE 1 TABLET BY MOUTH EVERY MORNING ON AN EMPTY STOMACH. INDICATIONS: UNDERACTIVE THYROID 100 Tablet 3    carvedilol (COREG) 25 MG Tab Take 1 Tablet by mouth 2 times a day. Indications: High Blood Pressure Disorder 200 Tablet 3    ketotifen (ZADITOR) 0.025 % ophthalmic solution Administer 1 Drop into both eyes every 12 hours as needed (eye allergies). Indications: Allergic Conjunctivitis 10 mL 2    omeprazole (PRILOSEC) 40 MG delayed-release capsule Take 1 Capsule by mouth every day. Indications: Heartburn 100 Capsule 3    pravastatin (PRAVACHOL) 40 MG tablet Take 1 Tablet by mouth every day. Indications: High Amount of Fats in the Blood 100 Tablet 3    Cholecalciferol (D3) 2000 UNIT Tab Take 1 Tablet by mouth every day. Indications: supplement      ketotifen (ZADITOR) 0.035 % ophthalmic solution ADMINISTER 1 DROP INTO BOTH EYES EVERY  12 HOURS AS NEEDED (EYE ALLERGIES). INDICATIONS: ALLERGIC CONJUNCTIVITIS 10 mL 2    meclizine (ANTIVERT) 12.5 MG Tab Take 1 Tablet by mouth 3 times a day as needed for Dizziness. 30 Tablet 1     No current facility-administered medications for this visit.         Allergies as of 11/27/2024 - Reviewed 11/27/2024   Allergen Reaction Noted    Other environmental Runny Nose and Itching 06/01/2011        Social History     Socioeconomic History    Marital status:      Spouse name: Not on file    Number of children: Not on file    Years of education: Not on file    Highest education level: Not on file   Occupational History    Not on file   Tobacco Use    Smoking status: Never    Smokeless tobacco: Never   Vaping Use    Vaping status: Never Used   Substance and Sexual Activity    Alcohol use: Yes     Alcohol/week: 1.2 - 1.8 oz     Types: 2 - 3 Standard drinks or equivalent per week     Comment: occ    Drug use: Never    Sexual activity: Not Currently   Other Topics Concern    Not on file   Social History Narrative    Not on file     Social Drivers of Health     Financial Resource Strain: Not on file   Food Insecurity: No Food Insecurity (1/27/2020)    Hunger Vital Sign     Worried About Running Out of Food in the Last Year: Never true     Ran Out of Food in the Last Year: Never true   Transportation Needs: No Transportation Needs (1/27/2020)    PRAPARE - Transportation     Lack of Transportation (Medical): No     Lack of Transportation (Non-Medical): No   Physical Activity: Not on file   Stress: Not on file   Social Connections: Feeling Socially Integrated (7/12/2023)    OASIS : Social Isolation     Frequency of experiencing loneliness or isolation: Never   Intimate Partner Violence: Not on file   Housing Stability: Not on file       Family History   Problem Relation Age of Onset    Cancer Mother         cervical/breast    Hypertension Father     Cancer Father         lung cancer    Cancer Sister         lung     Cancer Other     Heart Disease Neg Hx     Heart Attack Neg Hx        Past Surgical History:   Procedure Laterality Date    HAMMERTOE CORRECTION  2/2/2015    Performed by Abdifatah Orta D.P.M. at SURGERY [facility]    EXOSTOSIS EXCISION  6/3/2011    Performed by ABDIFATAH ORTA at [facility]    LESION EXCISION ORTHO  6/3/2011    Performed by ABDIFATAH ORTA at SURGERY [facility]    TOE ARTHROPLASTY  6/3/2011    Performed by ABDIFATAH ORTA at SURGERY [facility]    LUMBAR LAMINECTOMY DISKECTOMY  2005    microscopic    TONSILLECTOMY  1945    adenoidectomy    EYE SURGERY Bilateral     cataracts       ROS: Positive ROS per HPI.  Denies any Headache,Chest pain,  Shortness of breath,  Abdominal pain, Changes of bowel or bladder, Lower ext edema, Fevers, Nights sweats, Focal weakness or numbness.  All other systems are negative.    /60 (BP Location: Right arm, Patient Position: Sitting)   Pulse 60   Temp 36.1 °C (97 °F) (Temporal)   Resp 14   Ht 1.829 m (6')   Wt 108 kg (239 lb 1.6 oz)   SpO2 100%   BMI 32.43 kg/m²      Constitutional: Alert, no distress, well-groomed.  Skin: Warm, dry, good turgor, no rashes in visible areas.  Eye: Equal, round and reactive, conjunctiva clear, lids normal.  ENMT: Lips without lesions, good dentition, moist mucous membranes.  Neck: Trachea midline, no masses, no thyromegaly.  Respiratory: Unlabored respiratory effort, no cough.  Abdomen: Soft, no gross masses.  MSK: Normal gait, moves all extremities.  Neuro: Grossly non-focal. No cranial nerve deficit. Strength and sensation intact.   Psych: Alert and oriented x3, normal affect and mood.  Skin: No open wounds or lacerations noted on the buttock area.  No cellulitis.  No drainage of any kind.      Assessment and Plan.   84 y.o. male presenting with the following.     1. Falls  Offered patient home physical therapy sessions but declines at this time.  Spent time counseling  patient on home exercises for balance and strengthening.  Stressed importance of using his assistive devices throughout the home.    2. Unintentional weight loss  Will continue to monitor.  Recommend the patient and his wife consume additional supplements such as protein shakes/Ensure.  Patient will speak to his daughter-in-law to help prepare more meals during the week.  Otherwise patient asymptomatic.     My total time spent caring for the patient on the day of the encounter was 30 minutes.   This does not include time spent on separately billable procedures/tests.

## 2024-11-27 NOTE — TELEPHONE ENCOUNTER
Received request via: Pharmacy    Was the patient seen in the last year in this department? Yes    Does the patient have an active prescription (recently filled or refills available) for medication(s) requested? No    Ph[facility] Name: [facility]    Does the patient have nursing home Plus and need 100-day supply? (This applies to ALL medications) Yes, quantity updated to 100 days

## 2024-12-17 DIAGNOSIS — K21.9 GASTROESOPHAGEAL REFLUX DISEASE WITHOUT ESOPHAGITIS: ICD-10-CM

## 2024-12-17 DIAGNOSIS — I10 ESSENTIAL HYPERTENSION: Chronic | ICD-10-CM

## 2024-12-17 RX ORDER — OMEPRAZOLE 40 MG/1
40 CAPSULE, DELAYED RELEASE ORAL
Qty: 100 CAPSULE | Refills: 3 | Status: SHIPPED | OUTPATIENT
Start: 2024-12-17

## 2024-12-17 RX ORDER — LISINOPRIL 10 MG/1
10 TABLET ORAL
Qty: 100 TABLET | Refills: 3 | Status: SHIPPED | OUTPATIENT
Start: 2024-12-17

## 2024-12-17 RX ORDER — CARVEDILOL 25 MG/1
TABLET ORAL
Qty: 200 TABLET | Refills: 3 | Status: SHIPPED | OUTPATIENT
Start: 2024-12-17

## 2024-12-17 NOTE — TELEPHONE ENCOUNTER
Received request via: Patient    Was the patient seen in the last year in this department? Yes    Does the patient have an active prescription (recently filled or refills available) for medication(s) requested? No    [facility]    Does the patient have [facility] and need 100-day supply? (This applies to ALL medications) Yes, quantity updated to 100 days

## 2025-01-10 DIAGNOSIS — E78.5 DYSLIPIDEMIA: ICD-10-CM

## 2025-01-10 RX ORDER — PRAVASTATIN SODIUM 40 MG
40 TABLET ORAL DAILY
Qty: 100 TABLET | Refills: 3 | Status: SHIPPED | OUTPATIENT
Start: 2025-01-10

## 2025-01-10 NOTE — TELEPHONE ENCOUNTER
Received request via: Pharmacy    Was the patient seen in the last year in this department? Yes    Does the patient have an active prescription (recently filled or refills available) for medication(s) requested? No    Does the patient have [facility] and need 100-day supply? (This applies to ALL medications) Yes, quantity updated to 100 days

## 2025-02-18 ENCOUNTER — TELEPHONE (OUTPATIENT)
Dept: MEDICAL GROUP | Facility: PHYSICIAN GROUP | Age: 85
End: 2025-02-18
Payer: MEDICARE

## 2025-02-18 NOTE — TELEPHONE ENCOUNTER
Phone Number Called: [phone]    Call outcome: Left detailed message for patient. Informed to call back with any additional questions.    Message: LVM. Let patient know there was an opening for 2/20 to see PCP as he needs DMV paperwork filled out prior to 3/11. Advised to call back.

## 2025-02-20 ENCOUNTER — OFFICE VISIT (OUTPATIENT)
Dept: MEDICAL GROUP | Facility: PHYSICIAN GROUP | Age: 85
End: 2025-02-20
Payer: MEDICARE

## 2025-02-20 VITALS
DIASTOLIC BLOOD PRESSURE: 62 MMHG | SYSTOLIC BLOOD PRESSURE: 120 MMHG | HEIGHT: 72 IN | WEIGHT: 228.3 LBS | BODY MASS INDEX: 30.92 KG/M2 | HEART RATE: 78 BPM | TEMPERATURE: 97.8 F | OXYGEN SATURATION: 95 % | RESPIRATION RATE: 18 BRPM

## 2025-02-20 DIAGNOSIS — I48.20 CHRONIC ATRIAL FIBRILLATION (HCC): ICD-10-CM

## 2025-02-20 DIAGNOSIS — J84.9 INTERSTITIAL LUNG DISEASE (HCC): Chronic | ICD-10-CM

## 2025-02-20 DIAGNOSIS — R63.4 UNINTENTIONAL WEIGHT LOSS: ICD-10-CM

## 2025-02-20 PROCEDURE — 3074F SYST BP LT 130 MM HG: CPT | Performed by: INTERNAL MEDICINE

## 2025-02-20 PROCEDURE — 3078F DIAST BP <80 MM HG: CPT | Performed by: INTERNAL MEDICINE

## 2025-02-20 PROCEDURE — 99214 OFFICE O/P EST MOD 30 MIN: CPT | Performed by: INTERNAL MEDICINE

## 2025-02-20 ASSESSMENT — PATIENT HEALTH QUESTIONNAIRE - PHQ9: CLINICAL INTERPRETATION OF PHQ2 SCORE: 0

## 2025-02-20 ASSESSMENT — FIBROSIS 4 INDEX: FIB4 SCORE: 1.61

## 2025-02-21 NOTE — PROGRESS NOTES
CC: DMV paperwork, weight loss    HPI:  Farhat presents with the following    1. Unintentional weight loss  11/27/2024:Patient has noticed that he has been continuously losing weight. Current weight is 239 pounds with a BMI of 32.43. Looking back in his records, his weight in May 2023 was 266 pounds. Patient states that his wife has become debilitated and not able to prepare meals and rely on microwave dinners for breakfast and dinner. He tries to drink a milkshake daily. His appetite is good. Patient denies any cardiopulmonary, GI, other neurologic symptoms. TSH within normal limits in July. No other lab abnormalities.     2/20/2025: Patient continues to lose weight.  Current weight is 228 pounds down from 239 pounds in November.  Starting weight was 266 pounds in May 2023.  Patient just received full dentures, all his teeth were pulled.  Continues to have an appetite.  No other complaints except some mild fatigue.  Colonoscopy completed in 2022 which showed 5 adenomatous polyps with a 3-year follow-up recommended.    2. Chronic atrial fibrillation (HCC)  Patient with a remote history of atrial fibrillation was seen in the clinic in December for preop evaluation for tooth extraction. On that visit, EKG results showed prolonged QT interval. Cardiology referral was placed. He was seen and evaluated by cardiology and was started on anticoagulation with Eliquis for stroke prevention with a CHADS2 score of 3. Patient underwent repeat myocardial perfusion imaging study which was within normal range. Normal EF 63% with no ischemic changes. Patient discontinued Eliquis.     3. Interstitial lung disease (HCC)  Chronic.  Stable.  Followed by pulmonology.  No further CT surveillance recommended.  Patient is followed with pulmonary function tests.    4.  Requesting [employer] paperwork to be filled out.    Patient Active Problem List    Diagnosis Date Noted    Falls 11/27/2024    Unintentional weight loss 11/27/2024    Chronic  atrial fibrillation (HCC) 07/12/2024    Chronic anticoagulation 03/12/2024    Thrombocytopenia (HCC) 11/30/2023    Abnormal EKG 11/07/2023    At risk for falls 11/07/2023    Cellulitis of toe of left foot 06/15/2023    QT prolongation 06/13/2023    Chronic pain of right knee 06/16/2022    Cerebral atrophy (HCC) 03/25/2022    Atherosclerosis of aorta (HCC) 03/25/2022    Interstitial lung disease (HCC) 03/25/2022    Nonspecific abnormal cardiovascular function study 03/25/2022    Multiple pulmonary nodules 12/15/2021    Prediabetes 01/07/2021    Pleural effusion on left 01/07/2021    Osteoarthritis of knee 03/09/2017    Tubular adenoma of colon 07/06/2016    Blackwell's esophagus without dysplasia 07/06/2016    Idiopathic neuropathy 09/23/2015    Acquired hypothyroidism 04/28/2015    Severe obesity (BMI 35.0-39.9) with comorbidity ([facility]) 08/22/2014    History of vertigo 05/13/2014    MARCI on CPAP 02/20/2014    Renal cyst 01/22/2014    Sensorineural hearing loss 11/26/2012    History of atrial fibrillation 05/22/2012    Primary hypertension 09/16/2009    Dyslipidemia 09/16/2009    Allergic rhinitis 09/16/2009       Current Outpatient Medications   Medication Sig Dispense Refill    pravastatin (PRAVACHOL) 40 MG tablet TAKE 1 TABLET BY MOUTH EVERY DAY. INDICATIONS: HIGH AMOUNT OF FATS IN THE BLOOD 100 Tablet 3    omeprazole (PRILOSEC) 40 MG delayed-release capsule TAKE 1 CAPSULE BY MOUTH EVERY DAY. INDICATIONS: HEARTBURN 100 Capsule 3    carvedilol (COREG) 25 MG Tab TAKE 1 TABLET BY MOUTH TWICE A DAY FOR HIGH BLOOD PRESSURE DISORDER 200 Tablet 3    lisinopril (PRINIVIL) 10 MG Tab TAKE 1 TABLET BY MOUTH EVERY DAY FOR HIGH BLOOD PRESSURE 100 Tablet 3    fluticasone (FLONASE) 50 MCG/ACT nasal spray SPRAY 2 SPRAYS INTO AFFECTED NOSTRIL(S) TWICE A DAY 96 mL 0    influenza vaccine High-Dose (FLUZONE HIGH-DOSE) 0.5 ML Suspension Prefilled Syringe injection Inject  into the shoulder, thigh, or buttocks. 0.5 mL 0    COVID-19 mRNA  Vac-Bell,Pfizer, (COMIRNATY) 30 MCG/0.3ML Suspension Prefilled Syringe injection Inject  into the shoulder, thigh, or buttocks. 0.3 mL 0    ELIQUIS 5 MG Tab TAKE 1 TABLET BY MOUTH TWICE A  Tablet 1    ketotifen (ZADITOR) 0.035 % ophthalmic solution ADMINISTER 1 DROP INTO BOTH EYES EVERY 12 HOURS AS NEEDED (EYE ALLERGIES). INDICATIONS: ALLERGIC CONJUNCTIVITIS 10 mL 2    levothyroxine (SYNTHROID) 75 MCG Tab TAKE 1 TABLET BY MOUTH EVERY MORNING ON AN EMPTY STOMACH. INDICATIONS: UNDERACTIVE THYROID 100 Tablet 3    meclizine (ANTIVERT) 12.5 MG Tab Take 1 Tablet by mouth 3 times a day as needed for Dizziness. 30 Tablet 1    ketotifen (ZADITOR) 0.025 % ophthalmic solution Administer 1 Drop into both eyes every 12 hours as needed (eye allergies). Indications: Allergic Conjunctivitis 10 mL 2    Cholecalciferol (D3) 2000 UNIT Tab Take 1 Tablet by mouth every day. Indications: supplement       No current facility-administered medications for this visit.         Allergies as of 02/20/2025 - Reviewed 02/20/2025   Allergen Reaction Noted    Other environmental Runny Nose and Itching 06/01/2011        Social History     Socioeconomic History    Marital status:      Spouse name: Not on file    Number of children: Not on file    Years of education: Not on file    Highest education level: Not on file   Occupational History    Not on file   Tobacco Use    Smoking status: Never    Smokeless tobacco: Never   Vaping Use    Vaping status: Never Used   Substance and Sexual Activity    Alcohol use: Yes     Alcohol/week: 1.2 - 1.8 oz     Types: 2 - 3 Standard drinks or equivalent per week     Comment: occ    Drug use: Never    Sexual activity: Not Currently   Other Topics Concern    Not on file   Social History Narrative    Not on file     Social Drivers of Health     Financial Resource Strain: Not on file   Food Insecurity: No Food Insecurity (1/27/2020)    Hunger Vital Sign     Worried About Running Out of Food in the Last  Year: Never true     Ran Out of Food in the Last Year: Never true   Transportation Needs: No Transportation Needs (1/27/2020)    PRAPARE - Transportation     Lack of Transportation (Medical): No     Lack of Transportation (Non-Medical): No   Physical Activity: Not on file   Stress: Not on file   Social Connections: Feeling Socially Integrated (7/12/2023)    OASIS : Social Isolation     Frequency of experiencing loneliness or isolation: Never   Intimate Partner Violence: Not on file   Housing Stability: Not on file       Family History   Problem Relation Age of Onset    Cancer Mother         cervical/breast    Hypertension Father     Cancer Father         lung cancer    Cancer Sister         lung    Cancer Other     Heart Disease Neg Hx     Heart Attack Neg Hx        Past Surgical History:   Procedure Laterality Date    HAMMERTOE CORRECTION  2/2/2015    Performed by JOSÉ MIGUEL Choi.P.MEmmanuelle at SURGERY [facility]    EXOSTOSIS EXCISION  6/3/2011    Performed by ABDIFATAH YOUNG at [facility] ORS    LESION EXCISION ORTHO  6/3/2011    Performed by ABDIFATAH YOUNG at [facility] ORS    TOE ARTHROPLASTY  6/3/2011    Performed by ABDIFATAH YOUNG at [facility] ORS    LUMBAR LAMINECTOMY DISKECTOMY  2005    microscopic    TONSILLECTOMY  1945    adenoidectomy    EYE SURGERY Bilateral     cataracts       ROS: Positive ROS per HPI.  Denies any Headache,Chest pain,  Shortness of breath,  Abdominal pain, Changes of bowel or bladder, Lower ext edema, Fevers, Nights sweats, Focal weakness or numbness.  All other systems are negative.    /62 (BP Location: Right arm, Patient Position: Sitting)   Pulse 78   Temp 36.6 °C (97.8 °F) (Temporal)   Resp 18   Ht 1.829 m (6')   Wt 104 kg (228 lb 4.8 oz)   SpO2 95%   BMI 30.96 kg/m²      Constitutional: Alert, no distress, well-groomed.  Skin: Warm, dry, good turgor, no rashes in visible areas.  Eye: Equal, round and reactive,  conjunctiva clear, lids normal.  ENMT: Lips without lesions, good dentition, moist mucous membranes.  Neck: Trachea midline, no masses, no thyromegaly.  Respiratory: Unlabored respiratory effort, no cough.  Abdomen: Soft, no gross masses.  MSK: Normal gait, moves all extremities.  Neuro: Grossly non-focal. No cranial nerve deficit. Strength and sensation intact.   Psych: Alert and oriented x3, normal affect and mood.      Assessment and Plan.   84 y.o. male presenting with the following.     1. Unintentional weight loss    - CBC WITH DIFFERENTIAL; Future  - Comp Metabolic Panel; Future  - TSH WITH REFLEX TO FT4; Future  - CRP QUANTITIVE (NON-CARDIAC); Future  - Sed Rate; Future  - FAVIO REFLEXIVE PROFILE; Future  - LDH; Future  - FERRITIN; Future  - SPEP W/REFLEX TO KARLO, A, G, M; Future  - URINALYSIS,CULTURE IF INDICATED; Future  -PSA    2. Chronic atrial fibrillation (HCC)  Chronic.  Stable.  Continue current plan of care.    3. Interstitial lung disease (HCC)  Chronic.  Stable.  Continue current plan of care.  Followed by pulmonology.    4.  DMV paperwork filled out.

## 2025-02-26 ENCOUNTER — HOSPITAL ENCOUNTER (OUTPATIENT)
Facility: MEDICAL CENTER | Age: 85
End: 2025-02-26
Attending: INTERNAL MEDICINE
Payer: MEDICARE

## 2025-02-26 DIAGNOSIS — R63.4 UNINTENTIONAL WEIGHT LOSS: ICD-10-CM

## 2025-02-26 LAB
ALBUMIN SERPL BCP-MCNC: 3.5 G/DL (ref 3.2–4.9)
ALBUMIN/GLOB SERPL: 1.3 G/DL
ALP SERPL-CCNC: 129 U/L (ref 30–99)
ALT SERPL-CCNC: 22 U/L (ref 2–50)
ANION GAP SERPL CALC-SCNC: 14 MMOL/L (ref 7–16)
APPEARANCE UR: CLEAR
AST SERPL-CCNC: 20 U/L (ref 12–45)
BACTERIA #/AREA URNS HPF: ABNORMAL /HPF
BASOPHILS # BLD AUTO: 0.9 % (ref 0–1.8)
BASOPHILS # BLD: 0.06 K/UL (ref 0–0.12)
BILIRUB SERPL-MCNC: 0.4 MG/DL (ref 0.1–1.5)
BILIRUB UR QL STRIP.AUTO: NEGATIVE
BUN SERPL-MCNC: 24 MG/DL (ref 8–22)
CALCIUM ALBUM COR SERPL-MCNC: 9.3 MG/DL (ref 8.5–10.5)
CALCIUM SERPL-MCNC: 8.9 MG/DL (ref 8.4–10.2)
CASTS URNS QL MICRO: 0 /LPF (ref 0–2)
CHLORIDE SERPL-SCNC: 99 MMOL/L (ref 96–112)
CO2 SERPL-SCNC: 22 MMOL/L (ref 20–33)
COLOR UR: YELLOW
CREAT SERPL-MCNC: 1.17 MG/DL (ref 0.5–1.4)
CRP SERPL HS-MCNC: 4.76 MG/DL (ref 0–0.75)
EOSINOPHIL # BLD AUTO: 0.54 K/UL (ref 0–0.51)
EOSINOPHIL NFR BLD: 7.7 % (ref 0–6.9)
EPITHELIAL CELLS 1715: ABNORMAL /HPF (ref 0–5)
ERYTHROCYTE [DISTWIDTH] IN BLOOD BY AUTOMATED COUNT: 47.3 FL (ref 35.9–50)
ERYTHROCYTE [SEDIMENTATION RATE] IN BLOOD BY WESTERGREN METHOD: 51 MM/HOUR (ref 0–20)
FASTING STATUS PATIENT QL REPORTED: NORMAL
FERRITIN SERPL-MCNC: 402 NG/ML (ref 22–322)
GFR SERPLBLD CREATININE-BSD FMLA CKD-EPI: 61 ML/MIN/1.73 M 2
GLOBULIN SER CALC-MCNC: 2.6 G/DL (ref 1.9–3.5)
GLUCOSE SERPL-MCNC: 99 MG/DL (ref 65–99)
GLUCOSE UR STRIP.AUTO-MCNC: NEGATIVE MG/DL
HCT VFR BLD AUTO: 41.2 % (ref 42–52)
HGB BLD-MCNC: 13.5 G/DL (ref 14–18)
IMM GRANULOCYTES # BLD AUTO: 0.03 K/UL (ref 0–0.11)
IMM GRANULOCYTES NFR BLD AUTO: 0.4 % (ref 0–0.9)
KETONES UR STRIP.AUTO-MCNC: NEGATIVE MG/DL
LDH SERPL L TO P-CCNC: 140 U/L (ref 107–266)
LEUKOCYTE ESTERASE UR QL STRIP.AUTO: ABNORMAL
LYMPHOCYTES # BLD AUTO: 1.39 K/UL (ref 1–4.8)
LYMPHOCYTES NFR BLD: 19.8 % (ref 22–41)
MCH RBC QN AUTO: 30.2 PG (ref 27–33)
MCHC RBC AUTO-ENTMCNC: 32.8 G/DL (ref 32.3–36.5)
MCV RBC AUTO: 92.2 FL (ref 81.4–97.8)
MICRO URNS: ABNORMAL
MONOCYTES # BLD AUTO: 0.88 K/UL (ref 0–0.85)
MONOCYTES NFR BLD AUTO: 12.5 % (ref 0–13.4)
NEUTROPHILS # BLD AUTO: 4.13 K/UL (ref 1.82–7.42)
NEUTROPHILS NFR BLD: 58.7 % (ref 44–72)
NITRITE UR QL STRIP.AUTO: NEGATIVE
NRBC # BLD AUTO: 0 K/UL
NRBC BLD-RTO: 0 /100 WBC (ref 0–0.2)
PH UR STRIP.AUTO: 5.5 [PH] (ref 5–8)
PLATELET # BLD AUTO: 264 K/UL (ref 164–446)
PMV BLD AUTO: 8.8 FL (ref 9–12.9)
POTASSIUM SERPL-SCNC: 4.4 MMOL/L (ref 3.6–5.5)
PROT SERPL-MCNC: 6.1 G/DL (ref 6–8.2)
PROT UR QL STRIP: NEGATIVE MG/DL
PSA SERPL-MCNC: 1.21 NG/ML (ref 0–4)
RBC # BLD AUTO: 4.47 M/UL (ref 4.7–6.1)
RBC # URNS HPF: ABNORMAL /HPF
RBC UR QL AUTO: NEGATIVE
SODIUM SERPL-SCNC: 135 MMOL/L (ref 135–145)
SP GR UR STRIP.AUTO: 1.02
TSH SERPL DL<=0.005 MIU/L-ACNC: 3.47 UIU/ML (ref 0.38–5.33)
UROBILINOGEN UR STRIP.AUTO-MCNC: 1 EU/DL
WBC # BLD AUTO: 7 K/UL (ref 4.8–10.8)
WBC #/AREA URNS HPF: ABNORMAL /HPF

## 2025-02-26 PROCEDURE — 86140 C-REACTIVE PROTEIN: CPT

## 2025-02-26 PROCEDURE — 83615 LACTATE (LD) (LDH) ENZYME: CPT

## 2025-02-26 PROCEDURE — 85025 COMPLETE CBC W/AUTO DIFF WBC: CPT

## 2025-02-26 PROCEDURE — 84443 ASSAY THYROID STIM HORMONE: CPT

## 2025-02-26 PROCEDURE — 80053 COMPREHEN METABOLIC PANEL: CPT

## 2025-02-26 PROCEDURE — 86038 ANTINUCLEAR ANTIBODIES: CPT

## 2025-02-26 PROCEDURE — 84165 PROTEIN E-PHORESIS SERUM: CPT

## 2025-02-26 PROCEDURE — 84155 ASSAY OF PROTEIN SERUM: CPT

## 2025-02-26 PROCEDURE — 81001 URINALYSIS AUTO W/SCOPE: CPT

## 2025-02-26 PROCEDURE — 82728 ASSAY OF FERRITIN: CPT

## 2025-02-26 PROCEDURE — 36415 COLL VENOUS BLD VENIPUNCTURE: CPT

## 2025-02-26 PROCEDURE — 84153 ASSAY OF PSA TOTAL: CPT

## 2025-02-26 PROCEDURE — 85652 RBC SED RATE AUTOMATED: CPT

## 2025-02-27 ENCOUNTER — RESULTS FOLLOW-UP (OUTPATIENT)
Dept: MEDICAL GROUP | Facility: PHYSICIAN GROUP | Age: 85
End: 2025-02-27

## 2025-02-28 LAB — NUCLEAR IGG SER QL IA: NORMAL

## 2025-03-01 LAB
ALBUMIN SERPL ELPH-MCNC: 3.17 G/DL (ref 3.75–5.01)
ALPHA1 GLOB SERPL ELPH-MCNC: 0.37 G/DL (ref 0.19–0.46)
ALPHA2 GLOB SERPL ELPH-MCNC: 0.97 G/DL (ref 0.48–1.05)
B-GLOBULIN SERPL ELPH-MCNC: 1 G/DL (ref 0.48–1.1)
GAMMA GLOB SERPL ELPH-MCNC: 0.89 G/DL (ref 0.62–1.51)
INTERPRETATION SERPL IFE-IMP: ABNORMAL
MONOCLON BAND OBS SERPL: ABNORMAL
MONOCLONAL PROTEIN NL11656: ABNORMAL G/DL
PATHOLOGY STUDY: ABNORMAL
PROT SERPL-MCNC: 6.4 G/DL (ref 6.3–8.2)

## 2025-03-06 ENCOUNTER — OFFICE VISIT (OUTPATIENT)
Dept: MEDICAL GROUP | Facility: PHYSICIAN GROUP | Age: 85
End: 2025-03-06
Payer: MEDICARE

## 2025-03-06 VITALS
HEIGHT: 72 IN | TEMPERATURE: 97.9 F | DIASTOLIC BLOOD PRESSURE: 60 MMHG | HEART RATE: 79 BPM | BODY MASS INDEX: 30.81 KG/M2 | RESPIRATION RATE: 16 BRPM | WEIGHT: 227.5 LBS | SYSTOLIC BLOOD PRESSURE: 114 MMHG | OXYGEN SATURATION: 91 %

## 2025-03-06 DIAGNOSIS — J90 PLEURAL EFFUSION ON LEFT: Chronic | ICD-10-CM

## 2025-03-06 DIAGNOSIS — R05.2 SUBACUTE COUGH: ICD-10-CM

## 2025-03-06 DIAGNOSIS — D53.9 ANEMIA ASSOCIATED WITH NUTRITIONAL DEFICIENCY: ICD-10-CM

## 2025-03-06 DIAGNOSIS — G47.33 OSA ON CPAP: Chronic | ICD-10-CM

## 2025-03-06 DIAGNOSIS — R63.4 UNINTENTIONAL WEIGHT LOSS: ICD-10-CM

## 2025-03-06 DIAGNOSIS — J84.9 INTERSTITIAL LUNG DISEASE (HCC): Chronic | ICD-10-CM

## 2025-03-06 PROCEDURE — 3078F DIAST BP <80 MM HG: CPT | Performed by: INTERNAL MEDICINE

## 2025-03-06 PROCEDURE — 3074F SYST BP LT 130 MM HG: CPT | Performed by: INTERNAL MEDICINE

## 2025-03-06 PROCEDURE — 99215 OFFICE O/P EST HI 40 MIN: CPT | Performed by: INTERNAL MEDICINE

## 2025-03-06 ASSESSMENT — FIBROSIS 4 INDEX: FIB4 SCORE: 1.37

## 2025-03-06 NOTE — PROGRESS NOTES
CC: Recurrence of cough, weight loss    HPI:  Farhat presents with the following:  Patient is here with his son Romaine    1. Unintentional weight loss  11/27/2024:Patient has noticed that he has been continuously losing weight. Current weight is 239 pounds with a BMI of 32.43. Looking back in his records, his weight in May 2023 was 266 pounds. Patient states that his wife has become debilitated and not able to prepare meals and rely on microwave dinners for breakfast and dinner. He tries to drink a milkshake daily. His appetite is good. Patient denies any cardiopulmonary, GI, other neurologic symptoms. TSH within normal limits in July. No other lab abnormalities.      2/20/2025: Patient continues to lose weight.  Current weight is 228 pounds down from 239 pounds in November.  Starting weight was 266 pounds in May 2023.  Patient just received full dentures, all his teeth were pulled.  Continues to have an appetite.  No other complaints except some mild fatigue.  Colonoscopy completed in 2022 which showed 5 adenomatous polyps with a 3-year follow-up recommended.    3/5/2025: Patient's current weight 227 pounds, down 1 pound from last visit in February.  Chart review reveals a weight of 26 5 pounds in November 2023.  Patient's son Romaine states that his father has not had much of an appetite, tends to sleep throughout the day.  Patient and his wife do not cook and rely on processed food, microwavable products.    2. Interstitial lung disease (HCC)  Chronic.  Stable.  Patient was last seen by pulmonology on December 6, 2023.  PFT completed January 2022 which showed severe restrictive defect on lung volumes with TLC 56%.  Borderline mild DLCO impairment.  CT scan of the thorax completed June 2023.  Mild to moderate left pleural effusion mild pleural thickening unchanged from 2022.  Follow-up chest x-ray and pulmonary function test ordered however patient did not complete as he was asymptomatic at the time.  Patient did  not follow-up with pulmonology.  Patient now presents with ongoing intermittent mildly productive cough for the last 6 weeks.  Labs reveal elevated serum RP, ESR and ferritin.    3. Pleural effusion on left  Most recent CT scan of the thorax in 2023 showed mild to moderate left pleural effusion which was stable.    4. Subacute cough  Patient with history of ILD and pleural effusion, left-sided, presents with recurrence of intermittent now mildly productive cough for the last 6 weeks.  Patient complains of increased fatigue.  Mild shortness of breath with exertion.  Denies chest pain, fevers or chills, hemoptysis.    5. Anemia associated with nutritional deficiency  Hemoglobin 13.5 down from 14.4 in July 2024.  MCV RDW within normal limits.  Ferritin slightly elevated at 402.  SPEP within normal limits.  Monoclonal proteins without abnormality.    6. MARCI on CPAP  Followed by pulmonology.  Continues on CPAP.      Patient Active Problem List    Diagnosis Date Noted    Anemia associated with nutritional deficiency 03/06/2025    Falls 11/27/2024    Unintentional weight loss 11/27/2024    Chronic atrial fibrillation (HCC) 07/12/2024    Chronic anticoagulation 03/12/2024    Thrombocytopenia (HCC) 11/30/2023    Abnormal EKG 11/07/2023    At risk for falls 11/07/2023    Cellulitis of toe of left foot 06/15/2023    QT prolongation 06/13/2023    Chronic pain of right knee 06/16/2022    Cerebral atrophy (HCC) 03/25/2022    Atherosclerosis of aorta (HCC) 03/25/2022    Interstitial lung disease (HCC) 03/25/2022    Nonspecific abnormal cardiovascular function study 03/25/2022    Multiple pulmonary nodules 12/15/2021    Prediabetes 01/07/2021    Pleural effusion on left 01/07/2021    Osteoarthritis of knee 03/09/2017    Tubular adenoma of colon 07/06/2016    Blackwell's esophagus without dysplasia 07/06/2016    Idiopathic neuropathy 09/23/2015    Acquired hypothyroidism 04/28/2015    Severe obesity (BMI 35.0-39.9) with comorbidity  ([facility]) 08/22/2014    History of vertigo 05/13/2014    MARCI on CPAP 02/20/2014    Renal cyst 01/22/2014    Sensorineural hearing loss 11/26/2012    History of atrial fibrillation 05/22/2012    Primary hypertension 09/16/2009    Dyslipidemia 09/16/2009    Allergic rhinitis 09/16/2009       Current Outpatient Medications   Medication Sig Dispense Refill    pravastatin (PRAVACHOL) 40 MG tablet TAKE 1 TABLET BY MOUTH EVERY DAY. INDICATIONS: HIGH AMOUNT OF FATS IN THE BLOOD 100 Tablet 3    omeprazole (PRILOSEC) 40 MG delayed-release capsule TAKE 1 CAPSULE BY MOUTH EVERY DAY. INDICATIONS: HEARTBURN 100 Capsule 3    carvedilol (COREG) 25 MG Tab TAKE 1 TABLET BY MOUTH TWICE A DAY FOR HIGH BLOOD PRESSURE DISORDER 200 Tablet 3    lisinopril (PRINIVIL) 10 MG Tab TAKE 1 TABLET BY MOUTH EVERY DAY FOR HIGH BLOOD PRESSURE 100 Tablet 3    fluticasone (FLONASE) 50 MCG/ACT nasal spray SPRAY 2 SPRAYS INTO AFFECTED NOSTRIL(S) TWICE A DAY 96 mL 0    influenza vaccine High-Dose (FLUZONE HIGH-DOSE) 0.5 ML Suspension Prefilled Syringe injection Inject  into the shoulder, thigh, or buttocks. 0.5 mL 0    ELIQUIS 5 MG Tab TAKE 1 TABLET BY MOUTH TWICE A  Tablet 1    levothyroxine (SYNTHROID) 75 MCG Tab TAKE 1 TABLET BY MOUTH EVERY MORNING ON AN EMPTY STOMACH. INDICATIONS: UNDERACTIVE THYROID 100 Tablet 3    meclizine (ANTIVERT) 12.5 MG Tab Take 1 Tablet by mouth 3 times a day as needed for Dizziness. 30 Tablet 1    ketotifen (ZADITOR) 0.025 % ophthalmic solution Administer 1 Drop into both eyes every 12 hours as needed (eye allergies). Indications: Allergic Conjunctivitis 10 mL 2    Cholecalciferol (D3) 2000 UNIT Tab Take 1 Tablet by mouth every day. Indications: supplement      COVID-19 mRNA Vac-Bell,Pfizer, (COMIRNATY) 30 MCG/0.3ML Suspension Prefilled Syringe injection Inject  into the shoulder, thigh, or buttocks. 0.3 mL 0    ketotifen (ZADITOR) 0.035 % ophthalmic solution ADMINISTER 1 DROP INTO BOTH EYES EVERY 12 HOURS AS NEEDED (EYE  ALLERGIES). INDICATIONS: ALLERGIC CONJUNCTIVITIS 10 mL 2     No current facility-administered medications for this visit.         Allergies as of 03/06/2025 - Reviewed 03/06/2025   Allergen Reaction Noted    Other environmental Runny Nose and Itching 06/01/2011        Social History     Socioeconomic History    Marital status:      Spouse name: Not on file    Number of children: Not on file    Years of education: Not on file    Highest education level: Not on file   Occupational History    Not on file   Tobacco Use    Smoking status: Never    Smokeless tobacco: Never   Vaping Use    Vaping status: Never Used   Substance and Sexual Activity    Alcohol use: Yes     Alcohol/week: 1.2 - 1.8 oz     Types: 2 - 3 Standard drinks or equivalent per week     Comment: occ    Drug use: Never    Sexual activity: Not Currently   Other Topics Concern    Not on file   Social History Narrative    Not on file     Social Drivers of Health     Financial Resource Strain: Not on file   Food Insecurity: No Food Insecurity (1/27/2020)    Hunger Vital Sign     Worried About Running Out of Food in the Last Year: Never true     Ran Out of Food in the Last Year: Never true   Transportation Needs: No Transportation Needs (1/27/2020)    PRAPARE - Transportation     Lack of Transportation (Medical): No     Lack of Transportation (Non-Medical): No   Physical Activity: Not on file   Stress: Not on file   Social Connections: Feeling Socially Integrated (7/12/2023)    OASIS : Social Isolation     Frequency of experiencing loneliness or isolation: Never   Intimate Partner Violence: Not on file   Housing Stability: Not on file       Family History   Problem Relation Age of Onset    Cancer Mother         cervical/breast    Hypertension Father     Cancer Father         lung cancer    Cancer Sister         lung    Cancer Other     Heart Disease Neg Hx     Heart Attack Neg Hx        Past Surgical History:   Procedure Laterality Date     HAMMERTOE CORRECTION  2/2/2015    Performed by Abdifatah Orta D.P.M. at SURGERY [facility] ORS    EXOSTOSIS EXCISION  6/3/2011    Performed by ABDIFATAH ORTA at SURGERY [facility] ORS    LESION EXCISION ORTHO  6/3/2011    Performed by ABDIFATAH ORTA at SURGERY [facility] ORS    TOE ARTHROPLASTY  6/3/2011    Performed by ABDIFATAH ORTA at SURGERY [facility] ORS    LUMBAR LAMINECTOMY DISKECTOMY  2005    microscopic    TONSILLECTOMY  1945    adenoidectomy    EYE SURGERY Bilateral     cataracts       ROS:  Denies any Headache,Chest pain,  Shortness of breath,  Abdominal pain, Changes of bowel or bladder, Lower ext edema, Fevers, Nights sweats, Focal weakness or numbness.  All other systems are negative.    /60 (BP Location: Left arm, Patient Position: Sitting)   Pulse 79   Temp 36.6 °C (97.9 °F) (Temporal)   Resp 16   Ht 1.829 m (6')   Wt 103 kg (227 lb 8 oz)   SpO2 91%   BMI 30.85 kg/m²      Physical Exam:  Gen:         Alert and oriented, No apparent distress.  HEENT:   Perrla, TM clear,  Oralpharynx no erythema or exudates.  Neck:       No Jugular venous distension, Lymphadenopathy, Thyromegaly, Bruits.  Lungs:     Mildly decreased breath sounds throughout, crackles at the bases, decreased breath sounds left lower lung.  CV:          Regular rate and rhythm. No murmurs, rubs or gallops.  Abd:         Soft non tender, non distended. Normal active bowel sounds.             Ext:          No clubbing, cyanosis, edema.      Assessment and Plan.   84 y.o. male presenting with the following.     1. Unintentional weight loss  Discussed weight loss issues with patient's son Romaine.  He would like to get more involved in his father's care and monitor him more closely and provide nutritious meals for his parents.  Continue to monitor.  Additional labs ordered.  Follow-up with pulmonology.    2. Interstitial lung disease (HCC)  Chronic.  Stable.  Patient will complete chest x-ray and make  appointment to follow-up with pulmonology.  Consider repeat pulmonary function testing.  - DX-CHEST-2 VIEWS; Future    3. Pleural effusion on left  Follow-up with pulmonology.    4. Subacute cough  Patient afebrile.  Oxygenation in the low 90%.  Continue to monitor pulse oximetry at home.  Follow-up with pulmonology.  - DX-CHEST-2 VIEWS; Future    5. Anemia associated with nutritional deficiency    - IRON/TOTAL IRON BIND; Future  - TRANSFERRIN SATURATION  - FERRITIN; Future  - CBC WITHOUT DIFFERENTIAL; Future  - VITAMIN B12; Future  - FOLATE; Future    6. MARCI on CPAP  Follow-up with pulmonology.  Continue CPAP at current settings.    My total time spent caring for the patient on the day of the encounter was 43 minutes.   This does not include time spent on separately billable procedures/tests.

## 2025-03-11 ENCOUNTER — APPOINTMENT (OUTPATIENT)
Dept: RADIOLOGY | Facility: MEDICAL CENTER | Age: 85
DRG: 200 | End: 2025-03-11
Attending: STUDENT IN AN ORGANIZED HEALTH CARE EDUCATION/TRAINING PROGRAM
Payer: MEDICARE

## 2025-03-11 ENCOUNTER — HOSPITAL ENCOUNTER (INPATIENT)
Facility: MEDICAL CENTER | Age: 85
LOS: 3 days | DRG: 200 | End: 2025-03-14
Attending: STUDENT IN AN ORGANIZED HEALTH CARE EDUCATION/TRAINING PROGRAM | Admitting: STUDENT IN AN ORGANIZED HEALTH CARE EDUCATION/TRAINING PROGRAM
Payer: MEDICARE

## 2025-03-11 ENCOUNTER — HOSPITAL ENCOUNTER (OUTPATIENT)
Dept: RADIOLOGY | Facility: MEDICAL CENTER | Age: 85
DRG: 200 | End: 2025-03-11
Attending: INTERNAL MEDICINE
Payer: MEDICARE

## 2025-03-11 ENCOUNTER — HOSPITAL ENCOUNTER (OUTPATIENT)
Facility: MEDICAL CENTER | Age: 85
DRG: 200 | End: 2025-03-11
Attending: INTERNAL MEDICINE
Payer: MEDICARE

## 2025-03-11 DIAGNOSIS — R42 VERTIGO: ICD-10-CM

## 2025-03-11 DIAGNOSIS — M25.561 CHRONIC PAIN OF RIGHT KNEE: ICD-10-CM

## 2025-03-11 DIAGNOSIS — L03.032 CELLULITIS OF TOE OF LEFT FOOT: ICD-10-CM

## 2025-03-11 DIAGNOSIS — R29.6 FALLS: ICD-10-CM

## 2025-03-11 DIAGNOSIS — R73.03 PREDIABETES: ICD-10-CM

## 2025-03-11 DIAGNOSIS — D53.9 ANEMIA ASSOCIATED WITH NUTRITIONAL DEFICIENCY: ICD-10-CM

## 2025-03-11 DIAGNOSIS — M17.11 PRIMARY OSTEOARTHRITIS OF RIGHT KNEE: ICD-10-CM

## 2025-03-11 DIAGNOSIS — G89.29 CHRONIC PAIN OF RIGHT KNEE: ICD-10-CM

## 2025-03-11 DIAGNOSIS — Z91.81 AT RISK FOR FALLS: ICD-10-CM

## 2025-03-11 DIAGNOSIS — J84.9 INTERSTITIAL LUNG DISEASE (HCC): Chronic | ICD-10-CM

## 2025-03-11 DIAGNOSIS — I48.20 CHRONIC ATRIAL FIBRILLATION (HCC): ICD-10-CM

## 2025-03-11 DIAGNOSIS — G60.9 IDIOPATHIC NEUROPATHY: ICD-10-CM

## 2025-03-11 DIAGNOSIS — J93.83 SPONTANEOUS PNEUMOTHORAX: ICD-10-CM

## 2025-03-11 DIAGNOSIS — R94.31 QT PROLONGATION: ICD-10-CM

## 2025-03-11 DIAGNOSIS — G47.33 OSA ON CPAP: Chronic | ICD-10-CM

## 2025-03-11 DIAGNOSIS — R94.30 NONSPECIFIC ABNORMAL CARDIOVASCULAR FUNCTION STUDY: ICD-10-CM

## 2025-03-11 DIAGNOSIS — R63.4 UNINTENTIONAL WEIGHT LOSS: ICD-10-CM

## 2025-03-11 DIAGNOSIS — R05.2 SUBACUTE COUGH: ICD-10-CM

## 2025-03-11 DIAGNOSIS — H90.3 SENSORINEURAL HEARING LOSS (SNHL) OF BOTH EARS: ICD-10-CM

## 2025-03-11 DIAGNOSIS — J90 BILATERAL PLEURAL EFFUSION: ICD-10-CM

## 2025-03-11 DIAGNOSIS — J30.1 SEASONAL ALLERGIC RHINITIS DUE TO POLLEN: ICD-10-CM

## 2025-03-11 DIAGNOSIS — E66.01 SEVERE OBESITY (BMI 35.0-39.9) WITH COMORBIDITY (HCC): ICD-10-CM

## 2025-03-11 DIAGNOSIS — R94.31 ABNORMAL EKG: ICD-10-CM

## 2025-03-11 DIAGNOSIS — I70.0 ATHEROSCLEROSIS OF AORTA (HCC): ICD-10-CM

## 2025-03-11 DIAGNOSIS — E03.9 ACQUIRED HYPOTHYROIDISM: ICD-10-CM

## 2025-03-11 DIAGNOSIS — Z71.89 ACP (ADVANCE CARE PLANNING): ICD-10-CM

## 2025-03-11 DIAGNOSIS — Z79.01 CHRONIC ANTICOAGULATION: ICD-10-CM

## 2025-03-11 DIAGNOSIS — Z86.79 HISTORY OF ATRIAL FIBRILLATION: ICD-10-CM

## 2025-03-11 DIAGNOSIS — K22.70 BARRETT'S ESOPHAGUS WITHOUT DYSPLASIA: ICD-10-CM

## 2025-03-11 DIAGNOSIS — D69.6 THROMBOCYTOPENIA (HCC): ICD-10-CM

## 2025-03-11 DIAGNOSIS — I10 PRIMARY HYPERTENSION: ICD-10-CM

## 2025-03-11 DIAGNOSIS — R91.8 MULTIPLE PULMONARY NODULES: ICD-10-CM

## 2025-03-11 DIAGNOSIS — G31.9 CEREBRAL ATROPHY (HCC): ICD-10-CM

## 2025-03-11 DIAGNOSIS — D12.6 TUBULAR ADENOMA OF COLON: ICD-10-CM

## 2025-03-11 DIAGNOSIS — J93.9 PNEUMOTHORAX ON RIGHT: ICD-10-CM

## 2025-03-11 DIAGNOSIS — N28.1 RENAL CYST: ICD-10-CM

## 2025-03-11 LAB
ALBUMIN SERPL BCP-MCNC: 3.6 G/DL (ref 3.2–4.9)
ALBUMIN/GLOB SERPL: 1.2 G/DL
ALP SERPL-CCNC: 123 U/L (ref 30–99)
ALT SERPL-CCNC: 15 U/L (ref 2–50)
ANION GAP SERPL CALC-SCNC: 9 MMOL/L (ref 7–16)
APPEARANCE UR: CLEAR
AST SERPL-CCNC: 16 U/L (ref 12–45)
BASOPHILS # BLD AUTO: 1.1 % (ref 0–1.8)
BASOPHILS # BLD: 0.08 K/UL (ref 0–0.12)
BILIRUB SERPL-MCNC: 0.4 MG/DL (ref 0.1–1.5)
BILIRUB UR QL STRIP.AUTO: NEGATIVE
BUN SERPL-MCNC: 22 MG/DL (ref 8–22)
CALCIUM ALBUM COR SERPL-MCNC: 9.5 MG/DL (ref 8.5–10.5)
CALCIUM SERPL-MCNC: 9.2 MG/DL (ref 8.4–10.2)
CHLORIDE SERPL-SCNC: 97 MMOL/L (ref 96–112)
CO2 SERPL-SCNC: 24 MMOL/L (ref 20–33)
COLOR UR: YELLOW
CREAT SERPL-MCNC: 1.31 MG/DL (ref 0.5–1.4)
CRP SERPL HS-MCNC: 3.06 MG/DL (ref 0–0.75)
EOSINOPHIL # BLD AUTO: 0.52 K/UL (ref 0–0.51)
EOSINOPHIL NFR BLD: 6.9 % (ref 0–6.9)
ERYTHROCYTE [DISTWIDTH] IN BLOOD BY AUTOMATED COUNT: 48.5 FL (ref 35.9–50)
ERYTHROCYTE [DISTWIDTH] IN BLOOD BY AUTOMATED COUNT: 49.1 FL (ref 35.9–50)
FERRITIN SERPL-MCNC: 476 NG/ML (ref 22–322)
FLUAV RNA SPEC QL NAA+PROBE: NEGATIVE
FLUBV RNA SPEC QL NAA+PROBE: NEGATIVE
FOLATE SERPL-MCNC: 5.4 NG/ML
GFR SERPLBLD CREATININE-BSD FMLA CKD-EPI: 53 ML/MIN/1.73 M 2
GLOBULIN SER CALC-MCNC: 3.1 G/DL (ref 1.9–3.5)
GLUCOSE SERPL-MCNC: 86 MG/DL (ref 65–99)
GLUCOSE UR STRIP.AUTO-MCNC: NEGATIVE MG/DL
HCT VFR BLD AUTO: 41.3 % (ref 42–52)
HCT VFR BLD AUTO: 43 % (ref 42–52)
HGB BLD-MCNC: 13.6 G/DL (ref 14–18)
HGB BLD-MCNC: 13.8 G/DL (ref 14–18)
IMM GRANULOCYTES # BLD AUTO: 0.03 K/UL (ref 0–0.11)
IMM GRANULOCYTES NFR BLD AUTO: 0.4 % (ref 0–0.9)
INR PPP: 1.26 (ref 0.87–1.13)
IRON SATN MFR SERPL: 24 % (ref 15–55)
IRON SERPL-MCNC: 51 UG/DL (ref 50–180)
KETONES UR STRIP.AUTO-MCNC: NEGATIVE MG/DL
LEUKOCYTE ESTERASE UR QL STRIP.AUTO: NEGATIVE
LYMPHOCYTES # BLD AUTO: 1.53 K/UL (ref 1–4.8)
LYMPHOCYTES NFR BLD: 20.4 % (ref 22–41)
MCH RBC QN AUTO: 30 PG (ref 27–33)
MCH RBC QN AUTO: 30.4 PG (ref 27–33)
MCHC RBC AUTO-ENTMCNC: 32.1 G/DL (ref 32.3–36.5)
MCHC RBC AUTO-ENTMCNC: 32.9 G/DL (ref 32.3–36.5)
MCV RBC AUTO: 92.2 FL (ref 81.4–97.8)
MCV RBC AUTO: 93.5 FL (ref 81.4–97.8)
MICRO URNS: NORMAL
MONOCYTES # BLD AUTO: 0.84 K/UL (ref 0–0.85)
MONOCYTES NFR BLD AUTO: 11.2 % (ref 0–13.4)
NEUTROPHILS # BLD AUTO: 4.51 K/UL (ref 1.82–7.42)
NEUTROPHILS NFR BLD: 60 % (ref 44–72)
NITRITE UR QL STRIP.AUTO: NEGATIVE
NRBC # BLD AUTO: 0 K/UL
NRBC BLD-RTO: 0 /100 WBC (ref 0–0.2)
NT-PROBNP SERPL IA-MCNC: 149 PG/ML (ref 0–125)
PH UR STRIP.AUTO: 5 [PH] (ref 5–8)
PLATELET # BLD AUTO: 279 K/UL (ref 164–446)
PLATELET # BLD AUTO: 339 K/UL (ref 164–446)
PMV BLD AUTO: 9.2 FL (ref 9–12.9)
PMV BLD AUTO: 9.3 FL (ref 9–12.9)
POTASSIUM SERPL-SCNC: 4.7 MMOL/L (ref 3.6–5.5)
PROT SERPL-MCNC: 6.7 G/DL (ref 6–8.2)
PROT UR QL STRIP: NEGATIVE MG/DL
PROTHROMBIN TIME: 16.2 SEC (ref 12–14.6)
RBC # BLD AUTO: 4.48 M/UL (ref 4.7–6.1)
RBC # BLD AUTO: 4.6 M/UL (ref 4.7–6.1)
RBC UR QL AUTO: NEGATIVE
RSV RNA SPEC QL NAA+PROBE: NEGATIVE
SARS-COV-2 RNA RESP QL NAA+PROBE: NOTDETECTED
SODIUM SERPL-SCNC: 130 MMOL/L (ref 135–145)
SP GR UR STRIP.AUTO: 1.01
SPECIMEN SOURCE: NORMAL
TIBC SERPL-MCNC: 217 UG/DL (ref 250–450)
TRANSFERRIN SERPL-MCNC: 180 MG/DL (ref 200–370)
UIBC SERPL-MCNC: 166 UG/DL (ref 110–370)
UROBILINOGEN UR STRIP.AUTO-MCNC: 1 EU/DL
VIT B12 SERPL-MCNC: 468 PG/ML (ref 211–911)
WBC # BLD AUTO: 7.5 K/UL (ref 4.8–10.8)
WBC # BLD AUTO: 7.6 K/UL (ref 4.8–10.8)

## 2025-03-11 PROCEDURE — 87070 CULTURE OTHR SPECIMN AEROBIC: CPT

## 2025-03-11 PROCEDURE — 82728 ASSAY OF FERRITIN: CPT

## 2025-03-11 PROCEDURE — 84466 ASSAY OF TRANSFERRIN: CPT

## 2025-03-11 PROCEDURE — 85025 COMPLETE CBC W/AUTO DIFF WBC: CPT

## 2025-03-11 PROCEDURE — 36415 COLL VENOUS BLD VENIPUNCTURE: CPT

## 2025-03-11 PROCEDURE — 99497 ADVNCD CARE PLAN 30 MIN: CPT | Performed by: STUDENT IN AN ORGANIZED HEALTH CARE EDUCATION/TRAINING PROGRAM

## 2025-03-11 PROCEDURE — 83540 ASSAY OF IRON: CPT

## 2025-03-11 PROCEDURE — 0241U HCHG SARS-COV-2 COVID-19 NFCT DS RESP RNA 4 TRGT MIC: CPT

## 2025-03-11 PROCEDURE — 85027 COMPLETE CBC AUTOMATED: CPT

## 2025-03-11 PROCEDURE — 87205 SMEAR GRAM STAIN: CPT

## 2025-03-11 PROCEDURE — 83550 IRON BINDING TEST: CPT

## 2025-03-11 PROCEDURE — 85610 PROTHROMBIN TIME: CPT

## 2025-03-11 PROCEDURE — 82746 ASSAY OF FOLIC ACID SERUM: CPT

## 2025-03-11 PROCEDURE — 81003 URINALYSIS AUTO W/O SCOPE: CPT

## 2025-03-11 PROCEDURE — 71045 X-RAY EXAM CHEST 1 VIEW: CPT

## 2025-03-11 PROCEDURE — 82533 TOTAL CORTISOL: CPT

## 2025-03-11 PROCEDURE — 71250 CT THORAX DX C-: CPT

## 2025-03-11 PROCEDURE — 770020 HCHG ROOM/CARE - TELE (206)

## 2025-03-11 PROCEDURE — 84145 PROCALCITONIN (PCT): CPT

## 2025-03-11 PROCEDURE — 85652 RBC SED RATE AUTOMATED: CPT

## 2025-03-11 PROCEDURE — 86140 C-REACTIVE PROTEIN: CPT

## 2025-03-11 PROCEDURE — 83880 ASSAY OF NATRIURETIC PEPTIDE: CPT

## 2025-03-11 PROCEDURE — 99285 EMERGENCY DEPT VISIT HI MDM: CPT

## 2025-03-11 PROCEDURE — 99223 1ST HOSP IP/OBS HIGH 75: CPT | Mod: 25,AI | Performed by: STUDENT IN AN ORGANIZED HEALTH CARE EDUCATION/TRAINING PROGRAM

## 2025-03-11 PROCEDURE — 80053 COMPREHEN METABOLIC PANEL: CPT

## 2025-03-11 PROCEDURE — 82607 VITAMIN B-12: CPT

## 2025-03-11 PROCEDURE — 83605 ASSAY OF LACTIC ACID: CPT

## 2025-03-11 PROCEDURE — 71046 X-RAY EXAM CHEST 2 VIEWS: CPT

## 2025-03-11 RX ORDER — ALBUTEROL SULFATE 5 MG/ML
2.5 SOLUTION RESPIRATORY (INHALATION)
Status: DISCONTINUED | OUTPATIENT
Start: 2025-03-11 | End: 2025-03-14 | Stop reason: HOSPADM

## 2025-03-11 RX ORDER — ONDANSETRON 4 MG/1
4 TABLET, ORALLY DISINTEGRATING ORAL EVERY 4 HOURS PRN
Status: DISCONTINUED | OUTPATIENT
Start: 2025-03-11 | End: 2025-03-14 | Stop reason: HOSPADM

## 2025-03-11 RX ORDER — MECLIZINE HYDROCHLORIDE 25 MG/1
12.5 TABLET ORAL 3 TIMES DAILY PRN
Status: DISCONTINUED | OUTPATIENT
Start: 2025-03-11 | End: 2025-03-14 | Stop reason: HOSPADM

## 2025-03-11 RX ORDER — CARVEDILOL 25 MG/1
25 TABLET ORAL 2 TIMES DAILY WITH MEALS
Status: DISCONTINUED | OUTPATIENT
Start: 2025-03-12 | End: 2025-03-11

## 2025-03-11 RX ORDER — LABETALOL HYDROCHLORIDE 5 MG/ML
10 INJECTION, SOLUTION INTRAVENOUS EVERY 4 HOURS PRN
Status: DISCONTINUED | OUTPATIENT
Start: 2025-03-11 | End: 2025-03-14 | Stop reason: HOSPADM

## 2025-03-11 RX ORDER — FLUTICASONE PROPIONATE 50 MCG
1 SPRAY, SUSPENSION (ML) NASAL 2 TIMES DAILY
Status: DISCONTINUED | OUTPATIENT
Start: 2025-03-11 | End: 2025-03-11

## 2025-03-11 RX ORDER — LEVOTHYROXINE SODIUM 75 UG/1
75 TABLET ORAL
Status: DISCONTINUED | OUTPATIENT
Start: 2025-03-12 | End: 2025-03-14 | Stop reason: HOSPADM

## 2025-03-11 RX ORDER — ACETAMINOPHEN 325 MG/1
650 TABLET ORAL EVERY 6 HOURS PRN
Status: DISCONTINUED | OUTPATIENT
Start: 2025-03-11 | End: 2025-03-14 | Stop reason: HOSPADM

## 2025-03-11 RX ORDER — VITAMIN B COMPLEX
2000 TABLET ORAL DAILY
Status: DISCONTINUED | OUTPATIENT
Start: 2025-03-12 | End: 2025-03-14 | Stop reason: HOSPADM

## 2025-03-11 RX ORDER — IPRATROPIUM BROMIDE AND ALBUTEROL SULFATE 2.5; .5 MG/3ML; MG/3ML
3 SOLUTION RESPIRATORY (INHALATION)
Status: DISCONTINUED | OUTPATIENT
Start: 2025-03-11 | End: 2025-03-11

## 2025-03-11 RX ORDER — OMEPRAZOLE 20 MG/1
40 CAPSULE, DELAYED RELEASE ORAL DAILY
Status: DISCONTINUED | OUTPATIENT
Start: 2025-03-12 | End: 2025-03-14 | Stop reason: HOSPADM

## 2025-03-11 RX ORDER — BENZONATATE 100 MG/1
100 CAPSULE ORAL 3 TIMES DAILY PRN
Status: DISCONTINUED | OUTPATIENT
Start: 2025-03-11 | End: 2025-03-14 | Stop reason: HOSPADM

## 2025-03-11 RX ORDER — GUAIFENESIN/DEXTROMETHORPHAN 100-10MG/5
10 SYRUP ORAL EVERY 6 HOURS PRN
Status: DISCONTINUED | OUTPATIENT
Start: 2025-03-11 | End: 2025-03-14 | Stop reason: HOSPADM

## 2025-03-11 RX ORDER — LISINOPRIL 5 MG/1
10 TABLET ORAL
Status: DISCONTINUED | OUTPATIENT
Start: 2025-03-11 | End: 2025-03-11

## 2025-03-11 RX ORDER — PRAVASTATIN SODIUM 20 MG
40 TABLET ORAL DAILY
Status: DISCONTINUED | OUTPATIENT
Start: 2025-03-12 | End: 2025-03-14 | Stop reason: HOSPADM

## 2025-03-11 RX ORDER — IPRATROPIUM BROMIDE AND ALBUTEROL SULFATE 2.5; .5 MG/3ML; MG/3ML
3 SOLUTION RESPIRATORY (INHALATION)
Status: DISPENSED | OUTPATIENT
Start: 2025-03-11 | End: 2025-03-13

## 2025-03-11 RX ORDER — ONDANSETRON 2 MG/ML
4 INJECTION INTRAMUSCULAR; INTRAVENOUS EVERY 4 HOURS PRN
Status: DISCONTINUED | OUTPATIENT
Start: 2025-03-11 | End: 2025-03-14 | Stop reason: HOSPADM

## 2025-03-11 ASSESSMENT — FIBROSIS 4 INDEX: FIB4 SCORE: 1.08

## 2025-03-12 ENCOUNTER — APPOINTMENT (OUTPATIENT)
Dept: RADIOLOGY | Facility: MEDICAL CENTER | Age: 85
DRG: 200 | End: 2025-03-12
Attending: STUDENT IN AN ORGANIZED HEALTH CARE EDUCATION/TRAINING PROGRAM
Payer: MEDICARE

## 2025-03-12 PROBLEM — I13.10 CARDIORENAL SYNDROME: Status: ACTIVE | Noted: 2025-03-12

## 2025-03-12 PROBLEM — N18.30 CKD (CHRONIC KIDNEY DISEASE) STAGE 3, GFR 30-59 ML/MIN: Status: ACTIVE | Noted: 2023-06-13

## 2025-03-12 PROBLEM — I13.10 CARDIORENAL SYNDROME: Status: RESOLVED | Noted: 2025-03-12 | Resolved: 2025-03-12

## 2025-03-12 PROBLEM — K21.9 GERD (GASTROESOPHAGEAL REFLUX DISEASE): Status: ACTIVE | Noted: 2025-03-12

## 2025-03-12 LAB
ALBUMIN SERPL BCP-MCNC: 3 G/DL (ref 3.2–4.9)
BUN SERPL-MCNC: 19 MG/DL (ref 8–22)
CALCIUM ALBUM COR SERPL-MCNC: 9.6 MG/DL (ref 8.5–10.5)
CALCIUM SERPL-MCNC: 8.8 MG/DL (ref 8.4–10.2)
CHLORIDE SERPL-SCNC: 99 MMOL/L (ref 96–112)
CO2 SERPL-SCNC: 21 MMOL/L (ref 20–33)
CORTIS SERPL-MCNC: 5.1 UG/DL (ref 0–23)
CREAT SERPL-MCNC: 1.13 MG/DL (ref 0.5–1.4)
CREAT UR-MCNC: 71.8 MG/DL
ERYTHROCYTE [SEDIMENTATION RATE] IN BLOOD BY WESTERGREN METHOD: 47 MM/HOUR (ref 0–20)
GFR SERPLBLD CREATININE-BSD FMLA CKD-EPI: 63 ML/MIN/1.73 M 2
GLUCOSE SERPL-MCNC: 90 MG/DL (ref 65–99)
GRAM STN SPEC: NORMAL
LACTATE SERPL-SCNC: 1.1 MMOL/L (ref 0.5–2)
MAGNESIUM SERPL-MCNC: 1.8 MG/DL (ref 1.5–2.5)
PHOSPHATE SERPL-MCNC: 3.2 MG/DL (ref 2.5–4.5)
POTASSIUM SERPL-SCNC: 4.1 MMOL/L (ref 3.6–5.5)
PROCALCITONIN SERPL-MCNC: 0.06 NG/ML
SIGNIFICANT IND 70042: NORMAL
SITE SITE: NORMAL
SODIUM SERPL-SCNC: 130 MMOL/L (ref 135–145)
SODIUM UR-SCNC: 45 MMOL/L
SOURCE SOURCE: NORMAL

## 2025-03-12 PROCEDURE — A9270 NON-COVERED ITEM OR SERVICE: HCPCS | Performed by: STUDENT IN AN ORGANIZED HEALTH CARE EDUCATION/TRAINING PROGRAM

## 2025-03-12 PROCEDURE — 770020 HCHG ROOM/CARE - TELE (206)

## 2025-03-12 PROCEDURE — 71045 X-RAY EXAM CHEST 1 VIEW: CPT

## 2025-03-12 PROCEDURE — 99233 SBSQ HOSP IP/OBS HIGH 50: CPT | Performed by: HOSPITALIST

## 2025-03-12 PROCEDURE — 83735 ASSAY OF MAGNESIUM: CPT

## 2025-03-12 PROCEDURE — A9270 NON-COVERED ITEM OR SERVICE: HCPCS | Performed by: HOSPITALIST

## 2025-03-12 PROCEDURE — 82570 ASSAY OF URINE CREATININE: CPT

## 2025-03-12 PROCEDURE — 36415 COLL VENOUS BLD VENIPUNCTURE: CPT

## 2025-03-12 PROCEDURE — 700102 HCHG RX REV CODE 250 W/ 637 OVERRIDE(OP): Performed by: HOSPITALIST

## 2025-03-12 PROCEDURE — 97163 PT EVAL HIGH COMPLEX 45 MIN: CPT

## 2025-03-12 PROCEDURE — 76775 US EXAM ABDO BACK WALL LIM: CPT

## 2025-03-12 PROCEDURE — 700102 HCHG RX REV CODE 250 W/ 637 OVERRIDE(OP): Performed by: STUDENT IN AN ORGANIZED HEALTH CARE EDUCATION/TRAINING PROGRAM

## 2025-03-12 PROCEDURE — 99222 1ST HOSP IP/OBS MODERATE 55: CPT | Performed by: STUDENT IN AN ORGANIZED HEALTH CARE EDUCATION/TRAINING PROGRAM

## 2025-03-12 PROCEDURE — 94760 N-INVAS EAR/PLS OXIMETRY 1: CPT

## 2025-03-12 PROCEDURE — 97535 SELF CARE MNGMENT TRAINING: CPT

## 2025-03-12 PROCEDURE — 80069 RENAL FUNCTION PANEL: CPT

## 2025-03-12 PROCEDURE — 84300 ASSAY OF URINE SODIUM: CPT

## 2025-03-12 PROCEDURE — 700111 HCHG RX REV CODE 636 W/ 250 OVERRIDE (IP): Performed by: STUDENT IN AN ORGANIZED HEALTH CARE EDUCATION/TRAINING PROGRAM

## 2025-03-12 PROCEDURE — 97166 OT EVAL MOD COMPLEX 45 MIN: CPT

## 2025-03-12 RX ORDER — FUROSEMIDE 10 MG/ML
20 INJECTION INTRAMUSCULAR; INTRAVENOUS 2 TIMES DAILY
Status: DISCONTINUED | OUTPATIENT
Start: 2025-03-12 | End: 2025-03-13

## 2025-03-12 RX ORDER — CARVEDILOL 6.25 MG/1
12.5 TABLET ORAL 2 TIMES DAILY WITH MEALS
Status: DISCONTINUED | OUTPATIENT
Start: 2025-03-12 | End: 2025-03-14 | Stop reason: HOSPADM

## 2025-03-12 RX ORDER — MAGNESIUM SULFATE 1 G/100ML
1 INJECTION INTRAVENOUS ONCE
Status: COMPLETED | OUTPATIENT
Start: 2025-03-12 | End: 2025-03-12

## 2025-03-12 RX ADMIN — CARVEDILOL 12.5 MG: 6.25 TABLET, FILM COATED ORAL at 17:47

## 2025-03-12 RX ADMIN — LEVOTHYROXINE SODIUM 75 MCG: 0.07 TABLET ORAL at 05:29

## 2025-03-12 RX ADMIN — MAGNESIUM SULFATE IN DEXTROSE 1 G: 10 INJECTION, SOLUTION INTRAVENOUS at 06:15

## 2025-03-12 RX ADMIN — Medication 2000 UNITS: at 05:29

## 2025-03-12 RX ADMIN — PRAVASTATIN SODIUM 40 MG: 20 TABLET ORAL at 05:29

## 2025-03-12 RX ADMIN — APIXABAN 5 MG: 5 TABLET, FILM COATED ORAL at 17:49

## 2025-03-12 RX ADMIN — FUROSEMIDE 20 MG: 10 INJECTION, SOLUTION INTRAVENOUS at 17:48

## 2025-03-12 RX ADMIN — OMEPRAZOLE 40 MG: 20 CAPSULE, DELAYED RELEASE ORAL at 05:29

## 2025-03-12 SDOH — ECONOMIC STABILITY: TRANSPORTATION INSECURITY
IN THE PAST 12 MONTHS, HAS LACK OF RELIABLE TRANSPORTATION KEPT YOU FROM MEDICAL APPOINTMENTS, MEETINGS, WORK OR FROM GETTING THINGS NEEDED FOR DAILY LIVING?: NO

## 2025-03-12 ASSESSMENT — LIFESTYLE VARIABLES
ALCOHOL_USE: NO
HAVE YOU EVER FELT YOU SHOULD CUT DOWN ON YOUR DRINKING: NO
HAVE PEOPLE ANNOYED YOU BY CRITICIZING YOUR DRINKING: NO
HOW MANY TIMES IN THE PAST YEAR HAVE YOU HAD 5 OR MORE DRINKS IN A DAY: 0
CONSUMPTION TOTAL: NEGATIVE
TOTAL SCORE: 0
SUBSTANCE_ABUSE: 0
ON A TYPICAL DAY WHEN YOU DRINK ALCOHOL HOW MANY DRINKS DO YOU HAVE: 0
EVER FELT BAD OR GUILTY ABOUT YOUR DRINKING: NO
EVER HAD A DRINK FIRST THING IN THE MORNING TO STEADY YOUR NERVES TO GET RID OF A HANGOVER: NO
TOTAL SCORE: 0
TOTAL SCORE: 0
DOES PATIENT WANT TO STOP DRINKING: NO
AVERAGE NUMBER OF DAYS PER WEEK YOU HAVE A DRINK CONTAINING ALCOHOL: 0

## 2025-03-12 ASSESSMENT — ENCOUNTER SYMPTOMS
NAUSEA: 0
ORTHOPNEA: 0
EYE REDNESS: 0
DIZZINESS: 0
FOCAL WEAKNESS: 0
DEPRESSION: 0
HEMOPTYSIS: 0
WEAKNESS: 1
CHILLS: 0
MYALGIAS: 0
DOUBLE VISION: 0
BRUISES/BLEEDS EASILY: 0
COUGH: 1
WEIGHT LOSS: 1
BLURRED VISION: 0
FALLS: 1
SHORTNESS OF BREATH: 1
HEADACHES: 0
FEVER: 0
VOMITING: 0
PALPITATIONS: 0
COUGH: 0
ABDOMINAL PAIN: 0
HEARTBURN: 1
SPUTUM PRODUCTION: 0
DIAPHORESIS: 0

## 2025-03-12 ASSESSMENT — COGNITIVE AND FUNCTIONAL STATUS - GENERAL
MOVING FROM LYING ON BACK TO SITTING ON SIDE OF FLAT BED: A LITTLE
SUGGESTED CMS G CODE MODIFIER MOBILITY: CK
WALKING IN HOSPITAL ROOM: A LOT
MOBILITY SCORE: 16
DAILY ACTIVITIY SCORE: 23
MOVING FROM LYING ON BACK TO SITTING ON SIDE OF FLAT BED: A LITTLE
CLIMB 3 TO 5 STEPS WITH RAILING: A LOT
SUGGESTED CMS G CODE MODIFIER DAILY ACTIVITY: CI
SUGGESTED CMS G CODE MODIFIER MOBILITY: CK
SUGGESTED CMS G CODE MODIFIER DAILY ACTIVITY: CJ
WALKING IN HOSPITAL ROOM: A LITTLE
STANDING UP FROM CHAIR USING ARMS: A LITTLE
HELP NEEDED FOR BATHING: A LITTLE
MOBILITY SCORE: 16
DRESSING REGULAR LOWER BODY CLOTHING: A LITTLE
CLIMB 3 TO 5 STEPS WITH RAILING: TOTAL
MOVING TO AND FROM BED TO CHAIR: A LITTLE
STANDING UP FROM CHAIR USING ARMS: A LITTLE
TOILETING: A LITTLE
MOVING TO AND FROM BED TO CHAIR: A LOT
TURNING FROM BACK TO SIDE WHILE IN FLAT BAD: A LITTLE
DAILY ACTIVITIY SCORE: 22

## 2025-03-12 ASSESSMENT — CHA2DS2 SCORE
AGE 65 TO 74: NO
CHA2DS2 VASC SCORE: 3
DIABETES: NO
CHF OR LEFT VENTRICULAR DYSFUNCTION: NO
SEX: MALE
HYPERTENSION: YES
AGE 75 OR GREATER: YES
PRIOR STROKE OR TIA OR THROMBOEMBOLISM: NO
VASCULAR DISEASE: NO

## 2025-03-12 ASSESSMENT — PATIENT HEALTH QUESTIONNAIRE - PHQ9
3. TROUBLE FALLING OR STAYING ASLEEP OR SLEEPING TOO MUCH: SEVERAL DAYS
7. TROUBLE CONCENTRATING ON THINGS, SUCH AS READING THE NEWSPAPER OR WATCHING TELEVISION: NOT AT ALL
SUM OF ALL RESPONSES TO PHQ9 QUESTIONS 1 AND 2: 1
5. POOR APPETITE OR OVEREATING: SEVERAL DAYS
1. LITTLE INTEREST OR PLEASURE IN DOING THINGS: SEVERAL DAYS
2. FEELING DOWN, DEPRESSED, IRRITABLE, OR HOPELESS: NOT AT ALL
8. MOVING OR SPEAKING SO SLOWLY THAT OTHER PEOPLE COULD HAVE NOTICED. OR THE OPPOSITE, BEING SO FIGETY OR RESTLESS THAT YOU HAVE BEEN MOVING AROUND A LOT MORE THAN USUAL: NOT AT ALL
6. FEELING BAD ABOUT YOURSELF - OR THAT YOU ARE A FAILURE OR HAVE LET YOURSELF OR YOUR FAMILY DOWN: NOT AL ALL
SUM OF ALL RESPONSES TO PHQ QUESTIONS 1-9: 4
4. FEELING TIRED OR HAVING LITTLE ENERGY: SEVERAL DAYS
9. THOUGHTS THAT YOU WOULD BE BETTER OFF DEAD, OR OF HURTING YOURSELF: NOT AT ALL

## 2025-03-12 ASSESSMENT — COPD QUESTIONNAIRES
DURING THE PAST 4 WEEKS HOW MUCH DID YOU FEEL SHORT OF BREATH: SOME OF THE TIME
HAVE YOU SMOKED AT LEAST 100 CIGARETTES IN YOUR ENTIRE LIFE: NO/DON'T KNOW
COPD SCREENING SCORE: 4
DO YOU EVER COUGH UP ANY MUCUS OR PHLEGM?: NO/ONLY WITH OCCASIONAL COLDS OR INFECTIONS

## 2025-03-12 ASSESSMENT — PAIN DESCRIPTION - PAIN TYPE
TYPE: ACUTE PAIN
TYPE: ACUTE PAIN

## 2025-03-12 ASSESSMENT — SOCIAL DETERMINANTS OF HEALTH (SDOH)
WITHIN THE LAST YEAR, HAVE YOU BEEN KICKED, HIT, SLAPPED, OR OTHERWISE PHYSICALLY HURT BY YOUR PARTNER OR EX-PARTNER?: NO
WITHIN THE LAST YEAR, HAVE TO BEEN RAPED OR FORCED TO HAVE ANY KIND OF SEXUAL ACTIVITY BY YOUR PARTNER OR EX-PARTNER?: NO
IN THE PAST 12 MONTHS, HAS THE ELECTRIC, GAS, OIL, OR WATER COMPANY THREATENED TO SHUT OFF SERVICE IN YOUR HOME?: NO
WITHIN THE PAST 12 MONTHS, THE FOOD YOU BOUGHT JUST DIDN'T LAST AND YOU DIDN'T HAVE MONEY TO GET MORE: NEVER TRUE
WITHIN THE LAST YEAR, HAVE YOU BEEN AFRAID OF YOUR PARTNER OR EX-PARTNER?: NO
WITHIN THE PAST 12 MONTHS, YOU WORRIED THAT YOUR FOOD WOULD RUN OUT BEFORE YOU GOT THE MONEY TO BUY MORE: NEVER TRUE
WITHIN THE LAST YEAR, HAVE YOU BEEN HUMILIATED OR EMOTIONALLY ABUSED IN OTHER WAYS BY YOUR PARTNER OR EX-PARTNER?: NO

## 2025-03-12 ASSESSMENT — ACTIVITIES OF DAILY LIVING (ADL): TOILETING: INDEPENDENT

## 2025-03-12 ASSESSMENT — GAIT ASSESSMENTS
DISTANCE (FEET): 120
ASSISTIVE DEVICE: FRONT WHEEL WALKER
GAIT LEVEL OF ASSIST: CONTACT GUARD ASSIST
DISTANCE (FEET): 50
DEVIATION: DECREASED HEEL STRIKE;DECREASED TOE OFF

## 2025-03-12 ASSESSMENT — FIBROSIS 4 INDEX: FIB4 SCORE: 1.27

## 2025-03-12 NOTE — ED TRIAGE NOTES
Pt here with c/o    Chief Complaint   Patient presents with    Shortness of Breath     Pt sent here by radiology after chest x-ray today showing small pneumothorax    Cough     Pt has had a cough for over 1 month- productive for clear; sometimes green sputum       /65   Pulse 66   Temp 37.3 °C (99.2 °F) (Temporal)   Resp 20   Ht 1.829 m (6')   Wt 103 kg (226 lb 6.6 oz)   SpO2 91%   BMI 30.71 kg/m²

## 2025-03-12 NOTE — THERAPY
Physical Therapy   Initial Evaluation     Patient Name: Farhat Stahl  Age:  85 y.o., Sex:  male  Medical Record #: [id number]  Today's Date: 3/12/2025     Precautions  Precautions: (P) Fall Risk  Comments: (P) weakness BLE 's, Neuropathy    Assessment  Patient is 85 y.o. who resides with his spouse in a single level home. DX Small right anterior pneumothorax.  Pm hx includes : inhalational lung disease, recent unintentional weight loss, chronic A-fib on Eliquis, HTN,  hyperlipidemia, hypothyroidism, GERD, MARCI on CPAP.  Pt demonstrating the ability to ambulate with FWW for functional home distances. Pt presents with BLE weakness, decreased power and poor eccentric quad control during stand to sit.  Recommended OP PT, pt and son agreeable.   No DME needs identified.   Plan    Physical Therapy Initial Treatment Plan   Treatment Plan : (P) Bed Mobility, Family / Caregiver Training, Manual Therapy, Neuro Re-Education / Balance, Stair Training, Therapeutic Activities, Therapeutic Exercise, Self Care / Home Evaluation  Treatment Frequency: (P) 4 Times per Week  Duration: (P) Until Therapy Goals Met    DC Equipment Recommendations: (P) None  Discharge Recommendations: (P) Recommend outpatient physical therapy services to address higher level deficits         Initial Contact Note    Initial Contact Note Order Received and Verified, Physical Therapy Evaluation in Progress with Full Report to Follow.   Precautions   Precautions Fall Risk   Comments weakness BLE 's, Neuropathy   Vitals   O2 (LPM) 0   O2 Delivery Device None - Room Air   Pain 0 - 10 Group   Therapist Pain Assessment During Activity;0   Prior Living Situation   Prior Services None   Housing / Facility 1 Story House   Steps Into Home 2   Steps In Home 1   Rail Left Rail  (Steps into Home)   Bathroom Set up Walk In Shower   Equipment Owned Front-Wheel Walker;4-Wheel Walker;Single Point Cane   Lives with - Patient's Self Care Capacity Spouse   Comments Pt  "states his son is his \"caregiver\" provides assist with transportation.   Prior Level of Functional Mobility   Bed Mobility Independent   Transfer Status Independent   Ambulation Independent   Ambulation Distance household   Assistive Devices Used 4-Wheel Walker   Stairs Required Assist   Comments reports multiple falls.   History of Falls   History of Falls Yes   Date of Last Fall   (Pt reports frequent falls, no pattern or syncope. Reports lack of confidence with mobility and decreased activity due to falls.)   Cognition    Level of Consciousness Alert   Strength Upper Body   Upper Body Strength  WDL   Passive ROM Lower Body   Passive ROM Lower Body WDL   Active ROM Lower Body    Active ROM Lower Body  WDL   Strength Lower Body   Lower Body Strength  X   Gross Strength Generalized Weakness, Equal Bilaterally   Sensation Lower Body   Lower Extremity Sensation   X   Paresthesia Present    Comments neuropathy BLE's   Vision   Vision Comments no c/o visual changes.   Other Treatments   Other Treatments Provided education on balance systems, effects of immobility due to fear of falling. benefit of OP PT program   Balance Assessment   Sitting Balance (Static) Good   Sitting Balance (Dynamic) Fair +   Standing Balance (Static) Fair   Standing Balance (Dynamic) Fair -   Weight Shift Sitting Good   Weight Shift Standing Fair   Comments w/FWW   Bed Mobility    Supine to Sit Supervised   Sit to Supine   (up to chair post session.)   Gait Analysis   Gait Level Of Assist Contact Guard Assist   Assistive Device Front Wheel Walker   Distance (Feet) 120   # of Times Distance was Traveled 1   Deviation Decreased Heel Strike;Decreased Toe Off  (increased hip and knee flexion.)   # of Stairs Climbed 0   Weight Bearing Status no restrictions   Functional Mobility   Sit to Stand Contact Guard Assist   Bed, Chair, Wheelchair Transfer Contact Guard Assist   Transfer Method Stand Step   Mobility bed> toilet> gait in hallway> up to chair. "   Comments poor eccentric quad control during stand to sit.   6 Clicks Assessment - How much HELP from another person do you currently need... (If the patient hasn't done an activity recently, how much help from another person do you think he/she would need if he/she tried?)   Turning from your back to your side while in a flat bed without using bedrails? 3   Moving from lying on your back to sitting on the side of a flat bed without using bedrails? 3   Moving to and from a bed to a chair (including a wheelchair)? 3   Standing up from a chair using your arms (e.g., wheelchair, or bedside chair)? 3   Walking in hospital room? 3   Climbing 3-5 steps with a railing? 1   6 clicks Mobility Score 16   Activity Tolerance   Sitting in Chair post session.   Sitting Edge of Bed 8 min   Short Term Goals    Short Term Goal # 1 Pt will transfer with LRAD at S level by tx 6   Short Term Goal # 2 Pt will ambulate with  LRAD for 200 ft with S by tx 6   Short Term Goal # 3 Pt will ascend and descend steps x 2 with CGA and single rail by tx 6   Education Group   Education Provided Role of Physical Therapist;Gait Training;Stair Training;Use of Assistive Device;Exercises - Seated;Exercises - Supine   Role of Physical Therapist Patient Response Patient;Acceptance;Explanation;Verbal Demonstration   Gait Training Patient Response Patient;Acceptance;Explanation;Reinforcement Needed   Physical Therapy Initial Treatment Plan    Treatment Plan  Bed Mobility;Family / Caregiver Training;Manual Therapy;Neuro Re-Education / Balance;Stair Training;Therapeutic Activities;Therapeutic Exercise;Self Care / Home Evaluation   Treatment Frequency 4 Times per Week   Duration Until Therapy Goals Met   Problem List    Problems Impaired Ambulation;Impaired Transfers;Functional Strength Deficit;Impaired Balance;Decreased Activity Tolerance   Anticipated Discharge Equipment and Recommendations   DC Equipment Recommendations None   Discharge Recommendations  Recommend outpatient physical therapy services to address higher level deficits   Interdisciplinary Plan of Care Collaboration   IDT Collaboration with  Nursing;Family / Caregiver   Patient Position at End of Therapy Seated;Call Light within Reach;Tray Table within Reach;Family / Friend in Room;Phone within Reach   Collaboration Comments staff updated. Son updated on recommendations.   Session Information   Date / Session Number  3/12-1 ( 1/4,3/18)

## 2025-03-12 NOTE — CONSULTS
Pulmonary Critical Care Consultation    Date of consult: 3/12/2025    Referring Physician  Rahul Doe M.D.    Reason for Consultation  pneumothorax    History of Presenting Illness  85 y.o. male with history of atrial fibrillation, HTN, HLD, MACRI on CPAP, ILD who presented 3/11/2025 with dyspnea, cough and unintentional weight loss. He was seen by his PCP and CXR showed R pneumothorax so he was sent to ER. He was stable on RA so decision was made to monitor overnight.   This AM, patient states he feels well overall. He reports cough x2 months that improves when he takes mucinex. No sick contacts. He does have frequent falls at home apparently but no obvious trauma to his chest that he remembers recently. He also reports a significant weight loss recently w/o a clear etiology.     Code Status  DNAR/DNI    Review of Systems  Review of Systems   Constitutional:  Negative for chills and fever.   Eyes:  Negative for redness.   Respiratory:  Positive for cough.    Cardiovascular:  Negative for chest pain.   Gastrointestinal:  Negative for vomiting.   Musculoskeletal:  Positive for falls.       Past Medical History   has a past medical history of Anemia associated with nutritional deficiency (03/06/2025), Arrhythmia, Arthritis, Atrial fibrillation (HCC), Bronchitis (2004), Chronic atrial fibrillation (HCC) (07/12/2024), Chronic pain of right knee (06/16/2022), Falls (11/27/2024), Fibula fracture (1981), High cholesterol, Hyperlipidemia, Hypertension, Lab test positive for detection of COVID-19 virus (08/11/2022), MEDICAL HOME, Multiple pulmonary nodules (12/15/2021), Onychogryposis of toenail (01/21/2021), Onychomycosis (01/21/2021), Pain, Pneumonia (2004), Rash (06/16/2022), Sleep apnea, Thrombocytopenia (HCC) (11/30/2023), and Unintentional weight loss (11/27/2024).    Surgical History   has a past surgical history that includes tonsillectomy (1945); exostosis excision (6/3/2011); lesion excision ortho (6/3/2011); toe  arthroplasty (6/3/2011); lumbar laminectomy diskectomy (2005); hammertoe correction (2/2/2015); and eye surgery (Bilateral).    Family History  family history includes Cancer in his father, mother, sister, and another family member; Hypertension in his father.    Social History   reports that he has never smoked. He has never used smokeless tobacco. He reports current alcohol use of about 1.2 - 1.8 oz of alcohol per week. He reports that he does not use drugs.    Medications  Home Medications       Reviewed by Eb Chacon (Pharmacy Tech) on 03/12/25 at 0948  Med List Status: Complete     Medication Last Dose Status   carvedilol (COREG) 25 MG Tab 3/11/2025 Active   Cholecalciferol (D3) 2000 UNIT Tab 3/11/2025 Active   ELIQUIS 5 MG Tab 3/11/2025 Active   fluticasone (FLONASE) 50 MCG/ACT nasal spray 3/11/2025 Active   levothyroxine (SYNTHROID) 75 MCG Tab 3/11/2025 Active   lisinopril (PRINIVIL) 10 MG Tab 3/11/2025 Active   omeprazole (PRILOSEC) 40 MG delayed-release capsule 3/11/2025 Active   pravastatin (PRAVACHOL) 40 MG tablet 3/11/2025 Active                  Audit from Redirected Encounters    **Home medications have not yet been reviewed for this encounter**       Current Facility-Administered Medications   Medication Dose Route Frequency Provider Last Rate Last Admin    mometasone-formoterol (Dulera) 200-5 MCG/ACT inhaler 2 Puff  2 Puff Inhalation BID (RT) Tyler Ruano M.D.        furosemide (Lasix) injection 20 mg  20 mg Intravenous BID Tyler Ruano M.D.        carvedilol (Coreg) tablet 12.5 mg  12.5 mg Oral BID WITH MEALS Tyler Ruano M.D.        tiotropium (Spiriva Respimat) 2.5 mcg/Act inhalation spray 5 mcg  5 mcg Inhalation QDAILY (RT) Tyler Ruano M.D.        apixaban (Eliquis) tablet 5 mg  5 mg Oral BID Rahul Doe M.D.        acetaminophen (Tylenol) tablet 650 mg  650 mg Oral Q6HRS PRN Tyler Ruano M.D.        labetalol (Normodyne/Trandate) injection 10 mg  10 mg Intravenous Q4HRS PRN  Tyler Ruano M.D.        ondansetron (Zofran) syringe/vial injection 4 mg  4 mg Intravenous Q4HRS PRN Tyler Ruano M.D.        Or    ondansetron (Zofran ODT) dispertab 4 mg  4 mg Oral Q4HRS PRN Tyler Ruano M.D.        guaiFENesin dextromethorphan (Robitussin DM) 100-10 MG/5ML syrup 10 mL  10 mL Oral Q6HRS PRN Tyler Ruano M.D.        Respiratory Therapy Consult   Nebulization Continuous RT Tyler Ruano M.D.        albuterol (Proventil) 2.5mg/0.5ml nebulizer solution 2.5 mg  2.5 mg Nebulization Q2HRS PRN (RT) Tyler Ruano M.D.        ipratropium-albuterol (DUONEB) nebulizer solution  3 mL Nebulization Q6HRS WHILE AWAKE (RT) Tyler Ruano M.D.        meclizine (Antivert) tablet 12.5 mg  12.5 mg Oral TID PRN Tyler Ruano M.D.        vitamin D3 (Cholecalciferol) tablet 2,000 Units  2,000 Units Oral DAILY Tyler Ruano M.D.   2,000 Units at 03/12/25 0529    levothyroxine (Synthroid) tablet 75 mcg  75 mcg Oral AM ES Tyler Ruano M.D.   75 mcg at 03/12/25 0529    omeprazole (PriLOSEC) capsule 40 mg  40 mg Oral DAILY Tyler Ruano M.D.   40 mg at 03/12/25 0529    pravastatin (Pravachol) tablet 40 mg  40 mg Oral DAILY Tyler Ruano M.D.   40 mg at 03/12/25 0529    benzonatate (Tessalon) capsule 100 mg  100 mg Oral TID PRN Tyler Ruano M.D.           Allergies  Allergies   Allergen Reactions    Other Environmental Runny Nose and Itching     Pollens       Vital Signs last 24 hours  Temp:  [36.1 °C (96.9 °F)-37.3 °C (99.2 °F)] 36.8 °C (98.2 °F)  Pulse:  [58-66] 64  Resp:  [16-21] 16  BP: (102-112)/(54-65) 111/62  SpO2:  [91 %-96 %] 94 %    Physical Exam  Physical Exam  Vitals and nursing note reviewed.   Constitutional:       General: He is not in acute distress.  HENT:      Head: Normocephalic.      Mouth/Throat:      Mouth: Mucous membranes are moist.   Eyes:      Conjunctiva/sclera: Conjunctivae normal.   Cardiovascular:      Rate and Rhythm: Normal rate and regular rhythm.   Pulmonary:      Effort: Pulmonary  effort is normal. No respiratory distress.   Abdominal:      Palpations: Abdomen is soft.   Musculoskeletal:         General: No deformity.   Skin:     General: Skin is warm and dry.   Neurological:      Mental Status: He is alert. Mental status is at baseline.   Psychiatric:         Mood and Affect: Mood normal.         Fluids    Intake/Output Summary (Last 24 hours) at 3/12/2025 1645  Last data filed at 3/12/2025 0917  Gross per 24 hour   Intake 360 ml   Output 350 ml   Net 10 ml       Laboratory  Recent Results (from the past 48 hours)   FOLATE SERUM/PLASMA    Collection Time: 03/11/25 11:55 AM   Result Value Ref Range    Folate -Folic Acid 5.4 >4.0 ng/mL   VITAMIN B12    Collection Time: 03/11/25 11:55 AM   Result Value Ref Range    Vitamin B12 -True Cobalamin 468 211 - 911 pg/mL   IRON/TOTAL IRON BIND    Collection Time: 03/11/25 11:55 AM   Result Value Ref Range    Iron 51 50 - 180 ug/dL    Total Iron Binding 217 (L) 250 - 450 ug/dL    Unsat Iron Binding 166 110 - 370 ug/dL    % Saturation 24 15 - 55 %   FERRITIN    Collection Time: 03/11/25 11:55 AM   Result Value Ref Range    Ferritin 476.0 (H) 22.0 - 322.0 ng/mL   CBC WITHOUT DIFFERENTIAL    Collection Time: 03/11/25 11:55 AM   Result Value Ref Range    WBC 7.6 4.8 - 10.8 K/uL    RBC 4.60 (L) 4.70 - 6.10 M/uL    Hemoglobin 13.8 (L) 14.0 - 18.0 g/dL    Hematocrit 43.0 42.0 - 52.0 %    MCV 93.5 81.4 - 97.8 fL    MCH 30.0 27.0 - 33.0 pg    MCHC 32.1 (L) 32.3 - 36.5 g/dL    RDW 49.1 35.9 - 50.0 fL    Platelet Count 339 164 - 446 K/uL    MPV 9.3 9.0 - 12.9 fL   TRANSFERRIN    Collection Time: 03/11/25 11:56 AM   Result Value Ref Range    Transferrin 180 (L) 200 - 370 mg/dL   CBC WITH DIFFERENTIAL    Collection Time: 03/11/25  8:35 PM   Result Value Ref Range    WBC 7.5 4.8 - 10.8 K/uL    RBC 4.48 (L) 4.70 - 6.10 M/uL    Hemoglobin 13.6 (L) 14.0 - 18.0 g/dL    Hematocrit 41.3 (L) 42.0 - 52.0 %    MCV 92.2 81.4 - 97.8 fL    MCH 30.4 27.0 - 33.0 pg    MCHC 32.9  32.3 - 36.5 g/dL    RDW 48.5 35.9 - 50.0 fL    Platelet Count 279 164 - 446 K/uL    MPV 9.2 9.0 - 12.9 fL    Neutrophils-Polys 60.00 44.00 - 72.00 %    Lymphocytes 20.40 (L) 22.00 - 41.00 %    Monocytes 11.20 0.00 - 13.40 %    Eosinophils 6.90 0.00 - 6.90 %    Basophils 1.10 0.00 - 1.80 %    Immature Granulocytes 0.40 0.00 - 0.90 %    Nucleated RBC 0.00 0.00 - 0.20 /100 WBC    Neutrophils (Absolute) 4.51 1.82 - 7.42 K/uL    Lymphs (Absolute) 1.53 1.00 - 4.80 K/uL    Monos (Absolute) 0.84 0.00 - 0.85 K/uL    Eos (Absolute) 0.52 (H) 0.00 - 0.51 K/uL    Baso (Absolute) 0.08 0.00 - 0.12 K/uL    Immature Granulocytes (abs) 0.03 0.00 - 0.11 K/uL    NRBC (Absolute) 0.00 K/uL   COMP METABOLIC PANEL    Collection Time: 03/11/25  8:35 PM   Result Value Ref Range    Sodium 130 (L) 135 - 145 mmol/L    Potassium 4.7 3.6 - 5.5 mmol/L    Chloride 97 96 - 112 mmol/L    Co2 24 20 - 33 mmol/L    Anion Gap 9.0 7.0 - 16.0    Glucose 86 65 - 99 mg/dL    Bun 22 8 - 22 mg/dL    Creatinine 1.31 0.50 - 1.40 mg/dL    Calcium 9.2 8.4 - 10.2 mg/dL    Correct Calcium 9.5 8.5 - 10.5 mg/dL    AST(SGOT) 16 12 - 45 U/L    ALT(SGPT) 15 2 - 50 U/L    Alkaline Phosphatase 123 (H) 30 - 99 U/L    Total Bilirubin 0.4 0.1 - 1.5 mg/dL    Albumin 3.6 3.2 - 4.9 g/dL    Total Protein 6.7 6.0 - 8.2 g/dL    Globulin 3.1 1.9 - 3.5 g/dL    A-G Ratio 1.2 g/dL   ESTIMATED GFR    Collection Time: 03/11/25  8:35 PM   Result Value Ref Range    GFR (CKD-EPI) 53 (A) >60 mL/min/1.73 m 2   Prothrombin time (INR)    Collection Time: 03/11/25  8:35 PM   Result Value Ref Range    PT 16.2 (H) 12.0 - 14.6 sec    INR 1.26 (H) 0.87 - 1.13   Procalcitonin    Collection Time: 03/11/25  8:35 PM   Result Value Ref Range    Procalcitonin 0.06 <0.25 ng/mL   Cortisol    Collection Time: 03/11/25  8:35 PM   Result Value Ref Range    Cortisol 5.1 0.0 - 23.0 ug/dL   CRP QUANTITIVE (NON-CARDIAC)    Collection Time: 03/11/25  8:35 PM   Result Value Ref Range    Stat C-Reactive Protein 3.06  (H) 0.00 - 0.75 mg/dL   Sed Rate    Collection Time: 03/11/25  8:35 PM   Result Value Ref Range    Sed Rate Westergren 47 (H) 0 - 20 mm/hour   proBrain Natriuretic Peptide, NT    Collection Time: 03/11/25  8:35 PM   Result Value Ref Range    NT-proBNP 149 (H) 0 - 125 pg/mL   CoV-2, Flu A/B, And RSV by PCR ([email or web address])    Collection Time: 03/11/25  8:47 PM    Specimen: Nasal; Respirate   Result Value Ref Range    Influenza virus A RNA Negative Negative    Influenza virus B, PCR Negative Negative    RSV, PCR Negative Negative    SARS-CoV-2 by PCR NotDetected     SARS-CoV-2 Source NP Swab    Urinalysis    Collection Time: 03/11/25 11:10 PM    Specimen: Urine   Result Value Ref Range    Color Yellow     Character Clear     Specific Gravity 1.013 <1.035    Ph 5.0 5.0 - 8.0    Glucose Negative Negative mg/dL    Ketones Negative Negative mg/dL    Protein Negative Negative mg/dL    Bilirubin Negative Negative    Urobilinogen, Urine 1.0 <=1.0 EU/dL    Nitrite Negative Negative    Leukocyte Esterase Negative Negative    Occult Blood Negative Negative    Micro Urine Req see below    LACTIC ACID    Collection Time: 03/11/25 11:20 PM   Result Value Ref Range    Lactic Acid 1.1 0.5 - 2.0 mmol/L   Culture Respiratory W/ GRM STN    Collection Time: 03/11/25 11:26 PM    Specimen: Sputum; Respirate   Result Value Ref Range    Significant Indicator NEG     Source RESP     Site SPUTUM     Culture Result -     Gram Stain Result       Moderate WBCs.  Few epithelial cells.  Moderate mixed bacteria, no predominant organism seen.  Specimen Quality Score: 2+     GRAM STAIN    Collection Time: 03/11/25 11:26 PM    Specimen: Respirate   Result Value Ref Range    Significant Indicator .     Source RESP     Site SPUTUM     Gram Stain Result       Moderate WBCs.  Few epithelial cells.  Moderate mixed bacteria, no predominant organism seen.  Specimen Quality Score: 2+     Renal Function Panel    Collection Time: 03/12/25  3:42 AM   Result Value Ref  Range    Sodium 130 (L) 135 - 145 mmol/L    Potassium 4.1 3.6 - 5.5 mmol/L    Chloride 99 96 - 112 mmol/L    Co2 21 20 - 33 mmol/L    Glucose 90 65 - 99 mg/dL    Creatinine 1.13 0.50 - 1.40 mg/dL    Bun 19 8 - 22 mg/dL    Calcium 8.8 8.4 - 10.2 mg/dL    Correct Calcium 9.6 8.5 - 10.5 mg/dL    Phosphorus 3.2 2.5 - 4.5 mg/dL    Albumin 3.0 (L) 3.2 - 4.9 g/dL   MAGNESIUM    Collection Time: 03/12/25  3:42 AM   Result Value Ref Range    Magnesium 1.8 1.5 - 2.5 mg/dL   ESTIMATED GFR    Collection Time: 03/12/25  3:42 AM   Result Value Ref Range    GFR (CKD-EPI) 63 >60 mL/min/1.73 m 2       Imaging  Reviewed     Assessment/Plan    Right pneumothorax  Does have b/l peripheral reticular opacities with some small cysts/blebs at the periphery but not definite honeycombing. I do think it's slightly progressing when compared to imaging back in 2021.   Patient not on O2 and no significant dyspnea so we discussed option of DC home with f/u CXR in 2 days and then a week depending on how his ptx is doing VS trial of 100% FiO2 for next 24 hours and then getting repeat CXR to see if there's any improvement in the pneumothorax. He would like to go home and will discuss w/family and home and f/u. Regardless, he will need to referred to and evaluated by thoracic surgery as well since this is a secondary spontaneous pneumothorax so will need to talk about invasive therapies.  If patient decides to go home, recommend repeat CXR in 2 days to re-evaluate. He needs f/u with his PCP and pulmonary within the next week. Please also refer to thoracic surgery.         Karoline Hackett MD  Pulmonary and Critical Care Medicine  [facility]

## 2025-03-12 NOTE — PROGRESS NOTES
Telemetry summary    Rhythm SR/SB 1st degree   Rate 55-60    Ectopy r-fPAC, rPVC  Measurements 0.52/0.09/0.46    Normal Values  Rhythm SR  HR Range   Measurements 0.12-0.20/0.06-0.10/0.30-0.52

## 2025-03-12 NOTE — PROGRESS NOTES
Hospital Medicine Daily Progress Note    Date of Service  3/12/2025    Chief Complaint  Farhat Stahl is a 85 y.o. male admitted 3/11/2025 with abnormal chest xray    Hospital Course  Farhat Stahl has past medical history that inlcudes interstitial lung disease atrial fibrillation on Eliquis, hypertension, hyperlipidemia, sleep apnea on CPAP, and recent unintentional weight loss.  Patient was recently seen by primary care physician, a chest x-ray was ordered.  Results were concerning for a small right pneumothorax thus the patient was referred to the emergency room.  He presented to the emergency room 3/11/2025, repeat imaging demonstrated persistence of right-sided pneumothorax.  The patient was hospitalized and pulmonary was consulted.  CT scan of chest was ordered    Interval Problem Update  Patient seen and examined  I returned to the bedside to discuss pulmonary recommendations with the patient and his son  Patient says that he does not feel like eating, he feels weak overall  No chest pain, not currently short of breath  Patient and his son would like to proceed with high flow oxygen and repeat chest x-ray as treatment for pneumothorax  Patient has also met with transitional care manager, he has been referred to home health and outpatient palliative care    55 minutes of aggregate pateint care time including review of the medical records, patient interview and examination, discussion with RN and case managment, transitional care manager, discussion with pulmonary and documentation    I have discussed this patient's plan of care and discharge plan at IDT rounds today with Case Management, Nursing, Nursing leadership, and other members of the IDT team.    Consultants/Specialty  pulmonary    Code Status  DNAR/DNI    Disposition  The patient is not medically cleared for discharge to home or a post-acute facility.  Anticipate discharge to: home with organized home healthcare and close outpatient  follow-up    I have placed the appropriate orders for post-discharge needs.    Review of Systems  Review of Systems   Constitutional:  Positive for weight loss. Negative for chills and malaise/fatigue.   Respiratory:  Negative for cough, hemoptysis and sputum production.    Cardiovascular:  Negative for chest pain, palpitations and orthopnea.   Gastrointestinal:  Negative for nausea and vomiting.   Skin:  Negative for itching and rash.   Neurological:  Negative for dizziness.   All other systems reviewed and are negative.       Physical Exam  Temp:  [36.1 °C (96.9 °F)-37.3 °C (99.2 °F)] 36.8 °C (98.2 °F)  Pulse:  [58-66] 64  Resp:  [16-21] 16  BP: (102-112)/(54-65) 111/62  SpO2:  [91 %-96 %] 94 %    Physical Exam  Constitutional:       General: He is not in acute distress.     Appearance: Normal appearance. He is normal weight.   HENT:      Head: Normocephalic and atraumatic.      Right Ear: External ear normal.      Left Ear: External ear normal.   Cardiovascular:      Rate and Rhythm: Normal rate and regular rhythm.      Pulses: Normal pulses.   Pulmonary:      Effort: Pulmonary effort is normal. No respiratory distress.      Breath sounds: Normal breath sounds.   Abdominal:      General: Abdomen is flat. Bowel sounds are normal.      Palpations: Abdomen is soft.   Musculoskeletal:         General: Normal range of motion.      Cervical back: Normal range of motion and neck supple.   Skin:     General: Skin is warm and dry.   Neurological:      Mental Status: He is alert and oriented to person, place, and time.      Cranial Nerves: No cranial nerve deficit.   Psychiatric:         Mood and Affect: Mood normal.         Behavior: Behavior normal.         Fluids    Intake/Output Summary (Last 24 hours) at 3/12/2025 1629  Last data filed at 3/12/2025 0917  Gross per 24 hour   Intake 360 ml   Output 350 ml   Net 10 ml        Laboratory  Recent Labs     03/11/25  1155 03/11/25 2035   WBC 7.6 7.5   RBC 4.60* 4.48*    HEMOGLOBIN 13.8* 13.6*   HEMATOCRIT 43.0 41.3*   MCV 93.5 92.2   MCH 30.0 30.4   MCHC 32.1* 32.9   RDW 49.1 48.5   PLATELETCT 339 279   MPV 9.3 9.2     Recent Labs     03/11/25 2035 03/12/25  0342   SODIUM 130* 130*   POTASSIUM 4.7 4.1   CHLORIDE 97 99   CO2 24 21   GLUCOSE 86 90   BUN 22 19   CREATININE 1.31 1.13   CALCIUM 9.2 8.8     Recent Labs     03/11/25 2035   INR 1.26*               Imaging  US-RENAL   Final Result      1.  No hydronephrosis      2.  Simple right renal cyst has diminished in size from 2018      DX-CHEST-LIMITED (1 VIEW)   Final Result      Stable small right pneumothorax      Stable bibasilar scarring or atelectasis with likely pleural fluid      CT-CHEST (THORAX) W/O   Final Result         1.  Small anterior right pneumothorax   2.  Linear densities the bilateral lung bases, right greater than left, appearance compatible with changes of atelectasis, component of the left lower lobe infiltrate not excluded.   3.  Small layering bilateral pleural effusions   4.  Cholelithiasis   5.  Atherosclerosis and atherosclerotic coronary artery disease   6.  Pulmonary nodule, see nodule follow-up recommendations below.      Fleischner Society pulmonary nodule recommendations:   Low Risk: CT at 6-12 months, then consider CT at 18-24 months      High Risk: CT at 6-12 months, then CT at 18-24 months      Low Risk - Minimal or absent history of smoking and of other known risk factors.      High Risk - History of smoking or of other known risk factors.      Note: These recommendations do not apply to lung cancer screening, patients with immunosuppression, or patients with known primary cancer.      Fleischner Society 2017 Guidelines for Management of Incidentally Detected Pulmonary Nodules in Adults      These findings were discussed with the patient's clinician, Susan Pinzon, on 3/11/2025 11:53 PM.      DX-CHEST-PORTABLE (1 VIEW)   Final Result         1.  Hazy bilateral pulmonary infiltrates.   2.   Small right pneumothorax, stable   3.  Cardiomegaly      EC-ECHOCARDIOGRAM COMPLETE W/ CONT    (Results Pending)        Assessment/Plan  * Spontaneous pneumothorax- (present on admission)  Assessment & Plan  3/12:  Discussed with pulmonary  Discussed with the patient and his son  I ordered high flow oxygen by facemask  I ordered repeat chest x-ray for morning  Patient will need outpatient follow-up    Unintentional weight loss- (present on admission)  Assessment & Plan  3/12:  Outpatient follow-up  Palliative care referral    Interstitial lung disease (HCC)- (present on admission)  Assessment & Plan  3/12:  Outpatient pulmonary follow-up  Referral to outpatient palliative care    Bilateral pleural effusion- (present on admission)  Assessment & Plan  3/12:  Continue diuresis with Lasix  Monitor for side effects of Lasix which can include hypotension and hypokalemia, vital signs and laboratory values will be monitored    Chronic atrial fibrillation (HCC)- (present on admission)  Assessment & Plan  3/12:  Continue Coreg  Resume Eliquis    GERD (gastroesophageal reflux disease)- (present on admission)  Assessment & Plan  Omeprazole    CKD (chronic kidney disease) stage 3, GFR 30-59 ml/min- (present on admission)  Assessment & Plan  Minimize nephrotoxic agents, renally dose meds    Acquired hypothyroidism- (present on admission)  Assessment & Plan  Synthroid    MARCI on CPAP- (present on admission)  Assessment & Plan  Dependent on CPAP at bedtime    3/12:  Hold CPAP for now given concern for pneumothorax, resume when appropriate    ACP (advance care planning)- (present on admission)  Assessment & Plan  See prior documentation, the patient is DNAR/DNI    Hyperlipidemia- (present on admission)  Assessment & Plan  Pravastatin    Hypertension- (present on admission)  Assessment & Plan  3/12:   Continue Coreg  Consider resuming ACE inhibitor depending on blood pressure           VTE prophylaxis:    therapeutic anticoagulation  with eliquis 5 mg BID      I have performed a physical exam and reviewed and updated ROS and Plan today (3/12/2025). In review of yesterday's note (3/11/2025), there are no changes except as documented above.

## 2025-03-12 NOTE — PROGRESS NOTES
0655 Bedside report received from ZEHRA Lopes. Patient was asleep with chest rise & fall noted. Continuous pulse ox was placed. SpO2 94% room air. Bed alarm on. Call light within reach.

## 2025-03-12 NOTE — DISCHARGE PLANNING
Post Acute Navigator Team    Per chart review, patient has been identified as a candidate for a goals of care discussion.     EOL Index high risk score 47  High LACE + score 59  Low 6 click Mobility score 16   Decreased 6 click ADL score 22   Readmission: No     This Post Acute Navigator will round on patient to provide information on [facility] including home health, home palliative care, and hospice.    Please reach out to me directly should care team feel patient will benefit from a discharge goals of care conversation regarding outpatient palliative care or hospice.     1308-Per chart review, AD scanned into EMR is letter from  on how to access AD.   This RN was able to access patient AD. [facility] MSW will scan into patient EMR.    1356- LANDONN voalted Ana primary RN prior to meeting with patient at bedside.    1400-This RN introduced self to patient and son Romaine at bedside, and explained PAN role. Information provided on [facility] including HH, Home Palliative Care, Transitional Care Team, and Hospice for future reference.  Patient and son Romaine report the goal is to discharge and they would like home health. They are also agreeable to a palliative home health consult and TCM if available. Romaine reports that he has taken the next two weeks off of work and will be at home with patient and his mother.       This LANDONN updated Ana primary RN   Care team updated via Voalte

## 2025-03-12 NOTE — THERAPY
Occupational Therapy   Initial Evaluation     Patient Name: Farhat Stahl  Age:  85 y.o., Sex:  male  Medical Record #: [id number]  Today's Date: 3/12/2025     Precautions: (P) Fall Risk  Comments: weakness BLE 's, Neuropathy    Assessment  Patient is 85 y.o. male with a diagnosis of spontaneous pneumothorax. Additional factors influencing patient status / progress: Hx of inhalational lung disease, Afib, HTN, MARCI on CPAP. Indep prior, still drives. Lives with spouse, son states he plans to stay with pt upon dc. Pt performs most ADL's with SBA/Spv. Uses FWW for functional mobility; SBA/CGA. See below for CLOF. Reviewed home safety during ADL's. Appears to be near his baseline with ADL's, no further OT needs. Available for dc needs.      Plan  Duration: Discharge Needs Only    DC Equipment Recommendations: None  Discharge Recommendations: Other - (OP PT)     Subjective  Agreeable     Objective   03/12/25 1535   Prior Living Situation   Prior Services None   Housing / Facility 1 Story House   Bathroom Set up Walk In Shower;Bathtub / Shower Combination;Shower Chair;Tub Transfer Bench;Grab Bars   Equipment Owned Front-Wheel Walker;4-Wheel Walker;Single Point Cane;Tub Transfer Bench;Tub / Shower Seat;Grab Bar(s) In Tub / Shower;Grab Bar(s) By Toilet   Lives with - Patient's Self Care Capacity Spouse   Prior Level of ADL Function   Self Feeding Independent   Grooming / Hygiene Independent   Bathing Independent   Dressing Independent   Toileting Independent   Prior Level of IADL Function   Medication Management Independent   Laundry Requires Assist   Kitchen Mobility Independent   Finances Unable To Determine At This Time   Home Management Independent   Shopping Requires Assist   Prior Level Of Mobility Independent With Device in Home   Driving / Transportation Driving Independent   Occupation (Pre-Hospital Vocational) Retired Due To Age   Leisure Interests Hobbies;Pets   History of Falls   History of Falls Yes    Precautions   Precautions Fall Risk   Vitals   O2 Delivery Device None - Room Air   Pain 0 - 10 Group   Therapist Pain Assessment 0   Cognition    Level of Consciousness Alert   Comments Ox3. Pleasant and cooperative   Passive ROM Upper Body   Passive ROM Upper Body WDL   Active ROM Upper Body   Active ROM Upper Body  WDL   Strength Upper Body   Upper Body Strength  WDL   Sensation Upper Body   Upper Extremity Sensation  WDL   Upper Body Muscle Tone   Upper Body Muscle Tone  WDL   Coordination Upper Body   Coordination WDL   Balance Assessment   Sitting Balance (Static) Good   Sitting Balance (Dynamic) Fair +   Standing Balance (Static) Fair +   Standing Balance (Dynamic) Fair   Weight Shift Sitting Good   Weight Shift Standing Fair   Bed Mobility    Supine to Sit Supervised   Sit to Supine Supervised   ADL Assessment   Eating Modified Independent   Grooming Supervision;Standing   Upper Body Dressing Independent   Lower Body Dressing Standby Assist   Toileting Standby Assist   How much help from another person does the patient currently need...   Putting on and taking off regular lower body clothing? 4   Bathing (including washing, rinsing, and drying)? 3   Toileting, which includes using a toilet, bedpan, or urinal? 4   Putting on and taking off regular upper body clothing? 4   Taking care of personal grooming such as brushing teeth? 4   Eating meals? 4   6 Clicks Daily Activity Score 23   Functional Mobility   Sit to Stand Standby Assist   Bed, Chair, Wheelchair Transfer Standby Assist   Toilet Transfers Standby Assist   Transfer Method Stand Step   Distance (Feet) 50   # of Times Distance was Traveled 2   Comments fww   Activity Tolerance   Sitting Edge of Bed 12   Standing 8   Education Group   Education Provided Home Safety;Adaptive Equipment;Activities of Daily Living   Role of Occupational Therapist Patient Response Patient;Acceptance;Explanation;Verbal Demonstration   Home Safety Patient Response  Patient;Acceptance;Explanation;Verbal Demonstration   ADL Patient Response Patient;Acceptance;Explanation;Verbal Demonstration;Action Demonstration   Adaptive Equipment Patient Response Patient;Acceptance;Explanation;Verbal Demonstration   Occupational Therapy Initial Treatment Plan    Duration Discharge Needs Only   Anticipated Discharge Equipment and Recommendations   DC Equipment Recommendations None   Discharge Recommendations Other -  (OP PT)   Interdisciplinary Plan of Care Collaboration   IDT Collaboration with  Family / Caregiver;Nursing   Patient Position at End of Therapy In Bed;Bed Alarm On;Family / Friend in Room;Phone within Reach;Tray Table within Reach;Call Light within Reach   Collaboration Comments OT Nettie. Pt is near his baseline with ADL function.

## 2025-03-12 NOTE — ASSESSMENT & PLAN NOTE
3/13  Continue diuresis with Lasix  Monitor for side effects of Lasix which can include hypotension and hypokalemia, vital signs and laboratory values will be monitored

## 2025-03-12 NOTE — PROGRESS NOTES
Medication history reviewed with pt. Med rec is complete.  Allergies reviewed, per pt    Patient has not had any outpatient antibiotics in the last 30 days.    Pt is taking ELIQUIS 5MG, last dose was taken on 3/11/2025 at 1800    Dispense history available in EPIC? YES

## 2025-03-12 NOTE — HOSPITAL COURSE
Farhat Stahl has past medical history that inlcudes interstitial lung disease atrial fibrillation on Eliquis, hypertension, hyperlipidemia, sleep apnea on CPAP, and recent unintentional weight loss.  Patient was recently seen by primary care physician, a chest x-ray was ordered.  Results were concerning for a small right pneumothorax thus the patient was referred to the emergency room.  He presented to the emergency room 3/11/2025, repeat imaging demonstrated persistence of right-sided pneumothorax.  The patient was hospitalized and pulmonary was consulted.  CT scan of chest was ordered

## 2025-03-12 NOTE — ED PROVIDER NOTES
ED Provider Note    CHIEF COMPLAINT  Chief Complaint   Patient presents with    Shortness of Breath     Pt sent here by radiology after chest x-ray today showing small pneumothorax    Cough     Pt has had a cough for over 1 month- productive for clear; sometimes green sputum       EXTERNAL RECORDS REVIEWED  Patient was seen as an outpatient on 6 March for unintentional weight loss, interstitial lung disease, left pleural effusion.  Also having subacute cough.  An x-ray was ordered as an outpatient which shows a 13 mm right    HPI/ROS  LIMITATION TO HISTORY   none  OUTSIDE HISTORIAN(S):  Son at bedside    Farhat Stahl is a 85 y.o. male who presents shortness of breath and cough.  Symptoms have been ongoing for months.  He had a PCP appointment for further workup of this hence the chest x-ray was ordered.  He says that he has had a dry cough for the most part though sometimes green sputum.  No fevers or associated upper respiratory symptoms.  He is not having any chest pain does feel more short of breath due to the coughing.  He reports a history of pleural effusion but no known history of a pneumothorax.  He is on eliquis .  Reports a significant amount of weight loss over about one year.  No associated GI symptoms    PAST MEDICAL HISTORY   has a past medical history of Anemia associated with nutritional deficiency (03/06/2025), Arrhythmia, Arthritis, Atrial fibrillation (HCC), Bronchitis (2004), Chronic atrial fibrillation (HCC) (07/12/2024), Chronic pain of right knee (06/16/2022), Falls (11/27/2024), Fibula fracture (1981), High cholesterol, Hyperlipidemia, Hypertension, Lab test positive for detection of COVID-19 virus (08/11/2022), MEDICAL HOME, Multiple pulmonary nodules (12/15/2021), Onychogryposis of toenail (01/21/2021), Onychomycosis (01/21/2021), Pain, Pneumonia (2004), Rash (06/16/2022), Sleep apnea, Thrombocytopenia (HCC) (11/30/2023), and Unintentional weight loss (11/27/2024).    SURGICAL  HISTORY   has a past surgical history that includes tonsillectomy (1945); exostosis excision (6/3/2011); lesion excision ortho (6/3/2011); toe arthroplasty (6/3/2011); lumbar laminectomy diskectomy (2005); hammertoe correction (2/2/2015); and eye surgery (Bilateral).    FAMILY HISTORY  Family History   Problem Relation Age of Onset    Cancer Mother         cervical/breast    Hypertension Father     Cancer Father         lung cancer    Cancer Sister         lung    Cancer Other     Heart Disease Neg Hx     Heart Attack Neg Hx        SOCIAL HISTORY  Social History     Tobacco Use    Smoking status: Never    Smokeless tobacco: Never   Vaping Use    Vaping status: Never Used   Substance and Sexual Activity    Alcohol use: Yes     Alcohol/week: 1.2 - 1.8 oz     Types: 2 - 3 Standard drinks or equivalent per week     Comment: occ    Drug use: Never    Sexual activity: Not Currently       CURRENT MEDICATIONS  Home Medications       Reviewed by Genie Dotson R.N. (Registered Nurse) on 03/11/25 at 2017  Med List Status: Not Addressed     Medication Last Dose Status   carvedilol (COREG) 25 MG Tab  Active   Cholecalciferol (D3) 2000 UNIT Tab  Active   COVID-19 mRNA Vac-Bell,Pfizer, (COMIRNATY) 30 MCG/0.3ML Suspension Prefilled Syringe injection  Active   ELIQUIS 5 MG Tab  Active   fluticasone (FLONASE) 50 MCG/ACT nasal spray  Active   influenza vaccine High-Dose (FLUZONE HIGH-DOSE) 0.5 ML Suspension Prefilled Syringe injection  Active   ketotifen (ZADITOR) 0.025 % ophthalmic solution  Active   ketotifen (ZADITOR) 0.035 % ophthalmic solution  Active   levothyroxine (SYNTHROID) 75 MCG Tab  Active   lisinopril (PRINIVIL) 10 MG Tab  Active   meclizine (ANTIVERT) 12.5 MG Tab  Active   omeprazole (PRILOSEC) 40 MG delayed-release capsule  Active   pravastatin (PRAVACHOL) 40 MG tablet  Active                  Audit from Redirected Encounters    **Home medications have not yet been reviewed for this encounter**          ALLERGIES  Allergies   Allergen Reactions    Other Environmental Runny Nose and Itching     Pollens       PHYSICAL EXAM  VITAL SIGNS: /56   Pulse 64   Temp 37.3 °C (99.2 °F) (Temporal)   Resp (!) 21   Ht 1.829 m (6')   Wt 103 kg (226 lb 6.6 oz)   SpO2 92%   BMI 30.71 kg/m²    Constitutional: Awake and alert he is chronically ill   HENT: Normal inspection  Moist mucous membranes  Eyes: Normal inspection  Neck: Grossly normal range of motion.  Cardiovascular: Normal heart rate, Normal rhythm.  Symmetric peripheral pulses.   Thorax & Lungs: No respiratory distress, No wheezing, No rales, No rhonchi, No chest tenderness.   Abdomen: Soft, non-distended, nontender to palpation in all 4 quadrants, no mass  Skin: No obvious rash.  Extremities: Warm, well perfused. No clubbing, cyanosis, edema   Neurologic: Grossly normal   Psychiatric: Normal for situation      EKG/LABS  Results for orders placed or performed during the hospital encounter of 03/11/25   CBC WITH DIFFERENTIAL    Collection Time: 03/11/25  8:35 PM   Result Value Ref Range    WBC 7.5 4.8 - 10.8 K/uL    RBC 4.48 (L) 4.70 - 6.10 M/uL    Hemoglobin 13.6 (L) 14.0 - 18.0 g/dL    Hematocrit 41.3 (L) 42.0 - 52.0 %    MCV 92.2 81.4 - 97.8 fL    MCH 30.4 27.0 - 33.0 pg    MCHC 32.9 32.3 - 36.5 g/dL    RDW 48.5 35.9 - 50.0 fL    Platelet Count 279 164 - 446 K/uL    MPV 9.2 9.0 - 12.9 fL    Neutrophils-Polys 60.00 44.00 - 72.00 %    Lymphocytes 20.40 (L) 22.00 - 41.00 %    Monocytes 11.20 0.00 - 13.40 %    Eosinophils 6.90 0.00 - 6.90 %    Basophils 1.10 0.00 - 1.80 %    Immature Granulocytes 0.40 0.00 - 0.90 %    Nucleated RBC 0.00 0.00 - 0.20 /100 WBC    Neutrophils (Absolute) 4.51 1.82 - 7.42 K/uL    Lymphs (Absolute) 1.53 1.00 - 4.80 K/uL    Monos (Absolute) 0.84 0.00 - 0.85 K/uL    Eos (Absolute) 0.52 (H) 0.00 - 0.51 K/uL    Baso (Absolute) 0.08 0.00 - 0.12 K/uL    Immature Granulocytes (abs) 0.03 0.00 - 0.11 K/uL    NRBC (Absolute) 0.00 K/uL    COMP METABOLIC PANEL    Collection Time: 03/11/25  8:35 PM   Result Value Ref Range    Sodium 130 (L) 135 - 145 mmol/L    Potassium 4.7 3.6 - 5.5 mmol/L    Chloride 97 96 - 112 mmol/L    Co2 24 20 - 33 mmol/L    Anion Gap 9.0 7.0 - 16.0    Glucose 86 65 - 99 mg/dL    Bun 22 8 - 22 mg/dL    Creatinine 1.31 0.50 - 1.40 mg/dL    Calcium 9.2 8.4 - 10.2 mg/dL    Correct Calcium 9.5 8.5 - 10.5 mg/dL    AST(SGOT) 16 12 - 45 U/L    ALT(SGPT) 15 2 - 50 U/L    Alkaline Phosphatase 123 (H) 30 - 99 U/L    Total Bilirubin 0.4 0.1 - 1.5 mg/dL    Albumin 3.6 3.2 - 4.9 g/dL    Total Protein 6.7 6.0 - 8.2 g/dL    Globulin 3.1 1.9 - 3.5 g/dL    A-G Ratio 1.2 g/dL   ESTIMATED GFR    Collection Time: 03/11/25  8:35 PM   Result Value Ref Range    GFR (CKD-EPI) 53 (A) >60 mL/min/1.73 m 2   CoV-2, Flu A/B, And RSV by PCR ([facility])    Collection Time: 03/11/25  8:47 PM    Specimen: Nasal; Respirate   Result Value Ref Range    Influenza virus A RNA Negative Negative    Influenza virus B, PCR Negative Negative    RSV, PCR Negative Negative    SARS-CoV-2 by PCR NotDetected     SARS-CoV-2 Source NP Swab      *Note: Due to a large number of results and/or encounters for the requested time period, some results have not been displayed. A complete set of results can be found in Results Review.       I have independently interpreted this EKG    RADIOLOGY/PROCEDURES   I have independently interpreted the diagnostic imaging associated with this visit and am waiting the final reading from the radiologist.   My preliminary interpretation is as follows: He has a right basilar pneumothorax    Radiologist interpretation:  DX-CHEST-PORTABLE (1 VIEW)   Final Result         1.  Hazy bilateral pulmonary infiltrates.   2.  Small right pneumothorax, stable   3.  Cardiomegaly      CT-CHEST (THORAX) W/O    (Results Pending)       COURSE & MEDICAL DECISION MAKING    ASSESSMENT, COURSE AND PLAN  Care Narrative: This is an 85-year-old with COPD, interstitial lung  disease who was referred from radiology for a right basilar pneumothorax seen on outpatient imaging.  He is not hypoxic, breathing comfortably on room air and fortunately no signs of tension physiology.  I did repeat the chest x-ray as it is now been over 4 hours and the pneumothorax does appear to be stable.    10:26 PM  Dr. Davis, pulmonology consulted.  He reviewed the imaging.  He did not recommend any tube thoracostomy but did recommend admission to the hospital for observation he also recommended CT, without contrast given his renal function.  He we will have the pulmonology team evaluate the patient     Of note his labs are otherwise quite unremarkable.  He has no chest pain to suggest ACS or PE.  He does have some pulmonary edema on XR but no signs of gross volume overload. No signs to suggest pneumonia.     I consulted with the hospitalist, Dr. Ruano who will admit the patient for further management              DISPOSITION AND DISCUSSIONS  I have discussed management of the patient with the following physicians and LILIBETH's:  see above    Discussion of management with other QHP or appropriate source(s): None       FINAL DIAGNOSIS  1. Pneumothorax on right Acute   2. Subacute cough         Electronically signed by: Susan Pinzon M.D., 3/11/2025 8:31 PM

## 2025-03-12 NOTE — FACE TO FACE
Face to Face Supporting Documentation - Home Health    The encounter with this patient was in whole or in part the primary reason for home health admission.    Date of encounter:   Patient:                    MRN:                       YOB: 2026  Farhat Stahl  [id number]  1940     Home health to see patient for:  Skilled Nursing care for assessment, interventions & education, Physical Therapy evaluation and treatment, and Occupational therapy evaluation and treatment    Skilled need for:  New Onset Medical Diagnosis pneumothorax, unintentional weight loss    Skilled nursing interventions to include:  Comment: medication managment    Homebound status evidenced by:  Needs the assistance of another person in order to leave the home. Leaving home requires a considerable and taxing effort. There is a normal inability to leave the home.    Community Physician to provide follow up care: Stefanie Zavaleta M.D.     Optional Interventions? No      I certify the face to face encounter for this home health care referral meets the CMS requirements and the encounter/clinical assessment with the patient was, in whole, or in part, for the medical condition(s) listed above, which is the primary reason for home health care. Based on my clinical findings: the service(s) are medically necessary, support the need for home health care, and the homebound criteria are met.  I certify that this patient has had a face to face encounter by myself.  Rahul Doe M.D. - NPI: [id number]

## 2025-03-12 NOTE — DIETARY
Nutrition Services: Initial Assessment     Day 1 of admit. Farhat Stahl is 85 y.o., male with admitting DX of Spontaneous pneumothorax [J93.83].    Consult Received for: MST - Malnutrition Screening Tool    Current Hospital Problems List:    Spontaneous pneumothorax  Interstitial lung disease  Bilateral pleural effusion  GERD  Cardiorenal syndrome  Unintentional wt loss - 40 lb   Chronic atrial fibrillation  CKD st 3  Acquired hypothyroidism  Hyperlipidemia  HTN       Nutrition Assessment:      Height: 182.9 cm (6')  Weight: 96 kg (211 lb 10.3 oz)  Weight taken via: Bed Scale  BMI Calculated: 28.7  BMI Classification: Overweight       Weight Readings from Last 5 encounters:   Wt Readings from Last 5 Encounters:   03/12/25 96 kg (211 lb 10.3 oz)   03/11/25 102.7 kg (226 lb 6.6 oz)   03/06/25 103 kg (227 lb 8 oz)   02/20/25 104 kg (228 lb 4.8 oz)   11/27/24 108 kg (239 lb 1.6 oz)   07/12/24 111 kg (245 lb 12.8 oz)         Objective:   Pertinent Medical Hx: inhalational lung disease, recent unintentional weight loss, chronic A-fib on Eliquis, hypertension, hyperlipidemia, hypothyroidism, GERD, MARCI on CPAP   Pertinent Labs: 3/12/25: sodium=130  Pertinent Meds: Lasix, prilosec, vit D3  Last BM:      (pta, 3/11)       Current Diet Order/Intake:   Cardiac diet    PO 25-50% of breakfast this morning. Finished lunch tray in room. Pt left most of his meal. He did not eat the salad.     Subjective:   Patient reported UBW: 273 lb  Dietary Recall/Energy Intake: Pt said that there has not been any change in his intake. He has added high protein milkshakes daily to help minimize wt loss. No change in fluid status noted by pt that may have caused wt loss. Last summer, pt said that he stopped drinking his nightly [place] because he was worried about falling while on Eliquis. This indicates Sufficient energy intake.   Pt was drinking high protein milkshakes at home. He agreed to Ensure TID with meals during  hospitalization.      Nutrition Focused Physical Exam (NFPE)  Weight Loss: pt said that he lost 56 lb since November. This is a 21% wt loss x 4 months. Wt loss is significant.   Of note, pt with wt discrepancy in Epic. Current wt of 96 kg is 6.7 kg less than admit wt taken the day before of 102.7 kg.    Muscle Mass: Well Nourished  Subcutaneous Fat: Well Nourished  Fluid Accumulation: trace to BLEs  Reduced  Strength: N/A in acute care setting.    Nutrition Diagnosis:      Unintended Weight Loss related to unknown cause as evidenced by report of 21% wt loss x 4 months.      based on RD assessment at this time, Patient does not meet criteria in congruence with ASPEN/Academy guidelines for malnutrition    Nutrition Interventions:      Recommend Ensure Plus High Protein (provides 350 calories) 20 g protein per 8 fl oz) TID.  Patient aware of active plan of care as appropriate.       Nutrition Monitoring and Evaluation:      Monitor nutrition POC, goal for >50% intake from meals and supplements.  Additional fluids per MD/DO  Monitor vital signs pertinent to nutrition.    RD following and will provide updated recommendations as indicated.      Jolly Thomas R.D.                                         ASPEN/AND CRITERIA FOR MALNUTRITION        none

## 2025-03-12 NOTE — CARE PLAN
Problem: Bronchoconstriction  Goal: Improve in air movement and diminished wheezing  Description: Target End Date:  2 to 3 days1.  Implement inhaled treatments2.  Evaluate and manage medication effects  Outcome: Met

## 2025-03-12 NOTE — ED NOTES
Pt states pneumo is chronic in nature Pt talking in complete full sentences. O2 sat at 94-95% on RA. POC explained pt and son state understanding to all.

## 2025-03-12 NOTE — DISCHARGE PLANNING
Case Management Discharge Planning    Admission Date: 3/11/2025  GMLOS: 3.2  ALOS: 1    6-Clicks ADL Score: 22  6-Clicks Mobility Score: 16  PT and/or OT Eval ordered: Yes  Post-acute Referrals Ordered: Yes  Post-acute Choice Obtained: No  Has referral(s) been sent to post-acute provider:  Yes      Anticipated Discharge Dispo: Discharge Disposition: D/T to SNF with Medicare cert in anticipation of skilled care (03)    DME Needed: No    Action(s) Taken:     SNF order placed and referrals sent to local SNFs per protocol based on low 6-clicks score. PT/OT eval pending.    PASRR: [id number]    1415: Spoke to pt and son Romaine at bedside. Pt lives with wife Julito in 1-story home. Confirmed address, phone number, and PCP listed on facesheet. Pt uses cane and 4WW at BL. PT recommending outpatient PT.     1457: HH order received. CM to follow up tomorrow for HH choice.     Escalations Completed: None    Medically Clear: No    Next Steps: Follow-up with medical team to discuss DC needs and barriers. Follow-up regarding SNF acceptance.    Barriers to Discharge: Medical clearance and Pending PT Evaluation        Care Transition Team Assessment    Information Source  Orientation Level: Oriented X4  Information Given By: Patient, Relative (son)  Informant's Name: Romaine Dougherty  Who is responsible for making decisions for patient? : Patient    Readmission Evaluation  Is this a readmission?: No    Elopement Risk  Legal Hold: No  Ambulatory or Self Mobile in Wheelchair: Yes  Disoriented: No  Psychiatric Symptoms: None  History of Wandering: No  Elopement this Admit: No  Vocalizing Wanting to Leave: No  Displays Behaviors, Body Language Wanting to Leave: No-Not at Risk for Elopement  Elopement Risk: Not at Risk for Elopement    Interdisciplinary Discharge Planning  Lives with - Patient's Self Care Capacity: Spouse  Patient or legal guardian wants to designate a caregiver: No  Support Systems: Friends / Neighbors, Family  Member(s)  Housing / Facility: [address] [facility]  Prior Services: None    Discharge Preparedness  What is your plan after discharge?: Home health care  What are your discharge supports?: Child, Spouse  Prior Functional Level: Ambulatory, Uses Cane, Uses Walker    Functional Assesment  Prior Functional Level: Ambulatory, Uses Cane, Uses Walker    Finances  Financial Barriers to Discharge: No  Prescription Coverage: Yes    Vision / Hearing Impairment  Vision Impairment : No  Right Eye Vision: Wears Glasses  Left Eye Vision: Wears Glasses  Hearing Impairment : No    Domestic Abuse  Have you ever been the victim of abuse or violence?: No  Possible Abuse/Neglect Reported to:: Not Applicable    Discharge Risks or Barriers  Discharge risks or barriers?: No  Patient risk factors: Vulnerable adult    Anticipated Discharge Information  Discharge Disposition: D/T to home under A care in anticipation of covered skilled care (06)  Discharge Address: [address] [place] [address] [place] [address]  Discharge Contact Phone Number: [phone]

## 2025-03-12 NOTE — ASSESSMENT & PLAN NOTE
Dependent on CPAP at bedtime  Hold CPAP for now given concern for pneumothorax, resume when appropriate

## 2025-03-12 NOTE — H&P
Hospital Medicine History & Physical Note    Date of Service  3/11/2025    Primary Care Physician  Stefanie Zavaleta M.D.    Consultants  Pulmonology/critical care (Dr. Davis)    Code Status  DNAR/DNI    Chief Complaint  Chief Complaint   Patient presents with    Shortness of Breath     Pt sent here by radiology after chest x-ray today showing small pneumothorax    Cough     Pt has had a cough for over 1 month- productive for clear; sometimes green sputum       History of Presenting Illness  Farhat Stahl is a 85 y.o. male with history of inhalational lung disease, recent unintentional weight loss, chronic A-fib on Eliquis, hypertension, hyperlipidemia, hypothyroidism, GERD, MARCI on CPAP who presented 3/11/2025 with evaluation for shortness of breath, cough.  Patient has been seen by PCP recently, that was CXR ordered which was noted to have small right basilar pneumothorax.  Patient himself does complain of having shortness of breath with exertion and frequent cough.  He denies history of or current tobacco abuse.  Repeat CXR in ER revealed persistent right sided pneumothorax, however, has not expanded in size.  Case was discussed with on-call pulmonology, recommended obtaining CT chest, formal consult to follow in AM.  Admission requested by ERP.  Therefore, admitted to medicine service for further evaluation and treatment.    I discussed the plan of care with patient, family, bedside RN, charge RN, pharmacy, RT, and pulmonary.    Review of Systems  Review of Systems   Constitutional:  Positive for malaise/fatigue and weight loss. Negative for chills, diaphoresis and fever.   HENT:  Negative for hearing loss and tinnitus.    Eyes:  Negative for blurred vision and double vision.   Respiratory:  Positive for cough and shortness of breath.    Cardiovascular:  Negative for chest pain and palpitations.   Gastrointestinal:  Positive for heartburn. Negative for abdominal pain, nausea and vomiting.   Genitourinary:   Negative for dysuria and urgency.   Musculoskeletal:  Negative for joint pain and myalgias.   Skin:  Negative for itching and rash.   Neurological:  Positive for weakness. Negative for dizziness, focal weakness and headaches.   Endo/Heme/Allergies:  Negative for environmental allergies. Does not bruise/bleed easily.   Psychiatric/Behavioral:  Negative for depression and substance abuse.    All other systems reviewed and are negative.      Past Medical History   has a past medical history of Anemia associated with nutritional deficiency (03/06/2025), Arrhythmia, Arthritis, Atrial fibrillation (HCC), Bronchitis (2004), Chronic atrial fibrillation (HCC) (07/12/2024), Chronic pain of right knee (06/16/2022), Falls (11/27/2024), Fibula fracture (1981), High cholesterol, Hyperlipidemia, Hypertension, Lab test positive for detection of COVID-19 virus (08/11/2022), MEDICAL HOME, Multiple pulmonary nodules (12/15/2021), Onychogryposis of toenail (01/21/2021), Onychomycosis (01/21/2021), Pain, Pneumonia (2004), Rash (06/16/2022), Sleep apnea, Thrombocytopenia (HCC) (11/30/2023), and Unintentional weight loss (11/27/2024).    Surgical History   has a past surgical history that includes tonsillectomy (1945); exostosis excision (6/3/2011); lesion excision ortho (6/3/2011); toe arthroplasty (6/3/2011); lumbar laminectomy diskectomy (2005); hammertoe correction (2/2/2015); and eye surgery (Bilateral).     Family History  family history includes Cancer in his father, mother, sister, and another family member; Hypertension in his father.   Family history reviewed with patient. There is family history that is pertinent to the chief complaint.     Social History   reports that he has never smoked. He has never used smokeless tobacco. He reports current alcohol use of about 1.2 - 1.8 oz of alcohol per week. He reports that he does not use drugs.    Allergies  Allergies   Allergen Reactions    Other Environmental Runny Nose and Itching      Pollens       Medications  Prior to Admission Medications   Prescriptions Last Dose Informant Patient Reported? Taking?   COVID-19 mRNA Vac-Bell,Pfizer, (COMIRNATY) 30 MCG/0.3ML Suspension Prefilled Syringe injection   No No   Sig: Inject  into the shoulder, thigh, or buttocks.   Cholecalciferol (D3) 2000 UNIT Tab  Patient Yes No   Sig: Take 1 Tablet by mouth every day. Indications: supplement   ELIQUIS 5 MG Tab   No No   Sig: TAKE 1 TABLET BY MOUTH TWICE A DAY   carvedilol (COREG) 25 MG Tab   No No   Sig: TAKE 1 TABLET BY MOUTH TWICE A DAY FOR HIGH BLOOD PRESSURE DISORDER   fluticasone (FLONASE) 50 MCG/ACT nasal spray   No No   Sig: SPRAY 2 SPRAYS INTO AFFECTED NOSTRIL(S) TWICE A DAY   influenza vaccine High-Dose (FLUZONE HIGH-DOSE) 0.5 ML Suspension Prefilled Syringe injection   No No   Sig: Inject  into the shoulder, thigh, or buttocks.   ketotifen (ZADITOR) 0.025 % ophthalmic solution   No No   Sig: Administer 1 Drop into both eyes every 12 hours as needed (eye allergies). Indications: Allergic Conjunctivitis   ketotifen (ZADITOR) 0.035 % ophthalmic solution   No No   Sig: ADMINISTER 1 DROP INTO BOTH EYES EVERY 12 HOURS AS NEEDED (EYE ALLERGIES). INDICATIONS: ALLERGIC CONJUNCTIVITIS   levothyroxine (SYNTHROID) 75 MCG Tab   No No   Sig: TAKE 1 TABLET BY MOUTH EVERY MORNING ON AN EMPTY STOMACH. INDICATIONS: UNDERACTIVE THYROID   lisinopril (PRINIVIL) 10 MG Tab   No No   Sig: TAKE 1 TABLET BY MOUTH EVERY DAY FOR HIGH BLOOD PRESSURE   meclizine (ANTIVERT) 12.5 MG Tab   No No   Sig: Take 1 Tablet by mouth 3 times a day as needed for Dizziness.   omeprazole (PRILOSEC) 40 MG delayed-release capsule   No No   Sig: TAKE 1 CAPSULE BY MOUTH EVERY DAY. INDICATIONS: HEARTBURN   pravastatin (PRAVACHOL) 40 MG tablet   No No   Sig: TAKE 1 TABLET BY MOUTH EVERY DAY. INDICATIONS: HIGH AMOUNT OF FATS IN THE BLOOD      Facility-Administered Medications: None       Physical Exam  Temp:  [36.1 °C (96.9 °F)-37.3 °C (99.2 °F)] 36.1  °C (96.9 °F)  Pulse:  [61-66] 62  Resp:  [16-21] 16  BP: (102-112)/(56-65) 110/56  SpO2:  [91 %-96 %] 92 %  Blood Pressure : 102/56   Temperature: 37.3 °C (99.2 °F)   Pulse: 64   Respiration: (!) 21   Pulse Oximetry: 92 %       Physical Exam  Vitals and nursing note reviewed.   Constitutional:       General: He is not in acute distress.  HENT:      Head: Normocephalic and atraumatic.      Ears:      Comments: presbycusis     Nose: Nose normal.      Mouth/Throat:      Mouth: Mucous membranes are moist.      Pharynx: Oropharynx is clear.   Eyes:      General: No scleral icterus.     Extraocular Movements: Extraocular movements intact.   Cardiovascular:      Rate and Rhythm: Normal rate and regular rhythm.      Pulses: Normal pulses.      Heart sounds:      No friction rub.   Pulmonary:      Effort: No respiratory distress.      Breath sounds: No stridor. Rales present. No wheezing.   Chest:      Chest wall: No tenderness.   Abdominal:      General: There is distension.      Tenderness: There is no abdominal tenderness. There is no guarding or rebound.   Musculoskeletal:         General: Normal range of motion.      Cervical back: Neck supple. No tenderness.      Right lower leg: No edema.      Left lower leg: No edema.   Skin:     General: Skin is warm and dry.      Capillary Refill: Capillary refill takes less than 2 seconds.   Neurological:      General: No focal deficit present.      Mental Status: He is alert and oriented to person, place, and time.   Psychiatric:         Mood and Affect: Mood normal.         Laboratory:  Recent Labs     03/11/25  1155 03/11/25 2035   WBC 7.6 7.5   RBC 4.60* 4.48*   HEMOGLOBIN 13.8* 13.6*   HEMATOCRIT 43.0 41.3*   MCV 93.5 92.2   MCH 30.0 30.4   MCHC 32.1* 32.9   RDW 49.1 48.5   PLATELETCT 339 279   MPV 9.3 9.2     Recent Labs     03/11/25 2035 03/12/25  0342   SODIUM 130* 130*   POTASSIUM 4.7 4.1   CHLORIDE 97 99   CO2 24 21   GLUCOSE 86 90   BUN 22 19   CREATININE 1.31 1.13  "  CALCIUM 9.2 8.8     Recent Labs     03/11/25 2035 03/12/25  0342   ALTSGPT 15  --    ASTSGOT 16  --    ALKPHOSPHAT 123*  --    TBILIRUBIN 0.4  --    GLUCOSE 86 90     Recent Labs     03/11/25 2035   INR 1.26*     Recent Labs     03/11/25 2035   NTPROBNP 149*         No results for input(s): \"TROPONINT\" in the last 72 hours.    Imaging:  CT-CHEST (THORAX) W/O   Final Result         1.  Small anterior right pneumothorax   2.  Linear densities the bilateral lung bases, right greater than left, appearance compatible with changes of atelectasis, component of the left lower lobe infiltrate not excluded.   3.  Small layering bilateral pleural effusions   4.  Cholelithiasis   5.  Atherosclerosis and atherosclerotic coronary artery disease   6.  Pulmonary nodule, see nodule follow-up recommendations below.      Fleischner Society pulmonary nodule recommendations:   Low Risk: CT at 6-12 months, then consider CT at 18-24 months      High Risk: CT at 6-12 months, then CT at 18-24 months      Low Risk - Minimal or absent history of smoking and of other known risk factors.      High Risk - History of smoking or of other known risk factors.      Note: These recommendations do not apply to lung cancer screening, patients with immunosuppression, or patients with known primary cancer.      Fleischner Society 2017 Guidelines for Management of Incidentally Detected Pulmonary Nodules in Adults      These findings were discussed with the patient's clinician, Susan Pinzon, on 3/11/2025 11:53 PM.      DX-CHEST-PORTABLE (1 VIEW)   Final Result         1.  Hazy bilateral pulmonary infiltrates.   2.  Small right pneumothorax, stable   3.  Cardiomegaly      DX-CHEST-LIMITED (1 VIEW)    (Results Pending)   EC-ECHOCARDIOGRAM COMPLETE W/ CONT    (Results Pending)   US-RENAL    (Results Pending)             X-Ray:  I have personally reviewed the images and compared with prior images.    Assessment/Plan:  Justification for Admission Status  I " anticipate this patient will require at least 2 midnights hospitalization, therefore appropriate for inpatient status.      * Spontaneous pneumothorax- (present on admission)  Assessment & Plan  Small right anterior pneumothorax noted on CXR and CT chest imaging  Pulmonary hygiene  Currently not in respiratory distress, remains on room air    Serial CXR  As patient is currently not in respiratory distress, no hypoxia --> deferred chest tube at this time  Close clinical monitoring and serial exam    Pulmonary/critical care consulted, follow-up appreciated    Interstitial lung disease (HCC)- (present on admission)  Assessment & Plan  Per history  Pulmonary hygiene    Bilateral pleural effusion- (present on admission)  Assessment & Plan  Previously noted small left pleural effusion  Admission CT chest notable bilateral pleural effusions  Gentle diuresis  Check TTE    GERD (gastroesophageal reflux disease)  Assessment & Plan  Omeprazole    Cardiorenal syndrome  Assessment & Plan  Suspected  Monitor renal function while on diuretic  Check FeNa, renal US    Unintentional weight loss- (present on admission)  Assessment & Plan  Approximately 40 pounds unintentional weight loss per patient  ?  Malignancy  May consider CT imaging with contrast once renal function improved    Chronic atrial fibrillation (HCC)- (present on admission)  Assessment & Plan  Continue Coreg for rate control  Hold Eliquis for now  -- Resume if no further invasive intervention anticipated (chest tube)    CKD (chronic kidney disease) stage 3, GFR 30-59 ml/min- (present on admission)  Assessment & Plan  Minimize nephrotoxic agents, renally dose meds    Acquired hypothyroidism- (present on admission)  Assessment & Plan  Continue Synthroid    MARCI on CPAP- (present on admission)  Assessment & Plan  Dependent on CPAP at bedtime  -Hold CPAP for now given concern for pneumothorax, resume when appropriate    Hyperlipidemia- (present on admission)  Assessment &  Plan  statin    Hypertension- (present on admission)  Assessment & Plan  Hold home meds for now given concern for borderline hypotension  Resume when appropriate    ACP (advance care planning)- (present on admission)  Assessment & Plan  Goal of care discussed with patient and patient's son at bedside in emergency room.  Patient stated he does not wish to have aggressive/heroic life-sustaining measures-no CPR/defibrillation/intubation or mechanical ventilation.  He also confirmed that he already has advanced directive POLST established.  He agreed that he would no wish to end up for ventilation and be on vegetative state.  He is however agreeable for hospitalization, serial breathing treatment, possible chest tube placement if absolutely necessary.  Diagnosis, progress, question and concern addressed.  DNR/DNI status comfort.  ACP: 17 minutes        VTE prophylaxis: SCDs/TEDs, resume Eliquis when appropriate

## 2025-03-12 NOTE — PROGRESS NOTES
Contacted by VALE Pinzon to review imaging    Pt seen by PCP today for dyspnea, cough x 1 month. CXR ordered showed new small R PTX and advised to present to ER    Last chest imaging in 2023, notable for L pleural thickening, small bilateral effusions, peripheral Insterstitial opacities suggestive of ILD. Followed in pulm clinic, last seen 12/2023. 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     Recently has had unintentional weight loss, ~ 40lbs over the last year, poor appetite    Here, SpO2 94-95% on RA  CXR redemonstrates small R PTX, stable    #R sponatenous pneumothorax  #History of ILD  #Unintentional weight loss    -- Advised admission, CT chest without contrast due to borderline GFR  -- full consult in AM once CT chest performed    POC discussed with Dr Alberta COLLAZO and Dr Ruano, admitting nocturnist    __________  Florencio Davis MD  Pulmonary and Critical Care Medicine  [facility]

## 2025-03-12 NOTE — PROGRESS NOTES
4 Eyes Skin Assessment Completed by ZEHRA Lopes and ZEHRA Hernandez.    Head WDL  Ears WDL  Nose WDL  Mouth WDL  Neck WDL  Breast/Chest WDL  Shoulder Blades WDL  Spine WDL  (R) Arm/Elbow/Hand Bruising  (L) Arm/Elbow/Hand Bruising  Abdomen WDL  Groin WDL  Scrotum/Coccyx/Buttocks Pink, Blanching  (R) Leg Swelling, bruise  (L) Leg Swelling  (R) Heel/Foot/Toe Redness and Blanching  (L) Heel/Foot/Toe Redness and Blanching          Devices In Places Tele Box and Pulse Ox      Interventions In Place Pillows    Possible Skin Injury No    Pictures Uploaded Into Epic N/A  Wound Consult Placed N/A  RN Wound Prevention Protocol Ordered No

## 2025-03-12 NOTE — ASSESSMENT & PLAN NOTE
3/13:  CXR reviewed, no improvement in pneumothorax  Continue oxygen by NRB  Discussed with pulmonary, pulm reviewed with Dr. Brewster who will be able to evaluate the patient tomorrow  Follow xrays

## 2025-03-12 NOTE — ED NOTES
Chief Complaint   Patient presents with    Shortness of Breath     Pt sent here by radiology after chest x-ray today showing small pneumothorax    Cough     Pt has had a cough for over 1 month- productive for clear; sometimes green sputum     /65   Pulse 66   Temp 37.3 °C (99.2 °F) (Temporal)   Resp 20   Ht 1.829 m (6')   Wt 103 kg (226 lb 6.6 oz)   SpO2 91%   BMI 30.71 kg/m²

## 2025-03-12 NOTE — CARE PLAN
The patient is Stable - Low risk of patient condition declining or worsening    Shift Goals  Clinical Goals: Monitor chest pain, monitor O2, patient safety  Patient Goals: Feel better, rest  Family Goals: randall    Progress made toward(s) clinical / shift goals:  Patient updated on POC throughout shift and oriented to unit. PT/OT ordered for patient. Patient declined pain during shift. CXR scheduled for am. Patient remained on room air during shift with O2>90%.    Problem: Knowledge Deficit - Standard  Goal: Patient and family/care givers will demonstrate understanding of plan of care, disease process/condition, diagnostic tests and medications  Outcome: Progressing     Problem: Ineffective Airway Clearance  Goal: Patient will maintain patent airway with clear/clearing breath sounds  Outcome: Progressing     Problem: Risk for Aspiration  Goal: Patient's risk for aspiration will be absent or decrease  Outcome: Progressing     Problem: Self Care  Goal: Patient will have the ability to perform ADLs independently or with assistance (bathe, groom, dress, toilet and feed)  Outcome: Progressing       Patient is not progressing towards the following goals:

## 2025-03-13 ENCOUNTER — APPOINTMENT (OUTPATIENT)
Dept: RADIOLOGY | Facility: MEDICAL CENTER | Age: 85
DRG: 200 | End: 2025-03-13
Attending: HOSPITALIST
Payer: MEDICARE

## 2025-03-13 ENCOUNTER — APPOINTMENT (OUTPATIENT)
Dept: CARDIOLOGY | Facility: MEDICAL CENTER | Age: 85
DRG: 200 | End: 2025-03-13
Attending: STUDENT IN AN ORGANIZED HEALTH CARE EDUCATION/TRAINING PROGRAM
Payer: MEDICARE

## 2025-03-13 ENCOUNTER — HOME HEALTH ADMISSION (OUTPATIENT)
Dept: HOME HEALTH SERVICES | Facility: HOME HEALTHCARE | Age: 85
End: 2025-03-13
Payer: MEDICARE

## 2025-03-13 LAB
ERYTHROCYTE [DISTWIDTH] IN BLOOD BY AUTOMATED COUNT: 47.9 FL (ref 35.9–50)
HCT VFR BLD AUTO: 41.1 % (ref 42–52)
HGB BLD-MCNC: 13.6 G/DL (ref 14–18)
LV EJECT FRACT  99904: 58
LV EJECT FRACT MOD 2C 99903: 61.37
LV EJECT FRACT MOD 4C 99902: 60.06
LV EJECT FRACT MOD BP 99901: 62.05
MCH RBC QN AUTO: 30.1 PG (ref 27–33)
MCHC RBC AUTO-ENTMCNC: 33.1 G/DL (ref 32.3–36.5)
MCV RBC AUTO: 90.9 FL (ref 81.4–97.8)
PLATELET # BLD AUTO: 277 K/UL (ref 164–446)
PMV BLD AUTO: 9.3 FL (ref 9–12.9)
RBC # BLD AUTO: 4.52 M/UL (ref 4.7–6.1)
WBC # BLD AUTO: 6.9 K/UL (ref 4.8–10.8)

## 2025-03-13 PROCEDURE — 93306 TTE W/DOPPLER COMPLETE: CPT | Mod: 26 | Performed by: INTERNAL MEDICINE

## 2025-03-13 PROCEDURE — 99232 SBSQ HOSP IP/OBS MODERATE 35: CPT | Performed by: STUDENT IN AN ORGANIZED HEALTH CARE EDUCATION/TRAINING PROGRAM

## 2025-03-13 PROCEDURE — 700102 HCHG RX REV CODE 250 W/ 637 OVERRIDE(OP): Performed by: STUDENT IN AN ORGANIZED HEALTH CARE EDUCATION/TRAINING PROGRAM

## 2025-03-13 PROCEDURE — 36415 COLL VENOUS BLD VENIPUNCTURE: CPT

## 2025-03-13 PROCEDURE — 700117 HCHG RX CONTRAST REV CODE 255: Performed by: STUDENT IN AN ORGANIZED HEALTH CARE EDUCATION/TRAINING PROGRAM

## 2025-03-13 PROCEDURE — 99233 SBSQ HOSP IP/OBS HIGH 50: CPT | Performed by: HOSPITALIST

## 2025-03-13 PROCEDURE — 700111 HCHG RX REV CODE 636 W/ 250 OVERRIDE (IP): Mod: JZ | Performed by: STUDENT IN AN ORGANIZED HEALTH CARE EDUCATION/TRAINING PROGRAM

## 2025-03-13 PROCEDURE — A9270 NON-COVERED ITEM OR SERVICE: HCPCS | Performed by: STUDENT IN AN ORGANIZED HEALTH CARE EDUCATION/TRAINING PROGRAM

## 2025-03-13 PROCEDURE — 94760 N-INVAS EAR/PLS OXIMETRY 1: CPT

## 2025-03-13 PROCEDURE — 700102 HCHG RX REV CODE 250 W/ 637 OVERRIDE(OP): Performed by: HOSPITALIST

## 2025-03-13 PROCEDURE — 94669 MECHANICAL CHEST WALL OSCILL: CPT

## 2025-03-13 PROCEDURE — A9270 NON-COVERED ITEM OR SERVICE: HCPCS | Performed by: HOSPITALIST

## 2025-03-13 PROCEDURE — 71045 X-RAY EXAM CHEST 1 VIEW: CPT

## 2025-03-13 PROCEDURE — 93306 TTE W/DOPPLER COMPLETE: CPT

## 2025-03-13 PROCEDURE — 770020 HCHG ROOM/CARE - TELE (206)

## 2025-03-13 PROCEDURE — 85027 COMPLETE CBC AUTOMATED: CPT

## 2025-03-13 RX ORDER — FUROSEMIDE 40 MG/1
40 TABLET ORAL
Status: DISCONTINUED | OUTPATIENT
Start: 2025-03-14 | End: 2025-03-14

## 2025-03-13 RX ADMIN — APIXABAN 5 MG: 5 TABLET, FILM COATED ORAL at 05:14

## 2025-03-13 RX ADMIN — APIXABAN 5 MG: 5 TABLET, FILM COATED ORAL at 17:09

## 2025-03-13 RX ADMIN — PRAVASTATIN SODIUM 40 MG: 20 TABLET ORAL at 05:14

## 2025-03-13 RX ADMIN — Medication 2000 UNITS: at 05:14

## 2025-03-13 RX ADMIN — FUROSEMIDE 20 MG: 10 INJECTION, SOLUTION INTRAVENOUS at 07:52

## 2025-03-13 RX ADMIN — LEVOTHYROXINE SODIUM 75 MCG: 0.07 TABLET ORAL at 05:14

## 2025-03-13 RX ADMIN — OMEPRAZOLE 40 MG: 20 CAPSULE, DELAYED RELEASE ORAL at 05:14

## 2025-03-13 RX ADMIN — HUMAN ALBUMIN MICROSPHERES AND PERFLUTREN 3 ML: 10; .22 INJECTION, SOLUTION INTRAVENOUS at 10:45

## 2025-03-13 ASSESSMENT — ENCOUNTER SYMPTOMS
VOMITING: 0
FALLS: 1
WEIGHT LOSS: 1
HEMOPTYSIS: 0
NAUSEA: 0
ORTHOPNEA: 0
COUGH: 1
FEVER: 0
EYE REDNESS: 0
CHILLS: 0
PALPITATIONS: 0
COUGH: 0
SPUTUM PRODUCTION: 0
DIZZINESS: 0

## 2025-03-13 ASSESSMENT — PAIN DESCRIPTION - PAIN TYPE
TYPE: ACUTE PAIN

## 2025-03-13 ASSESSMENT — FIBROSIS 4 INDEX: FIB4 SCORE: 1.28

## 2025-03-13 NOTE — CARE PLAN
The patient is Watcher - Medium risk of patient condition declining or worsening    Shift Goals  Clinical Goals: monitor vitals, lung sounds, pending diagnostic results  Patient Goals: rest  Family Goals: randall    Progress made toward(s) clinical / shift goals:    Problem: Knowledge Deficit - Standard  Goal: Patient and family/care givers will demonstrate understanding of plan of care, disease process/condition, diagnostic tests and medications  Outcome: Progressing  Note: Patient verbalized understanding of side effects of furosemide. Urinal at bedside.      Problem: Impaired Gas Exchange  Goal: Patient will demonstrate improved ventilation and adequate oxygenation and participate in treatment regimen within the level of ability/situation.  Outcome: Progressing  Flowsheets (Taken 3/12/2025 1805)  Impaired Gas Exchange:   Turn, cough, and deep breathe   Assessed oxygenation   Administered/titrated oxygen  Note: 10L via oxymask applied per MD.        Patient is not progressing towards the following goals:

## 2025-03-13 NOTE — DISCHARGE PLANNING
ATTN: Case Management  RE: Referral for Home Health    As of 3/13/2025, we have accepted the Home Health referral for the patient listed above.    [facility]  will contact the patient within 48 hours. If you have any questions or concerns regarding the patient’s transition to Home Health, please do not hesitate to contact us at [phone].      We look forward to collaborating with you,  [facility] Team

## 2025-03-13 NOTE — PROGRESS NOTES
Telemetry Shift Summary    Rhythm SB, SR  HR Range 55-74  Ectopy R-F PAC               R PVC, bigem  Measurements  0.18/0.10/0.40    Per monitor tech     Normal Values  Rhythm SR  HR Range   Measurements 0.12-0.20 / 0.06-0.10 / 0.30-0.52

## 2025-03-13 NOTE — PROGRESS NOTES
Hospital Medicine Daily Progress Note    Date of Service  3/13/2025    Chief Complaint  Farhat Stahl is a 85 y.o. male admitted 3/11/2025 with abnormal chest xray    Hospital Course  Farhat Stahl has past medical history that inlcudes interstitial lung disease atrial fibrillation on Eliquis, hypertension, hyperlipidemia, sleep apnea on CPAP, and recent unintentional weight loss.  Patient was recently seen by primary care physician, a chest x-ray was ordered.  Results were concerning for a small right pneumothorax thus the patient was referred to the emergency room.  He presented to the emergency room 3/11/2025, repeat imaging demonstrated persistence of right-sided pneumothorax.  The patient was hospitalized and pulmonary was consulted.  CT scan of chest was ordered    Interval Problem Update  Patient seen and examined  I returned to the bedside to discuss pulmonary recommendations with the patient and his son  Patient says that he does not feel like eating, he feels weak overall  No chest pain, not currently short of breath  Patient and his son would like to proceed with high flow oxygen and repeat chest x-ray as treatment for pneumothorax  Patient has also met with transitional care manager, he has been referred to home health and outpatient palliative care    55 minutes of aggregate pateint care time including review of the medical records, patient interview and examination, discussion with RN and case managment, transitional care manager, discussion with pulmonary and documentation    I have discussed this patient's plan of care and discharge plan at IDT rounds today with Case Management, Nursing, Nursing leadership, and other members of the IDT team.    Consultants/Specialty  pulmonary    Code Status  DNAR/DNI    Disposition  The patient is not medically cleared for discharge to home or a post-acute facility.  Anticipate discharge to: home with organized home healthcare and close outpatient  follow-up    I have placed the appropriate orders for post-discharge needs.    Review of Systems  Review of Systems   Constitutional:  Positive for weight loss. Negative for chills and malaise/fatigue.   Respiratory:  Negative for cough, hemoptysis and sputum production.    Cardiovascular:  Negative for chest pain, palpitations and orthopnea.   Gastrointestinal:  Negative for nausea and vomiting.   Skin:  Negative for itching and rash.   Neurological:  Negative for dizziness.   All other systems reviewed and are negative.       Physical Exam  Temp:  [36.5 °C (97.7 °F)-37.4 °C (99.4 °F)] 36.5 °C (97.7 °F)  Pulse:  [53-86] 74  Resp:  [16-20] 18  BP: ()/(53-60) 103/57  SpO2:  [96 %-99 %] 98 %    Physical Exam  Constitutional:       General: He is not in acute distress.     Appearance: Normal appearance. He is normal weight.   HENT:      Head: Normocephalic and atraumatic.      Right Ear: External ear normal.      Left Ear: External ear normal.   Cardiovascular:      Rate and Rhythm: Normal rate and regular rhythm.      Pulses: Normal pulses.   Pulmonary:      Effort: Pulmonary effort is normal. No respiratory distress.      Breath sounds: Normal breath sounds.   Abdominal:      General: Abdomen is flat. Bowel sounds are normal.      Palpations: Abdomen is soft.   Musculoskeletal:         General: Normal range of motion.      Cervical back: Normal range of motion and neck supple.   Skin:     General: Skin is warm and dry.   Neurological:      Mental Status: He is alert and oriented to person, place, and time.      Cranial Nerves: No cranial nerve deficit.   Psychiatric:         Mood and Affect: Mood normal.         Behavior: Behavior normal.         Fluids    Intake/Output Summary (Last 24 hours) at 3/13/2025 1646  Last data filed at 3/13/2025 1400  Gross per 24 hour   Intake 360 ml   Output 2300 ml   Net -1940 ml        Laboratory  Recent Labs     03/11/25  1155 03/11/25 2035 03/13/25  0154   WBC 7.6 7.5 6.9    RBC 4.60* 4.48* 4.52*   HEMOGLOBIN 13.8* 13.6* 13.6*   HEMATOCRIT 43.0 41.3* 41.1*   MCV 93.5 92.2 90.9   MCH 30.0 30.4 30.1   MCHC 32.1* 32.9 33.1   RDW 49.1 48.5 47.9   PLATELETCT 339 279 277   MPV 9.3 9.2 9.3     Recent Labs     03/11/25 2035 03/12/25  0342   SODIUM 130* 130*   POTASSIUM 4.7 4.1   CHLORIDE 97 99   CO2 24 21   GLUCOSE 86 90   BUN 22 19   CREATININE 1.31 1.13   CALCIUM 9.2 8.8     Recent Labs     03/11/25 2035   INR 1.26*               Imaging  EC-ECHOCARDIOGRAM COMPLETE W/ CONT   Final Result      DX-CHEST-PORTABLE (1 VIEW)   Final Result      Stable small right pneumothorax      Improving bibasilar scarring or atelectasis with likely pleural fluid      US-RENAL   Final Result      1.  No hydronephrosis      2.  Simple right renal cyst has diminished in size from 2018      DX-CHEST-LIMITED (1 VIEW)   Final Result      Stable small right pneumothorax      Stable bibasilar scarring or atelectasis with likely pleural fluid      CT-CHEST (THORAX) W/O   Final Result         1.  Small anterior right pneumothorax   2.  Linear densities the bilateral lung bases, right greater than left, appearance compatible with changes of atelectasis, component of the left lower lobe infiltrate not excluded.   3.  Small layering bilateral pleural effusions   4.  Cholelithiasis   5.  Atherosclerosis and atherosclerotic coronary artery disease   6.  Pulmonary nodule, see nodule follow-up recommendations below.      Fleischner Society pulmonary nodule recommendations:   Low Risk: CT at 6-12 months, then consider CT at 18-24 months      High Risk: CT at 6-12 months, then CT at 18-24 months      Low Risk - Minimal or absent history of smoking and of other known risk factors.      High Risk - History of smoking or of other known risk factors.      Note: These recommendations do not apply to lung cancer screening, patients with immunosuppression, or patients with known primary cancer.      Fleischner Society 2017  Guidelines for Management of Incidentally Detected Pulmonary Nodules in Adults      These findings were discussed with the patient's clinician, Susan Pinzon, on 3/11/2025 11:53 PM.      DX-CHEST-PORTABLE (1 VIEW)   Final Result         1.  Hazy bilateral pulmonary infiltrates.   2.  Small right pneumothorax, stable   3.  Cardiomegaly           Assessment/Plan  * Spontaneous pneumothorax- (present on admission)  Assessment & Plan  3/13:  CXR reviewed, no improvement in pneumothorax  Continue oxygen by NRB  Discussed with pulmonary, pulm reviewed with Dr. Brewster who will be able to evaluate the patient tomorrow  Follow xrays    Unintentional weight loss- (present on admission)  Assessment & Plan  3/12:  Outpatient follow-up  Palliative care referral    Interstitial lung disease (HCC)- (present on admission)  Assessment & Plan  Outpatient pulmonary follow-up  Referral to outpatient palliative care placed    Bilateral pleural effusion- (present on admission)  Assessment & Plan  3/13  Continue diuresis with Lasix  Monitor for side effects of Lasix which can include hypotension and hypokalemia, vital signs and laboratory values will be monitored    Chronic atrial fibrillation (HCC)- (present on admission)  Assessment & Plan  Coreg/Eliquis    GERD (gastroesophageal reflux disease)- (present on admission)  Assessment & Plan  Omeprazole    CKD (chronic kidney disease) stage 3, GFR 30-59 ml/min- (present on admission)  Assessment & Plan  Minimize nephrotoxic agents, renally dose meds    Acquired hypothyroidism- (present on admission)  Assessment & Plan  Synthroid    MARCI on CPAP- (present on admission)  Assessment & Plan  Dependent on CPAP at bedtime  Hold CPAP for now given concern for pneumothorax, resume when appropriate    ACP (advance care planning)- (present on admission)  Assessment & Plan  See prior documentation, the patient is DNAR/DNI    Hyperlipidemia- (present on admission)  Assessment &  Plan  Pravastatin    Hypertension- (present on admission)  Assessment & Plan  3/13:  Continue Coreg  Consider resuming ACE inhibitor depending on blood pressure           VTE prophylaxis:    therapeutic anticoagulation with eliquis 5 mg BID      I have performed a physical exam and reviewed and updated ROS and Plan today (3/13/2025). In review of yesterday's note (3/12/2025), there are no changes except as documented above.

## 2025-03-13 NOTE — CARE PLAN
The patient is Stable - Low risk of patient condition declining or worsening    Shift Goals  Clinical Goals: Monitor  oxygen saturation, keep fio2 100 %  Patient Goals: Rest  Family Goals: Updates    Progress made toward(s) clinical / shift goals:    Problem: Hemodynamics  Goal: Patient's hemodynamics, fluid balance and neurologic status will be stable or improve  Description: Target End Date:  Prior to discharge or change in level of care    Document on Assessment and I/O flowsheet templates    1.  Monitor vital signs, pulse oximetry and cardiac monitor per provider order and/or policy  2.  Maintain blood pressure per provider order  3.  Hemodynamic monitoring per provider order  4.  Manage IV fluids and IV infusions  5.  Monitor intake and output  6.  Daily weights per unit policy or provider order  7.  Assess peripheral pulses and capillary refill  8.  Assess color and body temperature  9.  Position patient for maximum circulation/cardiac output  10. Monitor for signs/symptoms of excessive bleeding  11. Assess mental status, restlessness and changes in level of consciousness  12. Monitor temperature and report fever or hypothermia to provider immediately. Consideration of targeted temperature management.  Outcome: Progressing     Problem: Respiratory  Goal: Patient will achieve/maintain optimum respiratory ventilation and gas exchange  Description: Target End Date:  Prior to discharge or change in level of care    Document on Assessment flowsheet    1.  Assess and monitor rate, rhythm, depth and effort of respiration  2.  Breath sounds assessed qshift and/or as needed  3.  Assess O2 saturation, administer/titrate oxygen as ordered  4.  Position patient for maximum ventilatory efficiency  5.  Turn, cough, and deep breath with splinting to improve effectiveness  6.  Collaborate with RT to administer medication/treatments per order  7.  Encourage use of incentive spirometer and encourage patient to cough after use  and utilize splinting techniques if applicable  8.  Airway suctioning  9.  Monitor sputum production for changes in color, consistency and frequency  10. Perform frequent oral hygiene  11. Alternate physical activity with rest periods  Outcome: Progressing     Problem: Fall Risk  Goal: Patient will remain free from falls  Description: Target End Date:  Prior to discharge or change in level of care    Document interventions on the [facility] Fall Risk Assessment    1.  Assess for fall risk factors  2.  Implement fall precautions  Outcome: Progressing       Patient is not progressing towards the following goals:

## 2025-03-13 NOTE — DISCHARGE PLANNING
Case Management Discharge Planning    Admission Date: 3/11/2025  GMLOS: 3.2  ALOS: 2    6-Clicks ADL Score: 23  6-Clicks Mobility Score: 16  PT and/or OT Eval ordered: Yes  Post-acute Referrals Ordered: Yes  Post-acute Choice Obtained: Yes  Has referral(s) been sent to post-acute provider:  Yes      Anticipated Discharge Dispo: Discharge Disposition: D/T to home under A care in anticipation of covered skilled care (06)  Discharge Address: [address] [place] [address]  [place] [id number]  Discharge Contact Phone Number: [phone]    DME Needed: No    Action(s) Taken:     Spoke to pt at bedside. Choice form completed for HH with [facility] and faxed to [facility].    Escalations Completed: None    Medically Clear: No    Next Steps: Follow-up with [facility] regarding acceptance.    Barriers to Discharge: Medical clearance

## 2025-03-13 NOTE — CARE PLAN
Problem: Hyperinflation  Goal: Prevent or improve atelectasis  Description: Target End Date:  3 to 4 days1. Instruct incentive spirometry usage2.  Perform hyperinflation therapy as indicated  Outcome: Progressing

## 2025-03-13 NOTE — PROGRESS NOTES
Telemetry Shift Summary    Rhythm SR/SA w/ 1st Degree AV Block  HR Range 60-82  Ectopy rPVC, rPAC  Measurements 0.21/0.09/0.40        Normal Values  Rhythm SR  HR Range    Measurements 0.12-0.20 / 0.06-0.10  / 0.30-0.52

## 2025-03-13 NOTE — PROGRESS NOTES
Pulmonary Progress Note    Date of admission  3/11/2025    Chief Complaint/Hospital Course  85 y.o. male with history of atrial fibrillation, HTN, HLD, MARCI on CPAP, ILD who presented 3/11/2025 with dyspnea, cough and unintentional weight loss. He was seen by his PCP and CXR showed R pneumothorax so he was sent to ER. He was stable on RA so decision was made to monitor overnight.   This AM, patient states he feels well overall. He reports cough x2 months that improves when he takes mucinex. No sick contacts. He does have frequent falls at home apparently but no obvious trauma to his chest that he remembers recently. He also reports a significant weight loss recently w/o a clear etiology.     3/13: CXR w/o improvement in PTX, was on 100% fio2 overnight.    Review of Systems  Review of Systems   Constitutional:  Negative for chills and fever.   Eyes:  Negative for redness.   Respiratory:  Positive for cough.    Cardiovascular:  Negative for chest pain.   Gastrointestinal:  Negative for vomiting.   Musculoskeletal:  Positive for falls.        Vital Signs for last 24 hours   Temp:  [36.5 °C (97.7 °F)-37.4 °C (99.4 °F)] 36.5 °C (97.7 °F)  Pulse:  [53-86] 74  Resp:  [16-20] 18  BP: ()/(53-60) 103/57  SpO2:  [96 %-99 %] 98 %    Hemodynamic parameters for last 24 hours       Respiratory Information for the last 24 hours       Physical Exam   Physical Exam  Vitals and nursing note reviewed.   Constitutional:       General: He is not in acute distress.  HENT:      Head: Normocephalic.      Mouth/Throat:      Mouth: Mucous membranes are moist.   Eyes:      Conjunctiva/sclera: Conjunctivae normal.   Cardiovascular:      Rate and Rhythm: Normal rate and regular rhythm.   Pulmonary:      Effort: Pulmonary effort is normal. No respiratory distress.   Abdominal:      Palpations: Abdomen is soft.   Musculoskeletal:         General: No deformity.   Skin:     General: Skin is warm and dry.   Neurological:      Mental Status: He  is alert. Mental status is at baseline.   Psychiatric:         Mood and Affect: Mood normal.         Medications  Current Facility-Administered Medications   Medication Dose Route Frequency Provider Last Rate Last Admin    furosemide (Lasix) injection 20 mg  20 mg Intravenous BID Tyler Ruano M.D.   20 mg at 03/13/25 0752    carvedilol (Coreg) tablet 12.5 mg  12.5 mg Oral BID WITH MEALS Tyler Ruano M.D.   12.5 mg at 03/12/25 1747    apixaban (Eliquis) tablet 5 mg  5 mg Oral BID Rahul Doe M.D.   5 mg at 03/13/25 0514    acetaminophen (Tylenol) tablet 650 mg  650 mg Oral Q6HRS PRN Tyler Ruano M.D.        labetalol (Normodyne/Trandate) injection 10 mg  10 mg Intravenous Q4HRS PRN Tyler Ruano M.D.        ondansetron (Zofran) syringe/vial injection 4 mg  4 mg Intravenous Q4HRS PRN Tyler Ruano M.D.        Or    ondansetron (Zofran ODT) dispertab 4 mg  4 mg Oral Q4HRS PRN Tyler Ruano M.D.        guaiFENesin dextromethorphan (Robitussin DM) 100-10 MG/5ML syrup 10 mL  10 mL Oral Q6HRS PRN Tyler Ruano M.D.        Respiratory Therapy Consult   Nebulization Continuous RT Tyler Ruano M.D.        albuterol (Proventil) 2.5mg/0.5ml nebulizer solution 2.5 mg  2.5 mg Nebulization Q2HRS PRN (RT) Tyler Ruano M.D.        meclizine (Antivert) tablet 12.5 mg  12.5 mg Oral TID PRN Tyler Ruano M.D.        vitamin D3 (Cholecalciferol) tablet 2,000 Units  2,000 Units Oral DAILY Tyler Ruano M.D.   2,000 Units at 03/13/25 0514    levothyroxine (Synthroid) tablet 75 mcg  75 mcg Oral AM ES Tyler Ruano M.D.   75 mcg at 03/13/25 0514    omeprazole (PriLOSEC) capsule 40 mg  40 mg Oral DAILY Tyler Ruano M.D.   40 mg at 03/13/25 0514    pravastatin (Pravachol) tablet 40 mg  40 mg Oral DAILY Tyler Ruano M.D.   40 mg at 03/13/25 0514    benzonatate (Tessalon) capsule 100 mg  100 mg Oral TID PRN Tyler Ruano M.D.           Fluids    Intake/Output Summary (Last 24 hours) at 3/13/2025 1622  Last data filed at 3/13/2025  1400  Gross per 24 hour   Intake 360 ml   Output 2300 ml   Net -1940 ml       Laboratory          Recent Labs     03/11/25 2035 03/12/25  0342   SODIUM 130* 130*   POTASSIUM 4.7 4.1   CHLORIDE 97 99   CO2 24 21   BUN 22 19   CREATININE 1.31 1.13   MAGNESIUM  --  1.8   PHOSPHORUS  --  3.2   CALCIUM 9.2 8.8     Recent Labs     03/11/25 2035 03/12/25  0342   ALTSGPT 15  --    ASTSGOT 16  --    ALKPHOSPHAT 123*  --    TBILIRUBIN 0.4  --    GLUCOSE 86 90     Recent Labs     03/11/25 1155 03/11/25 2035 03/13/25  0154   WBC 7.6 7.5 6.9   NEUTSPOLYS  --  60.00  --    LYMPHOCYTES  --  20.40*  --    MONOCYTES  --  11.20  --    EOSINOPHILS  --  6.90  --    BASOPHILS  --  1.10  --    ASTSGOT  --  16  --    ALTSGPT  --  15  --    ALKPHOSPHAT  --  123*  --    TBILIRUBIN  --  0.4  --      Recent Labs     03/11/25 1155 03/11/25 2035 03/13/25  0154   RBC 4.60* 4.48* 4.52*   HEMOGLOBIN 13.8* 13.6* 13.6*   HEMATOCRIT 43.0 41.3* 41.1*   PLATELETCT 339 279 277   PROTHROMBTM  --  16.2*  --    INR  --  1.26*  --    IRON 51  --   --    FERRITIN 476.0*  --   --    TOTIRONBC 217*  --   --        Imaging  Reviewed     Assessment/Plan    Secondary spontaneous right pneumothorax  Does have b/l peripheral reticular opacities with some small cysts/blebs at the periphery but not definite honeycombing. I do think it's slightly progressing when compared to imaging back in 2021.   On 100% fio2 overnight w/o any improvement in PTX in CXR this AM  - d/w Dr. Brewster who will evaluate patient for possible intervention with this SSP  - repeat CXR tomorrow AM       Karoline Hackett MD  Pulmonary and Critical Care Medicine  [facility]

## 2025-03-13 NOTE — DISCHARGE PLANNING
Received Choice Form at: 8873  Agency/Facility Name:  [facility]  Sent Referral per Choice Form at: 6938

## 2025-03-13 NOTE — CARE PLAN
The patient is Watcher - Medium risk of patient condition declining or worsening    Shift Goals  Clinical Goals: Monitor oxygenation and keep at 100% Fio2; monitor pnuemothorax  Patient Goals: rest  Family Goals: randall    Progress made toward(s) clinical / shift goals:  Monitor for worsening signs of pneumothorax. Keep at 100%Fio2 per pulmonology. Cluster care to allow patient to rest.     Problem: Knowledge Deficit - Standard  Goal: Patient and family/care givers will demonstrate understanding of plan of care, disease process/condition, diagnostic tests and medications  Outcome: Progressing     Problem: Fall Risk  Goal: Patient will remain free from falls  Outcome: Progressing     Problem: Respiratory  Goal: Patient will achieve/maintain optimum respiratory ventilation and gas exchange  Outcome: Progressing     Problem: Impaired Gas Exchange  Goal: Patient will demonstrate improved ventilation and adequate oxygenation and participate in treatment regimen within the level of ability/situation.  Outcome: Progressing       Patient is not progressing towards the following goals:N/A

## 2025-03-14 ENCOUNTER — APPOINTMENT (OUTPATIENT)
Dept: RADIOLOGY | Facility: MEDICAL CENTER | Age: 85
DRG: 200 | End: 2025-03-14
Attending: HOSPITALIST
Payer: MEDICARE

## 2025-03-14 VITALS
HEIGHT: 72 IN | BODY MASS INDEX: 28.55 KG/M2 | WEIGHT: 210.76 LBS | SYSTOLIC BLOOD PRESSURE: 97 MMHG | TEMPERATURE: 98.3 F | RESPIRATION RATE: 17 BRPM | OXYGEN SATURATION: 92 % | DIASTOLIC BLOOD PRESSURE: 55 MMHG | HEART RATE: 68 BPM

## 2025-03-14 LAB
BACTERIA SPEC RESP CULT: NORMAL
GRAM STN SPEC: NORMAL
SIGNIFICANT IND 70042: NORMAL
SITE SITE: NORMAL
SOURCE SOURCE: NORMAL

## 2025-03-14 PROCEDURE — A9270 NON-COVERED ITEM OR SERVICE: HCPCS | Performed by: HOSPITALIST

## 2025-03-14 PROCEDURE — A9270 NON-COVERED ITEM OR SERVICE: HCPCS | Performed by: INTERNAL MEDICINE

## 2025-03-14 PROCEDURE — A9270 NON-COVERED ITEM OR SERVICE: HCPCS | Performed by: STUDENT IN AN ORGANIZED HEALTH CARE EDUCATION/TRAINING PROGRAM

## 2025-03-14 PROCEDURE — 700102 HCHG RX REV CODE 250 W/ 637 OVERRIDE(OP): Performed by: INTERNAL MEDICINE

## 2025-03-14 PROCEDURE — 700102 HCHG RX REV CODE 250 W/ 637 OVERRIDE(OP): Performed by: STUDENT IN AN ORGANIZED HEALTH CARE EDUCATION/TRAINING PROGRAM

## 2025-03-14 PROCEDURE — 71045 X-RAY EXAM CHEST 1 VIEW: CPT

## 2025-03-14 PROCEDURE — 700102 HCHG RX REV CODE 250 W/ 637 OVERRIDE(OP): Performed by: HOSPITALIST

## 2025-03-14 PROCEDURE — 94760 N-INVAS EAR/PLS OXIMETRY 1: CPT

## 2025-03-14 PROCEDURE — 99239 HOSP IP/OBS DSCHRG MGMT >30: CPT | Mod: AI | Performed by: INTERNAL MEDICINE

## 2025-03-14 RX ORDER — FUROSEMIDE 40 MG/1
40 TABLET ORAL
Status: DISCONTINUED | OUTPATIENT
Start: 2025-03-14 | End: 2025-03-14 | Stop reason: HOSPADM

## 2025-03-14 RX ADMIN — FUROSEMIDE 40 MG: 40 TABLET ORAL at 11:39

## 2025-03-14 RX ADMIN — OMEPRAZOLE 40 MG: 20 CAPSULE, DELAYED RELEASE ORAL at 05:45

## 2025-03-14 RX ADMIN — APIXABAN 5 MG: 5 TABLET, FILM COATED ORAL at 05:46

## 2025-03-14 RX ADMIN — LEVOTHYROXINE SODIUM 75 MCG: 0.07 TABLET ORAL at 05:46

## 2025-03-14 RX ADMIN — Medication 2000 UNITS: at 05:46

## 2025-03-14 RX ADMIN — PRAVASTATIN SODIUM 40 MG: 20 TABLET ORAL at 05:46

## 2025-03-14 ASSESSMENT — FIBROSIS 4 INDEX: FIB4 SCORE: 1.28

## 2025-03-14 NOTE — DISCHARGE SUMMARY
Discharge Summary    CHIEF COMPLAINT ON ADMISSION  Chief Complaint   Patient presents with    Shortness of Breath     Pt sent here by radiology after chest x-ray today showing small pneumothorax    Cough     Pt has had a cough for over 1 month- productive for clear; sometimes green sputum       Reason for Admission  Sent By MD; Cough     Admission Date  3/11/2025    CODE STATUS  DNAR/DNI    HPI & HOSPITAL COURSE  Farhat Stahl has past medical history that inlcudes interstitial lung disease atrial fibrillation on Eliquis, hypertension, hyperlipidemia, sleep apnea on CPAP, and recent unintentional weight loss.  Patient was recently seen by primary care physician, a chest x-ray was ordered.  Results were concerning for a small right pneumothorax thus the patient was referred to the emergency room.  He presented to the emergency room 3/11/2025, repeat imaging demonstrated persistence of right-sided pneumothorax.  The patient was hospitalized and pulmonary was consulted.  CT scan of chest was ordered shows a stable anterior pneumothorax.  Patient was continued on 48 hours of high oxygen concentration therapy with oxy mask at 10 L without change in his pneumothorax on imaging.  Patient is relatively asymptomatic from this and it is a stable finding.  He was seen by chest surgery, Dr. Brewster.  Is recommended that given he is stable, not hypoxic he can discharge home and follow-up in the clinic with the chest x-ray in 1 week to make sure everything is stable.  Patient and son at bedside agreeable with this plan.    Therefore, he is discharged in good and stable condition to home with close outpatient follow-up.    The patient met 2-midnight criteria for an inpatient stay at the time of discharge.    Discharge Date  3/14/25    FOLLOW UP ITEMS POST DISCHARGE  PCP, chest surgery, 2 view chest x-ray 1 week    DISCHARGE DIAGNOSES  Principal Problem:    Spontaneous pneumothorax (POA: Yes)  Active Problems:    Hypertension (POA:  Yes)      Overview: 6/07 echocardiogram borderline LVH      1/09 VEENA left 1.3, right 1.3      9/10 urine mac 2      3/11 u/s vascular lower ext negative      5/11 [facility] note       5/12 [facility] note on coreg 25 bid for paroxysmal atrial fibrillation and       vasotec 10 mg qday      6/12 on coreg 25 bid      id, off vasotec      7/2/12 [facility] note      7/13 cardiology note      1/21/14 ultrasound carotid negative; on coreg 25 mg bid, asa      5/9/14 echo normal ejection fraction 65%, mild LVH      5/16/14 decrease coreg to 12.5 mg bid, cont asa      8/21/14 cardiology note follow up one year      3/21/16 urine mac <8 on coreg 12.5 mg      3/10/17 urine mac 3 on coreg 12.5 mg       5/2/17 cardiology note one episode atrial fibrillation 2012 will continue       on aspirin, encourage weight loss, follow-up one year      9/27/18 on coreg 25 mg bid add lisinopril 10 mg      4/24/19 bun 16,creat 1.4,urine mac 33      6/13/19 bun 17,creat 1.35,GFR 51 on lisinopril 10 mg                                   Hyperlipidemia (POA: Yes)      Overview: 4/09 chol 189,trig 87,hdl 45,ldl 127      9/09 chol 206,trig 90,hdl 52,ldl 136 off lipitor/zocor      12/09 chol 183,trig 84,hdl 42,ldl 124      2/10 [facility] note chol 153,ldl 124 started by cards on pravastatin 40 since       ?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      9/10 chol 135,trig 52,hdl 46,ldl 79 on pravachol      9/11 chol 145,trig 64,hdl 52,ldl 80 on pravachol      5/12 chol 146,trig 98,hdl 46,ldl 80 on pravachol 40 mg      10/12 chol 133,trig 65,hdl 46,ldl 74 on pravachol 40 mg      7/19/13 chol 121,trig 65,hdl 48,ldl 60 on pravachol 40 mg      9/3/13 chol 131,trig 75,hdl 49,ldl 67 on pravachol 40 mg      4/11/14 chol 150,trig 42,hdl 62,ldl 80 on pravachol 40 mg      8/22/14 chol 134,trig 73,hdl 54,ldl 65 on pravachol 40 mg      4/28/15 chol 114,trig 59,hdl 51,ldl 51 on pravachol 40 mg      3/21/16 chol 124,trig 57,hdl 47,ldl 66 on pravachol 40 mg      3/10/17 chol 151,trig 89,hdl  53,ldl 80 on pravachol 40 mg      4/10/18 chol 158,trig 134,hdl 56,ldl 75 on pravachol 40 mg      4/24/19 chol 118,trig 83,hdl 51,ldl 50 on pravachol 40 mg      2/24/20 chol 132,trig 96,hdl 46,ldl 67 on pravachol 40 mg                            ACP (advance care planning) (POA: Yes)      Overview: 12/1/09 shingles vaccine      5/2/13 tdap      1/29/14 dexa LS +0.9,hip +0.4      5/29/15 prevnar      6/2/16 colon per [facility] tubular adenoma times 2 repeat colonoscopy 5 years      7/5/16 pneumovax      10/23/18 vit d 32      4/24/19 psa 0.78      2/13/20 declines shingrix    MARCI on CPAP (Chronic) (POA: Yes)      Overview: 2/18/14 overnight pulse oximetry room air time less than 88% saturation       182 minutes, low saturation 67%, basal saturation 87%, apnea index 42       events per hour      4/11/14 pt would like to repeat tests, initial test done while he had       bronchitis      6/18/14 nocturnal oximetry <89% for 231 minutes, low 63%, AHI per hour       55;will refer to [facility]      2/11/15 [facility] note, recommend cpap titration      3/21/15 [facility] sleep study 191 minutes,AHI 57,hypopnea index 42,minimum       saturation 81%,improved on cpap 11 titration study      4/30/15 [facility] sleep note mask fit auto CPAP 10-15, followup one month      12/14/15 [facility] sleep note AHI 57 on autopap 10-16, 100% compliance AHI       decreased to 0.6      3/2/17 sleep note       6/14/18 sleep note continue cpap 10-16 follow up one year      6/5/19 sleep note on cpap 10-16 compliance 100%                Acquired hypothyroidism (POA: Yes)      Overview: 5/19/12 tsh 7.45      4/28/15 tsh 7.49 start synthroid 50 mcg repeat tsh 6 weeks      8/28/15 tsh 3.2 on synthroid 50 mcg      3/21/16 hypothyroid 6.1 on synthroid 50 mcg, increase to synthroid 75 mcg       repeat tsh 6 weeks      5/6/16 tsh 2.4 on synthroid 75 mcg      3/10/17 tsh 3.9 on synthroid 75 mcg      4/10/18 tsh 3.6 on synthroid 75 mcg      6/13/19 tsh 3.2 on synthroid 75 mcg      2/24/20 tsh  2.9 on synthroid 75 mcg          Bilateral pleural effusion (POA: Yes)    Interstitial lung disease (HCC) (Chronic) (POA: Yes)    CKD (chronic kidney disease) stage 3, GFR 30-59 ml/min (POA: Yes)    Chronic atrial fibrillation (HCC) (POA: Yes)    Unintentional weight loss (POA: Yes)    GERD (gastroesophageal reflux disease) (POA: Yes)  Resolved Problems:    Cardiorenal syndrome (POA: Yes)      FOLLOW UP  Future Appointments   Date Time Provider Department Center   4/17/2025 12:30 PM [facility]   5/6/2025  2:20 PM JAG FergusonRROSA [facility] None     [facility]  [address]  [facility] [address]  [phone]        Newton Brewster M.D.  [address] [facility]  [address]  [facility] [address]  [phone]    Follow up      Stefanie Zavaleta M.D.  [address]  [address]  [facility] [address]  [phone]    Follow up        MEDICATIONS ON DISCHARGE     Medication List        CHANGE how you take these medications        Instructions   carvedilol 25 MG Tabs  What changed: See the new instructions.  Commonly known as: Coreg   TAKE 1 TABLET BY MOUTH TWICE A DAY FOR HIGH BLOOD PRESSURE DISORDER     fluticasone 50 MCG/ACT nasal spray  What changed: See the new instructions.  Commonly known as: Flonase   SPRAY 2 SPRAYS INTO AFFECTED NOSTRIL(S) TWICE A DAY            CONTINUE taking these medications        Instructions   D3 50 MCG (2000 UT) Tabs  Generic drug: Cholecalciferol   Take 2,000 Units by mouth every day. Indications: supplement  Dose: 2,000 Units     Eliquis 5 MG Tabs  Generic drug: apixaban   TAKE 1 TABLET BY MOUTH TWICE A DAY  Dose: 5 mg     levothyroxine 75 MCG Tabs  Commonly known as: Synthroid   TAKE 1 TABLET BY MOUTH EVERY MORNING ON AN EMPTY STOMACH. INDICATIONS: UNDERACTIVE THYROID  Dose: 75 mcg     lisinopril 10 MG Tabs  Commonly known as: Prinivil   TAKE 1 TABLET BY MOUTH EVERY DAY FOR HIGH BLOOD PRESSURE  Dose: 10 mg     omeprazole 40 MG delayed-release  capsule  Commonly known as: PriLOSEC   TAKE 1 CAPSULE BY MOUTH EVERY DAY. INDICATIONS: HEARTBURN  Dose: 40 mg     pravastatin 40 MG tablet  Commonly known as: Pravachol   TAKE 1 TABLET BY MOUTH EVERY DAY. INDICATIONS: HIGH AMOUNT OF FATS IN THE BLOOD  Dose: 40 mg              Allergies  Allergies   Allergen Reactions    Other Environmental Runny Nose and Itching     Pollens       DIET  Orders Placed This Encounter   Procedures    Diet Order Diet: Cardiac     Standing Status:   Standing     Number of Occurrences:   1     Diet::   Cardiac [6]       ACTIVITY  As tolerated.  Weight bearing as tolerated    CONSULTATIONS  Pulmonology-Dr. Fontana  Chest surgery - Dr Brewster     PROCEDURES  none    LABORATORY  Lab Results   Component Value Date    SODIUM 130 (L) 03/12/2025    POTASSIUM 4.1 03/12/2025    CHLORIDE 99 03/12/2025    CO2 21 03/12/2025    GLUCOSE 90 03/12/2025    BUN 19 03/12/2025    CREATININE 1.13 03/12/2025        Lab Results   Component Value Date    WBC 6.9 03/13/2025    HEMOGLOBIN 13.6 (L) 03/13/2025    HEMATOCRIT 41.1 (L) 03/13/2025    PLATELETCT 277 03/13/2025        Total time of the discharge process exceeds 39 minutes.

## 2025-03-14 NOTE — CARE PLAN
The patient is Stable - Low risk of patient condition declining or worsening    Shift Goals  Clinical Goals: wean off O2, mobiize as tolerated  Patient Goals: get better  Family Goals: Updates    Progress made toward(s) clinical / shift goals:  Pt remains on 10L NRB per MD Order, but showing no signs of acute respiratory distress.  Problem: Knowledge Deficit - Standard  Goal: Patient and family/care givers will demonstrate understanding of plan of care, disease process/condition, diagnostic tests and medications  Outcome: Progressing     Problem: Impaired Gas Exchange  Goal: Patient will demonstrate improved ventilation and adequate oxygenation and participate in treatment regimen within the level of ability/situation.  Outcome: Progressing     Problem: Self Care  Goal: Patient will have the ability to perform ADLs independently or with assistance (bathe, groom, dress, toilet and feed)  Outcome: Progressing     Problem: Respiratory  Goal: Patient will achieve/maintain optimum respiratory ventilation and gas exchange  Outcome: Progressing       Patient is not progressing towards the following goals:

## 2025-03-14 NOTE — PROGRESS NOTES
Telemetry Shift Summary     Rhythm SR/SA  HR Range 58-85  Ectopy rPVC, rTrigem, rBigem  Measurements  .24/.08/.38  Per strip printed 1600     Normal Values  Rhythm SR  HR Range    Measurements 0.12-0.20 / 0.06-0.10  / 0.30-0.52

## 2025-03-14 NOTE — CARE PLAN
The patient is Watcher - Medium risk of patient condition declining or worsening    Shift Goals  Clinical Goals: Monitor O2 Levels  Patient Goals: Comfort  Family Goals: Updates    Progress made toward(s) clinical / shift goals:    Problem: Knowledge Deficit - Standard  Goal: Patient and family/care givers will demonstrate understanding of plan of care, disease process/condition, diagnostic tests and medications  Outcome: Progressing     Problem: Knowledge Deficit - COPD  Goal: Patient/significant other demonstrates understanding of disease process, utilization of the Action Plan, medications and discharge instruction  Outcome: Progressing     Problem: Fall Risk  Goal: Patient will remain free from falls  Outcome: Progressing       Patient is not progressing towards the following goals:

## 2025-03-14 NOTE — PROGRESS NOTES
Telemetry Shift Summary     Rhythm: SR/SA 1st Degree AVB  Rate: 60s-70s  Measurements: 0.24/0.09/0.40  Ectopy (reported by Monitor Tech): rare PVC     Normal Values  Rhythm: Sinus  HR:   Measurements: 0.12-0.20/0.06-0.10/0.30-0.52

## 2025-03-15 NOTE — CONSULTS
Thoracic & General Surgery Consultation      Date of Service  3/14/2025    Referring Physician  Margret Fields D.O.    Consulting Physician  Newton Brewster M.D.    Reason for Consultation  Right-sided pneumothorax    History of Presenting Illness  Mr. Farhat Stahl is a 85 y.o. male who presented 3/11/2025 with a right-sided pneumothorax that was found on a chest x-ray as an outpatient and was sent to the hospital for evaluation.  A chest tube was not placed as the patient was not symptomatic and the pneumothorax was minimal.  He was placed on oxygen and his pneumothorax improved.  The patient states he is never had anything like this before.  He is a never smoker.  He has not worked in a factory or in the mines.  He does not have any chemical exposures.  He does have sleep apnea.  He is on Eliquis for his A-fib.    Review of Systems  All other systems reviewed and negative except as noted above    Past Medical History   has a past medical history of Anemia associated with nutritional deficiency (03/06/2025), Arrhythmia, Arthritis, Atrial fibrillation (HCC), Bronchitis (2004), Chronic atrial fibrillation (HCC) (07/12/2024), Chronic pain of right knee (06/16/2022), Falls (11/27/2024), Fibula fracture (1981), High cholesterol, Hyperlipidemia, Hypertension, Lab test positive for detection of COVID-19 virus (08/11/2022), MEDICAL HOME, Multiple pulmonary nodules (12/15/2021), Onychogryposis of toenail (01/21/2021), Onychomycosis (01/21/2021), Pain, Pneumonia (2004), Rash (06/16/2022), Sleep apnea, Thrombocytopenia (HCC) (11/30/2023), and Unintentional weight loss (11/27/2024).    Surgical History   has a past surgical history that includes tonsillectomy (1945); exostosis excision (6/3/2011); lesion excision ortho (6/3/2011); toe arthroplasty (6/3/2011); lumbar laminectomy diskectomy (2005); hammertoe correction (2/2/2015); and eye surgery (Bilateral).    Family History  family history includes Cancer in his  father, mother, sister, and another family member; Hypertension in his father.    Social History   reports that he has never smoked. He has never used smokeless tobacco. He reports current alcohol use of about 1.2 - 1.8 oz of alcohol per week. He reports that he does not use drugs.    Medications  Current Facility-Administered Medications   Medication Dose Route Frequency Provider Last Rate Last Admin    furosemide (Lasix) tablet 40 mg  40 mg Oral Q DAY Margret Fields DEmmanuelleOEmmanuelle   40 mg at 03/14/25 1139    carvedilol (Coreg) tablet 12.5 mg  12.5 mg Oral BID WITH MEALS Tyler Ruano M.D.   12.5 mg at 03/12/25 1747    apixaban (Eliquis) tablet 5 mg  5 mg Oral BID Rahul Doe M.D.   5 mg at 03/14/25 0546    acetaminophen (Tylenol) tablet 650 mg  650 mg Oral Q6HRS PRN Tyler Ruano M.D.        labetalol (Normodyne/Trandate) injection 10 mg  10 mg Intravenous Q4HRS PRN Tyler Ruano M.D.        ondansetron (Zofran) syringe/vial injection 4 mg  4 mg Intravenous Q4HRS PRN Tyler Ruano M.D.        Or    ondansetron (Zofran ODT) dispertab 4 mg  4 mg Oral Q4HRS PRN Tyler Ruano M.D.        guaiFENesin dextromethorphan (Robitussin DM) 100-10 MG/5ML syrup 10 mL  10 mL Oral Q6HRS PRN Tyler Ruano M.D.        Respiratory Therapy Consult   Nebulization Continuous RT Tyler Ruano M.D.        albuterol (Proventil) 2.5mg/0.5ml nebulizer solution 2.5 mg  2.5 mg Nebulization Q2HRS PRN (RT) Tyler Ruano M.D.        meclizine (Antivert) tablet 12.5 mg  12.5 mg Oral TID PRN Tyler Ruano M.D.        vitamin D3 (Cholecalciferol) tablet 2,000 Units  2,000 Units Oral DAILY Tyler Ruano M.D.   2,000 Units at 03/14/25 0546    levothyroxine (Synthroid) tablet 75 mcg  75 mcg Oral AM ES Tyler Ruano M.D.   75 mcg at 03/14/25 0546    omeprazole (PriLOSEC) capsule 40 mg  40 mg Oral DAILY Tyler Ruano M.D.   40 mg at 03/14/25 0545    pravastatin (Pravachol) tablet 40 mg  40 mg Oral DAILY Tyler Ruano M.D.   40 mg at 03/14/25 0546    benzonatate  (Tessalon) capsule 100 mg  100 mg Oral TID PRN Tyler Ruano M.D.           Allergies  Allergies   Allergen Reactions    Other Environmental Runny Nose and Itching     Pollens       Physical Exam  Temp:  [36.2 °C (97.2 °F)-36.9 °C (98.5 °F)] 36.8 °C (98.3 °F)  Pulse:  [64-75] 68  Resp:  [17-18] 17  BP: ()/(53-58) 99/58  SpO2:  [92 %-100 %] 93 %    GEN: Healthy appearing, well developed, no acute distress.  PSYCH: Alert and oriented x3. Normal  memory, mood, and affect.  NECK: Supple, trachea midline with no masses.  CV: RRR, radial pulses 2+, brisk cap refill on hands  LUNGS: no labored breathing on room air, no wheezing  ABD: Soft, nontender, nondistended  SKIN: Warm, well perfused. No skin rashes or abnormal lesions.  MSK: strength 5/5 throughout. No deformities  EXT: No clubbing, cyanosis, or edema.  NEURO: No focal deficits      Fluids  Date 03/14/25 0700 - 03/15/25 0659   Shift 8226-4324 6886-3558 2998-8060 24 Hour Total   INTAKE   Shift Total       OUTPUT   Urine 200   200   Shift Total 200   200   Weight (kg) 95.6 95.6 95.6 95.6       Laboratory  Recent Labs     03/11/25 2035 03/13/25  0154   WBC 7.5 6.9   RBC 4.48* 4.52*   HEMOGLOBIN 13.6* 13.6*   HEMATOCRIT 41.3* 41.1*   MCV 92.2 90.9   MCH 30.4 30.1   MCHC 32.9 33.1   RDW 48.5 47.9   PLATELETCT 279 277   MPV 9.2 9.3     Recent Labs     03/11/25 2035 03/12/25  0342   SODIUM 130* 130*   POTASSIUM 4.7 4.1   CHLORIDE 97 99   CO2 24 21   GLUCOSE 86 90   BUN 22 19   CREATININE 1.31 1.13   CALCIUM 9.2 8.8     Recent Labs     03/11/25 2035   INR 1.26*                 Imaging  DX-CHEST-LIMITED (1 VIEW)   Final Result         No significant interval change.      EC-ECHOCARDIOGRAM COMPLETE W/ CONT   Final Result      DX-CHEST-PORTABLE (1 VIEW)   Final Result      Stable small right pneumothorax      Improving bibasilar scarring or atelectasis with likely pleural fluid      US-RENAL   Final Result      1.  No hydronephrosis      2.  Simple right renal cyst  has diminished in size from 2018      DX-CHEST-LIMITED (1 VIEW)   Final Result      Stable small right pneumothorax      Stable bibasilar scarring or atelectasis with likely pleural fluid      CT-CHEST (THORAX) W/O   Final Result         1.  Small anterior right pneumothorax   2.  Linear densities the bilateral lung bases, right greater than left, appearance compatible with changes of atelectasis, component of the left lower lobe infiltrate not excluded.   3.  Small layering bilateral pleural effusions   4.  Cholelithiasis   5.  Atherosclerosis and atherosclerotic coronary artery disease   6.  Pulmonary nodule, see nodule follow-up recommendations below.      Fleischner Society pulmonary nodule recommendations:   Low Risk: CT at 6-12 months, then consider CT at 18-24 months      High Risk: CT at 6-12 months, then CT at 18-24 months      Low Risk - Minimal or absent history of smoking and of other known risk factors.      High Risk - History of smoking or of other known risk factors.      Note: These recommendations do not apply to lung cancer screening, patients with immunosuppression, or patients with known primary cancer.      Fleischner Society 2017 Guidelines for Management of Incidentally Detected Pulmonary Nodules in Adults      These findings were discussed with the patient's clinician, Susan Pinzon, on 3/11/2025 11:53 PM.      DX-CHEST-PORTABLE (1 VIEW)   Final Result         1.  Hazy bilateral pulmonary infiltrates.   2.  Small right pneumothorax, stable   3.  Cardiomegaly      DX-CHEST-2 VIEWS    (Results Pending)       Assessment/Plan  This is a 85 y.o. male who presents with spontaneous right pneumothorax.  He does have some interstitial lung disease but no obvious blebs.  This is the first pneumothorax he is ever had.  This was asymptomatic.  He did complain of a cough beforehand and he continues to have a cough.  I will see him in clinic in a few weeks with a chest x-ray to make sure he is doing well.   If he has a second pneumothorax then I would proceed with a talc pleurodesis.  I have discussed this with the patient and the patient's son.  They are in agreement with this plan.      Newton Brewster MD  Thoracic & General Surgeon  [facility]  [phone]

## 2025-03-16 ENCOUNTER — TELEPHONE (OUTPATIENT)
Dept: HOME HEALTH SERVICES | Facility: HOME HEALTHCARE | Age: 85
End: 2025-03-16
Payer: MEDICARE

## 2025-03-16 NOTE — TELEPHONE ENCOUNTER
Left VM for pt, spoke to wife and explained scheduling delay and they are okay waiting a couple more days to hear from .

## 2025-03-17 ENCOUNTER — TELEPHONE (OUTPATIENT)
Dept: HEALTH INFORMATION MANAGEMENT | Facility: OTHER | Age: 85
End: 2025-03-17
Payer: MEDICARE

## 2025-03-17 ENCOUNTER — TELEPHONE (OUTPATIENT)
Dept: PALLIATIVE MEDICINE | Facility: HOSPICE | Age: 85
End: 2025-03-17
Payer: MEDICARE

## 2025-03-17 ENCOUNTER — PATIENT OUTREACH (OUTPATIENT)
Dept: MEDICAL GROUP | Facility: PHYSICIAN GROUP | Age: 85
End: 2025-03-17
Payer: MEDICARE

## 2025-03-17 ENCOUNTER — TELEPHONE (OUTPATIENT)
Dept: HOME HEALTH SERVICES | Facility: HOME HEALTHCARE | Age: 85
End: 2025-03-17
Payer: MEDICARE

## 2025-03-17 NOTE — TELEPHONE ENCOUNTER
"Transitional Care Management (TCM) Home Care Initial Contact Within 48 Hours of Discharge From Inpatient Setting:    This RN verfied the patient has [facility] ([facility]) and [facility].    Farhat was scheduled for Home TCM and call was transferred to this RN via telephone.   This RN introduced self and provided a brief discription of [facility] TCM program and explained what services Home TCM provides.  Farhat verbally consented to proceed with Home TCM services.     Discharge date from inpatient setting: 3/14/2025  Business date Home TCM began:3/14/2025  Home TCM 30 day service period ends:4/13/2025    Now that you are home, how are you feelings: \"I'm ok, I sleep good with my CPAP on at night and cough during the day still.\"  Do you have a follow up appointment scheduled with your PCP and or specialists:yes  Appointment date(s):3/19/2025  Are you able to get to your appointment(s):yes  Assistance needed scheduling follow up appointments or establishing with PCP: no  Has [facility] contacted you:  no    SOC scheduled: TBD      Did you receive any new prescriptions:yes  Where you able to get your new prescriptions:yes  Medication reconciliation completed.  Medications reviewed with Farhat. Farhat has received instruction on special precautions for all high risk medications and has been instructed on how and when to report problems that may occur.  Medication questions answered: Edison reported that he is to check his blood pressure \"four times a day\" and take his blood pressure medication if his \"blood pressure is over 100.\" Farhat also reported that he needs assistance managing his medications. This RN told Farhat that [facility] could help him with medication management as well as his PCP at his appointment on 3/19/2025.     Do you have and barriers to health care such as transportation, food, paying for medications, lack of durable medical equipment or lack of caregivers:\"No, my son took " "time off of work and is helping me.\"    Do you have questions or concerns regarding your reason for admission to the hospital: no  Do you have any questions regarding your discharge instructions:no  Education provided regarding signs and symptoms for respiratory distress and when to contact Home TCM before next appointment with questions or concerns.    TCM home care contact information provided.    Number of attempts to contact patient:1  Current or previous attempts completed within 2 business days of discharge:1                        "

## 2025-03-17 NOTE — TELEPHONE ENCOUNTER
Transitional Care Management home care called patient and schedule appointment with Marisol LANG for 3/24/25.    TCM service period 3/14/25-4/13/25

## 2025-03-18 ENCOUNTER — TELEPHONE (OUTPATIENT)
Dept: HEALTH INFORMATION MANAGEMENT | Facility: OTHER | Age: 85
End: 2025-03-18
Payer: MEDICARE

## 2025-03-18 NOTE — PROGRESS NOTES
Transitional Care Management  TCM Outreach Date and Time: Filed (3/17/2025 11:27 AM)    Discharge Questions  Actual Discharge Date: 03/14/25  Now that you are home, how are you feeling?: Good  Did you receive any new prescriptions?: No  Do you have any questions about your current medications or new medications (Review Med Rec)?: No  Did you have any durable medical equipment ordered?: No  Do you have a follow up appointment scheduled with your PCP?: No  Was an appointment scheduled for the patient?: Yes  Appointment Date: 03/19/25  Appointment Time: 1350  Any issues or paperwork you wish to discuss with your PCP?: No  Are you (patient) able to get to the appointment?: Yes  If Home Health was ordered, have they contacted you (Patient): Yes ([facility] )  Did you have enough support after your last discharge?: Yes  Does this patient qualify for the CCM program?: Yes (pt's chart routed to ccm rn)    Transitional Care  Number of attempts made to contact patient: 1  Current or previous attempts completed within two business days of discharge? : Yes  Provided education regarding treatment plan, medications, self-management, ADLs?: No  Has patient completed an Advanced Directive?: Yes  Is the patient's advanced directive on file?: Yes  Has the Care Manager's phone number provided?: No  Is there anything else I can help you with?: No    Discharge Summary  Chief Complaint: Shortness of Breath        Pt sent here by radiology after chest x-ray today showing small pneumothorax    Cough        Pt has had a cough for over 1 month- productive for clear; sometimes green sputum  Admitting Diagnosis: Sent By MD; Cough  Discharge Diagnosis: Spontaneous pneumothorax

## 2025-03-19 ENCOUNTER — OFFICE VISIT (OUTPATIENT)
Dept: MEDICAL GROUP | Facility: PHYSICIAN GROUP | Age: 85
End: 2025-03-19
Payer: MEDICARE

## 2025-03-19 ENCOUNTER — TELEPHONE (OUTPATIENT)
Dept: HOME HEALTH SERVICES | Facility: HOME HEALTHCARE | Age: 85
End: 2025-03-19

## 2025-03-19 VITALS
HEART RATE: 63 BPM | SYSTOLIC BLOOD PRESSURE: 110 MMHG | TEMPERATURE: 98.9 F | DIASTOLIC BLOOD PRESSURE: 60 MMHG | OXYGEN SATURATION: 96 % | RESPIRATION RATE: 16 BRPM | HEIGHT: 72 IN | BODY MASS INDEX: 29.64 KG/M2 | WEIGHT: 218.8 LBS

## 2025-03-19 DIAGNOSIS — R63.4 UNINTENTIONAL WEIGHT LOSS: ICD-10-CM

## 2025-03-19 DIAGNOSIS — I10 HYPERTENSION, UNSPECIFIED TYPE: ICD-10-CM

## 2025-03-19 DIAGNOSIS — Z09 HOSPITAL DISCHARGE FOLLOW-UP: Primary | ICD-10-CM

## 2025-03-19 DIAGNOSIS — J93.9 PNEUMOTHORAX ON RIGHT: ICD-10-CM

## 2025-03-19 ASSESSMENT — FIBROSIS 4 INDEX: FIB4 SCORE: 1.28

## 2025-03-19 NOTE — TELEPHONE ENCOUNTER
Left VM for pt and wife, mentioned scheduling delay. Spoke to son who was with the patient and discussed scheduling delay, they are ok with a call in a couple of days. Later start of care requested for 3/21 per request.

## 2025-03-19 NOTE — PROGRESS NOTES
Subjective:     Farhat Stahl is a 85 y.o. male who presents for Hospital Follow-up.    HPI:   Recently hospitalized for :  Patient is here with his son.    1. Pneumothorax on right  Patient with PMH of interstitial lung disease, small left pleural effusion, atrial fibrillation on Eliquis, hypertension and MARCI using CPAP presented to the emergency room on March 11 when patient was notified of a chest x-ray which showed a small right pneumothorax.  Prior to hospitalization, patient has been having increased productive cough for 6 weeks with fatigue.  Mild GARCIA.  Patient was placed on HF NC and oxygen was reached up to 98%.  At home they have been monitoring pulse ox which shows oxygenation saturation between 92 and 96%.  Using CPAP.  Cough has improved.  Using humidifier.    2. Hypertension, unspecified type  Patient's blood pressure was managed with Coreg 25 mg twice daily and lisinopril 10 mg daily.  However these 2 medications were discontinued on discharge from the hospital recently due to hypotension.  Patient has lost a significant amount of weight prior to hospitalization.    3. Unintentional weight loss  11/27/2024:Patient has noticed that he has been continuously losing weight. Current weight is 239 pounds with a BMI of 32.43. Looking back in his records, his weight in May 2023 was 266 pounds. Patient states that his wife has become debilitated and not able to prepare meals and rely on microwave dinners for breakfast and dinner. He tries to drink a milkshake daily. His appetite is good. Patient denies any cardiopulmonary, GI, other neurologic symptoms. TSH within normal limits in July. No other lab abnormalities.      2/20/2025: Patient continues to lose weight.  Current weight is 228 pounds down from 239 pounds in November.  Starting weight was 266 pounds in May 2023.  Patient just received full dentures, all his teeth were pulled.  Continues to have an appetite.  No other complaints except some mild  fatigue.  Colonoscopy completed in 2022 which showed 5 adenomatous polyps with a 3-year follow-up recommended.     3/5/2025: Patient's current weight 227 pounds, down 1 pound from last visit in February.  Chart review reveals a weight of 26 5 pounds in November 2023.  Patient's son Romaine states that his father has not had much of an appetite, tends to sleep throughout the day.  Patient and his wife do not cook and rely on processed food, microwavable products.    3/19/2025:.  Patient's current weight is 218 pounds, up 8 pounds since his hospitalization 1 week ago.  On our last visit, patient's weight was 227 pounds.  Patient will complete workup for unintentional weight loss.  Patient's appetite has improved, 8 his meals that were given to him in the hospital and his son is being proactive about providing nutritious meals for his parents.  Albumin low at 3.0.  SPEP negative.    4.  Interstitial lung disease  Chronic.  Stable.  Patient was last seen by pulmonology on December 6, 2023.  PFT completed January 2022 which showed severe restrictive defect on lung volumes with TLC 56%.  Borderline mild DLCO impairment.  CT scan of the thorax completed June 2023.  Mild to moderate left pleural effusion mild pleural thickening unchanged from 2022.  Follow-up chest x-ray and pulmonary function test ordered however patient did not complete as he was asymptomatic at the time.  Patient did not follow-up with pulmonology.  Patient now presents with ongoing intermittent mildly productive cough for the last 6 weeks.  Labs reveal elevated serum RP, ESR and ferritin.      Current medicines (including reconciliation performed today)  Current Outpatient Medications   Medication Sig Dispense Refill    pravastatin (PRAVACHOL) 40 MG tablet TAKE 1 TABLET BY MOUTH EVERY DAY. INDICATIONS: HIGH AMOUNT OF FATS IN THE BLOOD 100 Tablet 3    omeprazole (PRILOSEC) 40 MG delayed-release capsule TAKE 1 CAPSULE BY MOUTH EVERY DAY. INDICATIONS:  HEARTBURN 100 Capsule 3    carvedilol (COREG) 25 MG Tab TAKE 1 TABLET BY MOUTH TWICE A DAY FOR HIGH BLOOD PRESSURE DISORDER 200 Tablet 3    lisinopril (PRINIVIL) 10 MG Tab TAKE 1 TABLET BY MOUTH EVERY DAY FOR HIGH BLOOD PRESSURE 100 Tablet 3    fluticasone (FLONASE) 50 MCG/ACT nasal spray SPRAY 2 SPRAYS INTO AFFECTED NOSTRIL(S) TWICE A DAY 96 mL 0    ELIQUIS 5 MG Tab TAKE 1 TABLET BY MOUTH TWICE A  Tablet 1    levothyroxine (SYNTHROID) 75 MCG Tab TAKE 1 TABLET BY MOUTH EVERY MORNING ON AN EMPTY STOMACH. INDICATIONS: UNDERACTIVE THYROID 100 Tablet 3    Cholecalciferol (D3) 2000 UNIT Tab Take 2,000 Units by mouth every day. Indications: supplement       No current facility-administered medications for this visit.       Allergies:   Other environmental    Social History     Tobacco Use    Smoking status: Never    Smokeless tobacco: Never   Vaping Use    Vaping status: Never Used   Substance Use Topics    Alcohol use: Yes     Alcohol/week: 1.2 - 1.8 oz     Types: 2 - 3 Standard drinks or equivalent per week     Comment: occ    Drug use: Never       ROS:  Mild cough.  Denies all other cardiopulmonary, GI, neurologic symptoms.    Objective:     Vitals:    03/19/25 1344   BP: 110/60   BP Location: Right arm   Patient Position: Sitting   Pulse: 63   Resp: 16   Temp: 37.2 °C (98.9 °F)   TempSrc: Temporal   SpO2: 96%   Weight: 99.2 kg (218 lb 12.8 oz)   Height: 1.829 m (6')     Body mass index is 29.67 kg/m².    Physical Exam:  Constitutional: Alert, no distress.  Skin: Warm, dry, good turgor, no rashes in visible areas.  Eye: Equal, round and reactive, conjunctiva clear, lids normal.  ENMT: Lips without lesions, good dentition, oropharynx clear.  Neck: Trachea midline, no masses, no thyromegaly. No cervical or supraclavicular lymphadenopathy  Respiratory: Noted crackles at the bases and right lower lung field.  Cardiovascular: Normal S1, S2, no murmur, no edema.  Abdomen: Soft, non-tender, no masses, no  hepatosplenomegaly.  Psych: Alert and oriented x3, normal affect and mood.      Assessment and Plan:   1. Pneumothorax on right  Hospital records reviewed with patient and son.  All medications reviewed.  Patient will have a follow-up chest x-ray next week.  Patient has an appointment with pulmonology on May 6.  Continue monitoring pulse ox.    2. Hypertension, unspecified type  Patient will keep a blood pressure log.  Monitor off antihypertensives.    3. Unintentional weight loss  Continue to monitor and follow-up with additional lab work.    - Chart and discharge summary were reviewed.   - Hospitalization and results reviewed with patient.   - Medications reviewed including instructions regarding high risk medications, dosing and side effects.  - Recommended Services: No services needed at this time  - Advance directive/POLST on file?  Yes    Follow-up:No follow-ups on file.    Face-to-face transitional care management services with MODERATE (today's visit is within 14 days post discharge & LACE+ score of 28-58) medical decision complexity were provided.     LACE+ Historical Score Over Time (0-28: Low, 29-58: Medium, 59+: High): 70      Critical Care

## 2025-03-21 ENCOUNTER — TELEPHONE (OUTPATIENT)
Dept: HOME HEALTH SERVICES | Facility: HOME HEALTHCARE | Age: 85
End: 2025-03-21
Payer: MEDICARE

## 2025-03-21 ENCOUNTER — APPOINTMENT (OUTPATIENT)
Dept: RADIOLOGY | Facility: MEDICAL CENTER | Age: 85
End: 2025-03-21
Attending: INTERNAL MEDICINE
Payer: MEDICARE

## 2025-03-21 DIAGNOSIS — J93.83 SPONTANEOUS PNEUMOTHORAX: ICD-10-CM

## 2025-03-21 PROCEDURE — 71046 X-RAY EXAM CHEST 2 VIEWS: CPT

## 2025-03-21 NOTE — TELEPHONE ENCOUNTER
Spoke with patient and discussed scheduling delay. Per patient okay to continue to wait and for [facility] to hold the referral. Later start of care 3/23 per request.

## 2025-03-23 ENCOUNTER — HOME CARE VISIT (OUTPATIENT)
Dept: HOME HEALTH SERVICES | Facility: HOME HEALTHCARE | Age: 85
End: 2025-03-23
Payer: MEDICARE

## 2025-03-23 ENCOUNTER — TELEPHONE (OUTPATIENT)
Dept: HOME HEALTH SERVICES | Facility: HOME HEALTHCARE | Age: 85
End: 2025-03-23
Payer: MEDICARE

## 2025-03-23 VITALS
OXYGEN SATURATION: 97 % | HEART RATE: 66 BPM | DIASTOLIC BLOOD PRESSURE: 70 MMHG | SYSTOLIC BLOOD PRESSURE: 138 MMHG | RESPIRATION RATE: 18 BRPM | TEMPERATURE: 98.4 F

## 2025-03-23 PROCEDURE — G0495 RN CARE TRAIN/EDU IN HH: HCPCS

## 2025-03-23 PROCEDURE — 665005 NO-PAY RAP - HOME HEALTH

## 2025-03-23 ASSESSMENT — ENCOUNTER SYMPTOMS
DYSPNEA ACTIVITY LEVEL: AFTER AMBULATING 10 - 20 FT
DYSPNEA ON EXERTION: 1
INDIGESTION: 1
POOR JUDGMENT: 1
LAST BOWEL MOVEMENT: 67286
NAUSEA: DENIES
VOMITING: DENIES
BOWEL PATTERN NORMAL: 1
DENIES PAIN: 1
FATIGUE: 1
STOOL FREQUENCY: DAILY
FATIGUES EASILY: 1
FORGETFULNESS: 1
LOSS OF SENSATION: 1
DRY SKIN: 1
SHORTNESS OF BREATH: 1
HYPERTENSION: 1

## 2025-03-23 ASSESSMENT — ACTIVITIES OF DAILY LIVING (ADL)
TRANSPORTATION COMMENTS: PATIENT UNABLE TO LEAVE THE HOME WITHOUT ASSISTANCE
OASIS_M1830: 03

## 2025-03-23 NOTE — TELEPHONE ENCOUNTER
Called and spoke with patient about filling appointment today.     Patient scheduled for 3/23 between 1:30-2:00 pm

## 2025-03-23 NOTE — Clinical Note
PRIMARY DIAGNOSIS: Pneumothorax            SKILLED NEED: Health assessment, Medication education, education and monitoring of disease processes, gait/balance issues, hx of falls, cognitive decline            NURSING FREQUENCY: 2w4, 3 PRN            ZIP CODE: [id number]            DISCIPLINES ORDERED:  SN, PT, OT, SLP            INSURANCE: [facility]            CERTIFICATION PERIOD: 3/23/2025 - 5/21/25            SPECIAL CONSIDERATION: There is 1 dog in the home that they will have put away. Spouse and son are concerned about patient's decline as they are having to set reminders for him to eat.

## 2025-03-23 NOTE — Clinical Note
Opened patient to [facility] medication reconciliation process done. Medication list left in home care folder. See MAR for drug allergy interactions. There are no major drug to drug interactions.                      The best contact phone number is [phone] and Shane Welsh) manages the medications.

## 2025-03-24 ENCOUNTER — DOCUMENTATION (OUTPATIENT)
Dept: MEDICAL GROUP | Facility: PHYSICIAN GROUP | Age: 85
End: 2025-03-24
Payer: MEDICARE

## 2025-03-24 ENCOUNTER — OFF SITE VISIT (OUTPATIENT)
Dept: PALLIATIVE MEDICINE | Facility: HOSPICE | Age: 85
End: 2025-03-24
Payer: MEDICARE

## 2025-03-24 VITALS
RESPIRATION RATE: 18 BRPM | OXYGEN SATURATION: 98 % | TEMPERATURE: 98.4 F | HEART RATE: 60 BPM | SYSTOLIC BLOOD PRESSURE: 120 MMHG | DIASTOLIC BLOOD PRESSURE: 70 MMHG

## 2025-03-24 DIAGNOSIS — I10 PRIMARY HYPERTENSION: ICD-10-CM

## 2025-03-24 DIAGNOSIS — Z86.79 HISTORY OF ATRIAL FIBRILLATION: ICD-10-CM

## 2025-03-24 DIAGNOSIS — J84.9 INTERSTITIAL LUNG DISEASE (HCC): Chronic | ICD-10-CM

## 2025-03-24 PROCEDURE — 99349 HOME/RES VST EST MOD MDM 40: CPT | Performed by: NURSE PRACTITIONER

## 2025-03-24 ASSESSMENT — PAIN SCALES - GENERAL: PAINLEVEL_OUTOF10: NO PAIN

## 2025-03-24 NOTE — PROGRESS NOTES
[facility] Transitional Care Management Home Visit      Farhat Stahl  85 y.o.  male  MRN [id number]  PCP Stefanie Zavaleta M.D.  Location of visit: Home    History of Present Illness:    Hospital Course: Patient admitted 3/11/25-3/14-25 for small right pneumothorax. PMH includes interstitial lung disease, afib on eliquis, hypertension, MARCI, and unintentional weight loss. Patient followed up with PCP Dr. Zavaleta, 3/19. Cough was improving and oxygen saturations 92-96% at that time. Patient remains off Coreg and Lisinopril due to hypotension in the hospital.     Today: This is a f/u TCM visit as he was seen for TCM by Dr. Zavaleta. Visit occurred with patient and his wife. He reports cough has significantly improved. He reports mild dyspnea on exertion, otherwise denies chest pain, palpitations. He is using IS and getting to 1500. He states he has not been taking his medications since his hospitalization including Eliquis.      Assessment and Plan:    Primary Problem #1: Interstitial Lung Disease    Plan: Patient does not require oxygen at this time. He does use CPAP at night and reports very good compliance. Pulmonary appointment scheduled 5/6.     Primary Problem #2: Hypertension     Plan: Blood pressure has been stable in records and today.     Primary Problem #3: Afib    Plan: Recommend patient restart his eliquis however he would like to speak with Cardiology first and will get an appointment. Discussed the risks of stopping Eliquis. Will f/u tomorrow with phone call regarding restarting blood thinner. Coreg was discontinued in the hospital.    Patient agreed to restart his medications per discharge instructions aside from eliquis as noted above.     F/u appointment made with TCM for in-home visit  4/7.     Other major issues not addressed during today's visit: Unintentional Weight Loss    Pertinent Physical Exam Findings:  Vital Signs:   Vitals:    03/24/25 0947   BP: 120/70   Pulse: 60   Resp: 18   Temp: 36.9 °C  (98.4 °F)   SpO2: 98%      Physical Exam  Constitutional:       General: He is not in acute distress.     Appearance: Normal appearance.   Cardiovascular:      Rate and Rhythm: Normal rate and regular rhythm.      Heart sounds: Normal heart sounds.   Pulmonary:      Effort: Pulmonary effort is normal.      Breath sounds: Rales present.      Comments: Fine, right lower lobe  Abdominal:      General: Bowel sounds are normal.   Musculoskeletal:      Right lower leg: No edema.      Left lower leg: No edema.   Skin:     General: Skin is warm and dry.   Neurological:      Mental Status: He is alert and oriented to person, place, and time.   Psychiatric:         Mood and Affect: Mood normal.          Current Medications:    Current Outpatient Medications:     Omega-3 Fatty Acids (FISH OIL) 1200 MG Cap, Take 1,200 mg by mouth every day. Indications: supplement, Disp: , Rfl:     ketotifen (ZADITOR) 0.035 % ophthalmic solution, Administer 1 Drop into both eyes as needed (allergies). Indications: Inflammation of Eyelid Lining due to Allergy, Disp: , Rfl:     pravastatin (PRAVACHOL) 40 MG tablet, TAKE 1 TABLET BY MOUTH EVERY DAY. INDICATIONS: HIGH AMOUNT OF FATS IN THE BLOOD, Disp: 100 Tablet, Rfl: 3    omeprazole (PRILOSEC) 40 MG delayed-release capsule, TAKE 1 CAPSULE BY MOUTH EVERY DAY. INDICATIONS: HEARTBURN, Disp: 100 Capsule, Rfl: 3    carvedilol (COREG) 25 MG Tab, TAKE 1 TABLET BY MOUTH TWICE A DAY FOR HIGH BLOOD PRESSURE DISORDER, Disp: 200 Tablet, Rfl: 3    lisinopril (PRINIVIL) 10 MG Tab, TAKE 1 TABLET BY MOUTH EVERY DAY FOR HIGH BLOOD PRESSURE, Disp: 100 Tablet, Rfl: 3    fluticasone (FLONASE) 50 MCG/ACT nasal spray, SPRAY 2 SPRAYS INTO AFFECTED NOSTRIL(S) TWICE A DAY, Disp: 96 mL, Rfl: 0    ELIQUIS 5 MG Tab, TAKE 1 TABLET BY MOUTH TWICE A DAY, Disp: 200 Tablet, Rfl: 1    levothyroxine (SYNTHROID) 75 MCG Tab, TAKE 1 TABLET BY MOUTH EVERY MORNING ON AN EMPTY STOMACH. INDICATIONS: UNDERACTIVE THYROID, Disp: 100 Tablet,  Rfl: 3    Cholecalciferol (D3) 2000 UNIT Tab, Take 2,000 Units by mouth every day. Indications: supplement, Disp: , Rfl:     Medication Allergies:  Other environmental    Thank you for allowing me the opportunity to participate in the care of Farhat Stahl    This visit was conducted within 14 days from hospital discharge. This is a moderate medical complexity visit, which in addition to above, included, if applicable, medication reconciliation and management, obtaining and reviewing discharge information, reviewing the need for diagnostic tests/treatments and/or follow-up on pending diagnostic tests/treatments, medical education, establishing or re-establishing referrals with community providers and services and assistance with scheduling follow-up visits with providers and services.        Marisol Beatty D.N.P.  [facility] Transitional Care Management  [id number] [address]  P. [phone]  F. [phone]  Available on Voalte

## 2025-03-24 NOTE — PROGRESS NOTES
Medication chart review for [facility] services    Received referral from [facility].   Medications reviewed  compared with discharge summary if available.  Discharge summary date, if applicable:   3/14/25    Current medication list per [facility]     Medication list one, patient is currently taking    Current Outpatient Medications:     Fish Oil, 1,200 mg, Oral, DAILY    ketotifen, 1 Drop, Both Eyes, PRN    pravastatin, 40 mg, Oral, DAILY    omeprazole, 40 mg, Oral, QDAY    carvedilol, TAKE 1 TABLET BY MOUTH TWICE A DAY FOR HIGH BLOOD PRESSURE DISORDER    lisinopril, 10 mg, Oral, QDAY    fluticasone, SPRAY 2 SPRAYS INTO AFFECTED NOSTRIL(S) TWICE A DAY    Eliquis, 5 mg, Oral, BID    levothyroxine, 75 mcg, Oral, AM ES    D3, 2,000 Units, Oral, DAILY      Medication list two, drugs that the patient has been prescribed or recommended to take by their healthcare provider on discharge summary  MEDICATIONS ON DISCHARGE      Medication List          CHANGE how you take these medications         Instructions   carvedilol 25 MG Tabs  What changed: See the new instructions.  Commonly known as: Coreg    TAKE 1 TABLET BY MOUTH TWICE A DAY FOR HIGH BLOOD PRESSURE DISORDER      fluticasone 50 MCG/ACT nasal spray  What changed: See the new instructions.  Commonly known as: Flonase    SPRAY 2 SPRAYS INTO AFFECTED NOSTRIL(S) TWICE A DAY                CONTINUE taking these medications         Instructions   D3 50 MCG (2000 UT) Tabs  Generic drug: Cholecalciferol    Take 2,000 Units by mouth every day. Indications: supplement  Dose: 2,000 Units      Eliquis 5 MG Tabs  Generic drug: apixaban    TAKE 1 TABLET BY MOUTH TWICE A DAY  Dose: 5 mg      levothyroxine 75 MCG Tabs  Commonly known as: Synthroid    TAKE 1 TABLET BY MOUTH EVERY MORNING ON AN EMPTY STOMACH. INDICATIONS: UNDERACTIVE THYROID  Dose: 75 mcg      lisinopril 10 MG Tabs  Commonly known as: Prinivil    TAKE 1 TABLET BY MOUTH EVERY DAY FOR HIGH BLOOD  PRESSURE  Dose: 10 mg      omeprazole 40 MG delayed-release capsule  Commonly known as: PriLOSEC    TAKE 1 CAPSULE BY MOUTH EVERY DAY. INDICATIONS: HEARTBURN  Dose: 40 mg      pravastatin 40 MG tablet  Commonly known as: Pravachol    TAKE 1 TABLET BY MOUTH EVERY DAY. INDICATIONS: HIGH AMOUNT OF FATS IN THE BLOOD  Dose: 40 mg          Allergies   Allergen Reactions    Other Environmental Runny Nose and Itching     Pollens       Labs     Lab Results   Component Value Date/Time    SODIUM 130 (L) 03/12/2025 03:42 AM    POTASSIUM 4.1 03/12/2025 03:42 AM    CHLORIDE 99 03/12/2025 03:42 AM    CO2 21 03/12/2025 03:42 AM    GLUCOSE 90 03/12/2025 03:42 AM    BUN 19 03/12/2025 03:42 AM    CREATININE 1.13 03/12/2025 03:42 AM     Lab Results   Component Value Date/Time    ALKPHOSPHAT 123 (H) 03/11/2025 08:35 PM    ASTSGOT 16 03/11/2025 08:35 PM    ALTSGPT 15 03/11/2025 08:35 PM    TBILIRUBIN 0.4 03/11/2025 08:35 PM    INR 1.26 (H) 03/11/2025 08:35 PM    ALBUMIN 3.0 (L) 03/12/2025 03:42 AM    ALBUMIN 3.17 (L) 02/26/2025 08:16 AM        Assessment for clinically significant drug interactions, drug omissions/additions, duplicative therapies.            CC   Stefanie Zavaleta M.D.  [address]  [place] [address]  Fax: [phone]    [facility]  Phone [phone] fax [phone]    This note was created using voice recognition software (Dragon). The accuracy of the dictation is limited by the abilities of the software. I have reviewed the note prior to signing, however some errors in grammar and context are still possible. If you have any questions related to this note please do not hesitate to contact our office.       No follow-ups on file.

## 2025-03-25 PROCEDURE — G0180 MD CERTIFICATION HHA PATIENT: HCPCS | Performed by: INTERNAL MEDICINE

## 2025-03-25 NOTE — CASE COMMUNICATION
noted  ----- Message -----  From: Lindsay Waggoner R.N.  Sent: 3/23/2025   3:53 PM PDT  To: Rachel Bird R.N.; Ivanna Casas R.N.; *      PRIMARY DIAGNOSIS: Pneumothorax            SKILLED NEED: Health assessment, Medication education, education and monitoring of disease processes, gait/balance issues, hx of falls, cognitive decline            NURSING FREQUENCY: 2w4, 3 PRN            ZIP CODE: [id number]            DISCIPLINES ORDERED:   SN, PT, OT, SLP            INSURANCE: [facility]            CERTIFICATION PERIOD: 3/23/2025 - 5/21/25            SPECIAL CONSIDERATION: There is 1 dog in the home that they will have put away. Spouse and son are concerned about patient's decline as they are having to set reminders for him to eat.

## 2025-03-26 ENCOUNTER — HOME CARE VISIT (OUTPATIENT)
Dept: HOME HEALTH SERVICES | Facility: HOME HEALTHCARE | Age: 85
End: 2025-03-26
Payer: MEDICARE

## 2025-03-26 VITALS
RESPIRATION RATE: 18 BRPM | HEART RATE: 67 BPM | OXYGEN SATURATION: 94 % | TEMPERATURE: 97.4 F | DIASTOLIC BLOOD PRESSURE: 70 MMHG | SYSTOLIC BLOOD PRESSURE: 124 MMHG

## 2025-03-26 PROCEDURE — G0495 RN CARE TRAIN/EDU IN HH: HCPCS

## 2025-03-26 ASSESSMENT — ENCOUNTER SYMPTOMS
DESCRIPTION OF MEMORY LOSS: SHORT TERM
MUSCLE WEAKNESS: 1
DENIES PAIN: 1
FATIGUE: 1
BOWEL PATTERN NORMAL: 1
FORGETFULNESS: 1
FATIGUES EASILY: 1
LAST BOWEL MOVEMENT: 67288
LIMITED RANGE OF MOTION: 1
STOOL FREQUENCY: LESS THAN DAILY

## 2025-03-26 ASSESSMENT — PATIENT HEALTH QUESTIONNAIRE - PHQ9: CLINICAL INTERPRETATION OF PHQ2 SCORE: 0

## 2025-03-26 NOTE — Clinical Note
Pt ambulates using cane.,reported that he had cxr done for pneumothorax,. Denies shortness of breath, still has intermittent cough, with whitish secretions as reported. Pt also reported that he is not taking eliquis, coreg and lisinopril , that it was discontinued..He also showed lump on mid back. which is getting itchy sensitive.Photo and measuremnts taken.. Pt instructed to monitor for s/s of infection ,increased redness, swelling pain drainage and notify PCP..

## 2025-03-27 ENCOUNTER — HOME CARE VISIT (OUTPATIENT)
Dept: HOME HEALTH SERVICES | Facility: HOME HEALTHCARE | Age: 85
End: 2025-03-27
Payer: MEDICARE

## 2025-03-27 NOTE — CASE COMMUNICATION
noted  ----- Message -----  From: Kateryna Reilly R.N.  Sent: 3/27/2025   7:42 AM PDT  To: Rachel Bird R.N.; Ivanna Casas R.N.; *      Patient reported he is not taking coreg,lisinopril and eliquis.

## 2025-03-27 NOTE — CASE COMMUNICATION
noted  ----- Message -----  From: Kateryna Reilly R.N.  Sent: 3/26/2025   7:27 PM PDT  To: Rachel Bird R.N.; Ivanna Casas R.N.; *      Pt ambulates using cane.,reported that he had cxr done for pneumothorax,. Denies shortness of breath, still has intermittent cough, with whitish secretions as reported. Pt also reported that he is not taking eliquis, coreg and lisinopril , that it was discontinued..He also showed lump on mid back.  which is getting itchy sensitive.Photo and measuremnts taken.. Pt instructed to monitor for s/s of infection ,increased redness, swelling pain drainage and notify PCP..

## 2025-03-31 ENCOUNTER — HOME CARE VISIT (OUTPATIENT)
Dept: HOME HEALTH SERVICES | Facility: HOME HEALTHCARE | Age: 85
End: 2025-03-31
Payer: MEDICARE

## 2025-03-31 VITALS
DIASTOLIC BLOOD PRESSURE: 64 MMHG | SYSTOLIC BLOOD PRESSURE: 122 MMHG | HEART RATE: 89 BPM | OXYGEN SATURATION: 95 % | TEMPERATURE: 98.6 F | RESPIRATION RATE: 17 BRPM

## 2025-03-31 PROCEDURE — G0152 HHCP-SERV OF OT,EA 15 MIN: HCPCS

## 2025-03-31 SDOH — ECONOMIC STABILITY: HOUSING INSECURITY: HOME SAFETY: OT CONTACT INFO LEFT ON PT BINDER

## 2025-03-31 ASSESSMENT — ACTIVITIES OF DAILY LIVING (ADL)
TRANSPORTATION ASSESSED: 1
BATHING_CURRENT_FUNCTION: INDEPENDENT
GROOMING ASSESSED: 1
ORAL_CARE_CURRENT_FUNCTION: INDEPENDENT
PREPARING MEALS: INDEPENDENT
LIGHT HOUSEKEEPING: NEEDS ASSISTANCE
LAUNDRY ASSESSED: 1
TRANSPORTATION: INDEPENDENT
ORAL_CARE_ASSESSED: 1
DRESSING_UB_CURRENT_FUNCTION: INDEPENDENT
AMBULATION ASSISTANCE: 1
DRESSING_LB_CURRENT_FUNCTION: INDEPENDENT
LAUNDRY: INDEPENDENT
SHOPPING: NEEDS ASSISTANCE
SHOPPING ASSESSED: 1
GROOMING_CURRENT_FUNCTION: INDEPENDENT
TOILETING: INDEPENDENT
AMBULATION ASSISTANCE: INDEPENDENT
BATHING ASSESSED: 1
HOUSEKEEPING ASSESSED: 1
TOILETING: 1
USING THE TELPHONE: INDEPENDENT
PHYSICAL TRANSFERS ASSESSED: 1
TELEPHONE USE ASSESSED: 1
FEEDING ASSESSED: 1
CURRENT_FUNCTION: INDEPENDENT
FEEDING: INDEPENDENT

## 2025-03-31 ASSESSMENT — ENCOUNTER SYMPTOMS
DENIES PAIN: 1
PAIN PRESENCE EVALUATION: DENIES PAIN

## 2025-03-31 NOTE — CASE COMMUNICATION
OT eval completed 3/31/2025. Education complete. No further skilled OT is indicated at this time. Eval only.

## 2025-04-01 ENCOUNTER — HOME CARE VISIT (OUTPATIENT)
Dept: HOME HEALTH SERVICES | Facility: HOME HEALTHCARE | Age: 85
End: 2025-04-01
Payer: MEDICARE

## 2025-04-01 PROCEDURE — G0153 HHCP-SVS OF S/L PATH,EA 15MN: HCPCS

## 2025-04-01 PROCEDURE — G0495 RN CARE TRAIN/EDU IN HH: HCPCS

## 2025-04-03 ENCOUNTER — HOME CARE VISIT (OUTPATIENT)
Dept: HOME HEALTH SERVICES | Facility: HOME HEALTHCARE | Age: 85
End: 2025-04-03
Payer: MEDICARE

## 2025-04-03 VITALS
DIASTOLIC BLOOD PRESSURE: 72 MMHG | OXYGEN SATURATION: 96 % | RESPIRATION RATE: 18 BRPM | TEMPERATURE: 97.6 F | SYSTOLIC BLOOD PRESSURE: 118 MMHG | HEART RATE: 62 BPM

## 2025-04-03 VITALS
HEART RATE: 82 BPM | RESPIRATION RATE: 18 BRPM | DIASTOLIC BLOOD PRESSURE: 68 MMHG | SYSTOLIC BLOOD PRESSURE: 118 MMHG | TEMPERATURE: 98.1 F | OXYGEN SATURATION: 95 %

## 2025-04-03 PROCEDURE — G0495 RN CARE TRAIN/EDU IN HH: HCPCS

## 2025-04-03 ASSESSMENT — ACTIVITIES OF DAILY LIVING (ADL)
TRANSPORTATION COMMENTS: PATIENT NEEDS ASSISTANCE TO LEAVE THE HOME
AMBULATION ASSISTANCE: STAND BY ASSIST
CURRENT_FUNCTION: STAND BY ASSIST

## 2025-04-03 ASSESSMENT — ENCOUNTER SYMPTOMS
DRY SKIN: 1
LAST BOWEL MOVEMENT: 67296
DENIES PAIN: 1
HYPERTENSION: 1
FORGETFULNESS: 1
POOR JUDGMENT: 1
LAST BOWEL MOVEMENT: 67298
STOOL FREQUENCY: DAILY
STOOL FREQUENCY: DAILY
DENIES PAIN: 1
VOMITING: DENIES
DESCRIPTION OF MEMORY LOSS: SHORT TERM
NAUSEA: DENIES
BOWEL PATTERN NORMAL: 1
MUSCLE WEAKNESS: 1
MUSCLE WEAKNESS: 1

## 2025-04-03 NOTE — CASE COMMUNICATION
Would you please confirm what meds you want Shane to take? He is not taking anything, has the bottles organized in his bathroom cabinet & knows what everything is for.

## 2025-04-03 NOTE — Clinical Note
During home visit today, patient and his spouse verbalized that he was not taking any medications as he was told to stop everything with the exception of a couple of supplements. Can you please advise on if he was suppose to stop everything including the eliquis?

## 2025-04-04 ENCOUNTER — HOME CARE VISIT (OUTPATIENT)
Dept: HOME HEALTH SERVICES | Facility: HOME HEALTHCARE | Age: 85
End: 2025-04-04
Payer: MEDICARE

## 2025-04-04 PROCEDURE — G0151 HHCP-SERV OF PT,EA 15 MIN: HCPCS

## 2025-04-04 NOTE — CASE COMMUNICATION
Thanks, he was not taking anything when I was there.  ----- Message -----  From: Marisol Beatty D.N.P.  Sent: 4/4/2025  10:42 AM PDT  To: EDWARD Mann,   I told him to restart his medication regimen per his discharge instruction. He said he would contact Cardiology himself to discuss restarting eliquis. I will see him for f/u on Monday.  ----- Message -----  From: Rachel Bird R.N.  Sent: 4/3/2025  12:32 PM PDT  To: Manasa Beatty D.N.P.    Would you please confirm what meds you want Shane to take? He is not taking anything, has the bottles organized in his bathroom cabinet & knows what everything is for.

## 2025-04-05 VITALS
OXYGEN SATURATION: 95 % | RESPIRATION RATE: 18 BRPM | DIASTOLIC BLOOD PRESSURE: 68 MMHG | TEMPERATURE: 98.1 F | HEART RATE: 82 BPM | SYSTOLIC BLOOD PRESSURE: 118 MMHG

## 2025-04-05 ASSESSMENT — ENCOUNTER SYMPTOMS: DENIES PAIN: 1

## 2025-04-06 VITALS
DIASTOLIC BLOOD PRESSURE: 62 MMHG | HEART RATE: 78 BPM | TEMPERATURE: 97.8 F | OXYGEN SATURATION: 96 % | RESPIRATION RATE: 16 BRPM | SYSTOLIC BLOOD PRESSURE: 125 MMHG

## 2025-04-06 ASSESSMENT — ACTIVITIES OF DAILY LIVING (ADL): AMBULATION ASSISTANCE ON FLAT SURFACES: 1

## 2025-04-06 ASSESSMENT — ENCOUNTER SYMPTOMS
DENIES PAIN: 1
MUSCLE WEAKNESS: 1
ARTHRALGIAS: 1

## 2025-04-06 ASSESSMENT — PATIENT HEALTH QUESTIONNAIRE - PHQ9: CLINICAL INTERPRETATION OF PHQ2 SCORE: 0

## 2025-04-07 ENCOUNTER — HOME CARE VISIT (OUTPATIENT)
Dept: HOME HEALTH SERVICES | Facility: HOME HEALTHCARE | Age: 85
End: 2025-04-07
Payer: MEDICARE

## 2025-04-07 VITALS
OXYGEN SATURATION: 92 % | HEART RATE: 81 BPM | SYSTOLIC BLOOD PRESSURE: 116 MMHG | RESPIRATION RATE: 18 BRPM | DIASTOLIC BLOOD PRESSURE: 60 MMHG | TEMPERATURE: 97.5 F

## 2025-04-07 PROCEDURE — G0495 RN CARE TRAIN/EDU IN HH: HCPCS

## 2025-04-07 ASSESSMENT — ENCOUNTER SYMPTOMS
LAST BOWEL MOVEMENT: 67302
BOWEL PATTERN NORMAL: 1
POOR JUDGMENT: 1
FORGETFULNESS: 1
STOOL FREQUENCY: DAILY
MUSCLE WEAKNESS: 1
DESCRIPTION OF MEMORY LOSS: SHORT TERM
DENIES PAIN: 1

## 2025-04-08 ENCOUNTER — HOME CARE VISIT (OUTPATIENT)
Dept: HOME HEALTH SERVICES | Facility: HOME HEALTHCARE | Age: 85
End: 2025-04-08
Payer: MEDICARE

## 2025-04-08 ENCOUNTER — OFF SITE VISIT (OUTPATIENT)
Dept: PALLIATIVE MEDICINE | Facility: HOSPICE | Age: 85
End: 2025-04-08
Payer: MEDICARE

## 2025-04-08 VITALS
SYSTOLIC BLOOD PRESSURE: 120 MMHG | HEART RATE: 82 BPM | DIASTOLIC BLOOD PRESSURE: 68 MMHG | TEMPERATURE: 97.6 F | RESPIRATION RATE: 18 BRPM | OXYGEN SATURATION: 94 %

## 2025-04-08 DIAGNOSIS — J84.9 INTERSTITIAL LUNG DISEASE (HCC): Chronic | ICD-10-CM

## 2025-04-08 DIAGNOSIS — E44.1 PROTEIN-CALORIE MALNUTRITION, MILD (HCC): ICD-10-CM

## 2025-04-08 PROCEDURE — G0153 HHCP-SVS OF S/L PATH,EA 15MN: HCPCS

## 2025-04-08 PROCEDURE — 99349 HOME/RES VST EST MOD MDM 40: CPT | Performed by: NURSE PRACTITIONER

## 2025-04-08 NOTE — CASE COMMUNICATION
noted  ----- Message -----  From: Lindsay Waggoner R.N.  Sent: 4/3/2025   1:11 PM PDT  To: Rachel Bird R.N.; Stefanie Zavaleta M.D.      During home visit today, patient and his spouse verbalized that he was not taking any medications as he was told to stop everything with the exception of a couple of supplements. Can you please advise on if he was suppose to stop everything including the eliquis?

## 2025-04-08 NOTE — PROGRESS NOTES
[facility] Transitional Care Management Home Visit      Farhat Stahl  85 y.o.  male  MRN [id number]  PCP Stefanie Zavaleta M.D.  Location of visit: Home     History of Present Illness:    Hospital Course:  Patient admitted 3/11/25-3/14-25 for small right pneumothorax. PMH includes interstitial lung disease, afib on eliquis, hypertension, MARCI, and unintentional weight loss. Patient followed up with PCP Dr. Zavaleta, 3/19. Cough was improving and oxygen saturations 92-96% at that time. Patient remains off Coreg and Lisinopril due to hypotension in the hospital.      3/24/25: This is a f/u TCM visit as he was seen for TCM by Dr. Zavaleta. Visit occurred with patient and his wife. He reports cough has significantly improved. He reports mild dyspnea on exertion, otherwise denies chest pain, palpitations. He is using IS and getting to 1500. He states he has not been taking his medications since his hospitalization including Eliquis.       Today: Follow-up visit with patient and his wife Sammi.  nurse informed provider patient has not been taking his medications despite discussion to restart them. He confirms he is on the Eliquis and the Synthroid now but is not taking anything else. His blood pressures have been well managed without the lisinopril and coreg.  Patient denies shortness of breath, cough, chest pains, palpitations, dizziness, urinary difficulties or changes. He is having regular bowel movements using a daily regimen of metamucil and prune juice. He feels his appetite is mildly improved but they are concerned about what are the best foods to feed him are. He is using walker and or cane. Standing he feels unbalanced. Vital signs performed by SLP immediately prior to my visit: BP-120/68 HR-82, RR-18, Temp-97.6, O2- 94% Pain- 0/10         Assessment and Plan:    Primary Problem #1: Interstitial Lung Disease     Plan: Stable rales to RLL. Cough has resolved. Patient does not require oxygen at this time. Using  humidifier. He does use CPAP at night and reports very good compliance. Patient Pulmonary appointment scheduled 5/6. Home Health to continue monitoring patient.      Primary Problem #2: Protein calorie malnutrition     Plan: Patient had an 18 lb weight loss between 2/20/25 and hospitalization date 3/14/25. He has gained a few pounds back. The patient and his wife are concerned about what foods he should prioritize and what is most efficient and his appetite is minimal. Albumin inpatient 3.0. Will refer dietician through home health.     No f/u scheduled with TCM but I am available as needed through service period end date 4/13/25.      Other major issues not addressed during today's visit: Afib, hypertension,     Pertinent Physical Exam Findings:    Physical Exam  Constitutional:       General: He is not in acute distress.     Appearance: Normal appearance.   Cardiovascular:      Rate and Rhythm: Normal rate and regular rhythm.   Pulmonary:      Effort: Pulmonary effort is normal.      Breath sounds: Examination of the right-lower field reveals rales. Rales present.   Abdominal:      Palpations: Abdomen is soft.   Skin:     General: Skin is warm and dry.   Neurological:      Mental Status: He is alert and oriented to person, place, and time.   Psychiatric:         Mood and Affect: Mood normal.         Behavior: Behavior normal.          Current Medications:    Current Outpatient Medications:     Omega-3 Fatty Acids (FISH OIL) 1200 MG Cap, Take 1,200 mg by mouth every day. Indications: supplement, Disp: , Rfl:     ketotifen (ZADITOR) 0.035 % ophthalmic solution, Administer 1 Drop into both eyes as needed (allergies). Indications: Inflammation of Eyelid Lining due to Allergy, Disp: , Rfl:     pravastatin (PRAVACHOL) 40 MG tablet, TAKE 1 TABLET BY MOUTH EVERY DAY. INDICATIONS: HIGH AMOUNT OF FATS IN THE BLOOD, Disp: 100 Tablet, Rfl: 3    omeprazole (PRILOSEC) 40 MG delayed-release capsule, TAKE 1 CAPSULE BY MOUTH EVERY  DAY. INDICATIONS: HEARTBURN, Disp: 100 Capsule, Rfl: 3    carvedilol (COREG) 25 MG Tab, TAKE 1 TABLET BY MOUTH TWICE A DAY FOR HIGH BLOOD PRESSURE DISORDER (Patient not taking: No sig reported), Disp: 200 Tablet, Rfl: 3    lisinopril (PRINIVIL) 10 MG Tab, TAKE 1 TABLET BY MOUTH EVERY DAY FOR HIGH BLOOD PRESSURE (Patient not taking: Reported on 4/3/2025), Disp: 100 Tablet, Rfl: 3    fluticasone (FLONASE) 50 MCG/ACT nasal spray, SPRAY 2 SPRAYS INTO AFFECTED NOSTRIL(S) TWICE A DAY, Disp: 96 mL, Rfl: 0    ELIQUIS 5 MG Tab, TAKE 1 TABLET BY MOUTH TWICE A DAY (Patient taking differently: Take 1 Tablet by mouth 2 times a day. Not taking -reported 3/26/25  Indications: Atrial Fibrillation), Disp: 200 Tablet, Rfl: 1    levothyroxine (SYNTHROID) 75 MCG Tab, TAKE 1 TABLET BY MOUTH EVERY MORNING ON AN EMPTY STOMACH. INDICATIONS: UNDERACTIVE THYROID, Disp: 100 Tablet, Rfl: 3    Cholecalciferol (D3) 2000 UNIT Tab, Take 2,000 Units by mouth every day. Indications: supplement, Disp: , Rfl:     Medication Allergies:  Other environmental    Thank you for allowing me the opportunity to participate in the care of Farhat Stahl    This is a moderate medical complexity visit, which in addition to above, included, if applicable, medication reconciliation and management, obtaining and reviewing discharge information, reviewing the need for diagnostic tests/treatments and/or follow-up on pending diagnostic tests/treatments, medical education, establishing or re-establishing referrals with community providers and services and assistance with scheduling follow-up visits with providers and services.      [facility]  [id number] [place] [id number]  P. [phone]  F. [phone]  Available on Voalte

## 2025-04-08 NOTE — CASE COMMUNICATION
noted  ----- Message -----  From: Irineo Sanderson, PT  Sent: 4/6/2025   6:01 PM PDT  To: Rachel Bird R.N.; SARAHI Charles, CEmmanuelleN.AEmmanuelle; *      PT evaluation completed, requesting follow up  visits with frequency of 1w3 effective 04/04/2025.

## 2025-04-09 ENCOUNTER — HOME CARE VISIT (OUTPATIENT)
Dept: HOME HEALTH SERVICES | Facility: HOME HEALTHCARE | Age: 85
End: 2025-04-09
Payer: MEDICARE

## 2025-04-09 PROCEDURE — G0151 HHCP-SERV OF PT,EA 15 MIN: HCPCS

## 2025-04-09 NOTE — CASE COMMUNICATION
Patient was discussed in IDT today due to recent admission. Discussed progress toward goals of care with the treatment team. The treatment team currently involves the following disciplines: nursing, OT, PT, RD and SLP. Plan is to discharge from home health services when goals met.

## 2025-04-10 ENCOUNTER — HOME CARE VISIT (OUTPATIENT)
Dept: HOME HEALTH SERVICES | Facility: HOME HEALTHCARE | Age: 85
End: 2025-04-10
Payer: MEDICARE

## 2025-04-10 VITALS
HEART RATE: 74 BPM | TEMPERATURE: 98.2 F | OXYGEN SATURATION: 95 % | SYSTOLIC BLOOD PRESSURE: 120 MMHG | DIASTOLIC BLOOD PRESSURE: 72 MMHG | RESPIRATION RATE: 96 BRPM

## 2025-04-10 VITALS
RESPIRATION RATE: 18 BRPM | HEART RATE: 85 BPM | OXYGEN SATURATION: 95 % | WEIGHT: 228 LBS | BODY MASS INDEX: 30.92 KG/M2 | TEMPERATURE: 97.4 F | SYSTOLIC BLOOD PRESSURE: 124 MMHG | DIASTOLIC BLOOD PRESSURE: 70 MMHG

## 2025-04-10 PROCEDURE — G0495 RN CARE TRAIN/EDU IN HH: HCPCS

## 2025-04-10 ASSESSMENT — ENCOUNTER SYMPTOMS
BOWEL PATTERN NORMAL: 1
DESCRIPTION OF MEMORY LOSS: SHORT TERM
FATIGUE: 1
MUSCLE WEAKNESS: 1
STOOL FREQUENCY: LESS THAN DAILY
DENIES PAIN: 1
DENIES PAIN: 1
LAST BOWEL MOVEMENT: 67303

## 2025-04-10 ASSESSMENT — FIBROSIS 4 INDEX: FIB4 SCORE: 1.28

## 2025-04-10 ASSESSMENT — PATIENT HEALTH QUESTIONNAIRE - PHQ9: CLINICAL INTERPRETATION OF PHQ2 SCORE: 0

## 2025-04-11 ENCOUNTER — TELEPHONE (OUTPATIENT)
Dept: HOME HEALTH SERVICES | Facility: HOME HEALTHCARE | Age: 85
End: 2025-04-11
Payer: MEDICARE

## 2025-04-11 NOTE — TELEPHONE ENCOUNTER
Farhat is discharging from Home Transitional Care Management on 4/13/2025. Dr. GEETHA Zavaleta  to continue [facility] care.

## 2025-04-12 ASSESSMENT — ENCOUNTER SYMPTOMS: DENIES PAIN: 1

## 2025-04-15 ENCOUNTER — HOME CARE VISIT (OUTPATIENT)
Dept: HOME HEALTH SERVICES | Facility: HOME HEALTHCARE | Age: 85
End: 2025-04-15
Payer: MEDICARE

## 2025-04-15 VITALS
TEMPERATURE: 97.4 F | OXYGEN SATURATION: 97 % | BODY MASS INDEX: 30.65 KG/M2 | HEART RATE: 74 BPM | DIASTOLIC BLOOD PRESSURE: 58 MMHG | WEIGHT: 226 LBS | SYSTOLIC BLOOD PRESSURE: 132 MMHG | RESPIRATION RATE: 18 BRPM

## 2025-04-15 PROCEDURE — G0495 RN CARE TRAIN/EDU IN HH: HCPCS

## 2025-04-15 ASSESSMENT — ENCOUNTER SYMPTOMS
STOOL FREQUENCY: LESS THAN DAILY
BOWEL PATTERN NORMAL: 1
DESCRIPTION OF MEMORY LOSS: SHORT TERM
LAST BOWEL MOVEMENT: 67308
DENIES PAIN: 1

## 2025-04-15 ASSESSMENT — FIBROSIS 4 INDEX: FIB4 SCORE: 1.28

## 2025-04-16 ENCOUNTER — HOME CARE VISIT (OUTPATIENT)
Dept: HOME HEALTH SERVICES | Facility: HOME HEALTHCARE | Age: 85
End: 2025-04-16
Payer: MEDICARE

## 2025-04-16 VITALS
WEIGHT: 225 LBS | TEMPERATURE: 97.7 F | HEART RATE: 72 BPM | DIASTOLIC BLOOD PRESSURE: 72 MMHG | BODY MASS INDEX: 30.48 KG/M2 | SYSTOLIC BLOOD PRESSURE: 125 MMHG | HEIGHT: 72 IN | RESPIRATION RATE: 16 BRPM | OXYGEN SATURATION: 95 %

## 2025-04-16 PROCEDURE — G0151 HHCP-SERV OF PT,EA 15 MIN: HCPCS

## 2025-04-16 ASSESSMENT — ACTIVITIES OF DAILY LIVING (ADL)
OASIS_M1830: 01
HOME_HEALTH_OASIS: 00

## 2025-04-16 ASSESSMENT — PATIENT HEALTH QUESTIONNAIRE - PHQ9: CLINICAL INTERPRETATION OF PHQ2 SCORE: 0

## 2025-04-16 ASSESSMENT — FIBROSIS 4 INDEX: FIB4 SCORE: 1.28

## 2025-04-16 ASSESSMENT — ENCOUNTER SYMPTOMS: DENIES PAIN: 1

## 2025-04-16 NOTE — CASE COMMUNICATION
Falls Template  4/11-2pm           Cause of fall:  Mechanical           Location:  Outside           Was the fall witnessed?                  If yes, by who? No            Actions taken by patient:  Was pushing standing walker with one hand whlie using cane in other. Wheels got hung up on clump of grass & fell. Able to get to knees but son had to assist up            Any new injury?                   If yes, what kind?  No             Any recent medication changes?    No            Did patient have appropriate DME available?  Yes                 If no, please explain:              Actions Taken: Notified care team and Notified MD  Family has restricted him from walking in backyard alone.

## 2025-04-17 ENCOUNTER — HOME CARE VISIT (OUTPATIENT)
Dept: HOME HEALTH SERVICES | Facility: HOME HEALTHCARE | Age: 85
End: 2025-04-17
Payer: MEDICARE

## 2025-04-17 DIAGNOSIS — I10 PRIMARY HYPERTENSION: ICD-10-CM

## 2025-04-17 DIAGNOSIS — I48.20 CHRONIC ATRIAL FIBRILLATION (HCC): ICD-10-CM

## 2025-04-21 ENCOUNTER — PATIENT OUTREACH (OUTPATIENT)
Dept: HEALTH INFORMATION MANAGEMENT | Facility: OTHER | Age: 85
End: 2025-04-21
Payer: MEDICARE

## 2025-04-21 NOTE — PROGRESS NOTES
CHRADHA Caballero tried to contact patient over phone to introduce self and CCM services, but patient did not answer and CHW was sent to voicemail. CHW left a detailed voicemail with contact information.     CHW will try and reach out again later in the week.

## 2025-04-22 ENCOUNTER — APPOINTMENT (OUTPATIENT)
Dept: PALLIATIVE MEDICINE | Facility: HOSPICE | Age: 85
End: 2025-04-22
Payer: MEDICARE

## 2025-04-22 DIAGNOSIS — J84.9 INTERSTITIAL LUNG DISEASE (HCC): Chronic | ICD-10-CM

## 2025-04-22 PROCEDURE — 99350 HOME/RES VST EST HIGH MDM 60: CPT | Mod: 25

## 2025-04-22 PROCEDURE — G0318 PR PROLONG HOME E&M ADDL 15 MIN: HCPCS

## 2025-04-22 PROCEDURE — 99497 ADVNCD CARE PLAN 30 MIN: CPT

## 2025-04-23 NOTE — PROGRESS NOTES
In-Home Palliative Medicine Evaluation      Farhat Stahl  85 y.o.  Male   MRN [id number]  PCP Stefanie Zavaleta M.D.  Referral Source: Rahul Doe MD  Location: Patient's residence    Reason for palliative medicine consultation and/or visit: Goals of care, symptom management    Assessment and Plan:    Summary: Farhat is an 86 y/o male with interstitial lung disease. He does not currently have significant or distressing symptoms currently. He was recently hospitalized for a small pneumothorax and was treated conservativley with oxygen and observation. He is back home and doing well with his supportive wife. Will f/u in 30 days to complete POLST and continue goal of care discussions.     Primary diagnosis: Interstitial lung disease, recurrent pneumothorax.     Prognosis: PPS 70-80%    Physical aspects of care:    Pain: Farhat denies any distressing pain at this time.     SOB: Endorses some SOB that limits his ability to participate in ADL's, however he does not consider it distressing at this point. He does not have symptoms at rest.     Psychological aspects of care:    Anxiety/Depression: Denies symptoms at this time.     Social aspects of care:  Lives with wife at home. He is a retired . His wife is supportive in his care and able to assist with ADL's, although she is elderly and has her own medical issues as well.     Spiritual aspects of care:  Did not discuss    Goals of care:  Stay home as much as possible, complete POLST next visit, avoid institutionalizations.     Other Pertinent Medical History OR Surgery Not Listed Above:   A-fib on eliquis,     Physical Exam  Constitutional:       General: He is not in acute distress.     Appearance: He is not ill-appearing.   Pulmonary:      Effort: Pulmonary effort is normal. No respiratory distress.   Abdominal:      Tenderness: There is no guarding.   Skin:     General: Skin is warm and dry.      Coloration: Skin is pale.   Neurological:      Mental  Status: He is alert.      Motor: Weakness present.      Gait: Gait abnormal.   Psychiatric:         Mood and Affect: Mood normal.         Behavior: Behavior normal.         Thought Content: Thought content normal.         Judgment: Judgment normal.         Advance care planning:    The patient and/or legal decision maker has provided voluntary consent to discuss advance care planning. We discussed goals of care. The following documents were discussed POLST. Total time spent in ACP discussion 30 minutes, which is separate from the time spent completing the evaluation and management visit.       Current Medications:    Current Outpatient Medications:     Omega-3 Fatty Acids (FISH OIL) 1200 MG Cap, Take 1,200 mg by mouth every day. Indications: supplement, Disp: , Rfl:     ketotifen (ZADITOR) 0.035 % ophthalmic solution, Administer 1 Drop into both eyes as needed (allergies). Indications: Inflammation of Eyelid Lining due to Allergy, Disp: , Rfl:     pravastatin (PRAVACHOL) 40 MG tablet, TAKE 1 TABLET BY MOUTH EVERY DAY. INDICATIONS: HIGH AMOUNT OF FATS IN THE BLOOD (Patient not taking: Reported on 4/8/2025), Disp: 100 Tablet, Rfl: 3    omeprazole (PRILOSEC) 40 MG delayed-release capsule, TAKE 1 CAPSULE BY MOUTH EVERY DAY. INDICATIONS: HEARTBURN (Patient not taking: Reported on 4/8/2025), Disp: 100 Capsule, Rfl: 3    carvedilol (COREG) 25 MG Tab, TAKE 1 TABLET BY MOUTH TWICE A DAY FOR HIGH BLOOD PRESSURE DISORDER (Patient not taking: No sig reported), Disp: 200 Tablet, Rfl: 3    lisinopril (PRINIVIL) 10 MG Tab, TAKE 1 TABLET BY MOUTH EVERY DAY FOR HIGH BLOOD PRESSURE (Patient not taking: Reported on 4/3/2025), Disp: 100 Tablet, Rfl: 3    fluticasone (FLONASE) 50 MCG/ACT nasal spray, SPRAY 2 SPRAYS INTO AFFECTED NOSTRIL(S) TWICE A DAY, Disp: 96 mL, Rfl: 0    ELIQUIS 5 MG Tab, TAKE 1 TABLET BY MOUTH TWICE A DAY, Disp: 200 Tablet, Rfl: 1    levothyroxine (SYNTHROID) 75 MCG Tab, TAKE 1 TABLET BY MOUTH EVERY MORNING ON AN  EMPTY STOMACH. INDICATIONS: UNDERACTIVE THYROID, Disp: 100 Tablet, Rfl: 3    Cholecalciferol (D3) 2000 UNIT Tab, Take 2,000 Units by mouth every day. Indications: supplement, Disp: , Rfl:     Medication Allergies:  Other environmental    Thank you for allowing me the opportunity to participate in the care of Farhat Stahl    I spent a total of 120 minutes reviewing medical records, direct face-to-face time with the patient and/or family, documentation and coordination of care. This is separate from the time spent on advance care planning, which is documented above.       Zaheer Soliman, APRN  Home Health and Palliative Medicine  [id number] [place]  [id number]  P: [phone]  F: [phone]

## 2025-04-24 ENCOUNTER — PATIENT OUTREACH (OUTPATIENT)
Dept: HEALTH INFORMATION MANAGEMENT | Facility: OTHER | Age: 85
End: 2025-04-24
Payer: MEDICARE

## 2025-04-24 NOTE — PROGRESS NOTES
CHW Ada called Farhat to introduce self and services. Pt states that he is doing okay taking care of his health and doesn't need CCM services at this time. CHW provided contact information, and pt reassured CHW that he would reach out if he needs anything.

## 2025-05-06 ENCOUNTER — OFFICE VISIT (OUTPATIENT)
Dept: SLEEP MEDICINE | Facility: MEDICAL CENTER | Age: 85
End: 2025-05-06
Attending: NURSE PRACTITIONER
Payer: MEDICARE

## 2025-05-06 VITALS
OXYGEN SATURATION: 90 % | WEIGHT: 226 LBS | BODY MASS INDEX: 30.61 KG/M2 | SYSTOLIC BLOOD PRESSURE: 110 MMHG | HEART RATE: 78 BPM | DIASTOLIC BLOOD PRESSURE: 62 MMHG | HEIGHT: 72 IN

## 2025-05-06 DIAGNOSIS — G47.33 OSA ON CPAP: Chronic | ICD-10-CM

## 2025-05-06 DIAGNOSIS — Z78.9 NONSMOKER: ICD-10-CM

## 2025-05-06 DIAGNOSIS — J90 PLEURAL EFFUSION: ICD-10-CM

## 2025-05-06 DIAGNOSIS — Z09 HOSPITAL DISCHARGE FOLLOW-UP: ICD-10-CM

## 2025-05-06 DIAGNOSIS — J84.9 INTERSTITIAL LUNG DISEASE (HCC): Chronic | ICD-10-CM

## 2025-05-06 PROCEDURE — 99215 OFFICE O/P EST HI 40 MIN: CPT | Performed by: NURSE PRACTITIONER

## 2025-05-06 PROCEDURE — 99214 OFFICE O/P EST MOD 30 MIN: CPT | Performed by: NURSE PRACTITIONER

## 2025-05-06 PROCEDURE — 94761 N-INVAS EAR/PLS OXIMETRY MLT: CPT | Performed by: NURSE PRACTITIONER

## 2025-05-06 ASSESSMENT — FIBROSIS 4 INDEX: FIB4 SCORE: 1.28

## 2025-05-06 NOTE — PATIENT INSTRUCTIONS
Complete Chest xray this week    Resume CPAP nightly use    Start daily breathing exercises with strengthening exercises at home to keep more fit

## 2025-05-06 NOTE — PROCEDURES
Multi-Ox Readings  Multi Ox #1 Room air   O2 sat % at rest 94   O2 sat % on exertion 92   O2 sat average on exertion 92   Multi Ox #2     O2 sat % at rest     O2 sat % on exertion     O2 sat average on exertion       Oxygen Use     Oxygen Frequency     Duration of need     Is the patient mobile within the home?     CPAP Use?     BIPAP Use?     Servo Titration

## 2025-05-06 NOTE — PROGRESS NOTES
Chief Complaint   Patient presents with    Follow-Up     Dx/Last seen:  Interstitial lung disease (HCC) 12/6/23 Shaina Fernandez     O2 or/and CPAP: Auto CPAP 10 to 16 cm  Wireless- [facility]med-Compliance uploaded  Set up date: 5/24/21     Other     Wants to discuss if you want him to go back on the CPAP he was told to get off it when he went to the ER        HPI:  Farhat Stahl is a 85 y.o. year old male here today for follow-up on recurrent pleural effusion and MARCI. Last OV 12/6/23 with me     CT chest 3/11/25:  1.  Small anterior right pneumothorax  2.  Linear densities the bilateral lung bases, right greater than left, appearance compatible with changes of atelectasis, component of the left lower lobe infiltrate not excluded.  3.  Small layering bilateral pleural effusions  4.  Cholelithiasis  5.  Atherosclerosis and atherosclerotic coronary artery disease  6.  Pulmonary nodule, see nodule follow-up recommendations below.    CXR 3/21/25:  1.  Mildly increased bibasilar pleural effusions.  2.  Lung base atelectasis with pneumonia not excluded.    Reviewed all results with patient.     MMRC stGstrstastdstest:st st1st Exacerbations this year: 0    Currently not on bronchodilators or O2 - has not been warranted.  Prescribed Autocpap 10-16cm; RESMED device  Compliance report 4/5/25-5/4/25 indicates 18 nights of use, avg nightly use of 5.5hrs, mean pressure 15.1cm, significant mask leak with reduced AHi 3.2/hr. Reviewed with patient.    Interval Events:  5/6/25: She was hospitalized March 2025 with bilateral pleural effusions noted in right pneumothorax on CT scan of chest.  He had increased shortness of breath and cough.  Thoracic surgery Dr. Brewster was consulted but did not feel surgical procedure was advised.  Monitoring was done with chest x-rays and patient's vital signs.  He maintained oxygenation levels without assistance well.  He did resume CPAP after hospital stay and then stopped in April since he was unsure of benefit  but  denies any respiratory complaints when he did so.  Palliative care has now consulted with patient.    PULM & SLEEP HX:  Never smoker  Patient establish care January 2021 for left pleural effusion.  On chest x-ray large left-sided pleural effusion with some loculation noted.  Admitted for thoracentesis which showed lymphocytic predominant exudative effusion.  92% lymphocytes.  LDH and total protein elevated consistent exudate.  Cytology was negative.  No growth of bacterial fungal or AFB cultures.  Repeat chest x-ray showed recurrence of loculated left pleural effusion but not quite as severe.  Some bilateral reticular changes noted on CT chest noting early ILD changes.  Repeat thoracentesis noted lymphocytic predominant exudative effusion cytology negative for malignancy.  Lymphocytes 88%, mild eosinophilia on last thoracentesis at 12% but not initial thoracentesis.  Connective tissue disease screening was unremarkable specifically with FAVIO negative, RA factor negative, CCP negative and QFT be negative.  Repeat CT chest March 2021 with high-resolution noted peripheral reticular thickening bilateral upper and lower lobes with mild bronchitis but no clear honeycombing and only small amount of loculated fluid on the left slightly less predominant.  CT chest September 2021 noted no significant changes in ILD findings with persistent left lower lobe effusion and associated atelectasis.  PFT 1/26/2022 notes proportional reduction in FVC and FEV1 with no significant bronchodilator response.  No obstruction noted.  Severe restrictive ventilatory defect with TLC 4.11 L or 56%.  Borderline mild diffusion impairment.    HRCT 3/12/2021:  1.  There is nonspecific diffuse subpleural interstitial opacity most consistent with UIP.  2.  There has been interval decrease in the loculated left pleural effusion with minimal residual pleural fluid remaining.  3.  There is left lower lobe rounded atelectasis with additional areas of  peripheral atelectasis in the lingula and left upper lobe.  4.  There is evidence of previous granulomatous inflammatory process with calcified granulomata.  5.  There is no change in 2 small less than 5 mm peripheral right middle lobe lung nodules.     PSG split-night 3/21/2015 noted severe sleep apnea with overall AHI of 57.0/h, supine AHI 77.9/h, O2 joshua 81%, less than 89% saturation for 78.5% of sleep time.  During treatment portion titrated on CPAP 5 to 11 cm.  Significant improvement on CPAP 11 cm with reduced AHI and REM sleep achieved.    ROS: As per HPI and otherwise negative if not stated.    Past Medical History:   Diagnosis Date    Anemia associated with nutritional deficiency 03/06/2025    Arrhythmia     Atrial fribrillation    Arthritis     Atrial fibrillation (HCC)     Bronchitis 2004    Chronic atrial fibrillation (HCC) 07/12/2024    Chronic pain of right knee 06/16/2022    Falls 11/27/2024    Fibula fracture 1981    right    High cholesterol     Hyperlipidemia     Hypertension     Lab test positive for detection of COVID-19 virus 08/11/2022    MEDICAL HOME     Multiple pulmonary nodules 12/15/2021    Onychogryposis of toenail 01/21/2021    Onychomycosis 01/21/2021    Pain     low back    Pneumonia 2004    Pneumothorax on right 03/19/2025    Rash 06/16/2022    Sleep apnea     Thrombocytopenia (HCC) 11/30/2023    Unintentional weight loss 11/27/2024       Past Surgical History:   Procedure Laterality Date    HAMMERTOE CORRECTION  2/2/2015    Performed by JOSÉ MIGUEL Choi.P.MEmmanuelle at [facility] ORS    EXOSTOSIS EXCISION  6/3/2011    Performed by ABDIFATAH YOUNG at [facility] ORS    LESION EXCISION ORTHO  6/3/2011    Performed by ABDIFATAH YOUNG at [facility] ORS    TOE ARTHROPLASTY  6/3/2011    Performed by ABDIFATAH YOUNG at [facility] ORS    LUMBAR LAMINECTOMY DISKECTOMY  2005    microscopic    TONSILLECTOMY  1945    adenoidectomy    EYE SURGERY  Bilateral     cataracts       Family History   Problem Relation Age of Onset    Cancer Mother         cervical/breast    Hypertension Father     Cancer Father         lung cancer    Cancer Sister         lung    Cancer Other     Heart Disease Neg Hx     Heart Attack Neg Hx        Social History     Socioeconomic History    Marital status:      Spouse name: Not on file    Number of children: Not on file    Years of education: Not on file    Highest education level: Not on file   Occupational History    Not on file   Tobacco Use    Smoking status: Never    Smokeless tobacco: Never   Vaping Use    Vaping status: Never Used   Substance and Sexual Activity    Alcohol use: Yes     Alcohol/week: 1.2 - 1.8 oz     Types: 2 - 3 Standard drinks or equivalent per week     Comment: occ    Drug use: Never    Sexual activity: Not Currently   Other Topics Concern    Not on file   Social History Narrative    Not on file     Social Drivers of Health     Financial Resource Strain: Not on file   Food Insecurity: No Food Insecurity (3/12/2025)    Hunger Vital Sign     Worried About Running Out of Food in the Last Year: Never true     Ran Out of Food in the Last Year: Never true   Transportation Needs: No Transportation Needs (3/12/2025)    PRAPARE - Transportation     Lack of Transportation (Medical): No     Lack of Transportation (Non-Medical): No   Physical Activity: Not on file   Stress: Not on file   Social Connections: Feeling Socially Integrated (4/16/2025)    OASIS : Social Isolation     Frequency of experiencing loneliness or isolation: Never   Intimate Partner Violence: Not At Risk (3/12/2025)    Humiliation, Afraid, Rape, and Kick questionnaire     Fear of Current or Ex-Partner: No     Emotionally Abused: No     Physically Abused: No     Sexually Abused: No   Housing Stability: Low Risk  (3/12/2025)    Housing Stability Vital Sign     Unable to Pay for Housing in the Last Year: No     Number of Times Moved in the  Last Year: 1     Homeless in the Last Year: No       Allergies as of 05/06/2025 - Reviewed 05/06/2025   Allergen Reaction Noted    Other environmental Runny Nose and Itching 06/01/2011        Vitals:  /62   Pulse 78   Ht 1.829 m (6')   Wt 103 kg (226 lb)   SpO2 90%     Current medications as of today   Current Outpatient Medications   Medication Sig Dispense Refill    Omega-3 Fatty Acids (FISH OIL) 1200 MG Cap Take 1,200 mg by mouth every day. Indications: supplement      ketotifen (ZADITOR) 0.035 % ophthalmic solution Administer 1 Drop into both eyes as needed (allergies). Indications: Inflammation of Eyelid Lining due to Allergy      fluticasone (FLONASE) 50 MCG/ACT nasal spray SPRAY 2 SPRAYS INTO AFFECTED NOSTRIL(S) TWICE A DAY 96 mL 0    ELIQUIS 5 MG Tab TAKE 1 TABLET BY MOUTH TWICE A  Tablet 1    levothyroxine (SYNTHROID) 75 MCG Tab TAKE 1 TABLET BY MOUTH EVERY MORNING ON AN EMPTY STOMACH. INDICATIONS: UNDERACTIVE THYROID 100 Tablet 3    Cholecalciferol (D3) 2000 UNIT Tab Take 2,000 Units by mouth every day. Indications: supplement       No current facility-administered medications for this visit.         Physical Exam:   Gen:           Alert and oriented, No apparent distress. Mood and affect appropriate, normal interaction with examiner.  Eyes:          PERRL, EOM intact, sclere white, conjunctive moist. Glasses.  Ears:          Not examined.   Hearing:     Grossly intact.  Nose:          Normal, no lesions or deformities.  Dentition:    Not examined.   Oropharynx:   Not examined.   Mallampati Classification: Not examined.   Neck:        Supple, trachea midline, no masses.  Respiratory Effort: No intercostal retractions or use of accessory muscles.   Lung Auscultation:      ILD rales to RLL and minimal to left. Upper lobes clear.  CV:            Regular rate and rhythm. No murmurs, rubs or gallops.  Abd:           Not examined.   Lymphadenopathy: Not examined.   Gait and  Station: walker  Digits and Nails: No clubbing, cyanosis, petechiae, or nodes.   Cranial Nerves: II-XII grossly intact.  Skin:        No rashes, lesions or ulcers noted.               Ext:           No cyanosis or edema.      Assessment:  1. Hospital discharge follow-up        2. Interstitial lung disease (HCC)  Multiple Oximetry    DX-CHEST-2 VIEWS    Multiple Oximetry      3. Pleural effusion  DX-CHEST-2 VIEWS      4. MARCI on CPAP  DME Mask and Supplies      5. BMI 30.0-30.9,adult  HEIGHT AND WEIGHT      6. Nonsmoker                 Immunizations:    Flu:10/2024  Pneumovax 23:2015  Prevnar 13:2016  PCV 20: not due  COVID-19: 10/2024    Plan:  Patient is stable since hospital DC and in consult with palliative care.   Stable at this time; defer PFT since it will not . Oxygen levels assessed and normal on RA today.   Multi ox  CXR now to reassess pleural effusion.   CXR  Patient may resume CPAP but it is not life prolonging therapy. He has been sleeping well without it so reviewed pathophysiology for benefit.   DME mask/supplies  Encourage healthy diet and strengthening exercises for conditioning.  Follow up in 6 mos to review symptoms with compliance, sooner if needed.    Please note that this dictation was created using voice recognition software. I have made every reasonable attempt to correct obvious errors, but it is possible there are errors of grammar and possibly content that I did not discover before finalizing the note.

## 2025-05-08 ENCOUNTER — APPOINTMENT (OUTPATIENT)
Dept: RADIOLOGY | Facility: MEDICAL CENTER | Age: 85
End: 2025-05-08
Attending: NURSE PRACTITIONER
Payer: MEDICARE

## 2025-05-08 DIAGNOSIS — J90 PLEURAL EFFUSION: ICD-10-CM

## 2025-05-08 DIAGNOSIS — J84.9 INTERSTITIAL LUNG DISEASE (HCC): Chronic | ICD-10-CM

## 2025-05-08 PROCEDURE — 71046 X-RAY EXAM CHEST 2 VIEWS: CPT

## 2025-05-12 ENCOUNTER — RESULTS FOLLOW-UP (OUTPATIENT)
Dept: SLEEP MEDICINE | Facility: MEDICAL CENTER | Age: 85
End: 2025-05-12

## 2025-05-20 ENCOUNTER — OFF SITE VISIT (OUTPATIENT)
Dept: PALLIATIVE MEDICINE | Facility: HOSPICE | Age: 85
End: 2025-05-20
Payer: MEDICARE

## 2025-05-20 DIAGNOSIS — J84.9 INTERSTITIAL LUNG DISEASE (HCC): Primary | Chronic | ICD-10-CM

## 2025-05-20 PROCEDURE — 99350 HOME/RES VST EST HIGH MDM 60: CPT

## 2025-05-20 PROCEDURE — G0318 PR PROLONG HOME E&M ADDL 15 MIN: HCPCS

## 2025-05-20 PROCEDURE — 99497 ADVNCD CARE PLAN 30 MIN: CPT | Mod: 25

## 2025-05-21 RX ORDER — FLUTICASONE PROPIONATE 50 MCG
1 SPRAY, SUSPENSION (ML) NASAL DAILY
Qty: 16 G | Refills: 5 | Status: SHIPPED | OUTPATIENT
Start: 2025-05-21

## 2025-05-22 ENCOUNTER — APPOINTMENT (OUTPATIENT)
Dept: MEDICAL GROUP | Facility: PHYSICIAN GROUP | Age: 85
End: 2025-05-22
Payer: MEDICARE

## 2025-05-22 ENCOUNTER — HOSPITAL ENCOUNTER (OUTPATIENT)
Facility: MEDICAL CENTER | Age: 85
End: 2025-05-22
Attending: INTERNAL MEDICINE
Payer: MEDICARE

## 2025-05-22 VITALS
HEIGHT: 72 IN | OXYGEN SATURATION: 97 % | BODY MASS INDEX: 30.41 KG/M2 | TEMPERATURE: 99 F | HEART RATE: 86 BPM | SYSTOLIC BLOOD PRESSURE: 128 MMHG | WEIGHT: 224.5 LBS | DIASTOLIC BLOOD PRESSURE: 62 MMHG | RESPIRATION RATE: 16 BRPM

## 2025-05-22 DIAGNOSIS — R63.4 UNINTENTIONAL WEIGHT LOSS: ICD-10-CM

## 2025-05-22 DIAGNOSIS — D53.9 ANEMIA ASSOCIATED WITH NUTRITIONAL DEFICIENCY: Primary | ICD-10-CM

## 2025-05-22 DIAGNOSIS — D53.9 ANEMIA ASSOCIATED WITH NUTRITIONAL DEFICIENCY: ICD-10-CM

## 2025-05-22 DIAGNOSIS — L72.0 EPIDERMOID CYST OF SKIN OF BACK: ICD-10-CM

## 2025-05-22 DIAGNOSIS — J84.9 INTERSTITIAL LUNG DISEASE (HCC): Chronic | ICD-10-CM

## 2025-05-22 PROBLEM — R09.81 NASAL CONGESTION: Status: ACTIVE | Noted: 2025-05-22

## 2025-05-22 LAB
ALBUMIN SERPL BCP-MCNC: 4 G/DL (ref 3.2–4.9)
ALBUMIN/GLOB SERPL: 1.3 G/DL
ALP SERPL-CCNC: 133 U/L (ref 30–99)
ALT SERPL-CCNC: 18 U/L (ref 2–50)
ANION GAP SERPL CALC-SCNC: 11 MMOL/L (ref 7–16)
AST SERPL-CCNC: 18 U/L (ref 12–45)
BASOPHILS # BLD AUTO: 0.7 % (ref 0–1.8)
BASOPHILS # BLD: 0.05 K/UL (ref 0–0.12)
BILIRUB SERPL-MCNC: 0.4 MG/DL (ref 0.1–1.5)
BUN SERPL-MCNC: 15 MG/DL (ref 8–22)
CALCIUM ALBUM COR SERPL-MCNC: 9.5 MG/DL (ref 8.5–10.5)
CALCIUM SERPL-MCNC: 9.5 MG/DL (ref 8.5–10.5)
CHLORIDE SERPL-SCNC: 102 MMOL/L (ref 96–112)
CO2 SERPL-SCNC: 25 MMOL/L (ref 20–33)
CREAT SERPL-MCNC: 1.21 MG/DL (ref 0.5–1.4)
EOSINOPHIL # BLD AUTO: 0.21 K/UL (ref 0–0.51)
EOSINOPHIL NFR BLD: 2.8 % (ref 0–6.9)
ERYTHROCYTE [DISTWIDTH] IN BLOOD BY AUTOMATED COUNT: 51.2 FL (ref 35.9–50)
FASTING STATUS PATIENT QL REPORTED: NORMAL
GFR SERPLBLD CREATININE-BSD FMLA CKD-EPI: 58 ML/MIN/1.73 M 2
GLOBULIN SER CALC-MCNC: 3.2 G/DL (ref 1.9–3.5)
GLUCOSE SERPL-MCNC: 76 MG/DL (ref 65–99)
HCT VFR BLD AUTO: 44.9 % (ref 42–52)
HGB BLD-MCNC: 15 G/DL (ref 14–18)
IMM GRANULOCYTES # BLD AUTO: 0.03 K/UL (ref 0–0.11)
IMM GRANULOCYTES NFR BLD AUTO: 0.4 % (ref 0–0.9)
LYMPHOCYTES # BLD AUTO: 1.3 K/UL (ref 1–4.8)
LYMPHOCYTES NFR BLD: 17.2 % (ref 22–41)
MCH RBC QN AUTO: 31.5 PG (ref 27–33)
MCHC RBC AUTO-ENTMCNC: 33.4 G/DL (ref 32.3–36.5)
MCV RBC AUTO: 94.3 FL (ref 81.4–97.8)
MONOCYTES # BLD AUTO: 0.7 K/UL (ref 0–0.85)
MONOCYTES NFR BLD AUTO: 9.3 % (ref 0–13.4)
NEUTROPHILS # BLD AUTO: 5.27 K/UL (ref 1.82–7.42)
NEUTROPHILS NFR BLD: 69.6 % (ref 44–72)
NRBC # BLD AUTO: 0 K/UL
NRBC BLD-RTO: 0 /100 WBC (ref 0–0.2)
PLATELET # BLD AUTO: 218 K/UL (ref 164–446)
PMV BLD AUTO: 9.1 FL (ref 9–12.9)
POTASSIUM SERPL-SCNC: 4.3 MMOL/L (ref 3.6–5.5)
PROT SERPL-MCNC: 7.2 G/DL (ref 6–8.2)
RBC # BLD AUTO: 4.76 M/UL (ref 4.7–6.1)
SODIUM SERPL-SCNC: 138 MMOL/L (ref 135–145)
WBC # BLD AUTO: 7.6 K/UL (ref 4.8–10.8)

## 2025-05-22 PROCEDURE — 3074F SYST BP LT 130 MM HG: CPT | Performed by: INTERNAL MEDICINE

## 2025-05-22 PROCEDURE — 85025 COMPLETE CBC W/AUTO DIFF WBC: CPT

## 2025-05-22 PROCEDURE — 80053 COMPREHEN METABOLIC PANEL: CPT

## 2025-05-22 PROCEDURE — 3078F DIAST BP <80 MM HG: CPT | Performed by: INTERNAL MEDICINE

## 2025-05-22 PROCEDURE — 36415 COLL VENOUS BLD VENIPUNCTURE: CPT

## 2025-05-22 PROCEDURE — 99214 OFFICE O/P EST MOD 30 MIN: CPT | Performed by: INTERNAL MEDICINE

## 2025-05-22 ASSESSMENT — FIBROSIS 4 INDEX: FIB4 SCORE: 1.28

## 2025-05-22 NOTE — PROGRESS NOTES
In-Home Palliative Medicine Evaluation      Farhat Stahl  85 y.o.  Male   MRN [id number]  PCP Stefanie Zavaleta M.D.  Referral Source: Rahul Doe MD  Location: Patient's residence    Reason for palliative medicine consultation and/or visit: Goals of care, symptom management    Assessment and Plan:    Summary: POLST in home and EMR, DNR, Comfort focused treatment only, NO artificial nutrition or tube feeds, IV fluids for 72 hours only.     Farhat is an 86 y/o male with interstitial lung disease. He does not currently have significant or distressing symptoms. He was recently hospitalized for a small pneumothorax and was treated conservativley with oxygen and observation. He is back home and doing well with his supportive wife.    Will follow up as needed, patient understands how and when to reach out to palliative care for further needs. No other concerns at this time, not actively following.     Primary diagnosis: Interstitial lung disease, recurrent pneumothorax.     Prognosis: PPS 70-80%    Physical aspects of care:    Pain: Farhat denies any distressing pain at this time.     SOB: Endorses some SOB that limits his ability to participate in ADL's, however he does not consider it distressing at this point. He does not have symptoms at rest.     Psychological aspects of care:    Anxiety/Depression: Denies symptoms at this time.     Social aspects of care:  Lives with wife at home. He is a retired . His wife is supportive in his care and able to assist with ADL's, although she is elderly and has her own medical issues as well.     Spiritual aspects of care:  Did not discuss    Goals of care:  Stay home as much as possible, complete POLST next visit, avoid institutionalizations.     Other Pertinent Medical History OR Surgery Not Listed Above:   A-fib on eliquis,     Advance care planning:    The patient and/or legal decision maker has provided voluntary consent to discuss advance care planning. We  discussed goals of care, POLST. The following documents were completed: POLST and are currently located in the home and EMR. Total time spent in ACP discussion 30 minutes, which is separate from the time spent completing the evaluation and management visit.     Physical Exam  Constitutional:       General: He is not in acute distress.     Appearance: He is not ill-appearing.   Pulmonary:      Effort: Pulmonary effort is normal. No respiratory distress.   Abdominal:      Tenderness: There is no guarding.   Skin:     General: Skin is warm and dry.      Coloration: Skin is pale.   Neurological:      Mental Status: He is alert.      Motor: Weakness present.      Gait: Gait abnormal.   Psychiatric:         Mood and Affect: Mood normal.         Behavior: Behavior normal.         Thought Content: Thought content normal.         Judgment: Judgment normal.             Current Medications:  Current Medications[1]    Medication Allergies:  Other environmental    Thank you for allowing me the opportunity to participate in the care of Farhat Stahl    I spent a total of 90 minutes reviewing medical records, direct face-to-face time with the patient and/or family, documentation and coordination of care. This is separate from the time spent on advance care planning, which is documented above.       [facility]  [address] [place]  [address]  P: [phone]  F: [phone]           [1]   Current Outpatient Medications:     fluticasone (FLONASE) 50 MCG/ACT nasal spray, Administer 1 Spray into affected nostril(S) every day., Disp: 16 g, Rfl: 5    Omega-3 Fatty Acids (FISH OIL) 1200 MG Cap, Take 1,200 mg by mouth every day. Indications: supplement, Disp: , Rfl:     ketotifen (ZADITOR) 0.035 % ophthalmic solution, Administer 1 Drop into both eyes as needed (allergies). Indications: Inflammation of Eyelid Lining due to Allergy, Disp: , Rfl:     ELIQUIS 5 MG Tab, TAKE 1  TABLET BY MOUTH TWICE A DAY, Disp: 200 Tablet, Rfl: 1    levothyroxine (SYNTHROID) 75 MCG Tab, TAKE 1 TABLET BY MOUTH EVERY MORNING ON AN EMPTY STOMACH. INDICATIONS: UNDERACTIVE THYROID, Disp: 100 Tablet, Rfl: 3    Cholecalciferol (D3) 2000 UNIT Tab, Take 2,000 Units by mouth every day. Indications: supplement, Disp: , Rfl:

## 2025-05-22 NOTE — Clinical Note
REFERRAL APPROVAL NOTICE         Sent on May 22, 2025                   Faraht Stahl  [address]  [place] [address]                   Dear Mr. Stahl,    After a careful review of the medical information and benefit coverage, [facility] has processed your referral. See below for additional details.    If applicable, you must be actively enrolled with your insurance for coverage of the authorized service. If you have any questions regarding your coverage, please contact your insurance directly.    REFERRAL INFORMATION   Referral #:  [id number]  Referred-To Department    Referred-By Provider:  Dermatology    Stefanie Zavaleta M.D.   [facility]      [address]  [place] [address]  [phone] [address]  [place] [id number] [phone]    Referral Start Date:  05/22/2025  Referral End Date:   05/22/2026             SCHEDULING  If you do not already have an appointment, please call [phone] to make an appointment.     MORE INFORMATION  If you do not already have a [employer] account, sign up at: [email or web address]  You can access your medical information, make appointments, see lab results, billing information, and more.  If you have questions regarding this referral, please contact  the [facility] Referrals department at:             [phone]. Monday - Friday 8:00AM - 5:00PM.     Sincerely,    [facility]

## 2025-05-22 NOTE — PROGRESS NOTES
CC: Cyst on back, ILD.    HPI:  Farhat presents with the following    1. Anemia associated with nutritional deficiency  Patient was noted to have lower hemoglobin levels in March.  Requesting CBC for follow-up.  In March, ferritin is a bit elevated, vitamin B12 and folate within normal limits.    2. Epidermoid cyst of skin of back  Patient complains of a chronic cyst on his mid back.  Would like to have that drained and requesting a referral to dermatology.  Denies any irritation, pain or drainage.    3. Interstitial lung disease (HCC)  Chronic.  Stable.  Followed by pulmonology.  Patient's status post pneumothorax, stable pleural effusion and MARCI now off of CPAP.  Patient monitoring his pulse ox at home which is always in the mid 90s.  Follow-up chest x-ray showed a stable small pleural effusion left greater than right.  Patient is trying to keep active, likes to walk in his backyard.  Using a cane however would like to get an electric tricycle for mobility outside.    4. Unintentional weight loss  11/27/2024:Patient has noticed that he has been continuously losing weight. Current weight is 239 pounds with a BMI of 32.43. Looking back in his records, his weight in May 2023 was 266 pounds. Patient states that his wife has become debilitated and not able to prepare meals and rely on microwave dinners for breakfast and dinner. He tries to drink a milkshake daily. His appetite is good. Patient denies any cardiopulmonary, GI, other neurologic symptoms. TSH within normal limits in July. No other lab abnormalities.      2/20/2025: Patient continues to lose weight.  Current weight is 228 pounds down from 239 pounds in November.  Starting weight was 266 pounds in May 2023.  Patient just received full dentures, all his teeth were pulled.  Continues to have an appetite.  No other complaints except some mild fatigue.  Colonoscopy completed in 2022 which showed 5 adenomatous polyps with a 3-year follow-up recommended.      3/5/2025: Patient's current weight 227 pounds, down 1 pound from last visit in February.  Chart review reveals a weight of 26 5 pounds in November 2023.  Patient's son Romaine states that his father has not had much of an appetite, tends to sleep throughout the day.  Patient and his wife do not cook and rely on processed food, microwavable products.     3/19/2025:.  Patient's current weight is 218 pounds, up 8 pounds since his hospitalization 1 week ago.  On our last visit, patient's weight was 227 pounds.  Patient will complete workup for unintentional weight loss.  Patient's appetite has improved, 8 his meals that were given to him in the hospital and his son is being proactive about providing nutritious meals for his parents.  Albumin low at 3.0.  SPEP negative.    5/22/2025:.  Weight has stabilized.  Currently 224 pounds and feeling well.      Patient Active Problem List    Diagnosis Date Noted    Nasal congestion 05/22/2025    Pneumothorax on right 03/19/2025    GERD (gastroesophageal reflux disease) 03/12/2025    Spontaneous pneumothorax 03/11/2025    Anemia associated with nutritional deficiency 03/06/2025    Falls 11/27/2024    Unintentional weight loss 11/27/2024    Chronic atrial fibrillation (HCC) 07/12/2024    Chronic anticoagulation 03/12/2024    Thrombocytopenia (HCC) 11/30/2023    Abnormal EKG 11/07/2023    At risk for falls 11/07/2023    Cellulitis of toe of left foot 06/15/2023    QT prolongation 06/13/2023    CKD (chronic kidney disease) stage 3, GFR 30-59 ml/min 06/13/2023    Chronic pain of right knee 06/16/2022    Cerebral atrophy (HCC) 03/25/2022    Atherosclerosis of aorta (HCC) 03/25/2022    Interstitial lung disease (HCC) 03/25/2022    Nonspecific abnormal cardiovascular function study 03/25/2022    Multiple pulmonary nodules 12/15/2021    Prediabetes 01/07/2021    Pleural effusion 01/07/2021    Osteoarthritis of knee 03/09/2017    Tubular adenoma of colon 07/06/2016    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 07/06/2016    Idiopathic neuropathy 09/23/2015    Acquired hypothyroidism 04/28/2015    Severe obesity (BMI 35.0-39.9) with comorbidity (HCC) 08/22/2014    History of vertigo 05/13/2014    MARCI on CPAP 02/20/2014    Renal cyst 01/22/2014    Sensorineural hearing loss 11/26/2012    History of atrial fibrillation 05/22/2012    Hypertension 09/16/2009    Hyperlipidemia 09/16/2009    Allergic rhinitis 09/16/2009    ACP (advance care planning) 09/16/2009       Current Medications[1]      Allergies as of 05/22/2025 - Reviewed 05/22/2025   Allergen Reaction Noted    Other environmental Runny Nose and Itching 06/01/2011        Social History     Socioeconomic History    Marital status:      Spouse name: Not on file    Number of children: Not on file    Years of education: Not on file    Highest education level: Not on file   Occupational History    Not on file   Tobacco Use    Smoking status: Never    Smokeless tobacco: Never   Vaping Use    Vaping status: Never Used   Substance and Sexual Activity    Alcohol use: Yes     Alcohol/week: 1.2 - 1.8 oz     Types: 2 - 3 Standard drinks or equivalent per week     Comment: occ    Drug use: Never    Sexual activity: Not Currently   Other Topics Concern    Not on file   Social History Narrative    Not on file     Social Drivers of Health     Financial Resource Strain: Not on file   Food Insecurity: No Food Insecurity (3/12/2025)    Hunger Vital Sign     Worried About Running Out of Food in the Last Year: Never true     Ran Out of Food in the Last Year: Never true   Transportation Needs: No Transportation Needs (3/12/2025)    PRAPARE - Transportation     Lack of Transportation (Medical): No     Lack of Transportation (Non-Medical): No   Physical Activity: Not on file   Stress: Not on file   Social Connections: Feeling Socially Integrated (4/16/2025)    OASIS : Social Isolation     Frequency of experiencing loneliness or isolation: Never   Intimate Partner  Violence: Not At Risk (3/12/2025)    Humiliation, Afraid, Rape, and Kick questionnaire     Fear of Current or Ex-Partner: No     Emotionally Abused: No     Physically Abused: No     Sexually Abused: No   Housing Stability: Low Risk  (3/12/2025)    Housing Stability Vital Sign     Unable to Pay for Housing in the Last Year: No     Number of Times Moved in the Last Year: 1     Homeless in the Last Year: No       Family History   Problem Relation Age of Onset    Cancer Mother         cervical/breast    Hypertension Father     Cancer Father         lung cancer    Cancer Sister         lung    Cancer Other     Heart Disease Neg Hx     Heart Attack Neg Hx        Past Surgical History[2]    ROS:  Denies any Headache,Chest pain,  Shortness of breath,  Abdominal pain, Changes of bowel or bladder, Lower ext edema, Fevers, Nights sweats, Weight Changes, Focal weakness or numbness.  All other systems are negative.    /62 (BP Location: Right arm, Patient Position: Sitting)   Pulse 86   Temp 37.2 °C (99 °F) (Temporal)   Resp 16   Ht 1.829 m (6')   Wt 102 kg (224 lb 8 oz)   SpO2 97%   BMI 30.45 kg/m²      Constitutional: Alert, no distress, well-groomed.  Skin: Warm, dry, good turgor, no rashes in visible areas.  Eye: Equal, round and reactive, conjunctiva clear, lids normal.  ENMT: Lips without lesions, good dentition, moist mucous membranes.  Neck: Trachea midline, no masses, no thyromegaly.  Respiratory: Unlabored respiratory effort, no cough.  Abdomen: Soft, no gross masses.  MSK: Normal gait, moves all extremities.  Neuro: Grossly non-focal. No cranial nerve deficit. Strength and sensation intact.   Psych: Alert and oriented x3, normal affect and mood.      Assessment and Plan.   85 y.o. male presenting with the following.     1. Anemia associated with nutritional deficiency (Primary)    - CBC WITH DIFFERENTIAL; Future  - Comp Metabolic Panel; Future    2. Epidermoid cyst of skin of back    - Referral to  Dermatology    3. Interstitial lung disease (HCC)  Chronic.  Stable.  Continue current plan of care per pulmonology and follow-up in November.    4. Unintentional weight loss  Continue to monitor.  6         [1]   Current Outpatient Medications   Medication Sig Dispense Refill    fluticasone (FLONASE) 50 MCG/ACT nasal spray Administer 1 Spray into affected nostril(S) every day. 16 g 5    Omega-3 Fatty Acids (FISH OIL) 1200 MG Cap Take 1,200 mg by mouth every day. Indications: supplement      ketotifen (ZADITOR) 0.035 % ophthalmic solution Administer 1 Drop into both eyes as needed (allergies). Indications: Inflammation of Eyelid Lining due to Allergy      ELIQUIS 5 MG Tab TAKE 1 TABLET BY MOUTH TWICE A  Tablet 1    levothyroxine (SYNTHROID) 75 MCG Tab TAKE 1 TABLET BY MOUTH EVERY MORNING ON AN EMPTY STOMACH. INDICATIONS: UNDERACTIVE THYROID 100 Tablet 3    Cholecalciferol (D3) 2000 UNIT Tab Take 2,000 Units by mouth every day. Indications: supplement       No current facility-administered medications for this visit.   [2]   Past Surgical History:  Procedure Laterality Date    HAMMERTOE CORRECTION  2/2/2015    Performed by Abdifatah Orta, D.P.M. at SURGERY [facility] ORS    EXOSTOSIS EXCISION  6/3/2011    Performed by ABDIFATAH ORTA at [facility] ORS    LESION EXCISION ORTHO  6/3/2011    Performed by ABDIFATAH ORTA at [facility] ORS    TOE ARTHROPLASTY  6/3/2011    Performed by ABDIFATAH ORTA at [facility] ORS    LUMBAR LAMINECTOMY DISKECTOMY  2005    microscopic    TONSILLECTOMY  1945    adenoidectomy    EYE SURGERY Bilateral     cataracts

## 2025-06-12 PROBLEM — J93.83 SPONTANEOUS PNEUMOTHORAX: Status: RESOLVED | Noted: 2025-03-11 | Resolved: 2025-06-12

## 2025-06-12 PROBLEM — R29.6 FALLS: Status: RESOLVED | Noted: 2024-11-27 | Resolved: 2025-06-12

## 2025-06-12 NOTE — ASSESSMENT & PLAN NOTE
Chronic, stable. Last GFR in May 2025 was 58. We discussed avoiding nephrotoxic medication such as NSAIDs as well as maintaining adequate hydration. Follow up with PCP for continued monitoring.

## 2025-06-12 NOTE — ASSESSMENT & PLAN NOTE
Chronic, stable. Currently takes omeprazole 20 mg daily. States this has well controlled his reflux. He was getting EGDs Q5yrs. Last one was in 2022. Follow up with GI/PCP for continued monitoring.     Katelynn Cr is a 74 year old female.   Chief Complaint   Patient presents with    Follow - Up     Patient here for follow-up: per pt completed antibiotics, still feels congested      HPI:   Patient finished a Z-Jassi x 2 still has sinus congestion.    Current Outpatient Medications   Medication Sig Dispense Refill    FUROSEMIDE 40 MG Oral Tab TAKE ONE TABLET BY MOUTH ONE TIME DAILY 90 tablet 0    azithromycin (ZITHROMAX Z-JASSI) 250 MG Oral Tab Take 1 by oral route every day for 5 days. 2 tablets today. 6 tablet 1    mometasone 0.1 % External Ointment Apply 1 Application topically 2 (two) times daily. APPLY TO AFFECTED AREA      simvastatin 40 MG Oral Tab Take 1 tablet (40 mg total) by mouth nightly.      omeprazole 20 MG Oral Capsule Delayed Release Take 1 capsule (20 mg total) by mouth before breakfast.      triamcinolone 0.1 % External Cream APPLY UNDER BREST TWICE DAILY      ezetimibe 10 MG Oral Tab Take 1 tablet (10 mg total) by mouth daily.      metoprolol succinate ER 25 MG Oral Tablet 24 Hr Take 1 tablet (25 mg total) by mouth daily.      metoprolol tartrate 100 MG Oral Tab Take 1 tablet by mouth the evening before the CTA and 1 tablet the morning of the CTA. Hold Metoprolol Succinate the morning of the CTA.      insulin aspart 100 Units/mL Injection Solution TAKE 60 UNITS DAILY VIA INSULIN PUMP.      Calcipotriene 0.005 % External Solution APPLY TO AFFECTED AREA OR BODY TWICE A DAY      Cholecalciferol (VITAMIN D) 25 MCG (1000 UT) Oral Tab Take 25 mcg by mouth daily.      Insulin Infusion Pump Supplies (INSULIN PUMP SYRINGE RESERVOIR) Does not apply Misc as directed      Levothyroxine Sodium 125 MCG Oral Tab   4    Albuterol Sulfate HFA (PROAIR HFA) 108 (90 Base) MCG/ACT Inhalation Aero Soln Inhale 2 puffs into the lungs every 4 (four) hours as needed for Wheezing or Shortness of Breath. 1 Inhaler 1    NOVOLOG 100 UNIT/ML Subcutaneous Solution   4    Benazepril-Hydrochlorothiazide (LOTENSIN HCT) 10-12.5 MG  Oral Tab Take 1 tablet by mouth daily.      Omeprazole 40 MG Oral Capsule Delayed Release Take 1 capsule (40 mg total) by mouth daily.        Past Medical History:    Ankle fracture, lateral malleolus, closed    Asthma (HCC)    Breast cancer (HCC)    Breast cancer of upper-outer quadrant of left female breast (HCC)    Breast cancer, left (HCC)    lumpectomy 2009    Breast cancer, left (HCC)    left breast re-excision, left axillary SLNB    Breast tenderness in female    CAD (coronary artery disease)    Carotid artery disease    LEFT heart cath adn PCI 2010    Cataract    Deep vein thrombosis (HCC)    R arm    Diabetes mellitus (HCC)    Encounter for fitting of portacath    venous access; portacath placement, 2009    Encounter for imaging to assess osteopenia    Encounter for monitoring aromatase inhibitor therapy    Essential hypertension    GERD (gastroesophageal reflux disease)    H/O  section    x2    H/O vitrectomy    LEFT vitrectomy    Hip bursitis    left    Hyperlipidemia    Hypertension    Hypothyroidism    Malignant neoplasm of upper-outer quadrant of left breast in female, estrogen receptor positive (HCC)    Neuropathy    B/L feet    Osteoarthritis    B/L shoulders    Osteopenia of lumbar spine    Personal history of antineoplastic chemotherapy    Psoriasis    Sleep apnea    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    Thrombus    SVC thrombus; Thrombolysis 2009    Trigger finger of both hands    Type 1 diabetes mellitus (HCC)    Type 1 diabetes mellitus with hyperglycemia (HCC)    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)    Visual impairment    glasses, contacts      Social History:  Social History     Socioeconomic History    Marital status:    Tobacco Use    Smoking status: Never    Smokeless tobacco: Never   Vaping Use    Vaping status: Never Used   Substance and Sexual Activity    Alcohol use: Yes     Alcohol/week: 1.0 - 2.0 standard drink of  alcohol     Types: 1 - 2 Glasses of wine per week     Comment: weekly    Drug use: No   Other Topics Concern    Caffeine Concern Yes     Comment: 4 cups coffee daily        REVIEW OF SYSTEMS:   GENERAL HEALTH: feels well otherwise  GENERAL : denies fever, chills, sweats, weight loss, weight gain  SKIN: denies any unusual skin lesions or rashes  RESPIRATORY: denies shortness of breath with exertion  NEURO: denies headaches    EXAM:   There were no vitals taken for this visit.  System Details   Skin Inspection - Normal.   Constitutional Overall appearance - Normal.   Head/Face Facial features - Normal. Eyebrows - Normal. Skull - Normal.   Eyes Conjunctiva - Right: Normal, Left: Normal. Pupil - Right: Normal, Left: Normal.    Ears Inspection - Right: Normal, Left: Normal.   Canal - Right: Normal, Left: Normal.   TM - Right: Normal, Left: Normal.   Nasal External nose - Normal.   Nasal septum - Normal.  Turbinates -congestion bilaterally and purulence in the right middle meatus.  Consent was obtained and culture was taken.   Oral/Oropharynx Lips - Normal, Tonsils - Normal, Tongue - Normal    Neck Exam Inspection - Normal. Palpation - Normal. Parotid gland - Normal. Thyroid gland - Normal.   Lymph Detail Submental. Submandibular. Anterior cervical. Posterior cervical. Supraclavicular all without enlargement   Psychiatric Orientation - Oriented to time, place, person & situation. Appropriate mood and affect.   Neurological Memory - Normal. Cranial nerves - Cranial nerves II through XII grossly intact.     ASSESSMENT AND PLAN:   1. Acute bacterial sinusitis  Patient with a history of pseudomonal infection in the past.  Culture sent.  Antibiotics may be chosen based on results.  - Aerobic Bacterial Culture; Future  - Aerobic Bacterial Culture    2. Acute recurrent maxillary sinusitis  Reviewed CT scan results with extensive maxillary sinusitis on the right.  This was done on 10/23/2023.      The patient indicates  understanding of these issues and agrees to the plan.      No Bocanegra MD  2/25/2025  7:49 PM

## 2025-06-12 NOTE — ASSESSMENT & PLAN NOTE
Chronic, Stable. BP in office today is 124/64. He has been checking his BP at home. He currently does not take any antihypertensive medications- reports after he lost 40 lbs, he was able to stop his BP meds. Follow up with PCP for continued monitoring and management.

## 2025-06-12 NOTE — ASSESSMENT & PLAN NOTE
Chronic, stable. He stopped his pravastatin in March. Currently does not take any lipid lowering medication. Does want his lipids rechecked before he sees his PCP in July. We discussed his dietary/lifestyle regimen. Follow up with PCP for continued monitoring and management.  Lab Results   Component Value Date/Time    CHOLSTRLTOT 116 07/19/2024 10:35 AM    TRIGLYCERIDE 85 07/19/2024 10:35 AM    HDL 45 07/19/2024 10:35 AM    LDL 54 07/19/2024 10:35 AM

## 2025-06-12 NOTE — ASSESSMENT & PLAN NOTE
Chronic, improved problem. He reports over the last year, he stopped drinking alcohol, which caused him to lose ~40 lbs. Because of this, he has been able to come off his BP medications, and stopped his cholesterol medication. Weight in office today is 225 lbs. BMI 30.52 kg/m2. We discussed his current diet/exercise regimen. Encouraged to remain physically active as well as monitor intake of excess calories. Follow up with PCP for continued monitoring.

## 2025-06-12 NOTE — ASSESSMENT & PLAN NOTE
Chronic, stable. Now off CPAP after his 40 lbs weight loss. Does not require supplemental O2. Follows with pulmonology.

## 2025-06-12 NOTE — ASSESSMENT & PLAN NOTE
Chronic, stable. Continue taking Eliquis 5 mg BID. Not on any rate or rhythm controlling medication. Follows with cardiology regularly.

## 2025-06-12 NOTE — ASSESSMENT & PLAN NOTE
Chronic, stable. Incidentally found on prior imaging. Likely age-related. Not associated with memory changes. Completed minicog today: 2/3 word-recall & 5/5 on clock drawing. Follow-up with PCP as previously scheduled.

## 2025-06-12 NOTE — ASSESSMENT & PLAN NOTE
Chronic, stable. He does not utilize any inhalers. Reports his breathing has been well controlled. Follow up with pulmonology.

## 2025-06-12 NOTE — ASSESSMENT & PLAN NOTE
Patient fall-risk assessment in office is positive. He reports 2 or more falls in the last year. He uses a 4-wheel walker when he leaves his house and uses a cane when he is in his home. Educated on removing hazards in the home, keeping walkways clear, as well as wearing appropriate non-slip footwear around the house. We discussed his balance, which he feels is affected by his peripheral neuropathy. Discussed balance exercises he can do at home.

## 2025-06-12 NOTE — ASSESSMENT & PLAN NOTE
Chronic, stable. Currently taking levothyroxine 75 mcg daily as prescribed. Denies complications. TSH stable.  Lab Results   Component Value Date/Time    TSHULTRASEN 3.470 02/26/2025 0816

## 2025-06-13 ENCOUNTER — OFFICE VISIT (OUTPATIENT)
Dept: FAMILY PLANNING/WOMEN'S HEALTH CLINIC | Facility: PHYSICIAN GROUP | Age: 85
End: 2025-06-13
Payer: MEDICARE

## 2025-06-13 VITALS
BODY MASS INDEX: 30.48 KG/M2 | HEART RATE: 83 BPM | WEIGHT: 225 LBS | SYSTOLIC BLOOD PRESSURE: 124 MMHG | OXYGEN SATURATION: 95 % | HEIGHT: 72 IN | DIASTOLIC BLOOD PRESSURE: 64 MMHG

## 2025-06-13 DIAGNOSIS — I48.20 CHRONIC ATRIAL FIBRILLATION (HCC): ICD-10-CM

## 2025-06-13 DIAGNOSIS — K22.70 BARRETT'S ESOPHAGUS WITHOUT DYSPLASIA: ICD-10-CM

## 2025-06-13 DIAGNOSIS — E66.9 OBESITY (BMI 30-39.9): ICD-10-CM

## 2025-06-13 DIAGNOSIS — G31.9 CEREBRAL ATROPHY (HCC): ICD-10-CM

## 2025-06-13 DIAGNOSIS — I10 PRIMARY HYPERTENSION: ICD-10-CM

## 2025-06-13 DIAGNOSIS — J84.9 INTERSTITIAL LUNG DISEASE (HCC): Chronic | ICD-10-CM

## 2025-06-13 DIAGNOSIS — N18.31 STAGE 3A CHRONIC KIDNEY DISEASE: ICD-10-CM

## 2025-06-13 DIAGNOSIS — E78.5 HYPERLIPIDEMIA, UNSPECIFIED HYPERLIPIDEMIA TYPE: ICD-10-CM

## 2025-06-13 DIAGNOSIS — Z91.81 AT RISK FOR FALLS: Primary | ICD-10-CM

## 2025-06-13 DIAGNOSIS — K21.00 GASTROESOPHAGEAL REFLUX DISEASE WITH ESOPHAGITIS WITHOUT HEMORRHAGE: ICD-10-CM

## 2025-06-13 DIAGNOSIS — G47.33 OSA ON CPAP: Chronic | ICD-10-CM

## 2025-06-13 DIAGNOSIS — E03.9 ACQUIRED HYPOTHYROIDISM: ICD-10-CM

## 2025-06-13 PROCEDURE — 3078F DIAST BP <80 MM HG: CPT

## 2025-06-13 PROCEDURE — G0439 PPPS, SUBSEQ VISIT: HCPCS

## 2025-06-13 PROCEDURE — 3074F SYST BP LT 130 MM HG: CPT

## 2025-06-13 PROCEDURE — 1126F AMNT PAIN NOTED NONE PRSNT: CPT

## 2025-06-13 RX ORDER — OMEPRAZOLE 20 MG/1
20 CAPSULE, DELAYED RELEASE ORAL DAILY
COMMUNITY

## 2025-06-13 SDOH — ECONOMIC STABILITY: FOOD INSECURITY: WITHIN THE PAST 12 MONTHS, THE FOOD YOU BOUGHT JUST DIDN'T LAST AND YOU DIDN'T HAVE MONEY TO GET MORE.: NEVER TRUE

## 2025-06-13 SDOH — ECONOMIC STABILITY: FOOD INSECURITY: HOW HARD IS IT FOR YOU TO PAY FOR THE VERY BASICS LIKE FOOD, HOUSING, MEDICAL CARE, AND HEATING?: NOT HARD AT ALL

## 2025-06-13 SDOH — ECONOMIC STABILITY: HOUSING INSECURITY: IN THE PAST 12 MONTHS, HOW MANY TIMES HAVE YOU MOVED WHERE YOU WERE LIVING?: 1

## 2025-06-13 SDOH — ECONOMIC STABILITY: HOUSING INSECURITY: IN THE LAST 12 MONTHS, WAS THERE A TIME WHEN YOU WERE NOT ABLE TO PAY THE MORTGAGE OR RENT ON TIME?: NO

## 2025-06-13 SDOH — ECONOMIC STABILITY: HOUSING INSECURITY: AT ANY TIME IN THE PAST 12 MONTHS, WERE YOU HOMELESS OR LIVING IN A SHELTER (INCLUDING NOW)?: NO

## 2025-06-13 SDOH — ECONOMIC STABILITY: FOOD INSECURITY: WITHIN THE PAST 12 MONTHS, YOU WORRIED THAT YOUR FOOD WOULD RUN OUT BEFORE YOU GOT THE MONEY TO BUY MORE.: NEVER TRUE

## 2025-06-13 SDOH — ECONOMIC STABILITY: TRANSPORTATION INSECURITY: IN THE PAST 12 MONTHS, HAS LACK OF TRANSPORTATION KEPT YOU FROM MEDICAL APPOINTMENTS OR FROM GETTING MEDICATIONS?: NO

## 2025-06-13 ASSESSMENT — ACTIVITIES OF DAILY LIVING (ADL)
BATHING_REQUIRES_ASSISTANCE: 0
LACK_OF_TRANSPORTATION: NO

## 2025-06-13 ASSESSMENT — FIBROSIS 4 INDEX: FIB4 SCORE: 1.67

## 2025-06-13 ASSESSMENT — PATIENT HEALTH QUESTIONNAIRE - PHQ9
1. LITTLE INTEREST OR PLEASURE IN DOING THINGS: NOT AT ALL
2. FEELING DOWN, DEPRESSED, IRRITABLE, OR HOPELESS: NOT AT ALL
CLINICAL INTERPRETATION OF PHQ2 SCORE: 0

## 2025-06-13 ASSESSMENT — PAIN SCALES - GENERAL: PAINLEVEL_OUTOF10: NO PAIN

## 2025-06-13 ASSESSMENT — ENCOUNTER SYMPTOMS: GENERAL WELL-BEING: GOOD

## 2025-06-13 NOTE — PROGRESS NOTES
Comprehensive Health Assessment Program     Farhat Stahl is a 85 y.o. here for his comprehensive health assessment.    Patient Active Problem List    Diagnosis Date Noted    Nasal congestion 05/22/2025    Pneumothorax on right 03/19/2025    GERD (gastroesophageal reflux disease) 03/12/2025    Anemia associated with nutritional deficiency 03/06/2025    Unintentional weight loss 11/27/2024    Chronic atrial fibrillation (HCC) 07/12/2024    Chronic anticoagulation 03/12/2024    Thrombocytopenia (HCC) 11/30/2023    Abnormal EKG 11/07/2023    At risk for falls 11/07/2023    Cellulitis of toe of left foot 06/15/2023    QT prolongation 06/13/2023    CKD (chronic kidney disease) stage 3, GFR 30-59 ml/min 06/13/2023    Chronic pain of right knee 06/16/2022    Cerebral atrophy (HCC) 03/25/2022    Atherosclerosis of aorta (HCC) 03/25/2022    Interstitial lung disease (HCC) 03/25/2022    Nonspecific abnormal cardiovascular function study 03/25/2022    Multiple pulmonary nodules 12/15/2021    Prediabetes 01/07/2021    Pleural effusion 01/07/2021    Osteoarthritis of knee 03/09/2017    Tubular adenoma of colon 07/06/2016    Blackwell's esophagus without dysplasia 07/06/2016    Idiopathic neuropathy 09/23/2015    Acquired hypothyroidism 04/28/2015    Obesity (BMI 30-39.9) 08/22/2014    History of vertigo 05/13/2014    MARCI on CPAP 02/20/2014    Renal cyst 01/22/2014    Sensorineural hearing loss 11/26/2012    History of atrial fibrillation 05/22/2012    Hypertension 09/16/2009    Hyperlipidemia 09/16/2009    Allergic rhinitis 09/16/2009    ACP (advance care planning) 09/16/2009       Current Medications[1]       Current supplements as per medication list.     Allergies:   Other environmental  Social History[2]  Family History   Problem Relation Age of Onset    Cancer Mother         cervical/breast    Hypertension Father     Cancer Father         lung cancer    Cancer Sister         lung    Cancer Other     Heart Disease  Neg Hx     Heart Attack Neg Hx      Farhat  has a past medical history of Anemia associated with nutritional deficiency (03/06/2025), Arrhythmia, Arthritis, Atrial fibrillation (HCC), Bronchitis (2004), Chronic atrial fibrillation (HCC) (07/12/2024), Chronic pain of right knee (06/16/2022), Falls (11/27/2024), Fibula fracture (1981), High cholesterol, Hyperlipidemia, Hypertension, Lab test positive for detection of COVID-19 virus (08/11/2022), MEDICAL HOME, Multiple pulmonary nodules (12/15/2021), Nasal congestion (05/22/2025), Onychogryposis of toenail (01/21/2021), Onychomycosis (01/21/2021), Pain, Pneumonia (2004), Pneumothorax on right (03/19/2025), Rash (06/16/2022), Sleep apnea, Spontaneous pneumothorax (03/11/2025), Thrombocytopenia (HCC) (11/30/2023), and Unintentional weight loss (11/27/2024).   Past Surgical History[3]    Screening:  In the last six months have you experienced any leakage of urine? Yes over the last 6 months, he has had to go more frequently. Does not wear any garments.     Depression Screening  Little interest or pleasure in doing things?  0 - not at all  Feeling down, depressed , or hopeless? 0 - not at all  Trouble falling or staying asleep, or sleeping too much?     Feeling tired or having little energy?     Poor appetite or overeating?     Feeling bad about yourself - or that you are a failure or have let yourself or your family down?    Trouble concentrating on things, such as reading the newspaper or watching television?    Moving or speaking so slowly that other people could have noticed.  Or the opposite - being so fidgety or restless that you have been moving around a lot more than usual?     Thoughts that you would be better off dead, or of hurting yourself?     Patient Health Questionnaire Score:      If depressive symptoms identified deferred to follow up visit unless specifically addressed in assessment and plan.    Interpretation of PHQ-9 Total Score   Score Severity   1-4  No Depression   5-9 Mild Depression   10-14 Moderate Depression   15-19 Moderately Severe Depression   20-27 Severe Depression    Screening for Cognitive Impairment  Do you or any of your friends or family members have any concern about your memory? No  Three Minute Recall (Village, Kitchen, Baby) 2/3    Shaun clock face with all 12 numbers and set the hands to show 10 minutes past 11.  Yes    Cognitive concerns identified deferred for follow up unless specifically addressed in assessment and plan.    Fall Risk Assessment  Has the patient had two or more falls in the last year or any fall with injury in the last year?  Yes He uses a cane in his house and a walker when he leaves his house. He has a balance issue and neuropathy issue contributing.     Safety Assessment  Do you always wear your seatbelt?  Yes  Any changes to home needed to function safely? No  Difficulty hearing.  Yes mild hearing loss  Patient counseled about all safety risks that were identified.    Functional Assessment ADLs  Are there any barriers preventing you from cooking for yourself or meeting nutritional needs?  No.    Are there any barriers preventing you from driving safely or obtaining transportation?  No.    Are there any barriers preventing you from using a telephone or calling for help?  No    Are there any barriers preventing you from shopping?  No.    Are there any barriers preventing you from taking care of your own finances?  No    Are there any barriers preventing you from managing your medications?  No    Are there any barriers preventing you from showering, bathing or dressing yourself? No    Are there any barriers preventing you from doing housework or laundry? No    Are there any barriers preventing you from using the toilet?No    Are you currently engaging in any exercise or physical activity?  No.   Rides 20 minutes daily on recumbent bike.     Self-Assessment of Health  What is your perception of your health? Good    Do you  sleep more than six hours a night? Yes    In the past 7 days, how much did pain keep you from doing your normal work? None    Do you spend quality time with family or friends (virtually or in person)? Yes    Do you usually eat a heart healthy diet that constists of a variety of fruits, vegetables, whole grains and fiber? Yes    Do you eat foods high in fat and/or Fast Food more than three times per week? No    How concerned are you that your medical conditions are not being well managed? Not at all    Are you worried that in the next 2 months, you may not have stable housing that you own, rent, or stay in as part of a household? No        Advance Care Planning  Do you have an Advance Directive, Living Will, Durable Power of , or POLST? Yes    Living Will Durable Power of    is not on file - instructed patient to bring in a copy to scan into their chart      Health Maintenance Summary            Current Care Gaps       COVID-19 Vaccine (8 - 2024-25 season) Overdue since 4/27/2025      10/27/2024  Imm Admin: COVID-19, mRNA, LNP-S, PF, jeanette-sucrose, 30 mcg/0.3 mL    11/23/2022  Imm Admin: MODERNA BIVALENT BOOSTER SARS-COV-2 VACCINE (6+)    04/09/2022  Imm Admin: MODERNA SARS-COV-2 VACCINE (12+)    11/15/2021  Imm Admin: MODERNA SARS-COV-2 VACCINE (12+)    03/10/2021  Imm Admin: PFIZER PURPLE CAP SARS-COV-2 VACCINATION (12+)     Only the first 5 history entries have been loaded, but more history exists.                    Upcoming       Annual Wellness Visit (Yearly) Next due on 6/13/2026 06/13/2025  Level of Service: MO ANNUAL WELLNESS VISIT-INCLUDES PPPS SUBSEQUE*    03/25/2022  Level of Service: MO ANNUAL WELLNESS VISIT-INCLUDES PPPS SUBSEQUE*    03/24/2022  Outside Procedure: MO ANNUAL WELLNESS VISIT-INCLUDES PPPS SUBSEQUE*    09/15/2021  Level of Service: MO ANNUAL WELLNESS VISIT-INCLUDES PPPS SUBSEQUE*    09/14/2021  Outside Procedure: MO ANNUAL WELLNESS VISIT-INCLUDES PPPS SUBSEQUE*       Only the first 5 history entries have been loaded, but more history exists.              IMM DTaP/Tdap/Td Vaccine (4 - Td or Tdap) Next due on 5/9/2033 05/09/2023  Imm Admin: Tdap Vaccine    05/02/2013  Imm Admin: Tdap Vaccine    09/06/2011  Imm Admin: Tdap Vaccine                      Completed or No Longer Recommended       Influenza Vaccine (Series Information) Completed      10/27/2024  Imm Admin: Influenza high-dose trivalent (PF)    11/30/2023  Imm Admin: Influenza Vaccine Adult HD    11/29/2023  Imm Admin: Influenza Vaccine Adult HD    11/23/2022  Imm Admin: Influenza, unspecified formulation    11/02/2021  Imm Admin: Influenza Vaccine Adult HD      Only the first 5 history entries have been loaded, but more history exists.              Zoster (Shingles) Vaccines (Series Information) Completed      11/02/2021  Imm Admin: Zoster Vaccine Recombinant (RZV) (SHINGRIX)    06/10/2021  Imm Admin: Zoster Vaccine Recombinant (RZV) (SHINGRIX)    12/01/2009  Imm Admin: Zoster Vaccine Live (ZVL) (Zostavax) - HISTORICAL DATA              Pneumococcal Vaccine: 50+ Years (Series Information) Completed      07/05/2016  Imm Admin: Pneumococcal polysaccharide vaccine (PPSV-23)    05/29/2015  Imm Admin: Pneumococcal Conjugate Vaccine (Prevnar/PCV-13)              Hepatitis A Vaccine (Hep A) (Series Information) Aged Out      No completion history exists for this topic.              Hepatitis B Vaccine (Hep B) (Series Information) Aged Out     No completion history exists for this topic.              HPV Vaccines (Series Information) Aged Out     No completion history exists for this topic.              Polio Vaccine (Inactivated Polio) (Series Information) Aged Out     No completion history exists for this topic.              Meningococcal Immunization (Series Information) Aged Out     No completion history exists for this topic.              Meningococcal B Vaccine (Series Information) Aged Out     No completion history  exists for this topic.              Colorectal Cancer Screening  Discontinued        Frequency changed to Never automatically (Topic No Longer Applies)    01/23/2022  REFERRAL TO GI FOR COLONOSCOPY    01/23/2022  REFERRAL TO GI FOR COLONOSCOPY    01/12/2022  REFERRAL TO GI FOR COLONOSCOPY    01/12/2022  REFERRAL TO GI FOR COLONOSCOPY     Only the first 5 history entries have been loaded, but more history exists.                          Patient Care Team:  Stefanie Zavaleta M.D. as PCP - General (Internal Medicine)  Umberto Hayes O.D. as Consulting Physician (Optometry)  Sven Bolanos M.D. as Consulting Physician (Gastroenterology)  Radha Cardenas as [facility]   RENAY ChoiPCHAYO (Podiatry)  Basil Alexandre M.D. (Ophthalmology)  Margret Syed M.D. (Pulmonary Medicine)  Shira Galindo M.D. (Dermatology)  [facility] as Home Health Provider  Chastity Jones R.N. as Transitional Care Navigator  HUGO Matthews.N.AEmmanuelle  [facility]  Preferred home care    Financial Resource Strain: Low Risk  (6/13/2025)    Overall Financial Resource Strain (CARDIA)     Difficulty of Paying Living Expenses: Not hard at all      Transportation Needs: No Transportation Needs (6/13/2025)    PRAPARE - Transportation     Lack of Transportation (Medical): No     Lack of Transportation (Non-Medical): No      Food Insecurity: No Food Insecurity (6/13/2025)    Hunger Vital Sign     Worried About Running Out of Food in the Last Year: Never true     Ran Out of Food in the Last Year: Never true        Encounter Vitals  Blood Pressure : 124/64  Pulse: 83  Pulse Oximetry: 95 %  Weight: 102 kg (225 lb)  Height: 182.9 cm (6')  BMI (Calculated): 30.52  Pain Score: No pain     Physical Exam  Constitutional:       General: He is not in acute distress.     Appearance: Normal appearance.   HENT:      Head: Normocephalic and atraumatic.   Eyes:      Extraocular Movements: Extraocular  movements intact.      Pupils: Pupils are equal, round, and reactive to light.   Cardiovascular:      Rate and Rhythm: Normal rate and regular rhythm.      Heart sounds: Normal heart sounds.   Pulmonary:      Breath sounds: Normal breath sounds.   Musculoskeletal:      Right lower leg: No edema.      Left lower leg: No edema.   Neurological:      Mental Status: He is alert and oriented to person, place, and time.   Psychiatric:         Mood and Affect: Mood normal.         Behavior: Behavior normal.     Assessment and Plan. The following treatment and monitoring plan is recommended:  Acquired hypothyroidism  Chronic, stable. Currently taking levothyroxine 75 mcg daily as prescribed. Denies complications. TSH stable.  Lab Results   Component Value Date/Time    TSHULTRASEN 3.470 02/26/2025 0816     At risk for falls  Patient fall-risk assessment in office is positive. He reports 2 or more falls in the last year. He uses a 4-wheel walker when he leaves his house and uses a cane when he is in his home. Educated on removing hazards in the home, keeping walkways clear, as well as wearing appropriate non-slip footwear around the house. We discussed his balance, which he feels is affected by his peripheral neuropathy. Discussed balance exercises he can do at home.    Blackwell's esophagus without dysplasia  GERD (gastroesophageal reflux disease)  Chronic, stable. Currently takes omeprazole 20 mg daily. States this has well controlled his reflux. He was getting EGDs Q5yrs. Last one was in 2022. Follow up with GI/PCP for continued monitoring.    Cerebral atrophy (HCC)  Chronic, stable. Incidentally found on prior imaging. Likely age-related. Not associated with memory changes. Completed minicog today: 2/3 word-recall & 5/5 on clock drawing. Follow-up with PCP as previously scheduled.     Chronic atrial fibrillation (HCC)  Chronic, stable. Continue taking Eliquis 5 mg BID. Not on any rate or rhythm controlling medication.  Follows with cardiology regularly.    CKD (chronic kidney disease) stage 3, GFR 30-59 ml/min  Chronic, stable. Last GFR in May 2025 was 58. We discussed avoiding nephrotoxic medication such as NSAIDs as well as maintaining adequate hydration. Follow up with PCP for continued monitoring.    Hyperlipidemia  Chronic, stable. He stopped his pravastatin in March. Currently does not take any lipid lowering medication. Does want his lipids rechecked before he sees his PCP in July. We discussed his dietary/lifestyle regimen. Follow up with PCP for continued monitoring and management.  Lab Results   Component Value Date/Time    CHOLSTRLTOT 116 07/19/2024 10:35 AM    TRIGLYCERIDE 85 07/19/2024 10:35 AM    HDL 45 07/19/2024 10:35 AM    LDL 54 07/19/2024 10:35 AM     Hypertension  Chronic, Stable. BP in office today is 124/64. He has been checking his BP at home. He currently does not take any antihypertensive medications- reports after he lost 40 lbs, he was able to stop his BP meds. Follow up with PCP for continued monitoring and management.    Interstitial lung disease (HCC)  Chronic, stable. He does not utilize any inhalers. Reports his breathing has been well controlled. Follow up with pulmonology.     MARCI on CPAP  Chronic, stable. Now off CPAP after his 40 lbs weight loss. Does not require supplemental O2. Follows with pulmonology.     Obesity (BMI 30-39.9)  Chronic, improved problem. He reports over the last year, he stopped drinking alcohol, which caused him to lose ~40 lbs. Because of this, he has been able to come off his BP medications, and stopped his cholesterol medication. Weight in office today is 225 lbs. BMI 30.52 kg/m2. We discussed his current diet/exercise regimen. Encouraged to remain physically active as well as monitor intake of excess calories. Follow up with PCP for continued monitoring.      Services suggested: No services needed at this time  Health Care Screening: Age-appropriate preventive services  recommended by USPTF and ACIP covered by Medicare were discussed today. Services ordered if indicated and agreed upon by the patient.  Referrals offered: Community-based lifestyle interventions to reduce health risks and promote self-management and wellness, fall prevention, nutrition, physical activity, tobacco-use cessation, weight loss, and mental health services as per orders if indicated.    Discussion today about general wellness and lifestyle habits:    Prevent falls and reduce trip hazards; Cautioned about securing or removing rugs.  Have a working fire alarm and carbon monoxide detector.  Engage in regular physical activity and social activities.    Follow-up: Return for appointment with Primary Care Provider as needed..              [1]   Current Outpatient Medications   Medication Sig Dispense Refill    omeprazole (PRILOSEC) 20 MG delayed-release capsule Take 20 mg by mouth every day.      fluticasone (FLONASE) 50 MCG/ACT nasal spray Administer 1 Spray into affected nostril(S) every day. 16 g 5    Omega-3 Fatty Acids (FISH OIL) 1200 MG Cap Take 1,200 mg by mouth every day. Indications: supplement      ketotifen (ZADITOR) 0.035 % ophthalmic solution Administer 1 Drop into both eyes as needed (allergies). Indications: Inflammation of Eyelid Lining due to Allergy      ELIQUIS 5 MG Tab TAKE 1 TABLET BY MOUTH TWICE A  Tablet 1    levothyroxine (SYNTHROID) 75 MCG Tab TAKE 1 TABLET BY MOUTH EVERY MORNING ON AN EMPTY STOMACH. INDICATIONS: UNDERACTIVE THYROID 100 Tablet 3    Cholecalciferol (D3) 2000 UNIT Tab Take 2,000 Units by mouth every day. Indications: supplement       No current facility-administered medications for this visit.   [2]   Social History  Tobacco Use    Smoking status: Never    Smokeless tobacco: Never   Vaping Use    Vaping status: Never Used   Substance Use Topics    Alcohol use: Not Currently     Alcohol/week: 1.2 - 1.8 oz     Types: 2 - 3 Standard drinks or equivalent per week      Comment: occ    Drug use: Never   [3]   Past Surgical History:  Procedure Laterality Date    HAMMERTOE CORRECTION  2/2/2015    Performed by Abdifatah Orta D.P.M. at SURGERY [facility] ORS    EXOSTOSIS EXCISION  6/3/2011    Performed by ABDIFATAH ORTA at [facility] ORS    LESION EXCISION ORTHO  6/3/2011    Performed by ABDIFATAH ORTA at [facility] ORS    TOE ARTHROPLASTY  6/3/2011    Performed by ABDIFATAH ORTA at [facility] ORS    LUMBAR LAMINECTOMY DISKECTOMY  2005    microscopic    TONSILLECTOMY  1945    adenoidectomy    EYE SURGERY Bilateral     cataracts

## 2025-06-22 DIAGNOSIS — Z86.79 HISTORY OF ATRIAL FIBRILLATION: ICD-10-CM

## 2025-06-23 ENCOUNTER — TELEPHONE (OUTPATIENT)
Dept: CARDIOLOGY | Facility: MEDICAL CENTER | Age: 85
End: 2025-06-23
Payer: MEDICARE

## 2025-06-23 NOTE — TELEPHONE ENCOUNTER
Schedulers: Please contact pt to schedule with BIBIANA or LILIBETH. CONCHIS 3/12/24    Thank you!

## 2025-07-10 NOTE — PROGRESS NOTES
Current Eye Medications:  Preservision twice a day      Subjective:  Complete eye exam. Patient sees Neurologist at [facility] for double vision. Started increased dose of Pyridostigmine and Prednisone 20 mg of prednisone daily for 1 month. Now vision has kind of stabilized. Images cross sometimes, not having now. Has times when in car images are little out of alignment. Vision is very clear with each eye. Did have tired left upper eyelid would droop for a while improved with increase dose of Pyridostigmine. No eye pain or discomfort in either eye.      Objective:  See Ophthalmology Exam.       Assessment:  Gustavo Ramon is a 81 year old male who presents with:   Encounter Diagnoses   Name Primary?     Examination of eyes and vision      Myopia of both eyes      Regular astigmatism of both eyes      Presbyopia        Ocular myasthenia gravis (H) Has follow up appointment with neurology coming up.      Nuclear sclerosis of both eyes      Macular pigment deposit Stable     Retinal hole, left operculated      Horseshoe tear of retina without detachment,od      PVD (posterior vitreous detachment), bilateral        Plan:  Continue taking an eye vitamin with AREDS2 on the packaging (like Preservision, iCaps, or generic). Follow dosing directions on the package.      Glasses prescription given     Continue ocular myasthenia gravis treatment as recommended by your neurologist.     Marjorie Mcclellan MD  [phone]       No chief complaint on file.      HPI:  Farhat Stahl is a 85 y.o. here for Medicare Annual Wellness Visit     Patient Active Problem List    Diagnosis Date Noted    Nasal congestion 05/22/2025    Pneumothorax on right 03/19/2025    GERD (gastroesophageal reflux disease) 03/12/2025    Anemia associated with nutritional deficiency 03/06/2025    Unintentional weight loss 11/27/2024    Chronic atrial fibrillation (HCC) 07/12/2024    Chronic anticoagulation 03/12/2024    Thrombocytopenia (HCC) 11/30/2023    Abnormal EKG 11/07/2023    At risk for falls 11/07/2023    Cellulitis of toe of left foot 06/15/2023    QT prolongation 06/13/2023    CKD (chronic kidney disease) stage 3, GFR 30-59 ml/min 06/13/2023    Chronic pain of right knee 06/16/2022    Cerebral atrophy (HCC) 03/25/2022    Atherosclerosis of aorta (HCC) 03/25/2022    Interstitial lung disease (HCC) 03/25/2022    Nonspecific abnormal cardiovascular function study 03/25/2022    Multiple pulmonary nodules 12/15/2021    Prediabetes 01/07/2021    Pleural effusion 01/07/2021    Osteoarthritis of knee 03/09/2017    Tubular adenoma of colon 07/06/2016    Blackwell's esophagus without dysplasia 07/06/2016    Idiopathic neuropathy 09/23/2015    Acquired hypothyroidism 04/28/2015    Obesity (BMI 30-39.9) 08/22/2014    History of vertigo 05/13/2014    MARCI on CPAP 02/20/2014    Renal cyst 01/22/2014    Sensorineural hearing loss 11/26/2012    History of atrial fibrillation 05/22/2012    Hypertension 09/16/2009    Hyperlipidemia 09/16/2009    Allergic rhinitis 09/16/2009    ACP (advance care planning) 09/16/2009       Current Medications[1]       Current supplements as per medication list.     Allergies: Other environmental    Current social contact/activities: ***   Living Situation (i.e assisted living):  Marital Status:    He  reports that he has never smoked. He has never used smokeless tobacco. He reports that he does not currently use alcohol after a past usage of  about 1.2 - 1.8 oz of alcohol per week. He reports that he does not use drugs.  Counseling given: Not Answered      ROS:    Gait: Uses {DEVICE:17713}  Ostomy: {AWV YES / NO:21192}  Other tubes: {AWV YES / NO:21192}  Amputations: {AWV YES / NO:21192}  Chronic oxygen use: {AWV YES / NO:21192}  Last eye exam: ***  Wears hearing aids: {AWV YES / NO:21192}   : {Urinary leakage?:20840}    Screening:  ***  Depression Screening  Little interest or pleasure in doing things?     Feeling down, depressed , or hopeless?    Trouble falling or staying asleep, or sleeping too much?     Feeling tired or having little energy?     Poor appetite or overeating?     Feeling bad about yourself - or that you are a failure or have let yourself or your family down?    Trouble concentrating on things, such as reading the newspaper or watching television?    Moving or speaking so slowly that other people could have noticed.  Or the opposite - being so fidgety or restless that you have been moving around a lot more than usual?     Thoughts that you would be better off dead, or of hurting yourself?     Patient Health Questionnaire Score:      If depressive symptoms identified deferred to follow up visit unless specifically addressed in assessment and plan.    Interpretation of PHQ-9 Total Score   Score Severity   1-4 No Depression   5-9 Mild Depression   10-14 Moderate Depression   15-19 Moderately Severe Depression   20-27 Severe Depression    Screening for Cognitive Impairment  Do you or any of your friends or family members have any concern about your memory?    Three Minute Recall (Village, Kitchen, Baby)  /3    Shaun clock face with all 12 numbers and set the hands to show 10 minutes past 11.       Cognitive concerns identified deferred for follow up unless specifically addressed in assessment and plan.    Fall Risk Assessment  Has the patient had two or more falls in the last year or any fall with injury in the last year?       Safety  Assessment  Do you always wear your seatbelt?     Any changes to home needed to function safely?    Difficulty hearing.     Patient counseled about all safety risks that were identified.    Functional Assessment ADLs  Are there any barriers preventing you from cooking for yourself or meeting nutritional needs?   .    Are there any barriers preventing you from driving safely or obtaining transportation?   .    Are there any barriers preventing you from using a telephone or calling for help?       Are there any barriers preventing you from shopping?   .    Are there any barriers preventing you from taking care of your own finances?       Are there any barriers preventing you from managing your medications?       Are there any barriers preventing you from showering, bathing or dressing yourself?      Are there any barriers preventing you from doing housework or laundry?      Are there any barriers preventing you from using the toilet?     Are you currently engaging in any exercise or physical activity?   .      Self-Assessment of Health  What is your perception of your health?      Do you sleep more than six hours a night?      In the past 7 days, how much did pain keep you from doing your normal work?      Do you spend quality time with family or friends (virtually or in person)?      Do you usually eat a heart healthy diet that constists of a variety of fruits, vegetables, whole grains and fiber?      Do you eat foods high in fat and/or Fast Food more than three times per week?      How concerned are you that your medical conditions are not being well managed?      Are you worried that in the next 2 months, you may not have stable housing that you own, rent, or stay in as part of a household?          Advance Care Planning  Do you have an Advance Directive, Living Will, Durable Power of , or POLST?                   Health Maintenance Summary            Current Care Gaps       COVID-19 Vaccine (8 - 2024-25  season) Overdue since 4/27/2025      10/27/2024  Imm Admin: COVID-19, mRNA, LNP-S, PF, jeanette-sucrose, 30 mcg/0.3 mL    11/23/2022  Imm Admin: MODERNA BIVALENT BOOSTER SARS-COV-2 VACCINE (6+)    04/09/2022  Imm Admin: MODERNA SARS-COV-2 VACCINE (12+)    11/15/2021  Imm Admin: MODERNA SARS-COV-2 VACCINE (12+)    03/10/2021  Imm Admin: PFIZER PURPLE CAP SARS-COV-2 VACCINATION (12+)     Only the first 5 history entries have been loaded, but more history exists.                    Upcoming       Influenza Vaccine (1) Next due on 9/1/2025      10/27/2024  Imm Admin: Influenza high-dose trivalent (PF)    11/30/2023  Imm Admin: Influenza Vaccine Adult HD    11/29/2023  Imm Admin: Influenza Vaccine Adult HD    11/23/2022  Imm Admin: Influenza, unspecified formulation    11/02/2021  Imm Admin: Influenza Vaccine Adult HD      Only the first 5 history entries have been loaded, but more history exists.              Annual Wellness Visit (Yearly) Next due on 6/13/2026 06/13/2025  Level of Service: ID ANNUAL WELLNESS VISIT-INCLUDES PPPS SUBSEQUE*    03/25/2022  Level of Service: ID ANNUAL WELLNESS VISIT-INCLUDES PPPS SUBSEQUE*    03/24/2022  Outside Procedure: ID ANNUAL WELLNESS VISIT-INCLUDES PPPS SUBSEQUE*    09/15/2021  Level of Service: ID ANNUAL WELLNESS VISIT-INCLUDES PPPS SUBSEQUE*    09/14/2021  Outside Procedure: ID ANNUAL WELLNESS VISIT-INCLUDES PPPS SUBSEQUE*      Only the first 5 history entries have been loaded, but more history exists.              IMM DTaP/Tdap/Td Vaccine (4 - Td or Tdap) Next due on 5/9/2033 05/09/2023  Imm Admin: Tdap Vaccine    05/02/2013  Imm Admin: Tdap Vaccine    09/06/2011  Imm Admin: Tdap Vaccine                      Completed or No Longer Recommended       Zoster (Shingles) Vaccines (Series Information) Completed      11/02/2021  Imm Admin: Zoster Vaccine Recombinant (RZV) (SHINGRIX)    06/10/2021  Imm Admin: Zoster Vaccine Recombinant (RZV) (SHINGRIX)    12/01/2009  Imm Admin:  Zoster Vaccine Live (ZVL) (Zostavax) - HISTORICAL DATA              Pneumococcal Vaccine: 50+ Years (Series Information) Completed      07/05/2016  Imm Admin: Pneumococcal polysaccharide vaccine (PPSV-23)    05/29/2015  Imm Admin: Pneumococcal Conjugate Vaccine (Prevnar/PCV-13)              Hepatitis A Vaccine (Hep A) (Series Information) Aged Out      No completion history exists for this topic.              Hepatitis B Vaccine (Hep B) (Series Information) Aged Out     No completion history exists for this topic.              HPV Vaccines (Series Information) Aged Out     No completion history exists for this topic.              Polio Vaccine (Inactivated Polio) (Series Information) Aged Out     No completion history exists for this topic.              Meningococcal Immunization (Series Information) Aged Out     No completion history exists for this topic.              Meningococcal B Vaccine (Series Information) Aged Out     No completion history exists for this topic.              Colorectal Cancer Screening  Discontinued        Frequency changed to Never automatically (Topic No Longer Applies)    01/23/2022  REFERRAL TO GI FOR COLONOSCOPY    01/23/2022  REFERRAL TO GI FOR COLONOSCOPY    01/12/2022  REFERRAL TO GI FOR COLONOSCOPY    01/12/2022  REFERRAL TO GI FOR COLONOSCOPY     Only the first 5 history entries have been loaded, but more history exists.                          Patient Care Team:  Stefanie Zavaleta M.D. as PCP - General (Internal Medicine)  Umberto Hayes O.D. as Consulting Physician (Optometry)  Sven Bolanos M.D. as Consulting Physician (Gastroenterology)  Radha Cardenas as [facility]   RENAY ChoiPCHAYO (Podiatry)  Basil Alexandre M.D. (Ophthalmology)  Margret Syed M.D. (Pulmonary Medicine)  Shira Galindo M.D. (Dermatology)  [facility] as Home Health Provider  Chastity Jones R.N. as Transitional Care Navigator  Maribel Hardy,  C.N.A.  [facility] home care      Social History[2]  Family History   Problem Relation Age of Onset    Cancer Mother         cervical/breast    Hypertension Father     Cancer Father         lung cancer    Cancer Sister         lung    Cancer Other     Heart Disease Neg Hx     Heart Attack Neg Hx      He  has a past medical history of Anemia associated with nutritional deficiency (03/06/2025), Arrhythmia, Arthritis, Atrial fibrillation (HCC), Bronchitis (2004), Chronic atrial fibrillation (HCC) (07/12/2024), Chronic pain of right knee (06/16/2022), Falls (11/27/2024), Fibula fracture (1981), High cholesterol, Hyperlipidemia, Hypertension, Lab test positive for detection of COVID-19 virus (08/11/2022), [facility] HOME, Multiple pulmonary nodules (12/15/2021), Nasal congestion (05/22/2025), Onychogryposis of toenail (01/21/2021), Onychomycosis (01/21/2021), Pain, Pneumonia (2004), Pneumothorax on right (03/19/2025), Rash (06/16/2022), Sleep apnea, Spontaneous pneumothorax (03/11/2025), Thrombocytopenia (HCC) (11/30/2023), and Unintentional weight loss (11/27/2024).   Past Surgical History[3]    Exam:   There were no vitals taken for this visit. There is no height or weight on file to calculate BMI.    Hearing {GOOD/FAIR/POOR/EXCELLENT:39422}.    Dentition {DENTITION:52408}  Alert, oriented in no acute distress.  Eye contact is good, speech goal directed, affect calm    Assessment and Plan. The following treatment and monitoring plan is recommended:  ***  There are no diagnoses linked to this encounter.    Services suggested: { AWV COORDINATION OF SERVICES:20405}  Health Care Screening: Age-appropriate preventive services recommended by USPTF and ACIP covered by Medicare were discussed today. Services ordered if indicated and agreed upon by the patient.  Referrals offered: Community-based lifestyle interventions to reduce health risks and promote self-management and wellness, fall prevention, nutrition,  physical activity, tobacco-use cessation, weight loss, and mental health services as per orders if indicated.    Discussion today about general wellness and lifestyle habits:    Prevent falls and reduce trip hazards; Cautioned about securing or removing rugs.  Have a working fire alarm and carbon monoxide detector;   Engage in regular physical activity and social activities     Follow-up: No follow-ups on file.       [1]   Current Outpatient Medications   Medication Sig Dispense Refill    apixaban (ELIQUIS) 5mg Tab Take 1 Tablet by mouth 2 times a day. Courtesy refill. Office visit required for further refills. Please call [phone]  Indications: Atrial Fibrillation 200 Tablet 0    omeprazole (PRILOSEC) 20 MG delayed-release capsule Take 20 mg by mouth every day.      fluticasone (FLONASE) 50 MCG/ACT nasal spray Administer 1 Spray into affected nostril(S) every day. 16 g 5    Omega-3 Fatty Acids (FISH OIL) 1200 MG Cap Take 1,200 mg by mouth every day. Indications: supplement      ketotifen (ZADITOR) 0.035 % ophthalmic solution Administer 1 Drop into both eyes as needed (allergies). Indications: Inflammation of Eyelid Lining due to Allergy      levothyroxine (SYNTHROID) 75 MCG Tab TAKE 1 TABLET BY MOUTH EVERY MORNING ON AN EMPTY STOMACH. INDICATIONS: UNDERACTIVE THYROID 100 Tablet 3    Cholecalciferol (D3) 2000 UNIT Tab Take 2,000 Units by mouth every day. Indications: supplement       No current facility-administered medications for this visit.   [2]   Social History  Tobacco Use    Smoking status: Never    Smokeless tobacco: Never   Vaping Use    Vaping status: Never Used   Substance Use Topics    Alcohol use: Not Currently     Alcohol/week: 1.2 - 1.8 oz     Types: 2 - 3 Standard drinks or equivalent per week     Comment: occ    Drug use: Never   [3]   Past Surgical History:  Procedure Laterality Date    HAMMERTOE CORRECTION  2/2/2015    Performed by Newton Orta D.P.M. at [facility]     EXOSTOSIS EXCISION  6/3/2011    Performed by ABDIFATAH YOUNG at SURGERY [facility] ORS    LESION EXCISION ORTHO  6/3/2011    Performed by ABDIFATAH YOUNG at SURGERY [facility]    TOE ARTHROPLASTY  6/3/2011    Performed by ABDIFATAH YOUNG at SURGERY [facility] ORS    LUMBAR LAMINECTOMY DISKECTOMY  2005    microscopic    TONSILLECTOMY  1945    adenoidectomy    EYE SURGERY Bilateral     cataracts

## 2025-07-11 ENCOUNTER — HOSPITAL ENCOUNTER (OUTPATIENT)
Facility: MEDICAL CENTER | Age: 85
End: 2025-07-11
Payer: MEDICARE

## 2025-07-11 DIAGNOSIS — E66.9 OBESITY (BMI 30-39.9): ICD-10-CM

## 2025-07-11 DIAGNOSIS — E78.5 HYPERLIPIDEMIA, UNSPECIFIED HYPERLIPIDEMIA TYPE: ICD-10-CM

## 2025-07-11 LAB
CHOLEST SERPL-MCNC: 152 MG/DL (ref 100–199)
FASTING STATUS PATIENT QL REPORTED: NORMAL
HDLC SERPL-MCNC: 48 MG/DL
LDLC SERPL CALC-MCNC: 90 MG/DL
TRIGL SERPL-MCNC: 71 MG/DL (ref 0–149)

## 2025-07-11 PROCEDURE — 80061 LIPID PANEL: CPT

## 2025-07-11 PROCEDURE — 36415 COLL VENOUS BLD VENIPUNCTURE: CPT

## 2025-07-17 ENCOUNTER — OFFICE VISIT (OUTPATIENT)
Dept: MEDICAL GROUP | Facility: PHYSICIAN GROUP | Age: 85
End: 2025-07-17
Payer: MEDICARE

## 2025-07-17 VITALS
HEIGHT: 72 IN | SYSTOLIC BLOOD PRESSURE: 120 MMHG | WEIGHT: 223.3 LBS | TEMPERATURE: 99.4 F | HEART RATE: 98 BPM | OXYGEN SATURATION: 93 % | BODY MASS INDEX: 30.25 KG/M2 | DIASTOLIC BLOOD PRESSURE: 72 MMHG

## 2025-07-17 DIAGNOSIS — I48.20 CHRONIC ATRIAL FIBRILLATION (HCC): Primary | ICD-10-CM

## 2025-07-17 DIAGNOSIS — E78.49 OTHER HYPERLIPIDEMIA: ICD-10-CM

## 2025-07-17 DIAGNOSIS — J84.9 INTERSTITIAL LUNG DISEASE (HCC): Chronic | ICD-10-CM

## 2025-07-17 DIAGNOSIS — D53.9 ANEMIA ASSOCIATED WITH NUTRITIONAL DEFICIENCY: ICD-10-CM

## 2025-07-17 PROCEDURE — 3078F DIAST BP <80 MM HG: CPT | Performed by: INTERNAL MEDICINE

## 2025-07-17 PROCEDURE — 99214 OFFICE O/P EST MOD 30 MIN: CPT | Performed by: INTERNAL MEDICINE

## 2025-07-17 PROCEDURE — 3074F SYST BP LT 130 MM HG: CPT | Performed by: INTERNAL MEDICINE

## 2025-07-17 ASSESSMENT — FIBROSIS 4 INDEX: FIB4 SCORE: 1.67

## 2025-07-17 NOTE — PROGRESS NOTES
CC: Follow-up lab    HPI:  Farhat presents with the following    1. Chronic atrial fibrillation (HCC)  7/12/2024:Patient with a remote history of atrial fibrillation was seen in the clinic in December for preop evaluation for tooth extraction. On that visit, EKG results showed prolonged QT interval. Cardiology referral was placed. He was seen and evaluated by cardiology and was started on anticoagulation with Eliquis for stroke prevention with a CHADS2 score of 3. Patient underwent repeat myocardial perfusion imaging study which was within normal range. Normal EF 63% with no ischemic changes. Patient discontinued Eliquis.     7/17/2025: Chronic.  Stable.  Patient continues with Eliquis however discontinued his beta-blocker due to side effects.  Patient denies any cardiac symptoms.  He discontinued his Pravachol in March as he has been on this medication since his diagnosis of atrial fibrillation.    2. Anemia associated with nutritional deficiency  5/22/2025:Patient was noted to have lower hemoglobin levels in March. Requesting CBC for follow-up. In March, ferritin is a bit elevated, vitamin B12 and folate within normal limits.   Hemoglobin 13.5 down from 14.4 in July 2024.  MCV RDW within normal limits.  Ferritin slightly elevated at 402.  SPEP within normal limits.  Monoclonal proteins without abnormality.    7/17/2025:.  Follow-up hemoglobin 15.0.  MCV within normal range.    3. Interstitial lung disease (HCC)  Chronic. Stable. Followed by pulmonology. Patient's status post pneumothorax, stable pleural effusion and MARCI now off of CPAP. Patient monitoring his pulse ox at home which is always in the mid 90s. Follow-up chest x-ray showed a stable small pleural effusion left greater than right. Patient is trying to keep active, likes to walk in his backyard. Using a cane however would like to get an electric tricycle for mobility outside.     4. Other hyperlipidemia  Patient discontinued his Pravachol in March.   Patient denies any family history of CVD/CVA.  Total cholesterol 152, up from 116, triglyceride 85, LDL 45, LDL 90 up from 54.      Patient Active Problem List    Diagnosis Date Noted    Nasal congestion 05/22/2025    Pneumothorax on right 03/19/2025    GERD (gastroesophageal reflux disease) 03/12/2025    Anemia associated with nutritional deficiency 03/06/2025    Unintentional weight loss 11/27/2024    Chronic atrial fibrillation (HCC) 07/12/2024    Chronic anticoagulation 03/12/2024    Thrombocytopenia (HCC) 11/30/2023    Abnormal EKG 11/07/2023    At risk for falls 11/07/2023    Cellulitis of toe of left foot 06/15/2023    QT prolongation 06/13/2023    CKD (chronic kidney disease) stage 3, GFR 30-59 ml/min 06/13/2023    Chronic pain of right knee 06/16/2022    Cerebral atrophy (HCC) 03/25/2022    Atherosclerosis of aorta (HCC) 03/25/2022    Interstitial lung disease (HCC) 03/25/2022    Nonspecific abnormal cardiovascular function study 03/25/2022    Multiple pulmonary nodules 12/15/2021    Prediabetes 01/07/2021    Pleural effusion 01/07/2021    Osteoarthritis of knee 03/09/2017    Tubular adenoma of colon 07/06/2016    Blackwell's esophagus without dysplasia 07/06/2016    Idiopathic neuropathy 09/23/2015    Acquired hypothyroidism 04/28/2015    Obesity (BMI 30-39.9) 08/22/2014    History of vertigo 05/13/2014    MARCI on CPAP 02/20/2014    Renal cyst 01/22/2014    Sensorineural hearing loss 11/26/2012    History of atrial fibrillation 05/22/2012    Hypertension 09/16/2009    Hyperlipidemia 09/16/2009    Allergic rhinitis 09/16/2009    ACP (advance care planning) 09/16/2009       Current Medications[1]      Allergies as of 07/17/2025 - Reviewed 07/17/2025   Allergen Reaction Noted    Other environmental Runny Nose and Itching 06/01/2011        Social History     Socioeconomic History    Marital status:      Spouse name: Not on file    Number of children: Not on file    Years of education: Not on file     Highest education level: Not on file   Occupational History    Not on file   Tobacco Use    Smoking status: Never    Smokeless tobacco: Never   Vaping Use    Vaping status: Never Used   Substance and Sexual Activity    Alcohol use: Not Currently     Alcohol/week: 1.2 - 1.8 oz     Types: 2 - 3 Standard drinks or equivalent per week     Comment: occ    Drug use: Never    Sexual activity: Not Currently   Other Topics Concern    Not on file   Social History Narrative    Not on file     Social Drivers of Health     Financial Resource Strain: Low Risk  (6/13/2025)    Overall Financial Resource Strain (CARDIA)     Difficulty of Paying Living Expenses: Not hard at all   Food Insecurity: No Food Insecurity (6/13/2025)    Hunger Vital Sign     Worried About Running Out of Food in the Last Year: Never true     Ran Out of Food in the Last Year: Never true   Transportation Needs: No Transportation Needs (6/13/2025)    PRAPARE - Transportation     Lack of Transportation (Medical): No     Lack of Transportation (Non-Medical): No   Physical Activity: Not on file   Stress: Not on file   Social Connections: Feeling Socially Integrated (4/16/2025)    OASIS : Social Isolation     Frequency of experiencing loneliness or isolation: Never   Intimate Partner Violence: Not At Risk (3/12/2025)    Humiliation, Afraid, Rape, and Kick questionnaire     Fear of Current or Ex-Partner: No     Emotionally Abused: No     Physically Abused: No     Sexually Abused: No   Housing Stability: Low Risk  (6/13/2025)    Housing Stability Vital Sign     Unable to Pay for Housing in the Last Year: No     Number of Times Moved in the Last Year: 1     Homeless in the Last Year: No       Family History   Problem Relation Age of Onset    Cancer Mother         cervical/breast    Hypertension Father     Cancer Father         lung cancer    Cancer Sister         lung    Cancer Other     Heart Disease Neg Hx     Heart Attack Neg Hx        Past Surgical  History[2]    ROS:  Denies any Headache,Chest pain,  Shortness of breath,  Abdominal pain, Changes of bowel or bladder, Lower ext edema, Fevers, Nights sweats, Weight Changes, Focal weakness or numbness.  All other systems are negative.    /72   Pulse 98   Temp 37.4 °C (99.4 °F) (Temporal)   Ht 1.829 m (6')   Wt 101 kg (223 lb 4.8 oz)   SpO2 93%   BMI 30.28 kg/m²      Constitutional: Alert, no distress, well-groomed.  Skin: Warm, dry, good turgor, no rashes in visible areas.  Eye: Equal, round and reactive, conjunctiva clear, lids normal.  ENMT: Lips without lesions, good dentition, moist mucous membranes.  Neck: Trachea midline, no masses, no thyromegaly.  Respiratory: Unlabored respiratory effort, no cough.  Abdomen: Soft, no gross masses.  MSK: Normal gait, moves all extremities.  Neuro: Grossly non-focal. No cranial nerve deficit. Strength and sensation intact.   Psych: Alert and oriented x3, normal affect and mood.      Assessment and Plan.   85 y.o. male presenting with the following.     1. Chronic atrial fibrillation (HCC) (Primary)  Patient to keep appointment with cardiology on July 29.  Continue Eliquis.  - CT-HEART W/O CONT EVAL CALCIUM (CARDIAC SCORING); Future    2. Anemia associated with nutritional deficiency  Resolved.    3. Interstitial lung disease (HCC)  Chronic.  Stable.  Continue to monitor.    4. Other hyperlipidemia  Discussed the risks and benefits of statins.    - CT-HEART W/O CONT EVAL CALCIUM (CARDIAC SCORING); Future         [1]   Current Outpatient Medications   Medication Sig Dispense Refill    apixaban (ELIQUIS) 5mg Tab Take 1 Tablet by mouth 2 times a day. Courtesy refill. Office visit required for further refills. Please call [phone]  Indications: Atrial Fibrillation 200 Tablet 0    omeprazole (PRILOSEC) 20 MG delayed-release capsule Take 20 mg by mouth every day.      fluticasone (FLONASE) 50 MCG/ACT nasal spray Administer 1 Spray into affected nostril(S) every  day. 16 g 5    Omega-3 Fatty Acids (FISH OIL) 1200 MG Cap Take 1,200 mg by mouth every day. Indications: supplement      ketotifen (ZADITOR) 0.035 % ophthalmic solution Administer 1 Drop into both eyes as needed (allergies). Indications: Inflammation of Eyelid Lining due to Allergy      levothyroxine (SYNTHROID) 75 MCG Tab TAKE 1 TABLET BY MOUTH EVERY MORNING ON AN EMPTY STOMACH. INDICATIONS: UNDERACTIVE THYROID 100 Tablet 3    Cholecalciferol (D3) 2000 UNIT Tab Take 2,000 Units by mouth every day. Indications: supplement       No current facility-administered medications for this visit.   [2]   Past Surgical History:  Procedure Laterality Date    HAMMERTOE CORRECTION  2/2/2015    Performed by Abdifatah Orta, D.P.M. at SURGERY [facility] ORS    EXOSTOSIS EXCISION  6/3/2011    Performed by ABDIFATAH ORTA at [facility] ORS    LESION EXCISION ORTHO  6/3/2011    Performed by ABDIFATAH ORTA at [facility] ORS    TOE ARTHROPLASTY  6/3/2011    Performed by ABDIFATAH ORTA at [facility] ORS    LUMBAR LAMINECTOMY DISKECTOMY  2005    microscopic    TONSILLECTOMY  1945    adenoidectomy    EYE SURGERY Bilateral     cataracts

## 2025-07-23 NOTE — PROGRESS NOTES
Chief Complaint   Patient presents with    Atrial Fibrillation     Fv. Dx History of atrial fibrillation     Subjective     Ionia Liam Stahl is a 85 y.o. male who presents today for follow up of abnormal EKG, atrial fibrillation, medication change evaluation. He is a patient of Dr. Melo and his last clinic visit was March 2024.    Since the patient's last visit, he has been doing well clinically. He remains on Eliquis and is tolerating this medication without side effects. He underwent unremarkable cardiac studies. Over the last year, he states that he has lost 50 pounds. He eliminated drinking from his diet. He moves around with a cane or a walker. He uses a recumbent bicycle for exercise. He gets his meals delivered by [employer] and enjoys them.     The patient denies any chest pain, pressure, tightness or discomfort; no palpitations; breathing is stable with no orthopnea or PND; no dizziness or syncope; no LE edema.     Past Medical History:   Diagnosis Date    Anemia associated with nutritional deficiency 03/06/2025    Arrhythmia     Atrial fribrillation    Arthritis     Atrial fibrillation (HCC)     Bronchitis 2004    Chronic atrial fibrillation (HCC) 07/12/2024    Chronic pain of right knee 06/16/2022    Falls 11/27/2024    Fibula fracture 1981    right    High cholesterol     Hyperlipidemia     Hypertension     Lab test positive for detection of COVID-19 virus 08/11/2022    [facility] HOME     Multiple pulmonary nodules 12/15/2021    Nasal congestion 05/22/2025    Onychogryposis of toenail 01/21/2021    Onychomycosis 01/21/2021    Pain     low back    Pneumonia 2004    Pneumothorax on right 03/19/2025    Rash 06/16/2022    Sleep apnea     Spontaneous pneumothorax 03/11/2025    Thrombocytopenia (HCC) 11/30/2023    Unintentional weight loss 11/27/2024     Past Surgical History:   Procedure Laterality Date    HAMMERTOE CORRECTION  2/2/2015    Performed by RENAY ChoiPCHAYO at [facility]    EXOSTOSIS EXCISION  6/3/2011    Performed by ABDIFATAH YOUNG at SURGERY [facility] ORS    LESION EXCISION ORTHO  6/3/2011    Performed by ABDIFATAH YOUNG at SURGERY [facility] ORS    TOE ARTHROPLASTY  6/3/2011    Performed by ABDIFATAH YOUNG at SURGERY [facility] ORS    LUMBAR LAMINECTOMY DISKECTOMY  2005    microscopic    TONSILLECTOMY  1945    adenoidectomy    EYE SURGERY Bilateral     cataracts     Family History   Problem Relation Age of Onset    Cancer Mother         cervical/breast    Hypertension Father     Cancer Father         lung cancer    Cancer Sister         lung    Cancer Other     Heart Disease Neg Hx     Heart Attack Neg Hx      Social History     Socioeconomic History    Marital status:      Spouse name: Not on file    Number of children: Not on file    Years of education: Not on file    Highest education level: Not on file   Occupational History    Not on file   Tobacco Use    Smoking status: Never    Smokeless tobacco: Never   Vaping Use    Vaping status: Never Used   Substance and Sexual Activity    Alcohol use: Not Currently     Alcohol/week: 1.2 - 1.8 oz     Types: 2 - 3 Standard drinks or equivalent per week     Comment: occ    Drug use: Never    Sexual activity: Not Currently   Other Topics Concern    Not on file   Social History Narrative    Not on file     Social Drivers of Health     Financial Resource Strain: Low Risk  (6/13/2025)    Overall Financial Resource Strain (CARDIA)     Difficulty of Paying Living Expenses: Not hard at all   Food Insecurity: No Food Insecurity (6/13/2025)    Hunger Vital Sign     Worried About Running Out of Food in the Last Year: Never true     Ran Out of Food in the Last Year: Never true   Transportation Needs: No Transportation Needs (6/13/2025)    PRAPARE - Transportation     Lack of Transportation (Medical): No     Lack of Transportation (Non-Medical): No   Physical Activity: Not on file   Stress: Not on file    Social Connections: Feeling Socially Integrated (4/16/2025)    OASIS : Social Isolation     Frequency of experiencing loneliness or isolation: Never   Intimate Partner Violence: Not At Risk (3/12/2025)    Humiliation, Afraid, Rape, and Kick questionnaire     Fear of Current or Ex-Partner: No     Emotionally Abused: No     Physically Abused: No     Sexually Abused: No   Housing Stability: Low Risk  (6/13/2025)    Housing Stability Vital Sign     Unable to Pay for Housing in the Last Year: No     Number of Times Moved in the Last Year: 1     Homeless in the Last Year: No     Allergies   Allergen Reactions    Other Environmental Runny Nose and Itching     [place]     (Medications reviewed.)  Outpatient Encounter Medications as of 7/10/2025   Medication Sig Dispense Refill    apixaban (ELIQUIS) 5mg Tab Take 1 Tablet by mouth 2 times a day. Courtesy refill. Office visit required for further refills. Please call [phone]  Indications: Atrial Fibrillation 200 Tablet 0    omeprazole (PRILOSEC) 20 MG delayed-release capsule Take 20 mg by mouth every day.      fluticasone (FLONASE) 50 MCG/ACT nasal spray Administer 1 Spray into affected nostril(S) every day. 16 g 5    Omega-3 Fatty Acids (FISH OIL) 1200 MG Cap Take 1,200 mg by mouth every day. Indications: supplement      ketotifen (ZADITOR) 0.035 % ophthalmic solution Administer 1 Drop into both eyes as needed (allergies). Indications: Inflammation of Eyelid Lining due to Allergy      levothyroxine (SYNTHROID) 75 MCG Tab TAKE 1 TABLET BY MOUTH EVERY MORNING ON AN EMPTY STOMACH. INDICATIONS: UNDERACTIVE THYROID 100 Tablet 3    Cholecalciferol (D3) 2000 UNIT Tab Take 2,000 Units by mouth every day. Indications: supplement       No facility-administered encounter medications on file as of 7/10/2025.     Review of Systems   Constitutional: Negative.  Negative for chills, fever, malaise/fatigue and weight loss.   HENT: Negative.  Negative for hearing loss.    Eyes:  Negative.  Negative for blurred vision and double vision.   Respiratory: Negative.  Negative for cough and shortness of breath.    Cardiovascular: Negative.  Negative for chest pain, palpitations, claudication and leg swelling.   Gastrointestinal: Negative.  Negative for abdominal pain, nausea and vomiting.   Genitourinary: Negative.  Negative for dysuria and urgency.   Musculoskeletal: Negative.  Negative for joint pain and myalgias.   Skin: Negative.  Negative for itching and rash.   Neurological: Negative.  Negative for dizziness, focal weakness, weakness and headaches.   Endo/Heme/Allergies: Negative.  Does not bruise/bleed easily.   Psychiatric/Behavioral: Negative.  Negative for depression. The patient is not nervous/anxious.      Objective     There were no vitals taken for this visit.    Physical Exam  Constitutional:       Appearance: Normal appearance. He is well-developed and normal weight.   HENT:      Head: Normocephalic and atraumatic.   Neck:      Vascular: No JVD.   Cardiovascular:      Rate and Rhythm: Normal rate and regular rhythm.      Heart sounds: Normal heart sounds.   Pulmonary:      Effort: Pulmonary effort is normal.      Breath sounds: Normal breath sounds.   Abdominal:      General: Bowel sounds are normal.      Palpations: Abdomen is soft.      Comments: No hepatosplenomegaly.   Musculoskeletal:         General: Normal range of motion.   Lymphadenopathy:      Cervical: No cervical adenopathy.   Skin:     General: Skin is warm and dry.   Neurological:      Mental Status: He is alert and oriented to person, place, and time.        CARDIAC STUDIES/PROCEDURES:     ANKLE BRACHIAL INDEX (06/15/23)  No prior exams.  The ankle pressures are not accurately measured due to calcification and noncompressibility of the tibial vessels.     CAROTID ULTRASOUND (01/20/14)  There is a mild amount of soft plaque in both carotid arterial systems with normal velocities.   There is estimated less than 50%  stenosis.     CTA OF CHEST (06/14/23)  Thoracic aorta and great vessels:  No aneurysm.  Heart and pericardium:   No significant coronary artery calcification. No pericardial effusion.  (study result reviewed)     ECHOCARDIOGRAM CONCLUSIONS (06/13/23)  Normal left ventricular systolic function.  The left ventricular ejection fraction is visually estimated to be 60-65%.  Normal diastolic function.  The right ventricle is upper normal in size and systolic function.  Estimated right ventricular systolic pressure is 18 mmHg.  The left atrium is normal in size.  Normal left ventricular systolic function.  No significant valvular abnormalities.   Normal inferior vena cava size and inspiratory collapse.    EKG performed on (12/04/23) EKG shows sinus rhythm with anterior Q waves.   EKG performed on (11/07/23) EKG shows sinus bradycardia with anterior Q waves with QTc of 416 ms.     Laboratory results of (06/23/23) Cholesterol profile of 113/88/37/58 mg/dL noted.      MYOCARDIAL PERFUSION STUDY CONCLUSIONS (12/08/23)  NUCLEAR IMAGING INTERPRETATION  Normal Lexiscan myocardial perfusion study.  No evidence of ischemia or infarct.  SDS 1.  LVEF 63%.  No ischemic changes with Regadenoson.  No arrhythmias.  No chest pain.  ECG INTERPRETATION  Negative stress ECG for ischemia.  (study result reviewed)    MYOCARDIAL PERFUSION STUDY CONCLUSIONS (05/19/12)  1. Lexiscan tolerated well.   2. Myocardial perfusion images with some subtle ischemia of the lateral wall.       3. Ejection fraction and wall motion as above.   4. Raw data as noted above.     Assessment & Plan     1. Chronic atrial fibrillation (HCC)  EKG    Comp Metabolic Panel    CBC WITHOUT DIFFERENTIAL    Lipid Profile    TSH WITH REFLEX TO FT4      2. History of atrial fibrillation  apixaban (ELIQUIS) 5 MG Tab      3. Primary hypertension  Lipid Profile    TSH WITH REFLEX TO FT4      4. Dyslipidemia  Comp Metabolic Panel    Lipid Profile    TSH WITH REFLEX TO FT4      5.  Abnormal EKG          Medical Decision Making: Today's Assessment/Status/Plan:     Atrial fibrillation (05/2010) on anticoagulation therapy (Eliquis): Patient with a remote history of atrial fibrillation was seen in the clinic in December for preop evaluation for tooth extraction. EKG results today showed prolonged QT interval, normal sinus rhythm. CHADS2 score of 3. He discontinued his beta-blocker due to side effects.    Hypertension: Blood pressure is well controlled. He has lost 50 pounds over the last year and no longer needs his CPAP.  Hyperlipidemia: LDL 90. He discontinued his Pravachol in March as he has been on this medication since his diagnosis of atrial fibrillation.Repeat lipid panel in January.  Additional information: He is retired  from [employer] and was involved in [employer]. His wife suffered stroke in 2018 and is suffering with aggressive personality.      See Dr. Melo in 6 months, sooner if necessary. Get labs done before that appointment.

## 2025-07-29 ENCOUNTER — OFFICE VISIT (OUTPATIENT)
Dept: CARDIOLOGY | Facility: MEDICAL CENTER | Age: 85
End: 2025-07-29
Attending: NURSE PRACTITIONER
Payer: MEDICARE

## 2025-07-29 VITALS
HEART RATE: 83 BPM | BODY MASS INDEX: 30.45 KG/M2 | HEIGHT: 72 IN | RESPIRATION RATE: 22 BRPM | DIASTOLIC BLOOD PRESSURE: 70 MMHG | WEIGHT: 224.8 LBS | OXYGEN SATURATION: 93 % | SYSTOLIC BLOOD PRESSURE: 130 MMHG

## 2025-07-29 DIAGNOSIS — I10 PRIMARY HYPERTENSION: ICD-10-CM

## 2025-07-29 DIAGNOSIS — R94.31 ABNORMAL EKG: ICD-10-CM

## 2025-07-29 DIAGNOSIS — I48.20 CHRONIC ATRIAL FIBRILLATION (HCC): Primary | ICD-10-CM

## 2025-07-29 DIAGNOSIS — E78.5 DYSLIPIDEMIA: ICD-10-CM

## 2025-07-29 DIAGNOSIS — Z86.79 HISTORY OF ATRIAL FIBRILLATION: ICD-10-CM

## 2025-07-29 LAB — EKG IMPRESSION: NORMAL

## 2025-07-29 PROCEDURE — 93005 ELECTROCARDIOGRAM TRACING: CPT | Mod: TC | Performed by: NURSE PRACTITIONER

## 2025-07-29 ASSESSMENT — FIBROSIS 4 INDEX: FIB4 SCORE: 1.67

## 2025-07-31 ENCOUNTER — APPOINTMENT (OUTPATIENT)
Dept: RADIOLOGY | Facility: MEDICAL CENTER | Age: 85
End: 2025-07-31
Attending: INTERNAL MEDICINE
Payer: MEDICARE

## 2025-08-14 RX ORDER — LEVOTHYROXINE SODIUM 75 UG/1
75 TABLET ORAL
Qty: 100 TABLET | Refills: 1 | Status: SHIPPED | OUTPATIENT
Start: 2025-08-14

## 2025-08-20 RX ORDER — OMEPRAZOLE 20 MG/1
20 CAPSULE, DELAYED RELEASE ORAL DAILY
Qty: 100 CAPSULE | Refills: 3 | Status: SHIPPED | OUTPATIENT
Start: 2025-08-20